# Patient Record
Sex: FEMALE | Race: WHITE | Employment: UNEMPLOYED | URBAN - METROPOLITAN AREA
[De-identification: names, ages, dates, MRNs, and addresses within clinical notes are randomized per-mention and may not be internally consistent; named-entity substitution may affect disease eponyms.]

---

## 2017-09-22 ENCOUNTER — HOSPITAL ENCOUNTER (OUTPATIENT)
Dept: WOUND CARE | Age: 67
Discharge: HOME | End: 2017-09-22
Payer: MEDICARE

## 2017-09-22 ENCOUNTER — HOSPITAL ENCOUNTER (OUTPATIENT)
Dept: GENERAL RADIOLOGY | Age: 67
Discharge: HOME | End: 2017-09-22
Payer: MEDICARE

## 2017-09-22 VITALS
BODY MASS INDEX: 42.59 KG/M2 | RESPIRATION RATE: 18 BRPM | TEMPERATURE: 97.6 F | SYSTOLIC BLOOD PRESSURE: 182 MMHG | DIASTOLIC BLOOD PRESSURE: 86 MMHG | HEIGHT: 66 IN | WEIGHT: 265 LBS | HEART RATE: 64 BPM

## 2017-09-22 DIAGNOSIS — E08.51 DIABETES MELLITUS DUE TO UNDERLYING CONDITION WITH DIABETIC PERIPHERAL ANGIOPATHY WITHOUT GANGRENE, WITHOUT LONG-TERM CURRENT USE OF INSULIN (HCC): Chronic | ICD-10-CM

## 2017-09-22 DIAGNOSIS — T81.32XA: Primary | ICD-10-CM

## 2017-09-22 DIAGNOSIS — M86.172 OSTEOMYELITIS OF ANKLE OR FOOT, LEFT, ACUTE (HCC): Chronic | ICD-10-CM

## 2017-09-22 DIAGNOSIS — T81.32XA: ICD-10-CM

## 2017-09-22 DIAGNOSIS — I73.9 PERIPHERAL ANGIOPATHY (HCC): Chronic | ICD-10-CM

## 2017-09-22 PROBLEM — T81.89XA NON-HEALING SURGICAL WOUND: Status: ACTIVE | Noted: 2017-09-22

## 2017-09-22 PROBLEM — E11.51: Chronic | Status: ACTIVE | Noted: 2017-09-22

## 2017-09-22 PROCEDURE — 87186 SC STD MICRODIL/AGAR DIL: CPT

## 2017-09-22 PROCEDURE — 87070 CULTURE OTHR SPECIMN AEROBIC: CPT

## 2017-09-22 PROCEDURE — 73620 X-RAY EXAM OF FOOT: CPT

## 2017-09-22 PROCEDURE — 99203 OFFICE O/P NEW LOW 30 MIN: CPT

## 2017-09-22 PROCEDURE — 11042 DBRDMT SUBQ TIS 1ST 20SQCM/<: CPT

## 2017-09-22 PROCEDURE — 87205 SMEAR GRAM STAIN: CPT

## 2017-09-22 PROCEDURE — 87077 CULTURE AEROBIC IDENTIFY: CPT

## 2017-09-22 RX ORDER — LEVOTHYROXINE SODIUM 0.12 MG/1
125 TABLET ORAL DAILY
COMMUNITY

## 2017-09-22 RX ORDER — VALSARTAN AND HYDROCHLOROTHIAZIDE 160; 25 MG/1; MG/1
1 TABLET ORAL DAILY
COMMUNITY
End: 2018-01-12

## 2017-09-22 RX ORDER — ALPRAZOLAM 0.5 MG/1
0.5 TABLET ORAL 3 TIMES DAILY
Status: ON HOLD | COMMUNITY
End: 2019-04-20 | Stop reason: HOSPADM

## 2017-09-22 RX ORDER — METOPROLOL SUCCINATE 100 MG/1
100 TABLET, EXTENDED RELEASE ORAL DAILY
COMMUNITY
End: 2022-06-09

## 2017-09-22 RX ORDER — INSULIN GLARGINE 300 U/ML
34 INJECTION, SOLUTION SUBCUTANEOUS NIGHTLY
Status: ON HOLD | COMMUNITY
End: 2019-04-19 | Stop reason: SDUPTHER

## 2017-09-22 RX ORDER — OXYCODONE HYDROCHLORIDE AND ACETAMINOPHEN 5; 325 MG/1; MG/1
1 TABLET ORAL EVERY 4 HOURS PRN
COMMUNITY
End: 2017-11-17

## 2017-09-22 RX ORDER — TRAZODONE HYDROCHLORIDE 150 MG/1
150 TABLET ORAL NIGHTLY
Status: ON HOLD | COMMUNITY
End: 2019-04-20 | Stop reason: HOSPADM

## 2017-09-24 LAB
GRAM STAIN RESULT: ABNORMAL
ORGANISM: ABNORMAL
ORGANISM: ABNORMAL
WOUND/ABSCESS: ABNORMAL

## 2017-09-26 RX ORDER — CIPROFLOXACIN 500 MG/1
500 TABLET, FILM COATED ORAL 2 TIMES DAILY
Qty: 20 TABLET | Refills: 0 | Status: SHIPPED | OUTPATIENT
Start: 2017-09-26 | End: 2017-10-06

## 2017-09-26 RX ORDER — GREEN TEA/HOODIA GORDONII 315-12.5MG
1 CAPSULE ORAL DAILY
Qty: 60 TABLET | Refills: 0 | Status: SHIPPED | OUTPATIENT
Start: 2017-09-26

## 2017-09-27 ENCOUNTER — TELEPHONE (OUTPATIENT)
Dept: WOUND CARE | Age: 67
End: 2017-09-27

## 2017-09-29 ENCOUNTER — HOSPITAL ENCOUNTER (OUTPATIENT)
Dept: WOUND CARE | Age: 67
Discharge: HOME | End: 2017-09-29
Payer: MEDICARE

## 2017-09-29 VITALS — HEART RATE: 65 BPM | SYSTOLIC BLOOD PRESSURE: 193 MMHG | DIASTOLIC BLOOD PRESSURE: 80 MMHG | TEMPERATURE: 97.4 F

## 2017-09-29 PROCEDURE — 11042 DBRDMT SUBQ TIS 1ST 20SQCM/<: CPT

## 2017-10-03 ENCOUNTER — HOSPITAL ENCOUNTER (OUTPATIENT)
Dept: MRI IMAGING | Age: 67
Discharge: HOME | End: 2017-10-03
Payer: MEDICARE

## 2017-10-03 ENCOUNTER — HOSPITAL ENCOUNTER (OUTPATIENT)
Dept: ULTRASOUND IMAGING | Age: 67
Discharge: HOME | End: 2017-10-03
Payer: MEDICARE

## 2017-10-03 DIAGNOSIS — T81.32XA: ICD-10-CM

## 2017-10-03 PROCEDURE — 93926 LOWER EXTREMITY STUDY: CPT

## 2017-10-03 PROCEDURE — 73718 MRI LOWER EXTREMITY W/O DYE: CPT

## 2017-10-06 ENCOUNTER — HOSPITAL ENCOUNTER (OUTPATIENT)
Dept: WOUND CARE | Age: 67
Discharge: HOME | End: 2017-10-06
Payer: MEDICARE

## 2017-10-06 VITALS
HEIGHT: 66 IN | HEART RATE: 67 BPM | BODY MASS INDEX: 44.2 KG/M2 | TEMPERATURE: 97.5 F | WEIGHT: 275 LBS | RESPIRATION RATE: 16 BRPM | DIASTOLIC BLOOD PRESSURE: 70 MMHG | SYSTOLIC BLOOD PRESSURE: 185 MMHG

## 2017-10-06 DIAGNOSIS — L97.425: Chronic | ICD-10-CM

## 2017-10-06 PROCEDURE — 11042 DBRDMT SUBQ TIS 1ST 20SQCM/<: CPT

## 2017-10-06 RX ORDER — OXYCODONE HYDROCHLORIDE AND ACETAMINOPHEN 5; 325 MG/1; MG/1
1 TABLET ORAL EVERY 6 HOURS PRN
Qty: 28 TABLET | Refills: 0 | Status: SHIPPED | OUTPATIENT
Start: 2017-10-06 | End: 2017-10-13

## 2017-10-06 ASSESSMENT — PAIN SCALES - GENERAL: PAINLEVEL_OUTOF10: 2

## 2017-10-06 ASSESSMENT — PAIN DESCRIPTION - LOCATION: LOCATION: FOOT

## 2017-10-06 ASSESSMENT — PAIN DESCRIPTION - ORIENTATION: ORIENTATION: LEFT

## 2017-10-06 ASSESSMENT — PAIN DESCRIPTION - DESCRIPTORS: DESCRIPTORS: SORE

## 2017-10-06 NOTE — PLAN OF CARE
Problem: Wound:  Goal: Will show signs of wound healing; wound closure and no evidence of infection  Will show signs of wound healing; wound closure and no evidence of infection  Outcome: Ongoing

## 2017-10-06 NOTE — PROGRESS NOTES
[address] [address]                                                   Progress Note and Procedure Note      Jl Johnston  MEDICAL RECORD NUMBER:  [id number]  AGE: 79 y.o. GENDER: female  : 1950  EPISODE DATE:  10/6/2017    Subjective:     Chief Complaint   Patient presents with    Wound Check     left foot         HISTORY of PRESENT ILLNESS HPI     Jl Johnston is a 79 y.o. female who presents today for wound/ulcer evaluation. History of Wound Context: Non-healing surgical site of the left foot secondary to neuroma excison. The wound is improving. She denies fever, chills, nausea, or vomiting. Admits to pain, reports the pain is so bad at night she has to take Percocet in order to sleep. MRI showed cutaneous defect with small underlying fluid collection near the distal 4th metatarsal without obvious cortical erosion or abnormal marrow signal, however fluid collection is not impressive to suggest an abscess. Arterial duplex showed no findings suggestive of arterial stenosis or occlusion, mild to moderately diminished waveforms noted distally, consistent with distal atherosclerotic disease. Wound/Ulcer Pain Timing/Severity: intermittent  Quality of pain: aching  Severity:  3 / 10   Modifying Factors: Pain worsens with walking  Associated Signs/Symptoms: edema, drainage and numbness    Ulcer Identification:  Ulcer Type: diabetic, non-healing surgical and neuropathic  Contributing Factors: edema, diabetes and decreased tissue oxygenation    Wound: N/A     Labs: Wound culture was positive for Ecoli and Klebsiella. She was placed on ten days of cipro and acidophilus      PAST MEDICAL HISTORY        Diagnosis Date    Bronchitis     Diabetes mellitus ([address]     Hypercholesterolemia     Hypertension     Thyroid disease        PAST SURGICAL HISTORY    Past Surgical History:   Procedure Laterality Date    APPENDECTOMY      HERNIA REPAIR      ORTHOPEDIC SURGERY Left 2017    broken left foot.  surgeries x3  TONSILLECTOMY N/A        FAMILY HISTORY    Family History   Problem Relation Age of Onset    Cancer Father        SOCIAL HISTORY    Social History   Substance Use Topics    Smoking status: Never Smoker    Smokeless tobacco: None    Alcohol use No       ALLERGIES    Allergies   Allergen Reactions    Sulfa Antibiotics Rash       MEDICATIONS    Current Outpatient Prescriptions on File Prior to Encounter   Medication Sig Dispense Refill    ciprofloxacin (CIPRO) 500 MG tablet Take 1 tablet by mouth 2 times daily for 10 days 20 tablet 0    Lactobacillus (PROBIOTIC ACIDOPHILUS) TABS Take 1 tablet by mouth daily 60 tablet 0    oxyCODONE-acetaminophen (PERCOCET) 5-325 MG per tablet Take 1 tablet by mouth every 4 hours as needed for Pain .  metoprolol succinate (TOPROL XL) 100 MG extended release tablet Take 100 mg by mouth daily      Multiple Vitamins-Minerals (CENTRUM SILVER PO) Take 1 tablet by mouth daily      levothyroxine (SYNTHROID) 125 MCG tablet Take 125 mcg by mouth Daily      valsartan-hydrochlorothiazide (DIOVAN-HCT) 160-25 MG per tablet Take 1 tablet by mouth daily      traZODone (DESYREL) 150 MG tablet Take 150 mg by mouth nightly      metFORMIN (GLUCOPHAGE) 1000 MG tablet Take 1,000 mg by mouth 2 times daily (with meals)      ALPRAZolam (XANAX) 0.5 MG tablet Take 0.5 mg by mouth three times daily      insulin glargine (TOUJEO SOLOSTAR) 300 UNIT/ML injection pen Inject 38 Units into the skin nightly        No current facility-administered medications on file prior to encounter. REVIEW OF SYSTEMS    Pertinent items are noted in HPI. Objective:      BP (!) 185/70  Pulse 67  Temp 97.5 °F (36.4 °C) (Oral)   Resp 16  Ht 5' 6\" (1.676 m)  Wt 275 lb (124.7 kg)  BMI 44.39 kg/m2    Wt Readings from Last 3 Encounters:   10/06/17 275 lb (124.7 kg)   09/22/17 265 lb (120.2 kg)       PHYSICAL EXAM    Constitutional:   Well nourished and well developed. Appears neat and clean.   Patient Debridement except measurements:    Wound 09/22/17 Surgical dehiscence Foot Left;Dorsal #1 (Active)   Wound Type Wound 10/6/2017  9:21 AM   Wound Other 10/6/2017  9:21 AM   Wound Cleansed Rinsed/Irrigated with saline 10/6/2017  9:21 AM   Wound Length (cm) 2.4 cm 10/6/2017  9:21 AM   Wound Width (cm) 0.4 cm 10/6/2017  9:21 AM   Wound Depth (cm)  1.7 10/6/2017  9:21 AM   Calculated Wound Size (cm^2) (l*w) 0.96 cm^2 10/6/2017  9:21 AM   Change in Wound Size % (l*w) 47.25 10/6/2017  9:21 AM   Distance Tunneling (cm) 1.6 cm 9/29/2017  9:11 AM   Tunneling Position ___ O'Clock 6 9/29/2017  9:11 AM   Undermining Starts ___ O'Clock 3 10/6/2017  9:21 AM   Undermining Ends___ O'Clock 5 10/6/2017  9:21 AM   Undermining Maxium Distance (cm) 1 10/6/2017  9:21 AM   Margins Defined edges 9/22/2017 11:19 AM   Exposed structure Tendon 10/6/2017  9:21 AM   Debby-wound Assessment Dry; Intact; Pink 10/6/2017  9:21 AM   Non-staged Wound Description Full thickness 10/6/2017  9:21 AM   [place]%Wound Bed 30 9/29/2017  9:11 AM   Red%Wound Bed 40 10/6/2017  9:21 AM   Yellow%Wound Bed 60 10/6/2017  9:21 AM   Drainage Amount Moderate 10/6/2017  9:21 AM   Drainage Description Yellow 10/6/2017  9:21 AM   Odor None 10/6/2017  9:21 AM   Op First Treatment Date 09/22/17 9/22/2017 11:19 AM   Number of days:13     Percent of Wound/Ulcer Debrided: 100%    Total Surface Area Debrided:  0.96 sq cm     Diabetic/Pressure/Non Pressure Ulcers:  Ulcer: Non-Pressure ulcer, fat layer exposed      Bleeding:  Minimal    Hemostasis Achieved:  by pressure    Procedural Pain:  3  / 10     Post Procedural Pain:  1 / 10     Response to treatment:  Well tolerated by patient. Plan:     Patient will be put back on wound vac for two weeks. If no improvement seen after two weeks, will discontinue wound vac. Plan for graft application. Another culture to be done prior to graft application.     Treatment Note please see attached Discharge Instructions    In my professional Doctor or go to the nearest emergency room. Keep next scheduled appointment. Joseline Aden give 24 hour notice if unable to keep appointment. [phone]      Patient Signature:_______________________   Michelle Kin  Nurse Signature_________________  Michelle Kin  Date: ___________ Time:  ____________            PLEASE NOTE: IF YOU ARE UNABLE TO OBTAIN WOUND SUPPLIES, CONTINUE TO USE THE SUPPLIES YOU HAVE AVAILABLE UNTIL YOU ARE ABLE TO REACH US. IT IS MOST IMPORTANT TO KEEP THE WOUND COVERED AT ALL TIMES.   Electronically signed by Milly Daly DPM on 10/6/2017 at 10:21 AM        Electronically signed by Milly Daly DPM on 10/6/2017 at 10:29 AM

## 2017-10-12 ENCOUNTER — TELEPHONE (OUTPATIENT)
Dept: WOUND CARE | Age: 67
End: 2017-10-12

## 2017-10-13 PROBLEM — L97.425: Chronic | Status: ACTIVE | Noted: 2017-10-13

## 2017-10-13 PROBLEM — M86.172 OSTEOMYELITIS OF ANKLE OR FOOT, LEFT, ACUTE (HCC): Chronic | Status: RESOLVED | Noted: 2017-09-22 | Resolved: 2017-10-13

## 2017-10-19 ENCOUNTER — TELEPHONE (OUTPATIENT)
Dept: WOUND CARE | Age: 67
End: 2017-10-19

## 2017-10-20 ENCOUNTER — HOSPITAL ENCOUNTER (OUTPATIENT)
Dept: WOUND CARE | Age: 67
Discharge: HOME | End: 2017-10-20
Payer: MEDICARE

## 2017-10-20 VITALS
BODY MASS INDEX: 44.2 KG/M2 | DIASTOLIC BLOOD PRESSURE: 86 MMHG | RESPIRATION RATE: 18 BRPM | SYSTOLIC BLOOD PRESSURE: 200 MMHG | WEIGHT: 275 LBS | HEIGHT: 66 IN | TEMPERATURE: 96.4 F | HEART RATE: 78 BPM

## 2017-10-20 PROCEDURE — 15275 SKIN SUB GRAFT FACE/NK/HF/G: CPT

## 2017-10-20 PROCEDURE — 97605 NEG PRS WND THER DME<=50SQCM: CPT

## 2017-10-20 ASSESSMENT — PAIN SCALES - GENERAL: PAINLEVEL_OUTOF10: 2

## 2017-10-20 ASSESSMENT — PAIN DESCRIPTION - PAIN TYPE: TYPE: ACUTE PAIN

## 2017-10-20 ASSESSMENT — PAIN DESCRIPTION - ORIENTATION: ORIENTATION: LEFT

## 2017-10-20 ASSESSMENT — PAIN DESCRIPTION - LOCATION: LOCATION: FOOT

## 2017-10-20 ASSESSMENT — PAIN DESCRIPTION - DESCRIPTORS: DESCRIPTORS: BURNING;THROBBING

## 2017-10-20 ASSESSMENT — PAIN DESCRIPTION - FREQUENCY: FREQUENCY: INTERMITTENT

## 2017-10-20 NOTE — PROGRESS NOTES
depression, anxiety, or agitation. Patient is calm, cooperative, and communicative. Appropriate interactions and affect. 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   Performed by: Patricia Paulino DPM    Wound/Ulcer #: 1    Post Debridement Measurements:  Wound/Ulcer Descriptions are Pre Debridement except measurements:    Wound 09/22/17 Surgical dehiscence Foot Left;Dorsal #1 (Active)   Wound Type Wound 10/20/2017 10:31 AM   Wound Other 10/20/2017 10:31 AM   Wound Cleansed Rinsed/Irrigated with saline 10/20/2017 10:31 AM   Wound Length (cm) 2 cm 10/20/2017 10:31 AM   Wound Width (cm) 0.4 cm 10/20/2017 10:31 AM   Wound Depth (cm)  1 10/20/2017 10:31 AM   Calculated Wound Size (cm^2) (l*w) 0.8 cm^2 10/20/2017 10:31 AM   Change in Wound Size % (l*w) 56.04 10/20/2017 10:31 AM   Distance Tunneling (cm) 2.8 cm 10/20/2017 10:31 AM   Tunneling Position ___ O'Clock 6 10/20/2017 10:31 AM   Undermining Starts ___ O'Clock 3 10/20/2017 10:31 AM   Undermining Ends___ O'Clock 5 10/20/2017 10:31 AM   Undermining Maxium Distance (cm) 1 10/20/2017 10:31 AM   Margins Defined edges 9/22/2017 11:19 AM   Exposed structure Tendon 10/20/2017 10:31 AM   Debby-wound Assessment Intact;Dry;Pink 10/20/2017 10:31 AM   Non-staged Wound Description Full thickness 10/20/2017 10:31 AM   [place]%Wound Bed 30 9/29/2017  9:11 AM   Red%Wound Bed 90 10/20/2017 10:31 AM   Yellow%Wound Bed 10 10/20/2017 10:31 AM   Drainage Amount Moderate 10/20/2017 10:31 AM   Drainage Description Serosanguinous 10/20/2017 10:31 AM   Odor None 10/6/2017  9:21 AM   Op First Treatment Date 09/22/17 9/22/2017 11:19 AM   Number of days: 28         Skin Substitue Procedure Note  Procedure:  Skin Substitute Application    Performed by: Hortensia Dickey DPM    Ulcer Type:non-healing surgical    Consent obtained: Yes    Time out taken: Yes    Product Utilized:         [] Apligraf   []44 sq/cm   []88 sq/cm    []132 sq/cm  []176  Sq/cm        [] Dermagraft   [] 38 sq/cm   [] 76 sq/cm             [] EpiFix      [] 1.54 sq/cm disc   #of pieces  [] 2 pieces (3.08 sq/cm)    [] 3 pieces (4.62 sq/cm)                                 [] 6 sq/cm    [] 8sq/cm (Mesh sheet 3.5 X 3.5)   [] 12 sq/cm (Mesh sheet 4 X 4.5)      [] PriMatrix   [] 9 sq/cm   []16 sq/cm    []25 sq/cm  []36 sq/cm         [] PuraPly AM   []4 sq/cm   []8 sq/cm    []25 sq/cm   []54sq/cm           [] Grafix Core  [] 1.5x2.0 sq/cm    [] 2.0x3.0cm               [x] TheraSkin      [x] 13 sq/cm    [] 39 sq/cm         Product Lot #:  [id number]  Product Expiration Date: 1/19/2022     0.9% Normal Saline: Lot # [id number]  0.9% Normal Saline: Expiration Date: 9/2018          Fenestrated: Yes    Mesher Utilized:  Yes      Skin Substitute was Applied to Ulcer Number(s):    Ulcer #: 1      Wound 09/22/17 Surgical dehiscence Foot Left;Dorsal #1 (Active)   Wound Type Wound 10/20/2017 10:31 AM   Wound Other 10/20/2017 10:31 AM   Wound Cleansed Rinsed/Irrigated with saline 10/20/2017 10:31 AM   Wound Length (cm) 2 cm 10/20/2017 10:31 AM   Wound Width (cm) 0.4 cm 10/20/2017 10:31 AM   Wound Depth (cm)  1 10/20/2017 10:31 AM   Calculated Wound Size (cm^2) (l*w) 0.8 cm^2 10/20/2017 10:31 AM   Change in Wound Size % (l*w) 56.04 10/20/2017 10:31 AM   Distance Tunneling (cm) 2.8 cm 10/20/2017 10:31 AM   Tunneling Position ___ O'Clock 6 10/20/2017 10:31 AM   Undermining Starts ___ O'Clock 3 10/20/2017 10:31 AM   Undermining Ends___ O'Clock 5 10/20/2017 10:31 AM   Undermining Maxium Distance (cm) 1 10/20/2017 10:31 AM   Margins Defined edges 9/22/2017 11:19 AM   Exposed structure Tendon 10/20/2017 10:31 AM   Debby-wound Assessment Intact;Dry;Pink 10/20/2017 10:31 AM   Non-staged Wound Description Full thickness 10/20/2017 10:31 AM   [place]%Wound Bed 30 9/29/2017  9:11 AM   Red%Wound Bed 90 10/20/2017 10:31 AM   Yellow%Wound Bed 10 10/20/2017 10:31 AM   Drainage Amount Moderate 10/20/2017 10:31 AM   Drainage Description Serosanguinous 10/20/2017 10:31 AM   Odor None 10/6/2017  9:21 AM   Op First Treatment Date 09/22/17 9/22/2017 11:19 AM   Number of days: 28       Diabetic/Pressure/Non Pressure Ulcers:  Ulcer: Non-Pressure ulcer, muscle necrosis\",      Total Surface Area of Ulcer(s) Covered 0.8 sq/cm    Was the Product Layered  No     Amount of Product Applied 8 sq/cm     Amount of Product Wasted 5 sq/cm     Reason for Waste Wound Size smaller then product size      Surgically Fixated: Yes    Secured With: Steri Strips     Procedural Pain: 3/10     Post Procedural Pain: 1 / 10    Response to Treatment:  Well tolerated by patient. Wound vac applied. Problem List Items Addressed This Visit     None      Visit Diagnoses    None. Plan:     Treatment Note please see attached Discharge Instructions    In my professional opinion this patient would benefit from HBO Therapy: No    Written patient dismissal instructions given to patient and signed by patient or POA. Discharge Instructions       Visit Discharge/Physician Orders:  [address]: [id number] [facility]      Wound Location:  Left Foot wound      Dressing orders: Theraskin GRAFT applied per Dr. Sandro Black today in wound center. Wound vac to remain in place until seen in wound center on Friday October 27, 2017. DO NOT REMOVE WOUND VAC OR DRESSING UNTIL SEEN IN WOUND CENTER.     TODAY IN WOUND CENTER APPLY WOUND VAC USING WHITE FOAM TUCKED OVER GRAFT IN WOUND BED, BLACK FOAM OVER WHITE FOAM, 75MMHg CONTINUOUS PRESSURE. Compression:  NONE    Other Instructions:        Keep all dressings clean & dry. Do not shower, take baths or get wound wet, unless otherwise instructed by your Wound Care doctor.      Follow up visit   1 Weeks        For Diabetic patients keep blood sugars below 150 for optimal wound healing.      If you experience any of the following, please call the Wound Care Service during business hours: [phone]      *Increase in pain                   *Temperature over 101                     *Increase in drainage from your wound or a foul odor  *Uncontrolled swelling                      *Need for compression bandage changes due to slippage, breakthrough drainage      If you need medical attention outside of business hours, please contact your Primary Care Doctor or go to the nearest emergency room. Keep next scheduled appointment. Adrienne Nguyen give 24 hour notice if unable to keep appointment. [phone]      Patient Signature:_______________________   Stoney Lower  Nurse Signature_________________  Stoney Lower  Date: ___________ Time:  ____________            PLEASE NOTE: IF YOU ARE UNABLE TO OBTAIN WOUND SUPPLIES, CONTINUE TO USE THE SUPPLIES YOU HAVE AVAILABLE UNTIL YOU ARE ABLE TO REACH US. IT IS MOST IMPORTANT TO KEEP THE WOUND COVERED AT ALL TIMES.   Electronically signed by Rosita Silverio DPM on 10/20/2017 at 11:29 AM               Electronically signed by Rosita Silverio DPM on 10/20/2017 at 11:31 AM

## 2017-10-27 ENCOUNTER — HOSPITAL ENCOUNTER (OUTPATIENT)
Dept: WOUND CARE | Age: 67
Discharge: HOME | End: 2017-10-27
Payer: MEDICARE

## 2017-10-27 VITALS
TEMPERATURE: 96.2 F | HEART RATE: 74 BPM | RESPIRATION RATE: 18 BRPM | DIASTOLIC BLOOD PRESSURE: 82 MMHG | SYSTOLIC BLOOD PRESSURE: 202 MMHG

## 2017-10-27 PROCEDURE — 99213 OFFICE O/P EST LOW 20 MIN: CPT

## 2017-10-27 ASSESSMENT — PAIN DESCRIPTION - DESCRIPTORS: DESCRIPTORS: DULL

## 2017-10-27 ASSESSMENT — PAIN DESCRIPTION - LOCATION: LOCATION: FOOT

## 2017-10-27 ASSESSMENT — PAIN SCALES - GENERAL: PAINLEVEL_OUTOF10: 4

## 2017-10-27 ASSESSMENT — PAIN DESCRIPTION - FREQUENCY: FREQUENCY: CONTINUOUS

## 2017-10-27 NOTE — PROGRESS NOTES
[address] [address]                                                   Progress Note and Procedure Note      Mendoza Gregory  MEDICAL RECORD NUMBER:  [id number]  AGE: 79 y.o. GENDER: female  : 1950  EPISODE DATE:  10/27/2017    Subjective:     Chief Complaint   Patient presents with    Wound Check     LLE wound recheck         HISTORY of PRESENT ILLNESS HPI     Mendoza Gregory is a 79 y.o. female who presents today for wound/ulcer evaluation. History of Wound Context: Non-healing surgical site of the left foot secondary to neuroma excison. She is status post one week theraskin application. She did have and issue with the wound vac with drape skin irritation. THe vac was removed and silver cell was applied. Quality of pain: aching  Severity:  3 / 10   Modifying Factors: Pain worsens with walking  Associated Signs/Symptoms: edema, drainage and numbness    Ulcer Identification:  Ulcer Type: diabetic, non-healing surgical and neuropathic  Contributing Factors: edema, diabetes and decreased tissue oxygenation    Wound: N/A     Labs: Wound culture was positive for Ecoli and Klebsiella. She was placed on ten days of cipro and acidophilus      PAST MEDICAL HISTORY        Diagnosis Date    Bronchitis     Diabetes mellitus ([address]     Hypercholesterolemia     Hypertension     Thyroid disease        PAST SURGICAL HISTORY    Past Surgical History:   Procedure Laterality Date    APPENDECTOMY      HERNIA REPAIR      ORTHOPEDIC SURGERY Left 2017    broken left foot.  surgeries x3    TONSILLECTOMY N/A        FAMILY HISTORY    Family History   Problem Relation Age of Onset    Cancer Father        SOCIAL HISTORY    Social History   Substance Use Topics    Smoking status: Never Smoker    Smokeless tobacco: Not on file    Alcohol use No       ALLERGIES    Allergies   Allergen Reactions    Sulfa Antibiotics Rash       MEDICATIONS    Current Outpatient Prescriptions on File Prior to Encounter   Medication Sig Dispense Refill    Lactobacillus (PROBIOTIC ACIDOPHILUS) TABS Take 1 tablet by mouth daily 60 tablet 0    oxyCODONE-acetaminophen (PERCOCET) 5-325 MG per tablet Take 1 tablet by mouth every 4 hours as needed for Pain .  metoprolol succinate (TOPROL XL) 100 MG extended release tablet Take 100 mg by mouth daily      Multiple Vitamins-Minerals (CENTRUM SILVER PO) Take 1 tablet by mouth daily      levothyroxine (SYNTHROID) 125 MCG tablet Take 125 mcg by mouth Daily      valsartan-hydrochlorothiazide (DIOVAN-HCT) 160-25 MG per tablet Take 1 tablet by mouth daily      traZODone (DESYREL) 150 MG tablet Take 150 mg by mouth nightly      metFORMIN (GLUCOPHAGE) 1000 MG tablet Take 1,000 mg by mouth 2 times daily (with meals)      ALPRAZolam (XANAX) 0.5 MG tablet Take 0.5 mg by mouth three times daily      insulin glargine (TOUJEO SOLOSTAR) 300 UNIT/ML injection pen Inject 38 Units into the skin nightly        No current facility-administered medications on file prior to encounter. REVIEW OF SYSTEMS    Pertinent items are noted in HPI. Objective:      BP (!) 202/82   Pulse 74   Temp 96.2 °F (35.7 °C) (Oral)   Resp 18     Wt Readings from Last 3 Encounters:   10/20/17 275 lb (124.7 kg)   10/06/17 275 lb (124.7 kg)   09/22/17 265 lb (120.2 kg)       PHYSICAL EXAM    Constitutional:   Well nourished and well developed. Appears neat and clean. Patient is alert, oriented x3, and in no apparent distress. Respiratory:  Respiratory effort is easy and symmetric bilaterally. Rate is normal at rest and on room air. Vascular:  Pedal Pulses is not palpable and audible with doppler. Capillary refill is <3 sec to digits bilateral.  Extremities positivefor 2+ pitting edema. Neurological:   Sensation is diminished to lower extremities. Dermatological:  Wound description noted in wound assessment. The wound is improving in depth and the theraskin adhered in the base of the wound.       Psychiatric:  Judgement and insight intact. Short and long term memory intact. No evidence of depression, anxiety, or agitation. Patient is calm, cooperative, and communicative. Appropriate interactions and affect. 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 Arterial duplex results consistent with distal atherosclerotic disease. Patient will be put back on wound vac for two weeks. If no improvement seen after two weeks, will discontinue wound vac. Plan for graft application. Another culture to be done prior to graft application. Treatment Note please see attached Discharge Instructions    In my professional opinion this patient would benefit from HBO Therapy: No    Written patient dismissal instructions given to patient and signed by patient or POA. Discharge Instructions       Visit Discharge/Physician Orders:  [address]: [id number] [facility]      Wound Location:  Left Foot wound      Dressing orders: Discontinue wound vac  1. Cleanse wound with normal saline  2. Gently pack wound with silvercel. 3. Cover with gauze, secure with nikolas or kerlix  4. Change dressing every day if draining through, change every other day if not draining through dressing. Compression:  NONE     Other Instructions:  may apply cortisone cream to rash area - do not get in wound, PATIENT MAY AMBULATE AS TOLERATED.     Keep all dressings clean & dry.  Do not shower, take baths or get wound wet, unless otherwise instructed by your Wound Care doctor.      Follow up visit   1 Week  NOVEMBER 3, 2017 AT 11:15      For Diabetic patients

## 2017-11-03 ENCOUNTER — HOSPITAL ENCOUNTER (OUTPATIENT)
Dept: WOUND CARE | Age: 67
Discharge: HOME | End: 2017-11-03
Payer: MEDICARE

## 2017-11-03 VITALS
TEMPERATURE: 97 F | HEART RATE: 74 BPM | RESPIRATION RATE: 18 BRPM | SYSTOLIC BLOOD PRESSURE: 153 MMHG | DIASTOLIC BLOOD PRESSURE: 80 MMHG

## 2017-11-03 DIAGNOSIS — E11.40 NEUROPATHY DUE TO TYPE 2 DIABETES MELLITUS (HCC): Primary | ICD-10-CM

## 2017-11-03 DIAGNOSIS — G90.521 COMPLEX REGIONAL PAIN SYNDROME TYPE 1 OF RIGHT LOWER EXTREMITY: ICD-10-CM

## 2017-11-03 DIAGNOSIS — T81.89XD NON-HEALING SURGICAL WOUND, SUBSEQUENT ENCOUNTER: ICD-10-CM

## 2017-11-03 DIAGNOSIS — I73.9 PERIPHERAL ANGIOPATHY (HCC): Chronic | ICD-10-CM

## 2017-11-03 DIAGNOSIS — E11.51 TYPE 2 DIABETES MELLITUS WITH DIABETIC PERIPHERAL ANGIOPATHY WITHOUT GANGRENE, WITHOUT LONG-TERM CURRENT USE OF INSULIN (HCC): Chronic | ICD-10-CM

## 2017-11-03 DIAGNOSIS — L97.425: Chronic | ICD-10-CM

## 2017-11-03 DIAGNOSIS — E08.43 DIABETIC AUTONOMIC NEUROPATHY ASSOCIATED WITH DIABETES MELLITUS DUE TO UNDERLYING CONDITION (HCC): Chronic | ICD-10-CM

## 2017-11-03 PROBLEM — E08.40 DIABETIC NEUROPATHY ASSOCIATED WITH DIABETES MELLITUS DUE TO UNDERLYING CONDITION (HCC): Chronic | Status: ACTIVE | Noted: 2017-11-03

## 2017-11-03 PROCEDURE — 11042 DBRDMT SUBQ TIS 1ST 20SQCM/<: CPT

## 2017-11-03 RX ORDER — LIDOCAINE 50 MG/G
1 PATCH TOPICAL DAILY
Qty: 30 PATCH | Refills: 0 | Status: SHIPPED | OUTPATIENT
Start: 2017-11-03 | End: 2017-12-15

## 2017-11-03 RX ORDER — NITROFURANTOIN 25; 75 MG/1; MG/1
100 CAPSULE ORAL 2 TIMES DAILY
COMMUNITY
Start: 2017-11-02 | End: 2017-11-09

## 2017-11-03 NOTE — PROGRESS NOTES
[address]                                                   Progress Note and Procedure Note      Abdirizak Brody  MEDICAL RECORD NUMBER:  [id number]  AGE: 79 y.o. GENDER: female  : 1950  EPISODE DATE:  11/3/2017    Subjective:     Chief Complaint   Patient presents with    Wound Check     left foot wound         HISTORY of PRESENT ILLNESS HPI     Abdirizak Brody is a 79 y.o. female who presents today for wound/ulcer evaluation. History of Wound Context: Non-healing surgical site of the left foot secondary to neuroma excison. She is status post two week theraskin application. Qualitlity of pain:Sharp only on the skin surface. She states it is an 9/10 but has no pain when debrided. Severity:  3 / 10   Modifying Factors: Pain worsens with walking  Associated Signs/Symptoms: edema, drainage and numbness    Ulcer Identification:  Ulcer Type: diabetic, non-healing surgical and neuropathic  Contributing Factors: edema, diabetes and decreased tissue oxygenation    Wound: N/A           Diagnosis Date    Bronchitis     Diabetes mellitus ([address]     Hypercholesterolemia     Hypertension     Thyroid disease        PAST SURGICAL HISTORY    Past Surgical History:   Procedure Laterality Date    APPENDECTOMY      HERNIA REPAIR      ORTHOPEDIC SURGERY Left 2017    broken left foot.  surgeries x3    TONSILLECTOMY N/A        FAMILY HISTORY    Family History   Problem Relation Age of Onset    Cancer Father        SOCIAL HISTORY    Social History   Substance Use Topics    Smoking status: Never Smoker    Smokeless tobacco: Not on file    Alcohol use No       ALLERGIES    Allergies   Allergen Reactions    Sulfa Antibiotics Rash       MEDICATIONS    Current Outpatient Prescriptions on File Prior to Encounter   Medication Sig Dispense Refill    Lactobacillus (PROBIOTIC ACIDOPHILUS) TABS Take 1 tablet by mouth daily 60 tablet 0    metoprolol succinate (TOPROL XL) 100 MG extended release tablet Take 100 mg by mouth daily      Multiple Vitamins-Minerals (CENTRUM SILVER PO) Take 1 tablet by mouth daily      levothyroxine (SYNTHROID) 125 MCG tablet Take 125 mcg by mouth Daily      valsartan-hydrochlorothiazide (DIOVAN-HCT) 160-25 MG per tablet Take 1 tablet by mouth daily      traZODone (DESYREL) 150 MG tablet Take 150 mg by mouth nightly      metFORMIN (GLUCOPHAGE) 1000 MG tablet Take 1,000 mg by mouth 2 times daily (with meals)      insulin glargine (TOUJEO SOLOSTAR) 300 UNIT/ML injection pen Inject 38 Units into the skin nightly       oxyCODONE-acetaminophen (PERCOCET) 5-325 MG per tablet Take 1 tablet by mouth every 4 hours as needed for Pain .  ALPRAZolam (XANAX) 0.5 MG tablet Take 0.5 mg by mouth three times daily       No current facility-administered medications on file prior to encounter. REVIEW OF SYSTEMS    Pertinent items are noted in HPI. Objective:      BP (!) 153/80   Pulse 74   Temp 97 °F (36.1 °C) (Oral)   Resp 18     Wt Readings from Last 3 Encounters:   10/20/17 275 lb (124.7 kg)   10/06/17 275 lb (124.7 kg)   09/22/17 265 lb (120.2 kg)       PHYSICAL EXAM    Constitutional:   Well nourished and well developed. Appears neat and clean. Patient is alert, oriented x3, and in no apparent distress. Respiratory:  Respiratory effort is easy and symmetric bilaterally. Rate is normal at rest and on room air. Vascular:  Pedal Pulses is not palpable and audible with doppler. Capillary refill is <3 sec to digits bilateral.  Extremities positivefor 2+ pitting edema. Neurological:   Sensation is diminished to lower extremities. Dermatological:  Wound description noted in wound assessment. The wound is improving in depth and the theraskin is well adhered and there is only one tunnel laterally to 1.7cm depth. The kinza skin is well healed without any redness or blistering. I did offer her a lidocaine patch RX which she will try until pain management.       Psychiatric:  Judgement debridement is required to promote healing and evaluate the wound base. Performed by: Rosemary Adler DPM    Consent obtained:  Yes    Time out taken:  Yes    Pain Control: Anesthetic  Anesthetic: None       Debridement:Excisional Debridement    Using curette the wound(s)/ulcer(s) was/were sharply debrided down through and including the removal of subcutaneous tissue. Devitalized Tissue Debrided:  slough    Pre Debridement Measurements:  Are located in the [facility]  Documentation Flow Sheet    Wound/Ulcer #: 1    Post Debridement Measurements:  Wound/Ulcer Descriptions are Pre Debridement except measurements:    Wound 09/22/17 Surgical dehiscence Foot Left;Dorsal #1 (Active)   Wound Type Wound 11/3/2017 11:32 AM   Wound Other 11/3/2017 11:32 AM   Wound Cleansed Rinsed/Irrigated with saline 11/3/2017 12:15 PM   Wound Length (cm) 1.7 cm 11/3/2017 11:32 AM   Wound Width (cm) 0.5 cm 11/3/2017 11:32 AM   Wound Depth (cm)  1.7 11/3/2017 11:32 AM   Calculated Wound Size (cm^2) (l*w) 0.85 cm^2 11/3/2017 11:32 AM   Change in Wound Size % (l*w) 53.3 11/3/2017 11:32 AM   Distance Tunneling (cm) 2.8 cm 10/20/2017 10:31 AM   Tunneling Position ___ O'Clock 6 10/20/2017 10:31 AM   Undermining Starts ___ O'Clock 3 10/20/2017 10:31 AM   Undermining Ends___ O'Clock 5 10/20/2017 10:31 AM   Undermining Maxium Distance (cm) 1 10/20/2017 10:31 AM   Margins Defined edges 9/22/2017 11:19 AM   Exposed structure Tendon 10/20/2017 10:31 AM   Debby-wound Assessment Dry;Pink; Intact 11/3/2017 11:32 AM   Non-staged Wound Description Full thickness 10/20/2017 10:31 AM   [place]%Wound Bed 20 11/3/2017 11:32 AM   Red%Wound Bed 90 10/20/2017 10:31 AM   Yellow%Wound Bed 80 11/3/2017 11:32 AM   Drainage Amount Moderate 11/3/2017 11:32 AM   Drainage Description Yellow 11/3/2017 11:32 AM   Odor None 11/3/2017 11:32 AM   Op First Treatment Date 09/22/17 9/22/2017 11:19 AM   Number of days: 42       Percent of Wound/Ulcer Debrided: 100%    Total Surface Area Debrided:  0.85 sq cm     Diabetic/Pressure/Non Pressure Ulcers:  Ulcer: Non-Pressure ulcer, fat layer exposed      Bleeding:  Minimal    Hemostasis Achieved:  by pressure    Procedural Pain:  0  / 10     Post Procedural Pain:  0 / 10     Response to treatment:  Well tolerated by patient. 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 Discharge Instructions       Visit Discharge/Physician Orders:  [address]: [id number] [facility]      Wound Location:  Left Foot wound      Dressing orders: Discontinue wound vac  1. Cleanse wound with normal saline  2. Gently pack wound with Ashlie or equivalent. 3. Cover with SilverCel NA, then gauze, secure with nikolas or kerlix  4. Change dressing every day if draining through, change every other day if not draining through dressing.     Compression:  NONE     Other Instructions: Dr. Cathryn Ruvalcaba to refer to pain management,  may apply cortisone cream to rash area - do not get in wound, PATIENT MAY AMBULATE AS TOLERATED.     Keep all dressings clean & dry.  Do not shower, take baths or get wound wet, unless otherwise instructed by your Wound Care doctor.      Follow up visit   2 Weeks      For Diabetic patients keep blood sugars below 150 for optimal wound healing.      If you experience any of the following, please call the Wound Care Service during business hours: [phone]      *Increase in pain                   *Temperature over 101                     *Increase in drainage from your wound or a foul odor  *Uncontrolled swelling                      *Need for compression bandage changes due to slippage, breakthrough drainage      If you need medical attention outside of business hours, please contact your Primary Care Doctor or go to the nearest emergency room. Keep next scheduled appointment. Myrtie Siemens give 24 hour notice if unable to keep appointment. [phone]      Patient Signature:_______________________   [facility]  Nurse Signature_________________  [facility]  Date: ___________ Time:  ____________            PLEASE NOTE: IF YOU ARE UNABLE TO OBTAIN WOUND SUPPLIES, CONTINUE TO USE THE SUPPLIES YOU HAVE AVAILABLE UNTIL YOU ARE ABLE TO REACH US. IT IS MOST IMPORTANT TO KEEP THE WOUND COVERED AT ALL TIMES.   Electronically signed by Ramez Urena DPM on 11/3/2017 at 12:04 PM            Electronically signed by Ramez Urena DPM on 11/3/2017 at 12:11 PM

## 2017-11-03 NOTE — CODE DOCUMENTATION
[id number] Phys[address] Billing Sheet. Lupe Blas  AGE: 79 y.o.    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

## 2017-11-17 ENCOUNTER — HOSPITAL ENCOUNTER (OUTPATIENT)
Dept: WOUND CARE | Age: 67
Discharge: HOME | End: 2017-11-17
Payer: MEDICARE

## 2017-11-17 VITALS — SYSTOLIC BLOOD PRESSURE: 152 MMHG | DIASTOLIC BLOOD PRESSURE: 78 MMHG

## 2017-11-17 PROCEDURE — 99213 OFFICE O/P EST LOW 20 MIN: CPT

## 2017-11-17 PROCEDURE — 87070 CULTURE OTHR SPECIMN AEROBIC: CPT

## 2017-11-17 PROCEDURE — 87205 SMEAR GRAM STAIN: CPT

## 2017-11-17 RX ORDER — GABAPENTIN 300 MG/1
300 CAPSULE ORAL DAILY
COMMUNITY
End: 2018-02-09

## 2017-11-17 RX ORDER — AMOXICILLIN 875 MG/1
875 TABLET, COATED ORAL 2 TIMES DAILY
COMMUNITY
End: 2017-12-01

## 2017-11-17 RX ORDER — GLIPIZIDE 10 MG/1
10 TABLET ORAL
Status: ON HOLD | COMMUNITY
End: 2019-04-19 | Stop reason: HOSPADM

## 2017-11-17 NOTE — PROGRESS NOTES
(PROBIOTIC ACIDOPHILUS) TABS Take 1 tablet by mouth daily 60 tablet 0    metoprolol succinate (TOPROL XL) 100 MG extended release tablet Take 100 mg by mouth daily      Multiple Vitamins-Minerals (CENTRUM SILVER PO) Take 1 tablet by mouth daily      levothyroxine (SYNTHROID) 125 MCG tablet Take 125 mcg by mouth Daily      valsartan-hydrochlorothiazide (DIOVAN-HCT) 160-25 MG per tablet Take 1 tablet by mouth daily      traZODone (DESYREL) 150 MG tablet Take 150 mg by mouth nightly      metFORMIN (GLUCOPHAGE) 1000 MG tablet Take 1,000 mg by mouth 2 times daily (with meals)      ALPRAZolam (XANAX) 0.5 MG tablet Take 0.5 mg by mouth three times daily      insulin glargine (TOUJEO SOLOSTAR) 300 UNIT/ML injection pen Inject 38 Units into the skin nightly        No current facility-administered medications on file prior to encounter. REVIEW OF SYSTEMS    Pertinent items are noted in HPI. Objective:      BP (!) 152/78     Wt Readings from Last 3 Encounters:   10/20/17 275 lb (124.7 kg)   10/06/17 275 lb (124.7 kg)   09/22/17 265 lb (120.2 kg)       PHYSICAL EXAM    Constitutional:   Well nourished and well developed. Appears neat and clean. Patient is alert, oriented x3, and in no apparent distress. Respiratory:  Respiratory effort is easy and symmetric bilaterally. Rate is normal at rest and on room air. Vascular:  Pedal Pulses is not palpable and audible with doppler. Capillary refill is <3 sec to digits bilateral.  Extremities positivefor 2+ pitting edema. Neurological:   Sensation is diminished to lower extremities. Dermatological:  Wound description noted in wound assessment. The wound is improving however she still has a small 2cm tunnel at 12:00 . A culture was taken. Psychiatric:  Judgement and insight intact. Short and long term memory intact. No evidence of depression, anxiety, or agitation. Patient is calm, cooperative, and communicative.   Appropriate interactions and affect. 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 Discharge Instructions       Visit Discharge/Physician Orders:  [address]: [id number] [facility]      Wound Location:  Left Foot wound      Dressing orders:   1. Cleanse wound with normal saline  2. Gently pack wound with Ashlie or equivalent. 3. Gently pack with HydroFeraBlueReady, then gauze, secure with nikolas or kerlix  4. Change dressing every day if draining through, change every other day if not draining through dressing.     Compression:  NONE     Other Instructions: may apply cortisone cream to rash area - do not get in wound, PATIENT MAY AMBULATE AS TOLERATED.     Keep all dressings clean & dry.  Do not shower, take baths or get wound wet, unless otherwise instructed by your Wound Care doctor.      Follow up visit   2 Weeks      For Diabetic patien ts keep blood sugars below 150 for optimal wound healing.      If you experience any of the following, please call the Wound Care Service during business hours: [phone]      *Increase in pain   *Temperature over 101   *Increase in drainage from your wound or a foul odor  *Uncontrolled swelling    *Need for compression bandage changes due to slippage, breakthrough drainage      If you need medical attention outside of

## 2017-11-17 NOTE — CODE DOCUMENTATION
[address] Phys[address] Billing Sheet. Lupe Blas  AGE: 79 y.o.    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

## 2017-11-19 LAB
GRAM STAIN RESULT: NORMAL
WOUND/ABSCESS: NORMAL

## 2017-12-01 ENCOUNTER — HOSPITAL ENCOUNTER (OUTPATIENT)
Dept: WOUND CARE | Age: 67
Discharge: HOME | End: 2017-12-01
Payer: MEDICARE

## 2017-12-01 VITALS
TEMPERATURE: 96.6 F | RESPIRATION RATE: 18 BRPM | SYSTOLIC BLOOD PRESSURE: 159 MMHG | HEART RATE: 73 BPM | DIASTOLIC BLOOD PRESSURE: 72 MMHG

## 2017-12-01 PROCEDURE — 11042 DBRDMT SUBQ TIS 1ST 20SQCM/<: CPT

## 2017-12-01 NOTE — PROGRESS NOTES
[address]                                                   Progress Note and Procedure Note      Caesar Warner  MEDICAL RECORD NUMBER:  [id number]  AGE: 79 y.o. GENDER: female  : 1950  EPISODE DATE:  2017    Subjective:     Chief Complaint   Patient presents with    Wound Check     left dorsal foot         HISTORY of PRESENT ILLNESS HPI     Caesar Warner is a 79 y.o. female who presents today for wound/ulcer evaluation. History of Wound Context: Non-healing surgical wound of the left foot. Wound/Ulcer Pain Timing/Severity: none  Quality of pain: N/A  Severity:  1 / 10   Modifying Factors: None  Associated Signs/Symptoms: edema    Ulcer Identification:  Ulcer Type: diabetic and non-healing/non-surgical  Contributing Factors: edema, venous stasis, diabetes, poor glucose control, decreased mobility and obesity    Wound: Internal surgical dehiscence        PAST MEDICAL HISTORY        Diagnosis Date    Bronchitis     Diabetes mellitus ([address]     Hypercholesterolemia     Hypertension     Thyroid disease        PAST SURGICAL HISTORY    Past Surgical History:   Procedure Laterality Date    APPENDECTOMY      HERNIA REPAIR      ORTHOPEDIC SURGERY Left 2017    broken left foot. surgeries x3    TONSILLECTOMY N/A        FAMILY HISTORY    Family History   Problem Relation Age of Onset    Cancer Father        SOCIAL HISTORY    Social History   Substance Use Topics    Smoking status: Never Smoker    Smokeless tobacco: Not on file    Alcohol use No       ALLERGIES    Allergies   Allergen Reactions    Sulfa Antibiotics Rash       MEDICATIONS    Current Outpatient Prescriptions on File Prior to Encounter   Medication Sig Dispense Refill    glipiZIDE (GLUCOTROL) 10 MG tablet Take 10 mg by mouth 2 times daily (before meals)      gabapentin (NEURONTIN) 300 MG capsule Take 300 mg by mouth daily      lidocaine (LIDODERM) 5 % Place 1 patch onto the skin daily 12 hours on, 12 hours off.  Switch area Wound Size (cm^2) (l*w) 0.22 cm^2 12/1/2017  9:10 AM   Change in Wound Size % (l*w) 87.91 12/1/2017  9:10 AM   Distance Tunneling (cm) 2 cm 11/17/2017  9:26 AM   Tunneling Position ___ O'Clock 12 11/17/2017  9:26 AM   Undermining Starts ___ O'Clock 3 10/20/2017 10:31 AM   Undermining Ends___ O'Clock 5 10/20/2017 10:31 AM   Undermining Maxium Distance (cm) 1 10/20/2017 10:31 AM   Drainage Amount None 12/1/2017  9:10 AM   Drainage Description Yellow;Serous 11/17/2017  9:17 AM   Odor None 11/3/2017 11:32 AM   Margins Defined edges 9/22/2017 11:19 AM   Exposed structure Tendon 10/20/2017 10:31 AM   Debby-wound Assessment Dry; Intact; Pink 11/17/2017  9:17 AM   Non-staged Wound Description Full thickness 10/20/2017 10:31 AM   [place]%Wound Bed 30 12/1/2017  9:10 AM   Red%Wound Bed 90 10/20/2017 10:31 AM   Yellow%Wound Bed 5 11/17/2017  9:17 AM   Other%Wound Bed 70% white 12/1/2017  9:10 AM   Op First Treatment Date 09/22/17 9/22/2017 11:19 AM   Number of days: 69       Percent of Wound/Ulcer Debrided: 100%    Total Surface Area Debrided:  0.22 sq cm     Diabetic/Pressure/Non Pressure Ulcers:  Ulcer: Non-Pressure ulcer, fat layer exposed      Bleeding:  Minimal    Hemostasis Achieved:  by pressure    Procedural Pain:  0  / 10     Post Procedural Pain:  0 / 10     Response to treatment:  Well tolerated by patient. Plan:     Treatment Note please see attached Discharge Instructions    In my professional opinion this patient would benefit from HBO Therapy: No    Written patient dismissal instructions given to patient and signed by patient or POA. Discharge Instructions       Visit Discharge/Physician Orders:  [address]: [id number] [facility]      Wound Location:  Left Foot wound      Dressing orders:   1. Cleanse wound with normal saline  2. Gently pack wound with Ashlie or equivalent. (Pack into deep area)   3. Gently pack with HydroFeraBlueReady, then gauze, secure with nikolas or kerlix  4.  Change dressing every day if draining through, change every other day if not draining through dressing.     Compression:  NONE     Other Instructions: may apply cortisone cream to rash area - do not get in wound, PATIENT MAY AMBULATE AS TOLERATED.     Keep all dressings clean & dry. Do not shower, take baths or get wound wet, unless otherwise instructed by your Wound Care doctor.      Follow up visit   2 Weeks  December 15, 2017 at   9:15      For Diabetic patien ts keep blood sugars below 150 for optimal wound healing.      If you experience any of the following, please call the Wound Care Service during business hours: [phone]    *Increase in pain   *Temperature over 101   *Increase in drainage from your wound or a foul odor   *Uncontrolled swelling    *Need for compression bandage changes due to slippage, breakthrough drainage      If you need medical attention outside of business hours, please contact your Primary Care Doctor or go to the nearest emergency room. Keep next scheduled appointment. Antonino Slater give 24 hour notice if unable to keep appointment. [phone]      Patient Signature:_______________________   Karen Levi  Nurse Signature_________________  Karen Levi  Date: ___________ Time:  ____________            PLEASE NOTE: IF YOU ARE UNABLE TO OBTAIN WOUND SUPPLIES, CONTINUE TO USE THE SUPPLIES YOU HAVE AVAILABLE UNTIL YOU ARE ABLE TO REACH US. IT IS MOST IMPORTANT TO KEEP THE WOUND COVERED AT ALL TIMES.   Electronically signed by Rosemary Adler DPM on 12/1/2017 at 10:05 AM          Electronically signed by Rosemary Adler DPM on 12/1/2017 at 10:06 AM

## 2017-12-01 NOTE — CODE DOCUMENTATION
[address] Physician Billing Sheet. Lupe Blas  AGE: 79 y.o.    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

## 2017-12-15 ENCOUNTER — HOSPITAL ENCOUNTER (OUTPATIENT)
Dept: WOUND CARE | Age: 67
Discharge: HOME | End: 2017-12-15
Payer: MEDICARE

## 2017-12-15 VITALS
SYSTOLIC BLOOD PRESSURE: 182 MMHG | DIASTOLIC BLOOD PRESSURE: 83 MMHG | HEART RATE: 67 BPM | TEMPERATURE: 96.2 F | RESPIRATION RATE: 18 BRPM

## 2017-12-15 PROCEDURE — 11042 DBRDMT SUBQ TIS 1ST 20SQCM/<: CPT

## 2017-12-15 NOTE — PROGRESS NOTES
Wound Description Full thickness 10/20/2017 10:31 AM   [place]%Wound Bed 100 12/15/2017  9:12 AM   Red%Wound Bed 90 10/20/2017 10:31 AM   Yellow%Wound Bed 5 11/17/2017  9:17 AM   Other%Wound Bed 70% white 12/1/2017  9:10 AM   Op First Treatment Date 09/22/17 9/22/2017 11:19 AM   Number of days: 83       Performed by: Bo Jansen DPM    Consent obtained:  Yes    Time out taken:  Yes    Pain Control: Anesthetic  Anesthetic: None       Debridement:Excisional Debridement    Using curette the wound(s)/ulcer(s) was/were sharply debrided down through and including the removal of subcutaneous tissue. Devitalized Tissue Debrided:  slough    Pre Debridement Measurements:  Are located in the [place]  Documentation Flow Sheet    Wound/Ulcer #: 1    Post Debridement Measurements:  Wound/Ulcer Descriptions are Pre Debridement except measurements:    Wound 09/22/17 Surgical dehiscence Foot Left;Dorsal #1 (Active)   Wound Type Wound 11/3/2017 11:32 AM   Wound Other 11/3/2017 11:32 AM   Wound Cleansed Rinsed/Irrigated with saline 11/3/2017 12:15 PM   Wound Length (cm) 1.7 cm 11/3/2017 11:32 AM   Wound Width (cm) 0.5 cm 11/3/2017 11:32 AM   Wound Depth (cm)  1.7 11/3/2017 11:32 AM   Calculated Wound Size (cm^2) (l*w) 0.85 cm^2 11/3/2017 11:32 AM   Change in Wound Size % (l*w) 53.3 11/3/2017 11:32 AM   Distance Tunneling (cm) 2.8 cm 10/20/2017 10:31 AM   Tunneling Position ___ O'Clock 6 10/20/2017 10:31 AM   Undermining Starts ___ O'Clock 3 10/20/2017 10:31 AM   Undermining Ends___ O'Clock 5 10/20/2017 10:31 AM   Undermining Maxium Distance (cm) 1 10/20/2017 10:31 AM   Margins Defined edges 9/22/2017 11:19 AM   Exposed structure Tendon 10/20/2017 10:31 AM   Debby-wound Assessment Dry;Pink; Intact 11/3/2017 11:32 AM   Non-staged Wound Description Full thickness 10/20/2017 10:31 AM   [place]%Wound Bed 20 11/3/2017 11:32 AM   Red%Wound Bed 90 10/20/2017 10:31 AM   Yellow%Wound Bed 80 11/3/2017 11:32 AM   Drainage Amount Moderate 11/3/2017 11:32 AM   Drainage Description Yellow 11/3/2017 11:32 AM   Odor None 11/3/2017 11:32 AM   Op First Treatment Date 09/22/17 9/22/2017 11:19 AM   Number of days: 42       Percent of Wound/Ulcer Debrided: 100%    Total Surface Area Debrided:  0.04 sq cm     Diabetic/Pressure/Non Pressure Ulcers:  Ulcer: Non-Pressure ulcer, fat layer exposed      Bleeding:  Minimal    Hemostasis Achieved:  by pressure    Procedural Pain:  0  / 10     Post Procedural Pain:  0 / 10     Response to treatment:  Well tolerated by patient. 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 Discharge Instructions       Visit Discharge/Physician Orders:  [address]: [id number] [facility]      Wound Location:  Left Foot wound      Dressing orders:   1. Cleanse wound with normal saline  2. Gently pack wound with Ashlie or equivalent. 3. Cover with gauze, secure with nikolas or kerlix. 4. Change dressing every day if draining through, change every other day if not draining through dressing.     Compression:  NONE     Other Instructions: may apply cortisone cream to rash area - do not get in wound, PATIENT MAY AMBULATE AS TOLERATED.       Keep all

## 2017-12-29 ENCOUNTER — HOSPITAL ENCOUNTER (OUTPATIENT)
Dept: WOUND CARE | Age: 67
Discharge: HOME | End: 2017-12-29
Payer: MEDICARE

## 2017-12-29 VITALS
TEMPERATURE: 96.4 F | DIASTOLIC BLOOD PRESSURE: 82 MMHG | SYSTOLIC BLOOD PRESSURE: 194 MMHG | HEART RATE: 67 BPM | RESPIRATION RATE: 18 BRPM

## 2017-12-29 PROCEDURE — 87077 CULTURE AEROBIC IDENTIFY: CPT

## 2017-12-29 PROCEDURE — 87186 SC STD MICRODIL/AGAR DIL: CPT

## 2017-12-29 PROCEDURE — 87070 CULTURE OTHR SPECIMN AEROBIC: CPT

## 2017-12-29 PROCEDURE — 99213 OFFICE O/P EST LOW 20 MIN: CPT

## 2017-12-29 PROCEDURE — 87205 SMEAR GRAM STAIN: CPT

## 2017-12-29 NOTE — CODE DOCUMENTATION
[id number] Physician Billing Sheet. Lupe Blas  AGE: 79 y.o.    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

## 2017-12-29 NOTE — PROGRESS NOTES
[address]   Progress Note and Procedure Note      Jordi Oropeza  MEDICAL RECORD NUMBER:  [id number]  AGE: 79 y.o. GENDER: female  : 1950  EPISODE DATE:  2017    Subjective:     No chief complaint on file. HISTORY of PRESENT ILLNESS HPI     Jordi Oropeza is a 79 y.o. female who presents today for wound/ulcer evaluation. History of Wound Context: Non-healing surgical wound of the left dorsal foot. The patient states that Mary Ann Haynes was able to push 2cm deep tunnel in the wound bed. She states she does not feel well. She denies any N/V/F/C. She is retaining fluid in both extremities +2 pitting edema. Wound/Ulcer Pain Timing/Severity: none  Quality of pain: N/A  Severity:  0 / 10   Modifying Factors: None  Associated Signs/Symptoms: edema, drainage and numbness    Ulcer Identification:  Ulcer Type: diabetic and non-healing surgical  Contributing Factors: edema, diabetes, poor glucose control, decreased mobility and obesity    Wound: N/A        PAST MEDICAL HISTORY        Diagnosis Date    Bronchitis     Diabetes mellitus ([address]     Hypercholesterolemia     Hypertension     Thyroid disease        PAST SURGICAL HISTORY    Past Surgical History:   Procedure Laterality Date    APPENDECTOMY      HERNIA REPAIR      ORTHOPEDIC SURGERY Left 2017    broken left foot.  surgeries x3    TONSILLECTOMY N/A        FAMILY HISTORY    Family History   Problem Relation Age of Onset    Cancer Father        SOCIAL HISTORY    Social History   Substance Use Topics    Smoking status: Never Smoker    Smokeless tobacco: Not on file    Alcohol use No       ALLERGIES    Allergies   Allergen Reactions    Sulfa Antibiotics Rash       MEDICATIONS    Current Outpatient Prescriptions on File Prior to Encounter   Medication Sig Dispense Refill    glipiZIDE (GLUCOTROL) 10 MG tablet Take 10 mg by mouth 2 times daily (before meals)      gabapentin (NEURONTIN) 300 MG capsule Take 300 mg by mouth daily      Lactobacillus (PROBIOTIC ACIDOPHILUS) TABS Take 1 tablet by mouth daily 60 tablet 0    metoprolol succinate (TOPROL XL) 100 MG extended release tablet Take 100 mg by mouth daily      Multiple Vitamins-Minerals (CENTRUM SILVER PO) Take 1 tablet by mouth daily      levothyroxine (SYNTHROID) 125 MCG tablet Take 125 mcg by mouth Daily      valsartan-hydrochlorothiazide (DIOVAN-HCT) 160-25 MG per tablet Take 1 tablet by mouth daily      traZODone (DESYREL) 150 MG tablet Take 150 mg by mouth nightly      metFORMIN (GLUCOPHAGE) 1000 MG tablet Take 1,000 mg by mouth 2 times daily (with meals)      ALPRAZolam (XANAX) 0.5 MG tablet Take 0.5 mg by mouth three times daily      insulin glargine (TOUJEO SOLOSTAR) 300 UNIT/ML injection pen Inject 38 Units into the skin nightly        No current facility-administered medications on file prior to encounter. REVIEW OF SYSTEMS    Pertinent items are noted in HPI. Objective:      BP (!) 194/82   Pulse 67   Temp 96.4 °F (35.8 °C) (Oral)   Resp 18     Wt Readings from Last 3 Encounters:   10/20/17 275 lb (124.7 kg)   10/06/17 275 lb (124.7 kg)   09/22/17 265 lb (120.2 kg)       PHYSICAL EXAM    Constitutional:   Well nourished and well developed. Appears neat and clean. Patient is alert, oriented x3, and in no apparent distress. Respiratory:  Respiratory effort is easy and symmetric bilaterally. Rate is normal at rest and on room air. Vascular:  Pedal Pulses is not palpable and audible signal noted with doppler. Capillary refill is <5 sec to digits bilateral.  Extremities negative for pitting edema. Neurological:  Gross and Light touch intact. Protective sensation absent    Dermatological:  Wound description noted in wound assessment. The wound is stable with a granular base. No signs of acute infection. No erythema or warmth noted. I cannot appreciate a 2cm tunnel. It was probed in all directions without a  Tunnel identified per [facility].  A culture was

## 2017-12-29 NOTE — CODE DOCUMENTATION
[address] Physician Billing Sheet. Lupe Blas  AGE: 79 y.o.    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

## 2017-12-31 LAB
GRAM STAIN RESULT: ABNORMAL
ORGANISM: ABNORMAL
WOUND/ABSCESS: ABNORMAL
WOUND/ABSCESS: ABNORMAL

## 2018-01-02 ENCOUNTER — TELEPHONE (OUTPATIENT)
Dept: WOUND CARE | Age: 68
End: 2018-01-02

## 2018-01-02 RX ORDER — CEPHALEXIN 500 MG/1
500 CAPSULE ORAL 3 TIMES DAILY
Qty: 30 CAPSULE | Refills: 0 | Status: SHIPPED | OUTPATIENT
Start: 2018-01-02 | End: 2018-01-12

## 2018-01-05 ENCOUNTER — HOSPITAL ENCOUNTER (OUTPATIENT)
Dept: WOUND CARE | Age: 68
Discharge: HOME | End: 2018-01-05
Payer: MEDICARE

## 2018-01-05 VITALS
RESPIRATION RATE: 18 BRPM | TEMPERATURE: 96.7 F | HEART RATE: 74 BPM | DIASTOLIC BLOOD PRESSURE: 80 MMHG | SYSTOLIC BLOOD PRESSURE: 150 MMHG

## 2018-01-05 PROCEDURE — 99213 OFFICE O/P EST LOW 20 MIN: CPT

## 2018-01-05 PROCEDURE — 87070 CULTURE OTHR SPECIMN AEROBIC: CPT

## 2018-01-05 PROCEDURE — 87186 SC STD MICRODIL/AGAR DIL: CPT

## 2018-01-05 PROCEDURE — 87205 SMEAR GRAM STAIN: CPT

## 2018-01-05 PROCEDURE — 87077 CULTURE AEROBIC IDENTIFY: CPT

## 2018-01-05 RX ORDER — AZITHROMYCIN 250 MG/1
250 TABLET, FILM COATED ORAL DAILY
COMMUNITY
End: 2018-01-12

## 2018-01-05 ASSESSMENT — PAIN DESCRIPTION - DESCRIPTORS: DESCRIPTORS: ACHING

## 2018-01-05 ASSESSMENT — PAIN SCALES - GENERAL: PAINLEVEL_OUTOF10: 3

## 2018-01-05 ASSESSMENT — PAIN DESCRIPTION - PAIN TYPE: TYPE: ACUTE PAIN

## 2018-01-05 ASSESSMENT — PAIN DESCRIPTION - LOCATION: LOCATION: FOOT

## 2018-01-05 ASSESSMENT — PAIN DESCRIPTION - ORIENTATION: ORIENTATION: RIGHT

## 2018-01-05 NOTE — PROGRESS NOTES
base. No signs of acute infection. No erythema or warmth noted. I can appreciate a 2cm tunnel. It was probed  Plantarly. The wound has no further purulence. No erythema or edema noted. Psychiatric:  Judgement and insight intact. Short and long term memory intact. No evidence of depression, anxiety, or agitation. Patient is calm, cooperative, and communicative. Appropriate interactions and affect. 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 Plan:   Another culture was taken and a change to Aquacel Ag was made for the dressing. Possible repeat MRI if wound is not improving or further purulence is noted. Treatment Note please see attached Discharge Instructions    In my professional opinion this patient would benefit from HBO Therapy: No    Written patient dismissal instructions given to patient and signed by patient or POA. Discharge Instructions       Visit Discharge/Physician Orders:  [address]: [id number] [facility]      Wound Location:  Left Foot wound      Dressing orders: 1. Cleanse wound(s) with normal saline. 2. Apply dry AquaCel Ag rope or eqivalent to wound bed. Pack gently into wound bed. 3. Cover with 4x4's and wrap with gauze (nikolas or kerlix)  4.  Change  Every other day or Monday, Wednesday, and Friday     Compression:  NONE     Other Instructions: may apply cortisone cream to rash area - do not get in wound, PATIENT MAY AMBULATE AS TOLERATED.     Keep all dressings clean & dry. Do not shower, take baths or get wound wet, unless otherwise instructed by your Wound Care doctor.      Follow up visit   1 week      For Diabetic patien ts keep blood sugars below 150 for optimal wound healing.      If you experience any of the following, please call the Wound Care Service during business hours: [phone]    *Increase in pain   *Temperature over 101   *Increase in drainage from your wound or a foul odor   *Uncontrolled swelling    *Need for compression bandage changes due to slippage, breakthrough drainage      If you need medical attention outside of business hours, please contact your Primary Care Doctor or go to the nearest emergency room. Keep next scheduled appointment. Kaylynn Jovel give 24 hour notice if unable to keep appointment. [phone]      Patient Signature:_______________________   Claudia Galor  Nurse Signature_________________  Claudia Oar  Date: ___________ Time:  ____________            PLEASE NOTE: IF YOU ARE UNABLE TO OBTAIN WOUND SUPPLIES, CONTINUE TO USE THE SUPPLIES YOU HAVE AVAILABLE UNTIL YOU ARE ABLE TO REACH US. IT IS MOST IMPORTANT TO KEEP THE WOUND COVERED AT ALL TIMES.   Electronically signed by Terrell Alvarado DPM on 1/5/2018 at 10:32 AM          Electronically signed by Terrell Alvarado DPM on 1/5/2018 at 10:37 AM

## 2018-01-07 LAB
GRAM STAIN RESULT: ABNORMAL
ORGANISM: ABNORMAL
WOUND/ABSCESS: ABNORMAL
WOUND/ABSCESS: ABNORMAL

## 2018-01-12 ENCOUNTER — HOSPITAL ENCOUNTER (OUTPATIENT)
Dept: WOUND CARE | Age: 68
Discharge: HOME | End: 2018-01-12
Payer: MEDICARE

## 2018-01-12 VITALS
HEART RATE: 64 BPM | SYSTOLIC BLOOD PRESSURE: 170 MMHG | DIASTOLIC BLOOD PRESSURE: 74 MMHG | TEMPERATURE: 96.4 F | RESPIRATION RATE: 18 BRPM

## 2018-01-12 PROCEDURE — 99213 OFFICE O/P EST LOW 20 MIN: CPT

## 2018-01-12 RX ORDER — VALSARTAN AND HYDROCHLOROTHIAZIDE 320; 25 MG/1; MG/1
1 TABLET, FILM COATED ORAL DAILY
COMMUNITY
End: 2022-06-09

## 2018-01-12 ASSESSMENT — PAIN DESCRIPTION - ORIENTATION: ORIENTATION: LEFT

## 2018-01-12 ASSESSMENT — PAIN DESCRIPTION - PAIN TYPE: TYPE: ACUTE PAIN

## 2018-01-12 ASSESSMENT — PAIN DESCRIPTION - DESCRIPTORS: DESCRIPTORS: BURNING

## 2018-01-12 ASSESSMENT — PAIN SCALES - GENERAL: PAINLEVEL_OUTOF10: 4

## 2018-01-12 ASSESSMENT — PAIN DESCRIPTION - LOCATION: LOCATION: FOOT

## 2018-01-12 NOTE — CODE DOCUMENTATION
[address] Physician Billing Sheet. Lupe Bals  AGE: 79 y.o.    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

## 2018-01-12 NOTE — PROGRESS NOTES
succinate (TOPROL XL) 100 MG extended release tablet Take 100 mg by mouth daily      Multiple Vitamins-Minerals (CENTRUM SILVER PO) Take 1 tablet by mouth daily      levothyroxine (SYNTHROID) 125 MCG tablet Take 125 mcg by mouth Daily      traZODone (DESYREL) 150 MG tablet Take 150 mg by mouth nightly      metFORMIN (GLUCOPHAGE) 1000 MG tablet Take 1,000 mg by mouth 2 times daily (with meals)      ALPRAZolam (XANAX) 0.5 MG tablet Take 0.5 mg by mouth three times daily      insulin glargine (TOUJEO SOLOSTAR) 300 UNIT/ML injection pen Inject 38 Units into the skin nightly        No current facility-administered medications on file prior to encounter. REVIEW OF SYSTEMS    Pertinent items are noted in HPI. Objective:      BP (!) 170/74   Pulse 64   Temp 96.4 °F (35.8 °C) (Oral)   Resp 18     Wt Readings from Last 3 Encounters:   10/20/17 275 lb (124.7 kg)   10/06/17 275 lb (124.7 kg)   09/22/17 265 lb (120.2 kg)       PHYSICAL EXAM    Constitutional:   Well nourished and well developed. Appears neat and clean. Patient is alert, oriented x3, and in no apparent distress. Respiratory:  Respiratory effort is easy and symmetric bilaterally. Rate is normal at rest and on room air. Vascular:  Pedal Pulses is not palpable and audible signal noted with doppler. Capillary refill is <5 sec to digits bilateral.  Extremities negative for pitting edema. Neurological:  Gross and Light touch intact. Protective sensation absent    Dermatological:  Wound description noted in wound assessment. The wound is stable with a granular base. No signs of acute infection. No erythema or warmth noted. I can appreciate a 1 cm tunnel. This is decreased since last visit. Psychiatric:  Judgement and insight intact. Short and long term memory intact. No evidence of depression, anxiety, or agitation. Patient is calm, cooperative, and communicative. Appropriate interactions and affect.       Assessment:      Patient Active Problem List   Diagnosis Code    Diabetes mellitus with diabetic peripheral angiopathy without gangrene, without long-term current use of insulin (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 E08.40        Procedure Note  Indications:  Based on my examination of this patient's wound(s)/ulcer(s) today, debridement is not required to promote healing and evaluate the wound base. Plan:   Treatment Note please see attached Discharge Instructions    In my professional opinion this patient would benefit from HBO Therapy: No    Written patient dismissal instructions given to patient and signed by patient or POA. Discharge Instructions            Visit Discharge/Physician Orders:  [address]: [id number] [facility]      Wound Location:  Left Foot wound      Dressing orders    1. Cleanse wound(s) with normal saline. 2. Apply dry BERNARDINO  Or equivalent to wound bed. 3. Moisten BERNARDINO with a few drops of normal saline. 4. Cover with 4x4's and wrap with gauze (nikolas or kerlix)  5. Change  Every other day or Monday, Wednesday, and Friday    Compression:  NONE     Other Instructions: PATIENT MAY AMBULATE AS TOLERATED.     Keep all dressings clean & dry.  Do not shower, take baths or get wound wet, unless otherwise instructed by your Wound Care doctor.      Follow up visit   2 week      For Diabetic patien ts keep blood sugars below 150 for optimal wound healing.      If you experience any of the following, please call the Wound Care Service during business hours: [phone]    *Increase in pain   *Temperature over 101   *Increase in drainage from your wound or a foul odor   *Uncontrolled swelling    *Need for compression bandage changes due to slippage, breakthrough drainage      If you need medical attention outside of business hours, please contact your Primary Care Doctor or go to the nearest emergency room. Keep next scheduled appointment. Marco Ferguson give 24 hour notice if unable to keep appointment. [phone]      Patient Signature:_______________________   Anderson Persaud  Nurse Signature_________________  Anderson Persaud  Date: ___________ Time:  ____________            PLEASE NOTE: IF YOU ARE UNABLE TO OBTAIN WOUND SUPPLIES, CONTINUE TO USE THE SUPPLIES YOU HAVE AVAILABLE UNTIL YOU ARE ABLE TO REACH US. IT IS MOST IMPORTANT TO KEEP THE WOUND COVERED AT ALL TIMES.   Electronically signed by Parisa White DPM on 1/12/2018 at 9:23 AM            Electronically signed by Parisa White DPM on 1/12/2018 at 9:33 AM

## 2018-01-26 ENCOUNTER — HOSPITAL ENCOUNTER (OUTPATIENT)
Dept: WOUND CARE | Age: 68
Discharge: HOME | End: 2018-01-26
Payer: MEDICARE

## 2018-01-26 VITALS
HEART RATE: 70 BPM | SYSTOLIC BLOOD PRESSURE: 190 MMHG | TEMPERATURE: 97.1 F | DIASTOLIC BLOOD PRESSURE: 89 MMHG | RESPIRATION RATE: 18 BRPM

## 2018-01-26 DIAGNOSIS — L02.612 ABSCESS OF FOOT INCLUDING TOES, LEFT: Chronic | ICD-10-CM

## 2018-01-26 DIAGNOSIS — B43.2: Chronic | ICD-10-CM

## 2018-01-26 DIAGNOSIS — E11.51 TYPE 2 DIABETES MELLITUS WITH DIABETIC PERIPHERAL ANGIOPATHY WITHOUT GANGRENE, WITHOUT LONG-TERM CURRENT USE OF INSULIN (HCC): Primary | Chronic | ICD-10-CM

## 2018-01-26 DIAGNOSIS — I73.9 PERIPHERAL ANGIOPATHY (HCC): Chronic | ICD-10-CM

## 2018-01-26 PROCEDURE — 99213 OFFICE O/P EST LOW 20 MIN: CPT

## 2018-01-26 PROCEDURE — 11042 DBRDMT SUBQ TIS 1ST 20SQCM/<: CPT

## 2018-01-26 ASSESSMENT — PAIN SCALES - GENERAL: PAINLEVEL_OUTOF10: 4

## 2018-01-26 ASSESSMENT — PAIN DESCRIPTION - PAIN TYPE: TYPE: ACUTE PAIN

## 2018-01-26 ASSESSMENT — PAIN DESCRIPTION - LOCATION: LOCATION: FOOT

## 2018-01-26 ASSESSMENT — PAIN DESCRIPTION - DESCRIPTORS: DESCRIPTORS: BURNING

## 2018-01-26 NOTE — CODE DOCUMENTATION
[address] Physician Billing Sheet. Lupe Blas  AGE: 79 y.o.    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

## 2018-01-26 NOTE — PROGRESS NOTES
insight intact. Short and long term memory intact. No evidence of depression, anxiety, or agitation. Patient is calm, cooperative, and communicative. Appropriate interactions and affect. 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 Performed by: Gideon Cornelius DPM    Consent obtained:  Yes    Time out taken:  Yes    Pain Control: Anesthetic  Anesthetic: None       Debridement:Excisional Debridement    Using tissue nippers the wound(s)/ulcer(s) was/were sharply debrided down through and including the removal of epidermis, dermis and subcutaneous tissue.         Devitalized Tissue Debrided:  slough and exudate    Pre Debridement Measurements:  Are located in the [place]  Documentation Flow Sheet    Wound/Ulcer #: 1    Post Debridement Measurements:  Wound/Ulcer Descriptions are Pre Debridement except measurements:    Wound 09/22/17 Surgical dehiscence Foot Left;Dorsal #1 (Active)   Wound Image   1/26/2018 11:34 AM   Wound Type Wound 1/26/2018 11:34 AM   Wound Other 1/26/2018 11:34 AM   Wound Cleansed Rinsed/Irrigated with saline 1/26/2018 11:34 AM   Wound Length (cm) 0.1 cm 1/26/2018 11:49 AM   Wound Width (cm) 0.1 cm 1/26/2018 11:49 AM   Wound Depth (cm)  0.2 1/26/2018 11:49 AM   Calculated Wound Size (cm^2) (l*w) 0.01 cm^2 1/26/2018

## 2018-02-01 ENCOUNTER — HOSPITAL ENCOUNTER (OUTPATIENT)
Dept: MRI IMAGING | Age: 68
Discharge: HOME | End: 2018-02-03
Payer: MEDICARE

## 2018-02-01 DIAGNOSIS — E11.51 TYPE 2 DIABETES MELLITUS WITH DIABETIC PERIPHERAL ANGIOPATHY WITHOUT GANGRENE, WITHOUT LONG-TERM CURRENT USE OF INSULIN (HCC): Chronic | ICD-10-CM

## 2018-02-01 DIAGNOSIS — L02.612 ABSCESS OF FOOT INCLUDING TOES, LEFT: Chronic | ICD-10-CM

## 2018-02-01 DIAGNOSIS — I73.9 PERIPHERAL ANGIOPATHY (HCC): Chronic | ICD-10-CM

## 2018-02-01 DIAGNOSIS — B43.2: Chronic | ICD-10-CM

## 2018-02-01 PROCEDURE — 73718 MRI LOWER EXTREMITY W/O DYE: CPT

## 2018-02-09 ENCOUNTER — HOSPITAL ENCOUNTER (OUTPATIENT)
Dept: WOUND CARE | Age: 68
Discharge: HOME | End: 2018-02-09
Payer: MEDICARE

## 2018-02-09 ENCOUNTER — TELEPHONE (OUTPATIENT)
Dept: INTERVENTIONAL RADIOLOGY/VASCULAR | Age: 68
End: 2018-02-09

## 2018-02-09 VITALS
RESPIRATION RATE: 18 BRPM | DIASTOLIC BLOOD PRESSURE: 82 MMHG | TEMPERATURE: 97.2 F | HEART RATE: 67 BPM | SYSTOLIC BLOOD PRESSURE: 186 MMHG

## 2018-02-09 PROCEDURE — 99213 OFFICE O/P EST LOW 20 MIN: CPT

## 2018-02-09 RX ORDER — GABAPENTIN 300 MG/1
300 CAPSULE ORAL 2 TIMES DAILY
Status: ON HOLD | COMMUNITY
End: 2019-04-20 | Stop reason: HOSPADM

## 2018-02-09 ASSESSMENT — PAIN DESCRIPTION - ORIENTATION: ORIENTATION: LEFT

## 2018-02-09 ASSESSMENT — PAIN DESCRIPTION - LOCATION: LOCATION: FOOT

## 2018-02-09 ASSESSMENT — PAIN SCALES - GENERAL: PAINLEVEL_OUTOF10: 5

## 2018-02-09 ASSESSMENT — PAIN DESCRIPTION - PAIN TYPE: TYPE: CHRONIC PAIN

## 2018-02-09 ASSESSMENT — PAIN DESCRIPTION - DESCRIPTORS: DESCRIPTORS: BURNING

## 2018-02-09 NOTE — PROGRESS NOTES
[address]   Progress Note and Procedure Note      Mani Deleon  MEDICAL RECORD NUMBER:  [id number]  AGE: 79 y.o. GENDER: female  : 1950  EPISODE DATE:  2018    Subjective:     Chief Complaint   Patient presents with    Wound Check     Left foot wound recheck         HISTORY of PRESENT ILLNESS HPI     Mani Deleon is a 79 y.o. female who presents today for wound/ulcer evaluation. History of Wound Context: Non-healing surgical wound of the left dorsal foot. Patient has has pain. MRI was positive for fluid collection measuring 1.4x1.6x0.6cm  . Wound/Ulcer Pain Timing/Severity: none  Quality of pain: N/A  Severity:  0 / 10   Modifying Factors: None  Associated Signs/Symptoms: edema, drainage and numbness    Ulcer Identification:  Ulcer Type: diabetic and non-healing surgical  Contributing Factors: edema, diabetes, poor glucose control, decreased mobility and obesity    Wound: N/A        PAST MEDICAL HISTORY        Diagnosis Date    Bronchitis     Diabetes mellitus ([address]     Hypercholesterolemia     Hypertension     Thyroid disease        PAST SURGICAL HISTORY    Past Surgical History:   Procedure Laterality Date    APPENDECTOMY      HERNIA REPAIR      ORTHOPEDIC SURGERY Left 2017    broken left foot.  surgeries x3    TONSILLECTOMY N/A        FAMILY HISTORY    Family History   Problem Relation Age of Onset    Cancer Father        SOCIAL HISTORY    Social History   Substance Use Topics    Smoking status: Never Smoker    Smokeless tobacco: Not on file    Alcohol use No       ALLERGIES    Allergies   Allergen Reactions    Sulfa Antibiotics Rash       MEDICATIONS    Current Outpatient Prescriptions on File Prior to Encounter   Medication Sig Dispense Refill    valsartan-hydrochlorothiazide (DIOVAN-HCT) 320-25 MG per tablet Take 1 tablet by mouth daily      glipiZIDE (GLUCOTROL) 10 MG tablet Take 10 mg by mouth 2 times daily (before meals)      Lactobacillus (PROBIOTIC ACIDOPHILUS) TABS Take 1 tablet by mouth daily 60 tablet 0    metoprolol succinate (TOPROL XL) 100 MG extended release tablet Take 100 mg by mouth daily      Multiple Vitamins-Minerals (CENTRUM SILVER PO) Take 1 tablet by mouth daily      levothyroxine (SYNTHROID) 125 MCG tablet Take 125 mcg by mouth Daily      traZODone (DESYREL) 150 MG tablet Take 150 mg by mouth nightly      metFORMIN (GLUCOPHAGE) 1000 MG tablet Take 1,000 mg by mouth 2 times daily (with meals)      ALPRAZolam (XANAX) 0.5 MG tablet Take 0.5 mg by mouth three times daily      insulin glargine (TOUJEO SOLOSTAR) 300 UNIT/ML injection pen Inject 38 Units into the skin nightly        No current facility-administered medications on file prior to encounter. REVIEW OF SYSTEMS    Pertinent items are noted in HPI. Objective:      BP (!) 186/82   Pulse 67   Temp 97.2 °F (36.2 °C) (Oral)   Resp 18     Wt Readings from Last 3 Encounters:   10/20/17 275 lb (124.7 kg)   10/06/17 275 lb (124.7 kg)   09/22/17 265 lb (120.2 kg)       PHYSICAL EXAM    Constitutional:   Well nourished and well developed. Appears neat and clean. Patient is alert, oriented x3, and in no apparent distress. Respiratory:  Respiratory effort is easy and symmetric bilaterally. Rate is normal at rest and on room air. Vascular:  Pedal Pulses is not palpable and audible signal noted with doppler. Capillary refill is <5 sec to digits bilateral.  Extremities negative for pitting edema. Neurological:  Gross and Light touch intact. Protective sensation absent    Dermatological:  Wound description noted in wound assessment. The wound is 95% healed redness is improved. No edema is noted. Psychiatric:  Judgement and insight intact. Short and long term memory intact. No evidence of depression, anxiety, or agitation. Patient is calm, cooperative, and communicative. Appropriate interactions and affect.       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 Plan:   Reviewed MRI results with patient and told her that a fine needle aspiration would be the first method of treatment to see if it is indeed an infection. Dr. Tee Shen will plan to do this on Tuesday 2/13 @ 9:45. After the results are tested for infection then we can decide if surgery is necessary. Treatment Note please see attached Discharge Instructions    In my professional opinion this patient would benefit from HBO Therapy: No    Written patient dismissal instructions given to patient and signed by patient or POA. Discharge Instructions       Discharge Instructions       Home Care: [id number] [place] [address]      Wound Location:  Left Foot wound      Dressing orders     1. Cleanse wound(s) with normal saline. 2. Apply a dry dressing every day.     Compression:  NONE     Other Instructions: schedule appointment with Dr. Niels Goodson.     Keep all dressings clean & dry.  Do not shower, take baths or get wound wet, unless otherwise instructed by your Wound Care doctor.      Follow up visit   3 weeks      For Diabetic patien ts keep blood sugars below 150 for optimal wound healing.      If you experience any of the following, please call the Wound Care Service during business hours: [phone]    *Increase in pain   *Temperature

## 2018-02-13 ENCOUNTER — PROCEDURE VISIT (OUTPATIENT)
Dept: INTERVENTIONAL RADIOLOGY/VASCULAR | Age: 68
End: 2018-02-13
Payer: MEDICARE

## 2018-02-13 VITALS
HEART RATE: 68 BPM | SYSTOLIC BLOOD PRESSURE: 130 MMHG | OXYGEN SATURATION: 98 % | RESPIRATION RATE: 14 BRPM | DIASTOLIC BLOOD PRESSURE: 72 MMHG

## 2018-02-13 DIAGNOSIS — L02.612 ABSCESS OF FOOT INCLUDING TOES, LEFT: Primary | Chronic | ICD-10-CM

## 2018-02-13 DIAGNOSIS — T81.89XA NON-HEALING SURGICAL WOUND, INITIAL ENCOUNTER: ICD-10-CM

## 2018-02-13 PROCEDURE — G8417 CALC BMI ABV UP PARAM F/U: HCPCS | Performed by: RADIOLOGY

## 2018-02-13 PROCEDURE — G8484 FLU IMMUNIZE NO ADMIN: HCPCS | Performed by: RADIOLOGY

## 2018-02-13 PROCEDURE — 1036F TOBACCO NON-USER: CPT | Performed by: RADIOLOGY

## 2018-02-13 PROCEDURE — 4040F PNEUMOC VAC/ADMIN/RCVD: CPT | Performed by: RADIOLOGY

## 2018-02-13 PROCEDURE — 1090F PRES/ABSN URINE INCON ASSESS: CPT | Performed by: RADIOLOGY

## 2018-02-13 PROCEDURE — 99204 OFFICE O/P NEW MOD 45 MIN: CPT | Performed by: RADIOLOGY

## 2018-02-13 PROCEDURE — G8427 DOCREV CUR MEDS BY ELIG CLIN: HCPCS | Performed by: RADIOLOGY

## 2018-02-13 PROCEDURE — 1123F ACP DISCUSS/DSCN MKR DOCD: CPT | Performed by: RADIOLOGY

## 2018-02-13 PROCEDURE — 3017F COLORECTAL CA SCREEN DOC REV: CPT | Performed by: RADIOLOGY

## 2018-02-13 PROCEDURE — 3014F SCREEN MAMMO DOC REV: CPT | Performed by: RADIOLOGY

## 2018-02-13 PROCEDURE — G8400 PT W/DXA NO RESULTS DOC: HCPCS | Performed by: RADIOLOGY

## 2018-02-13 ASSESSMENT — ENCOUNTER SYMPTOMS
DOUBLE VISION: 0
NAUSEA: 0
COUGH: 0
ABDOMINAL PAIN: 0
BACK PAIN: 0
EYES NEGATIVE: 1
VOMITING: 0
SHORTNESS OF BREATH: 0
DIARRHEA: 0
GASTROINTESTINAL NEGATIVE: 1
WHEEZING: 0
SPUTUM PRODUCTION: 0
RESPIRATORY NEGATIVE: 1
BLURRED VISION: 0
CONSTIPATION: 0
HEARTBURN: 0
PHOTOPHOBIA: 0
HEMOPTYSIS: 0

## 2018-02-13 NOTE — PROGRESS NOTES
IMPRESSION:   Status post ultrasound-guided attempted aspiration of a left interosseous fluid collection post surgical repair of a traumatic fracture. No fluid was able to be aspirated from any portion of the small fluid collection. The collection appears complex with hypoechoic, and echogenic septations. CLINICAL HISTORY: Isadora Nesbitt is a Female of 79 years age, referred for ultrasound-guided aspiration of a fluid collection in the forefoot. PROCEDURE: Survey of the left foot showed a small, complex hypoechoic, and mixed echogenic area dorsal to the second digit. After obtaining informed consent, the patient was positioned supine on the sonography table. A transverse approach was used. The skin over the fluid collection was cleansed with ChloraPrep. Cutaneous and deeper subcutaneous anesthesia was achieved using 1% Lidocaine. A 22-gauge needle was inserted and its accurate position confirmed with ultrasound. Attempted aspiration yielded no fluid or tissue of any kind. It was decided to move up to a larger 21-gauge needle. The 21-gauge needle was also inserted under direct ultrasound imaging, and aspiration was attempted from several areas of the collection, though no fluid or tissue was aspirated. At that point it was decided to abandon any further attempts to minimize the risk of contamination or infection. The patient tolerated the procedure well. Following the procedure, the wound was cleansed and compressed. Band-aid was applied and the patient was given post procedure instructions. The patient left the department in good condition.

## 2018-03-02 ENCOUNTER — HOSPITAL ENCOUNTER (OUTPATIENT)
Dept: WOUND CARE | Age: 68
Discharge: HOME | End: 2018-03-02
Payer: MEDICARE

## 2018-03-02 VITALS
SYSTOLIC BLOOD PRESSURE: 177 MMHG | DIASTOLIC BLOOD PRESSURE: 83 MMHG | RESPIRATION RATE: 18 BRPM | TEMPERATURE: 97 F | HEART RATE: 76 BPM

## 2018-03-02 DIAGNOSIS — E08.42 DIABETIC POLYNEUROPATHY ASSOCIATED WITH DIABETES MELLITUS DUE TO UNDERLYING CONDITION (HCC): Chronic | ICD-10-CM

## 2018-03-02 DIAGNOSIS — T81.89XD NON-HEALING SURGICAL WOUND, SUBSEQUENT ENCOUNTER: ICD-10-CM

## 2018-03-02 DIAGNOSIS — I73.9 PERIPHERAL ANGIOPATHY (HCC): Primary | Chronic | ICD-10-CM

## 2018-03-02 PROCEDURE — 99212 OFFICE O/P EST SF 10 MIN: CPT

## 2018-03-02 ASSESSMENT — PAIN DESCRIPTION - ORIENTATION: ORIENTATION: LEFT

## 2018-03-02 ASSESSMENT — PAIN DESCRIPTION - LOCATION: LOCATION: FOOT

## 2018-03-02 ASSESSMENT — PAIN DESCRIPTION - PAIN TYPE: TYPE: CHRONIC PAIN

## 2018-03-02 ASSESSMENT — PAIN DESCRIPTION - DESCRIPTORS: DESCRIPTORS: ACHING

## 2018-03-02 ASSESSMENT — PAIN SCALES - GENERAL: PAINLEVEL_OUTOF10: 3

## 2018-03-02 NOTE — PROGRESS NOTES
tablet Take 10 mg by mouth 2 times daily (before meals)      Lactobacillus (PROBIOTIC ACIDOPHILUS) TABS Take 1 tablet by mouth daily 60 tablet 0    metoprolol succinate (TOPROL XL) 100 MG extended release tablet Take 100 mg by mouth daily      Multiple Vitamins-Minerals (CENTRUM SILVER PO) Take 1 tablet by mouth daily      levothyroxine (SYNTHROID) 125 MCG tablet Take 125 mcg by mouth Daily      traZODone (DESYREL) 150 MG tablet Take 150 mg by mouth nightly      metFORMIN (GLUCOPHAGE) 1000 MG tablet Take 1,000 mg by mouth 2 times daily (with meals)      ALPRAZolam (XANAX) 0.5 MG tablet Take 0.5 mg by mouth three times daily      insulin glargine (TOUJEO SOLOSTAR) 300 UNIT/ML injection pen Inject 38 Units into the skin nightly        No current facility-administered medications on file prior to encounter. REVIEW OF SYSTEMS    Pertinent items are noted in HPI. Objective:      BP (!) 177/83   Pulse 76   Temp 97 °F (36.1 °C) (Oral)   Resp 18     Wt Readings from Last 3 Encounters:   10/20/17 275 lb (124.7 kg)   10/06/17 275 lb (124.7 kg)   09/22/17 265 lb (120.2 kg)       PHYSICAL EXAM    Constitutional:   Well nourished and well developed. Appears neat and clean. Patient is alert, oriented x3, and in no apparent distress. Respiratory:  Respiratory effort is easy and symmetric bilaterally. Rate is normal at rest and on room air. Vascular:  Pedal Pulses is not palpable and audible signal noted with doppler. Capillary refill is <5 sec to digits bilateral.  Extremities negative for pitting edema. Neurological:  Gross and Light touch intact. Protective sensation absent    Dermatological:  Wound description noted in wound assessment. The wound is  healed redness is improved. No edema is noted. Still continues to have burning sensation. Psychiatric:  Judgement and insight intact. Short and long term memory intact. No evidence of depression, anxiety, or agitation.   Patient is calm, attention outside of business hours, please contact your Primary Care Doctor or go to the nearest emergency room. Keep next scheduled appointment. Bernabe Hansen give 24 hour notice if unable to keep appointment. [phone]      Patient Signature:_______________________   Chely Lavern  Nurse Signature_________________  Chely Puckett  Date: ___________ Time:  ____________            PLEASE NOTE: IF YOU ARE UNABLE TO OBTAIN WOUND SUPPLIES, CONTINUE TO USE THE SUPPLIES YOU HAVE AVAILABLE UNTIL YOU ARE ABLE TO REACH US. IT IS MOST IMPORTANT TO KEEP THE WOUND COVERED AT ALL TIMES.   Electronically signed by Justin Martinez DPM on 3/2/2018 at 9:37 AM          Electronically signed by Justin Martinez DPM on 3/2/2018 at 9:49 AM

## 2018-03-13 ENCOUNTER — TELEPHONE (OUTPATIENT)
Dept: WOUND CARE | Age: 68
End: 2018-03-13

## 2018-03-30 ENCOUNTER — HOSPITAL ENCOUNTER (OUTPATIENT)
Dept: WOUND CARE | Age: 68
Discharge: HOME | End: 2018-03-30
Payer: MEDICARE

## 2018-03-30 VITALS
DIASTOLIC BLOOD PRESSURE: 82 MMHG | TEMPERATURE: 97 F | RESPIRATION RATE: 18 BRPM | HEART RATE: 75 BPM | SYSTOLIC BLOOD PRESSURE: 170 MMHG

## 2018-03-30 PROCEDURE — 99212 OFFICE O/P EST SF 10 MIN: CPT

## 2018-03-30 NOTE — PROGRESS NOTES
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 Plan:   She is to get diabetic shoes which she states she has an RX for. Keep blood sugars under control and continue PT in home which she states has not yet begun. They did not contact he. My staff will look into this matter. Follow up with PCP. Treatment Note please see attached Discharge Instructions    In my professional opinion this patient would benefit from HBO Therapy: No    Written patient dismissal instructions given to patient and signed by patient or POA. Discharge Instructions         Wound Clinic Physician Orders and Discharge [address] [facility]  [address] [facility], [address] [place] [address]  Telephone: [phone]    NAME:  Yudi Ruiz  YOB: 1950  MEDICAL RECORD NUMBER:  [id number]  DATE:  3/30/2018    Congratulations!! You have completed your treatment. 1. Return to your Primary Care Physician for all your health issues. 2. Resume your ordinary activities as tolerated. 3. Take your medications as prescribed by your primary care physician. 4. Check your skin daily for cracks, bruises, sores, or dryness. Use a moisturizer as needed. 5. Clean and dry your skin, using mild soap and warm water (not hot). 6. Avoid alcohol and caffeine and do not smoke. 7. Maintain a nutritious diet. 8. Avoid pressure on your wound site.  Keep your legs elevated above the level

## 2018-06-15 ENCOUNTER — HOSPITAL ENCOUNTER (OUTPATIENT)
Dept: PHYSICAL THERAPY | Age: 68
Setting detail: THERAPIES SERIES
Discharge: HOME | End: 2018-06-15
Payer: MEDICARE

## 2018-06-15 PROCEDURE — G8979 MOBILITY GOAL STATUS: HCPCS

## 2018-06-15 PROCEDURE — 97162 PT EVAL MOD COMPLEX 30 MIN: CPT

## 2018-06-15 PROCEDURE — G8978 MOBILITY CURRENT STATUS: HCPCS

## 2018-06-15 ASSESSMENT — PAIN SCALES - GENERAL: PAINLEVEL_OUTOF10: 4

## 2018-06-15 ASSESSMENT — PAIN DESCRIPTION - DESCRIPTORS: DESCRIPTORS: TIGHTNESS

## 2018-06-19 ENCOUNTER — HOSPITAL ENCOUNTER (OUTPATIENT)
Dept: WOUND CARE | Age: 68
Discharge: HOME | End: 2018-06-19

## 2018-06-20 ENCOUNTER — HOSPITAL ENCOUNTER (OUTPATIENT)
Dept: PHYSICAL THERAPY | Age: 68
Setting detail: THERAPIES SERIES
Discharge: HOME | End: 2018-06-20
Payer: MEDICARE

## 2018-06-20 PROCEDURE — 97116 GAIT TRAINING THERAPY: CPT

## 2018-06-20 PROCEDURE — 97112 NEUROMUSCULAR REEDUCATION: CPT

## 2018-06-20 PROCEDURE — 97110 THERAPEUTIC EXERCISES: CPT

## 2018-06-20 ASSESSMENT — PAIN DESCRIPTION - DESCRIPTORS: DESCRIPTORS: TIGHTNESS

## 2018-06-20 ASSESSMENT — PAIN DESCRIPTION - PAIN TYPE: TYPE: CHRONIC PAIN

## 2018-06-20 ASSESSMENT — PAIN SCALES - GENERAL: PAINLEVEL_OUTOF10: 3

## 2018-06-20 ASSESSMENT — PAIN DESCRIPTION - LOCATION: LOCATION: FOOT;BACK

## 2018-06-20 ASSESSMENT — PAIN DESCRIPTION - ORIENTATION: ORIENTATION: LEFT

## 2018-06-26 ENCOUNTER — HOSPITAL ENCOUNTER (OUTPATIENT)
Dept: PHYSICAL THERAPY | Age: 68
Setting detail: THERAPIES SERIES
Discharge: HOME | End: 2018-06-26
Payer: MEDICARE

## 2018-06-26 PROCEDURE — 97110 THERAPEUTIC EXERCISES: CPT

## 2018-06-26 PROCEDURE — 97112 NEUROMUSCULAR REEDUCATION: CPT

## 2018-06-26 PROCEDURE — 97116 GAIT TRAINING THERAPY: CPT

## 2018-06-26 ASSESSMENT — PAIN DESCRIPTION - DESCRIPTORS: DESCRIPTORS: SORE

## 2018-06-26 ASSESSMENT — PAIN DESCRIPTION - ORIENTATION: ORIENTATION: LEFT

## 2018-06-26 ASSESSMENT — PAIN SCALES - GENERAL: PAINLEVEL_OUTOF10: 3

## 2018-06-26 ASSESSMENT — PAIN DESCRIPTION - PAIN TYPE: TYPE: CHRONIC PAIN

## 2018-06-28 ENCOUNTER — HOSPITAL ENCOUNTER (OUTPATIENT)
Dept: PHYSICAL THERAPY | Age: 68
Setting detail: THERAPIES SERIES
Discharge: HOME | End: 2018-06-28
Payer: MEDICARE

## 2018-06-28 PROCEDURE — 97112 NEUROMUSCULAR REEDUCATION: CPT

## 2018-06-28 PROCEDURE — 97110 THERAPEUTIC EXERCISES: CPT

## 2018-06-28 ASSESSMENT — PAIN DESCRIPTION - LOCATION: LOCATION: LEG

## 2018-06-28 ASSESSMENT — PAIN DESCRIPTION - PAIN TYPE: TYPE: CHRONIC PAIN

## 2018-06-28 ASSESSMENT — PAIN DESCRIPTION - DESCRIPTORS: DESCRIPTORS: SORE

## 2018-06-28 ASSESSMENT — PAIN SCALES - GENERAL: PAINLEVEL_OUTOF10: 3

## 2018-07-03 ENCOUNTER — HOSPITAL ENCOUNTER (OUTPATIENT)
Dept: PHYSICAL THERAPY | Age: 68
Setting detail: THERAPIES SERIES
Discharge: HOME | End: 2018-07-03
Payer: MEDICARE

## 2018-07-03 PROCEDURE — 97112 NEUROMUSCULAR REEDUCATION: CPT

## 2018-07-03 PROCEDURE — 97110 THERAPEUTIC EXERCISES: CPT

## 2018-07-06 ENCOUNTER — HOSPITAL ENCOUNTER (OUTPATIENT)
Dept: PHYSICAL THERAPY | Age: 68
Setting detail: THERAPIES SERIES
Discharge: HOME | End: 2018-07-06
Payer: MEDICARE

## 2018-07-06 PROCEDURE — 97112 NEUROMUSCULAR REEDUCATION: CPT

## 2018-07-06 PROCEDURE — 97110 THERAPEUTIC EXERCISES: CPT

## 2018-07-06 ASSESSMENT — PAIN DESCRIPTION - ORIENTATION: ORIENTATION: LEFT

## 2018-07-06 ASSESSMENT — PAIN SCALES - GENERAL: PAINLEVEL_OUTOF10: 3

## 2018-07-06 ASSESSMENT — PAIN DESCRIPTION - PAIN TYPE: TYPE: CHRONIC PAIN

## 2018-07-06 ASSESSMENT — PAIN DESCRIPTION - DESCRIPTORS: DESCRIPTORS: ACHING

## 2018-07-06 ASSESSMENT — PAIN DESCRIPTION - LOCATION: LOCATION: LEG

## 2018-07-11 ENCOUNTER — HOSPITAL ENCOUNTER (OUTPATIENT)
Dept: PHYSICAL THERAPY | Age: 68
Setting detail: THERAPIES SERIES
Discharge: HOME | End: 2018-07-11
Payer: MEDICARE

## 2018-07-11 PROCEDURE — 97110 THERAPEUTIC EXERCISES: CPT

## 2018-07-11 PROCEDURE — 97116 GAIT TRAINING THERAPY: CPT

## 2018-07-11 PROCEDURE — 97112 NEUROMUSCULAR REEDUCATION: CPT

## 2018-07-11 ASSESSMENT — PAIN SCALES - GENERAL: PAINLEVEL_OUTOF10: 3

## 2018-07-11 NOTE — PROGRESS NOTES
[address] [address]  Outpatient Physical Therapy    Treatment Note        Date: 2018  Patient: Es Jasmine  : 1950  ACCT #: [de-identified]  Referring Practitioner: Dr. Maria C Cordoba  Diagnosis: Generalized weakness, Muscle weakness    Visit Information:  PT Visit Information  PT Insurance Information: Medicare  Total # of Visits Approved:  (Subject to therapy cap)  Total # of Visits to Date: 7  Plan of Care/Certification Expiration Date: 18  No Show: 0  Canceled Appointment: 0  Progress Note Counter: 7/10    Subjective: Pt reports feeling really worn out today after increased activity for 3 days. Feels she is getting overall stronger and is picking her feet up more when she walks. Able to get about 8 reps of exercises at home before she becomes fatigued - was only doing 5.      HEP Compliance:  [x] Good [] Fair [] Poor [] Reports not doing due to:    Vital Signs  Patient Currently in Pain: Yes   Pain Screening  Patient Currently in Pain: Yes  Pain Assessment  Pain Assessment: 0-10  Pain Level: 3  Pain Location:  (All over)    OBJECTIVE:   Exercises  Exercise 4: Foam: balancing, wt shifts with marty UE support  Exercise 5: Gait drills: lateral  Exercise 6: 2 way SLR x10 - decreased quad lag  Exercise 8: bridges x10   Exercise 10: B clams x10    Ambulation 1  Surface: carpet  Device: Single point cane  Assistance: Contact guard assistance, Stand by assistance  Quality of Gait: Step to gait pattern, decreased speed, decreased marty step length and heel strike  Distance: 20' x2    Transfers  Comment: 5xSTS from chair with UEs = 45.26sec    Assessment:   Activity Tolerance  Activity Tolerance: Patient Tolerated treatment well    Body structures, Functions, Activity limitations: Decreased functional mobility , Decreased strength, Decreased balance, Decreased coordination, Decreased endurance  Assessment: Initiated gait training with s/c - pt very hesitant at first.  Ambulates with a step to gait pattern

## 2018-07-13 ENCOUNTER — HOSPITAL ENCOUNTER (OUTPATIENT)
Dept: PHYSICAL THERAPY | Age: 68
Setting detail: THERAPIES SERIES
Discharge: HOME | End: 2018-07-13
Payer: MEDICARE

## 2018-07-13 PROCEDURE — 97116 GAIT TRAINING THERAPY: CPT

## 2018-07-13 PROCEDURE — 97110 THERAPEUTIC EXERCISES: CPT

## 2018-07-13 PROCEDURE — 97140 MANUAL THERAPY 1/> REGIONS: CPT

## 2018-07-13 PROCEDURE — 97112 NEUROMUSCULAR REEDUCATION: CPT

## 2018-07-13 ASSESSMENT — PAIN SCALES - GENERAL: PAINLEVEL_OUTOF10: 5

## 2018-07-18 ENCOUNTER — HOSPITAL ENCOUNTER (OUTPATIENT)
Dept: PHYSICAL THERAPY | Age: 68
Setting detail: THERAPIES SERIES
Discharge: HOME | End: 2018-07-18
Payer: MEDICARE

## 2018-07-18 PROCEDURE — 97110 THERAPEUTIC EXERCISES: CPT

## 2018-07-18 PROCEDURE — 97116 GAIT TRAINING THERAPY: CPT

## 2018-07-18 ASSESSMENT — PAIN DESCRIPTION - LOCATION: LOCATION: FOOT

## 2018-07-18 ASSESSMENT — PAIN DESCRIPTION - DESCRIPTORS: DESCRIPTORS: ACHING

## 2018-07-18 ASSESSMENT — PAIN SCALES - GENERAL: PAINLEVEL_OUTOF10: 3

## 2018-07-18 ASSESSMENT — PAIN DESCRIPTION - ORIENTATION: ORIENTATION: LEFT

## 2018-07-18 ASSESSMENT — PAIN DESCRIPTION - PAIN TYPE: TYPE: CHRONIC PAIN

## 2018-07-20 ENCOUNTER — HOSPITAL ENCOUNTER (OUTPATIENT)
Dept: PHYSICAL THERAPY | Age: 68
Setting detail: THERAPIES SERIES
Discharge: HOME | End: 2018-07-20
Payer: MEDICARE

## 2018-07-20 PROCEDURE — G8978 MOBILITY CURRENT STATUS: HCPCS

## 2018-07-20 PROCEDURE — 97116 GAIT TRAINING THERAPY: CPT

## 2018-07-20 PROCEDURE — 97112 NEUROMUSCULAR REEDUCATION: CPT

## 2018-07-20 PROCEDURE — G8979 MOBILITY GOAL STATUS: HCPCS

## 2018-07-20 ASSESSMENT — PAIN DESCRIPTION - PAIN TYPE: TYPE: CHRONIC PAIN

## 2018-07-20 ASSESSMENT — PAIN SCALES - GENERAL: PAINLEVEL_OUTOF10: 3

## 2018-07-20 ASSESSMENT — PAIN DESCRIPTION - ORIENTATION: ORIENTATION: LEFT

## 2018-07-20 ASSESSMENT — PAIN DESCRIPTION - LOCATION: LOCATION: FOOT

## 2018-07-20 ASSESSMENT — PAIN DESCRIPTION - DESCRIPTORS: DESCRIPTORS: ACHING

## 2018-07-20 NOTE — PROGRESS NOTES
Danielle [address]ndt [address]     Ph: [phone]  Fax: [phone]    [] Certification  [] Recertification []  Plan of Care  [x] Progress Note [] Discharge      To:  Referring Practitioner: Dr. Kayla Quispe      From: Manjit Mcneill PT, DPT   Patient: Sharri Mccullough     : 1950  Diagnosis: Generalized weakness, Muscle weakness     Date: 2018  Treatment Diagnosis: Generalized weakness    Plan of Care/Certification Expiration Date: 18  Progress Report Period from:  6/15/2018  to 2018    Total # of Visits to Date: 10   No Show: 0    Canceled Appointment: 0     OBJECTIVE:       Long Term Goals - Time Frame for Long term goals : 4-5 weeks  Goals Current/ Discharge status Met   Long term goal 1: Improve marty LE strength to >/= 4+/5 to improve stability with standing and walking. Strength RLE  Comment: Hip flex 4/5, knee ext and flex 4+/5, expect hip adb and ext weakness  Strength LLE  Comment: hip flex 4+/5, knee ext/flex 5/5, expect hip abd and ext weakness [x] yes  [x] no   Long term goal 2: Pt will ambulate with LRD >/= 250' with minimal to no deviations S/I. Ambulation 1  Surface: carpet  Device: Rollator  Assistance: Stand by assistance, Supervision  Quality of Gait: decreased jean claude, slow, steady  Distance: 250ft   Ambulation 2  Surface - 2: carpet  Device 2: Single point cane  Assistance 2: Contact guard assistance  Distance: 20'  Comments: slow jean claude and speed  [] yes  [x] no   Long term goal 3: [place] >/= 40/56 to reduce risk for falls at home. UPDATECandie Minnie >/= 46/56 40/56 [x] yes  [] no   Long term goal 4: TUG </= 12sec with LRD. 28.9 with rollator [] yes  [x] no   Long term goal 5: 5xSTS with use of UEs from chair </= 12sec to demo improved functional strength.  STS x5 without UE A = 24.45 secs [] yes  [x] no        Body structures, Functions, Activity limitations: Decreased functional mobility , Decreased strength, Decreased balance, Decreased coordination, Decreased endurance  Assessment: Pt demonstrates steadily improving strength and balance. Pt has progressed to ambulating short distances in therapy with use of s/c vs [employer]. Pt would benefit from further skilled PT to improve her strength, balance and safety with all functional mobility. Prognosis: Good  Discharge Recommendations: Continue to assess pending progress    G-Codes  PT G-Codes  Functional Assessment Tool Used: [place] and clinical judgement  Score: 40/56  Functional Limitation: Mobility: Walking and moving around  Mobility: Walking and Moving Around Current Status (): At least 20 percent but less than 40 percent impaired, limited or restricted  Mobility: Walking and Moving Around Goal Status (): At least 1 percent but less than 20 percent impaired, limited or restricted    PLAN: [x] Evaluate and Treat  Frequency/Duration:  Plan  Times per week: 2  Plan weeks: 4-5  Current Treatment Recommendations: Strengthening, Balance Training, Functional Mobility Training, Transfer Training, Gait Training, Stair training, Neuromuscular Re-education, Manual Therapy - Soft Tissue Mobilization, Home Exercise Program, Safety Education & Training, Patient/Caregiver Education & Training, Equipment Evaluation, Education, & procurement, Modalities     Patient Status:[x] Continue/ Initiate plan of Care    [] Discharge PT. Recommend pt continue with HEP. [] Additional visits requested, Please re-certify for additional visits:          Signature: Electronically signed by Amy Bajwa PTA on 7/20/18 at 5:13 PM  Electronically signed by Bonnie Lord PT on 7/23/2018 at 5:51 PM    If you have any questions or concerns, please don't hesitate to call. Thank you for your referral.    I have reviewed this plan of care and certify a need for medically necessary rehabilitation services.     Physician Signature:__________________________________________________________  Date:  Please sign and return

## 2018-07-20 NOTE — PROGRESS NOTES
[address] [address]  Outpatient Physical Therapy    Treatment Note        Date: 2018  Patient: Basim Valdes  : 1950  ACCT #: [de-identified]  Referring Practitioner: Dr. Angela Thomas  Diagnosis: Generalized weakness, Muscle weakness    Visit Information:  PT Visit Information  PT Insurance Information: Medicare  Total # of Visits Approved:  (Subject to therapy cap)  Total # of Visits to Date: 10  Plan of Care/Certification Expiration Date: 18  No Show: 0  Canceled Appointment: 0  Progress Note Counter: 10/10    Subjective: Pt reports she wants to continue     HEP Compliance:  [x] Good [] Fair [] Poor [] Reports not doing due to:    Vital Signs  Patient Currently in Pain: Yes   Pain Screening  Patient Currently in Pain: Yes  Pain Assessment  Pain Assessment: 0-10  Pain Level: 3  Pain Type: Chronic pain  Pain Location: Foot  Pain Orientation: Left  Pain Descriptors: Aching    OBJECTIVE:   Exercises  Exercise 7: STS x5 without UE A = 24.45 secs  Exercise 15: [place]/56  Exercise 19: TUG=28.9 with rollator       Ambulation 1  Surface: carpet  Device: Rollator  Assistance: Stand by assistance, Supervision  Quality of Gait: decreased jean claude, slow, steady  Distance: 250ft          Strength: [] NT  [x] MMT completed:  Strength RLE  Comment: Hip flex 4/5, knee ext and flex 4+/5, expect hip adb and ext weakness  Strength LLE  Comment: hip flex 4+/5, knee ext/flex 5/5, expect hip abd and ext weakness    ROM: [x] NT  [] ROM measurements:    *Indicates exercise, modality, or manual techniques to be initiated when appropriate    Assessment:   Activity Tolerance  Activity Tolerance: Patient Tolerated treatment well    Body structures, Functions, Activity limitations: Decreased functional mobility , Decreased strength, Decreased balance, Decreased coordination, Decreased endurance  Assessment: Pt would like to con't with therapy. [place] 40/56 increase by 5 pts and TUG 28.9 with rollator.  Pt able to amb with rollator 250' with decreased jean claude, with SPC slow with extra concentration on pattern. Treatment Diagnosis: Generalized weakness  Prognosis: Good       Goals:       Long term goals  Time Frame for Long term goals : 4-5 weeks  Long term goal 1: Improve marty LE strength to >/= 4+/5 to improve stability with standing and walking. Long term goal 2: Pt will ambulate with LRD >/= 250' with minimal to no deviations S/I. Long term goal 3: [place] >/= 40/56 to reduce risk for falls at home. Long term goal 4: TUG </= 12sec with LRD. Long term goal 5: 5xSTS with use of UEs from chair </= 12sec to demo improved functional strength. Progress toward goals: [place] improved by 12 pts to improve balance    POST-PAIN       Pain Rating (0-10 pain scale):  3/10   Location and pain description same as pre-treatment unless indicated. Action: [x] NA   [] Perform HEP  [] Meds as prescribed  [] Modalities as prescribed   [] Call Physician     Frequency/Duration:  Plan  Times per week: 2  Plan weeks: 4-5  Current Treatment Recommendations: Strengthening, Balance Training, Functional Mobility Training, Transfer Training, Gait Training, Stair training, Neuromuscular Re-education, Manual Therapy - Soft Tissue Mobilization, Home Exercise Program, Safety Education & Training, Patient/Caregiver Education & Training, Equipment Evaluation, Education, & procurement, Modalities     Pt to continue current HEP. See objective section for any therapeutic exercise changes, additions or modifications this date.          PT Individual Minutes  Time In: 1000  Time Out: 1100  Minutes: 60     Neuro 45  Gait  15    Signature:  Electronically signed by Ivelisse Montanez PTA on 7/20/18 at 5:08 PM

## 2018-07-25 ENCOUNTER — HOSPITAL ENCOUNTER (OUTPATIENT)
Dept: PHYSICAL THERAPY | Age: 68
Setting detail: THERAPIES SERIES
Discharge: HOME | End: 2018-07-25
Payer: MEDICARE

## 2018-07-25 PROCEDURE — 97116 GAIT TRAINING THERAPY: CPT

## 2018-07-25 PROCEDURE — 97110 THERAPEUTIC EXERCISES: CPT

## 2018-07-25 PROCEDURE — 97112 NEUROMUSCULAR REEDUCATION: CPT

## 2018-07-25 ASSESSMENT — PAIN DESCRIPTION - PAIN TYPE: TYPE: CHRONIC PAIN

## 2018-07-25 ASSESSMENT — PAIN DESCRIPTION - DESCRIPTORS: DESCRIPTORS: ACHING

## 2018-07-25 ASSESSMENT — PAIN SCALES - GENERAL: PAINLEVEL_OUTOF10: 3

## 2018-07-25 ASSESSMENT — PAIN DESCRIPTION - LOCATION: LOCATION: FOOT

## 2018-07-25 ASSESSMENT — PAIN DESCRIPTION - ORIENTATION: ORIENTATION: LEFT

## 2018-07-27 ENCOUNTER — HOSPITAL ENCOUNTER (OUTPATIENT)
Dept: PHYSICAL THERAPY | Age: 68
Setting detail: THERAPIES SERIES
Discharge: HOME | End: 2018-07-27
Payer: MEDICARE

## 2018-07-27 PROCEDURE — 97116 GAIT TRAINING THERAPY: CPT

## 2018-07-27 PROCEDURE — 97112 NEUROMUSCULAR REEDUCATION: CPT

## 2018-07-27 PROCEDURE — 97110 THERAPEUTIC EXERCISES: CPT

## 2018-07-27 ASSESSMENT — PAIN DESCRIPTION - LOCATION: LOCATION: GENERALIZED

## 2018-07-27 ASSESSMENT — PAIN SCALES - GENERAL: PAINLEVEL_OUTOF10: 3

## 2018-07-27 ASSESSMENT — PAIN DESCRIPTION - DESCRIPTORS: DESCRIPTORS: ACHING

## 2018-07-30 ENCOUNTER — HOSPITAL ENCOUNTER (OUTPATIENT)
Dept: PHYSICAL THERAPY | Age: 68
Setting detail: THERAPIES SERIES
Discharge: HOME | End: 2018-07-30
Payer: MEDICARE

## 2018-07-30 PROCEDURE — 97112 NEUROMUSCULAR REEDUCATION: CPT

## 2018-07-30 PROCEDURE — 97110 THERAPEUTIC EXERCISES: CPT

## 2018-07-30 PROCEDURE — 97116 GAIT TRAINING THERAPY: CPT

## 2018-07-30 ASSESSMENT — PAIN DESCRIPTION - LOCATION: LOCATION: GENERALIZED

## 2018-07-30 ASSESSMENT — PAIN SCALES - GENERAL: PAINLEVEL_OUTOF10: 3

## 2018-07-30 ASSESSMENT — PAIN DESCRIPTION - DESCRIPTORS: DESCRIPTORS: ACHING

## 2018-07-30 ASSESSMENT — PAIN DESCRIPTION - ORIENTATION: ORIENTATION: LEFT

## 2018-07-30 ASSESSMENT — PAIN DESCRIPTION - PAIN TYPE: TYPE: CHRONIC PAIN

## 2018-07-30 NOTE — PROGRESS NOTES
36'      Assessment:   Activity Tolerance  Activity Tolerance: Patient Tolerated treatment well    Body structures, Functions, Activity limitations: Decreased functional mobility , Decreased strength, Decreased balance, Decreased coordination, Decreased endurance  Assessment: Patient completed session with restbreaks between activities. Increased repititions for strengthening as well as increased distance with more confidence using SC. Patient demonstrates good judgement and knows when she needs a break. Treatment Diagnosis: Generalized weakness  Prognosis: Good       Goals:       Long term goals  Time Frame for Long term goals : 4-5 weeks  Long term goal 1: Improve marty LE strength to >/= 4+/5 to improve stability with standing and walking. Long term goal 2: Pt will ambulate with LRD >/= 250' with minimal to no deviations S/I. Long term goal 3: [place] >/= 46/56 to reduce risk for falls at home. Long term goal 4: TUG </= 12sec with LRD. Long term goal 5: 5xSTS with use of UEs from chair </= 12sec to demo improved functional strength. Progress toward goals: LTG 2 as noted with improved ambulation with sc    POST-PAIN       Pain Rating (0-10 pain scale):  0 /10   Location and pain description same as pre-treatment unless indicated. Action: [x] NA   [] Perform HEP  [] Meds as prescribed  [] Modalities as prescribed   [] Call Physician     Frequency/Duration:   Plan  Times per week: 2  Plan weeks: 4-5  Current Treatment Recommendations: Strengthening, Balance Training, Functional Mobility Training, Transfer Training, Gait Training, Stair training, Neuromuscular Re-education, Manual Therapy - Soft Tissue Mobilization, Home Exercise Program, Safety Education & Training, Patient/Caregiver Education & Training, Equipment Evaluation, Education, & procurement, Modalities     Pt to continue current HEP. See objective section for any therapeutic exercise changes, additions or modifications this date.          PT Individual Minutes  Time In: 0900  Time Out: 0069  Minutes: 59  Timed Code Treatment Minutes: 59 Minutes  Procedure Minutes:0  TherEx 30 min  Gait 15 min  Neuro 14 min    Signature:  Electronically signed by Aleyxs Layton PTA on 7/30/18 at 12:49 PM

## 2018-08-01 ENCOUNTER — HOSPITAL ENCOUNTER (OUTPATIENT)
Dept: PHYSICAL THERAPY | Age: 68
Setting detail: THERAPIES SERIES
Discharge: HOME | End: 2018-08-01
Payer: MEDICARE

## 2018-08-01 PROCEDURE — 97110 THERAPEUTIC EXERCISES: CPT

## 2018-08-01 PROCEDURE — 97116 GAIT TRAINING THERAPY: CPT

## 2018-08-01 PROCEDURE — 97112 NEUROMUSCULAR REEDUCATION: CPT

## 2018-08-01 ASSESSMENT — PAIN DESCRIPTION - LOCATION: LOCATION: KNEE

## 2018-08-01 ASSESSMENT — PAIN SCALES - GENERAL: PAINLEVEL_OUTOF10: 4

## 2018-08-01 ASSESSMENT — PAIN DESCRIPTION - PAIN TYPE: TYPE: CHRONIC PAIN

## 2018-08-01 ASSESSMENT — PAIN DESCRIPTION - ORIENTATION: ORIENTATION: RIGHT

## 2018-08-08 ENCOUNTER — HOSPITAL ENCOUNTER (OUTPATIENT)
Dept: PHYSICAL THERAPY | Age: 68
Setting detail: THERAPIES SERIES
Discharge: HOME | End: 2018-08-08
Payer: MEDICARE

## 2018-08-08 PROCEDURE — 97110 THERAPEUTIC EXERCISES: CPT

## 2018-08-08 PROCEDURE — 97112 NEUROMUSCULAR REEDUCATION: CPT

## 2018-08-08 PROCEDURE — 97116 GAIT TRAINING THERAPY: CPT

## 2018-08-08 ASSESSMENT — PAIN SCALES - GENERAL: PAINLEVEL_OUTOF10: 3

## 2018-08-08 NOTE — PROGRESS NOTES
[address] [address]  Outpatient Physical Therapy    Treatment Note        Date: 2018  Patient: Zeny Crawford  : 1950  ACCT #: [de-identified]  Referring Practitioner: Dr. Melida Dickerson  Diagnosis: Generalized weakness, Muscle weakness    Visit Information:  PT Visit Information  PT Insurance Information: Medicare  Total # of Visits Approved:  (Subject to therapy cap)  Total # of Visits to Date: 13  Plan of Care/Certification Expiration Date: 18  No Show: 0  Canceled Appointment: 0  Progress Note Counter: 5/10    Subjective: Pt reports feeling very tired today - has been visiting friend in hospice and then daughter had her baby last night. HEP Compliance:  [x] Good [] Fair [] Poor [] Reports not doing due to:    Vital Signs  Patient Currently in Pain: Yes   Pain Screening  Patient Currently in Pain: Yes  Pain Assessment  Pain Assessment: 0-10  Pain Level: 3  Pain Location:  (all over)    OBJECTIVE:   Exercises  Exercise 1: Static standing: heels/toes on bean bags  Exercise 2: Standing with 1 foot on 4\" box with bean bag toss  Exercise 3: Single stepping with s/c x10 marty  Exercise 13: 2\" eccentric step downs x10 marty  Exercise 14: Standing hip circles x10 ea marty  Exercise 15: Hip hikes on 2\" box x10 marty  Exercise 20: HEP: walk along counter with s/c    Ambulation 1  Surface: carpet  Device: Single point cane  Assistance: Stand by assistance, Contact guard assistance  Quality of Gait: Decreased speed, focus on step trough gait pattern  Distance: 60', 40', 70'    Transfers  Comment: 5xSTS from chair with UEs = 28.83sec    Assessment:   Activity Tolerance  Activity Tolerance: Patient Tolerated treatment well    Body structures, Functions, Activity limitations: Decreased functional mobility , Decreased strength, Decreased balance, Decreased coordination, Decreased endurance  Assessment: Pt with improved tolerance for ambulation this date with s/c.   Focused on improved step length marty with ambulation with a step through gait pattern. Pt with Rt Trendelenburg when ambulating with cane likely due to hip abduction weakness. Challenged with standing with 1 foot on 4\" box due to need for modified SLS. Treatment Diagnosis: Generalized weakness  Prognosis: Good       Goals:  Long term goals  Time Frame for Long term goals : 4-5 weeks  Long term goal 1: Improve marty LE strength to >/= 4+/5 to improve stability with standing and walking. Long term goal 2: Pt will ambulate with LRD >/= 250' with minimal to no deviations S/I. Long term goal 3: [place] >/= 46/56 to reduce risk for falls at home. Long term goal 4: TUG </= 12sec with LRD. Long term goal 5: 5xSTS with use of UEs from chair </= 12sec to demo improved functional strength. Progress toward goals: gait, balance    POST-PAIN       Pain Rating (0-10 pain scale): 3  /10   Location and pain description same as pre-treatment unless indicated. Action: [] NA   [] Perform HEP  [x] Meds as prescribed  [x] Modalities as prescribed   [] Call Physician     Frequency/Duration:  Plan  Times per week: 2  Plan weeks: 4-5  Current Treatment Recommendations: Strengthening, Balance Training, Functional Mobility Training, Transfer Training, Gait Training, Stair training, Neuromuscular Re-education, Manual Therapy - Soft Tissue Mobilization, Home Exercise Program, Safety Education & Training, Patient/Caregiver Education & Training, Equipment Evaluation, Education, & procurement, Modalities     Pt to continue current HEP. See objective section for any therapeutic exercise changes, additions or modifications this date.     PT Individual Minutes  Time In: 7541  Time Out: 1000  Minutes: 55  Timed Code Treatment Minutes: 54 Minutes  Procedure Minutes:0  Gait: 20'  There ex: 10'  Neuro mathew: 24'    Signature:  Electronically signed by Citlaly Alvarado, PT on 8/8/18 at 8:54 AM

## 2018-08-09 ENCOUNTER — APPOINTMENT (OUTPATIENT)
Dept: PHYSICAL THERAPY | Age: 68
End: 2018-08-09
Payer: MEDICARE

## 2018-08-10 ENCOUNTER — HOSPITAL ENCOUNTER (OUTPATIENT)
Dept: PHYSICAL THERAPY | Age: 68
Setting detail: THERAPIES SERIES
Discharge: HOME | End: 2018-08-10
Payer: MEDICARE

## 2018-08-10 ENCOUNTER — APPOINTMENT (OUTPATIENT)
Dept: PHYSICAL THERAPY | Age: 68
End: 2018-08-10
Payer: MEDICARE

## 2018-08-10 PROCEDURE — 97112 NEUROMUSCULAR REEDUCATION: CPT

## 2018-08-10 PROCEDURE — 97110 THERAPEUTIC EXERCISES: CPT

## 2018-08-10 PROCEDURE — 97116 GAIT TRAINING THERAPY: CPT

## 2018-08-10 ASSESSMENT — PAIN DESCRIPTION - ORIENTATION: ORIENTATION: RIGHT

## 2018-08-10 ASSESSMENT — PAIN SCALES - GENERAL: PAINLEVEL_OUTOF10: 3

## 2018-08-10 ASSESSMENT — PAIN DESCRIPTION - PAIN TYPE: TYPE: CHRONIC PAIN

## 2018-08-10 ASSESSMENT — PAIN DESCRIPTION - DESCRIPTORS: DESCRIPTORS: ACHING

## 2018-08-10 ASSESSMENT — PAIN DESCRIPTION - LOCATION: LOCATION: FOOT

## 2018-08-10 NOTE — PROGRESS NOTES
[address] [address]  Outpatient Physical Therapy    Treatment Note        Date: 8/10/2018  Patient: Sharri Mccullough  : 1950  ACCT #: [de-identified]  Referring Practitioner: Dr. Kayla Quispe  Diagnosis: Generalized weakness, Muscle weakness    Visit Information:  PT Visit Information  PT Insurance Information: Medicare  Total # of Visits Approved:  (Subject to therapy cap)  Total # of Visits to Date: 12  Plan of Care/Certification Expiration Date: 18  No Show: 0  Canceled Appointment: 0  Progress Note Counter: 6/10    Subjective: Pt reports being tired d/t to a lot going on.       HEP Compliance:  [x] Good [] Fair [] Poor [] Reports not doing due to:    Vital Signs  Patient Currently in Pain: Yes   Pain Screening  Patient Currently in Pain: Yes  Pain Assessment  Pain Assessment: 0-10  Pain Level: 3  Pain Type: Chronic pain  Pain Location: Foot  Pain Orientation: Right  Pain Descriptors: Aching    OBJECTIVE:   Exercises  Exercise 2: Standing with 1 foot on 4\" box with bean bag toss  Exercise 3: Single stepping in 6\" box concentrating on heel strike  Exercise 5: Gait drills with emphasis on 0-1 ue support for improved strength and stability  Exercise 11: Seated HS stretch on step 3/30'' B  Exercise 13: 2\" eccentric step downs x10 marty  Exercise 14: Standing hip circles x10 ea marty  Exercise 15: Hip hikes on 2\" box x10 marty       Ambulation 1  Surface: carpet  Device: Single point cane  Assistance: Stand by assistance  Quality of Gait: Decreased speed, focus on step through gait pattern  Distance: 125'   Comments: with standing RB every 50-70'     *Indicates exercise, modality, or manual techniques to be initiated when appropriate    Assessment:   Activity Tolerance  Activity Tolerance: Patient Tolerated treatment well    Body structures, Functions, Activity limitations: Decreased functional mobility , Decreased strength, Decreased balance, Decreased coordination, Decreased endurance  Assessment: Increased

## 2018-08-13 ENCOUNTER — HOSPITAL ENCOUNTER (OUTPATIENT)
Dept: PHYSICAL THERAPY | Age: 68
Setting detail: THERAPIES SERIES
Discharge: HOME | End: 2018-08-13
Payer: MEDICARE

## 2018-08-13 PROCEDURE — 97112 NEUROMUSCULAR REEDUCATION: CPT

## 2018-08-13 PROCEDURE — 97110 THERAPEUTIC EXERCISES: CPT

## 2018-08-13 ASSESSMENT — PAIN DESCRIPTION - DESCRIPTORS: DESCRIPTORS: BURNING

## 2018-08-13 ASSESSMENT — PAIN DESCRIPTION - ORIENTATION: ORIENTATION: RIGHT

## 2018-08-13 ASSESSMENT — PAIN DESCRIPTION - LOCATION: LOCATION: GENERALIZED;FOOT

## 2018-08-13 ASSESSMENT — PAIN DESCRIPTION - PAIN TYPE: TYPE: CHRONIC PAIN

## 2018-08-13 ASSESSMENT — PAIN SCALES - GENERAL: PAINLEVEL_OUTOF10: 3

## 2018-08-15 ENCOUNTER — HOSPITAL ENCOUNTER (OUTPATIENT)
Dept: PHYSICAL THERAPY | Age: 68
Setting detail: THERAPIES SERIES
Discharge: HOME | End: 2018-08-15
Payer: MEDICARE

## 2018-08-15 PROCEDURE — 97116 GAIT TRAINING THERAPY: CPT

## 2018-08-15 PROCEDURE — 97110 THERAPEUTIC EXERCISES: CPT

## 2018-08-15 PROCEDURE — 97112 NEUROMUSCULAR REEDUCATION: CPT

## 2018-08-15 ASSESSMENT — PAIN DESCRIPTION - PAIN TYPE: TYPE: CHRONIC PAIN

## 2018-08-15 ASSESSMENT — PAIN DESCRIPTION - LOCATION: LOCATION: FOOT

## 2018-08-15 ASSESSMENT — PAIN SCALES - GENERAL: PAINLEVEL_OUTOF10: 3

## 2018-08-15 NOTE — PROGRESS NOTES
[address] [address]  Outpatient Physical Therapy    Treatment Note        Date: 8/15/2018  Patient: Valeriy Toscano  : 1950  ACCT #: [de-identified]  Referring Practitioner: Dr. Elizabeth Judd  Diagnosis: Generalized weakness, Muscle weakness    Visit Information:  PT Visit Information  PT Insurance Information: Medicare  Total # of Visits Approved:  (Subject to therapy cap)  Total # of Visits to Date: 25  Plan of Care/Certification Expiration Date: 18  No Show: 0  Canceled Appointment: 0  Progress Note Counter: 8/10    Subjective: Pt reports that the outside of her left toe was swollen and red after last treatment. Pain was a 6/10 the following day, but patient reports that it is feeling better. HEP Compliance:  [x] Good [] Fair [] Poor [] Reports not doing due to:    Vital Signs  Patient Currently in Pain: Yes   Pain Screening  Patient Currently in Pain: Yes  Pain Assessment  Pain Assessment: 0-10  Pain Level: 3  Pain Type: Chronic pain  Pain Location: Foot    OBJECTIVE:   Exercises  Exercise 4: Foam: Tuning board, wt shifts L/A/P/circles with 1 ue support  Exercise 9: NuStep L2x 5 minutes   Exercise 11: alt toe taps 4\" with 1HHA  Exercise 15: Hip hikes on 2\" box x15 marty  Exercise 18: AKERS=43/56  Exercise 19: TUG=22 with rollator  Exercise 20: STS from chair            Ambulation 1  Surface: carpet  Device: Single point cane  Assistance: Stand by assistance  Quality of Gait: Decreased speed, reciprocal step gait pattern  Distance: 125' and 75'         Assessment:   Activity Tolerance  Activity Tolerance: Patient Tolerated treatment well    Body structures, Functions, Activity limitations: Decreased functional mobility , Decreased strength, Decreased balance, Decreased coordination, Decreased endurance  Assessment: AKERS scored improved 43/56 and TUG score 22 with rollator. Contiued tuning board activites to progress with balance.   Treatment Diagnosis: Generalized weakness          Goals:

## 2018-08-17 ENCOUNTER — APPOINTMENT (OUTPATIENT)
Dept: PHYSICAL THERAPY | Age: 68
End: 2018-08-17
Payer: MEDICARE

## 2018-08-20 ENCOUNTER — HOSPITAL ENCOUNTER (OUTPATIENT)
Dept: PHYSICAL THERAPY | Age: 68
Setting detail: THERAPIES SERIES
Discharge: HOME | End: 2018-08-20
Payer: MEDICARE

## 2018-08-20 PROCEDURE — 97112 NEUROMUSCULAR REEDUCATION: CPT

## 2018-08-20 PROCEDURE — 97110 THERAPEUTIC EXERCISES: CPT

## 2018-08-20 PROCEDURE — 97116 GAIT TRAINING THERAPY: CPT

## 2018-08-20 ASSESSMENT — PAIN DESCRIPTION - DESCRIPTORS: DESCRIPTORS: SORE

## 2018-08-20 ASSESSMENT — PAIN DESCRIPTION - LOCATION: LOCATION: GENERALIZED

## 2018-08-20 ASSESSMENT — PAIN SCALES - GENERAL: PAINLEVEL_OUTOF10: 3

## 2018-08-20 ASSESSMENT — PAIN DESCRIPTION - PAIN TYPE: TYPE: CHRONIC PAIN

## 2018-08-20 NOTE — PROGRESS NOTES
Patient Tolerated treatment well    Body structures, Functions, Activity limitations: Decreased functional mobility , Decreased strength, Decreased balance, Decreased coordination, Decreased endurance  Assessment: Initiated step ups for increased strengthening. VCs to avoid knee extension/locking in [address] Trialed gait drills using straight cane to improve balance and confidence with AD. Occ seated RB during ex's d/t decreased endurance and LE fatigue. Pt is progressing well towards goals, would like to continue with PT to further address stnregth and balance deficits. Discussed with pt visits subject to  Medicare cap, pt aware. Treatment Diagnosis: Generalized weakness  Prognosis: Good       Goals:       Long term goals  Time Frame for Long term goals : 4-5 weeks  Long term goal 1: Improve marty LE strength to >/= 4+/5 to improve stability with standing and walking. Long term goal 2: Pt will ambulate with LRD >/= 250' with minimal to no deviations S/I. Long term goal 3: [place] >/= 46/56 to reduce risk for falls at home. Long term goal 4: TUG </= 12sec with LRD. Long term goal 5: 5xSTS with use of UEs from chair </= 12sec to demo improved functional strength. Progress toward goals: Balance, strength    POST-PAIN       Pain Rating (0-10 pain scale):   4/10   Location and pain description same as pre-treatment unless indicated.    Action: [] NA   [] Perform HEP  [x] Meds as prescribed  [] Modalities as prescribed   [] Call Physician     Frequency/Duration:  Plan  Times per week: 2  Plan weeks: 4-5  Specific instructions for Next Treatment: POC NV  Current Treatment Recommendations: Strengthening, Balance Training, Functional Mobility Training, Transfer Training, Gait Training, Stair training, Neuromuscular Re-education, Manual Therapy - Soft Tissue Mobilization, Home Exercise Program, Safety Education & Training, Patient/Caregiver Education & Training, Equipment Evaluation, Education, & procurement, Modalities

## 2018-08-22 ENCOUNTER — HOSPITAL ENCOUNTER (OUTPATIENT)
Dept: PHYSICAL THERAPY | Age: 68
Setting detail: THERAPIES SERIES
Discharge: HOME | End: 2018-08-22
Payer: MEDICARE

## 2018-08-22 PROCEDURE — G8979 MOBILITY GOAL STATUS: HCPCS

## 2018-08-22 PROCEDURE — 97116 GAIT TRAINING THERAPY: CPT

## 2018-08-22 PROCEDURE — 97112 NEUROMUSCULAR REEDUCATION: CPT

## 2018-08-22 PROCEDURE — G8978 MOBILITY CURRENT STATUS: HCPCS

## 2018-08-22 ASSESSMENT — PAIN SCALES - GENERAL: PAINLEVEL_OUTOF10: 3

## 2018-08-22 ASSESSMENT — PAIN DESCRIPTION - ORIENTATION: ORIENTATION: RIGHT

## 2018-08-22 ASSESSMENT — PAIN DESCRIPTION - LOCATION: LOCATION: FOOT

## 2018-08-22 NOTE — PROGRESS NOTES
[address]     Ph: [phone]  Fax: [phone]    [] Certification  [x] Recertification []  Plan of Care  [] Progress Note [] Discharge      To:  Referring Practitioner: Dr. Britany Monk      From:  Dawn Ortiz, PT  Patient: Nora Daly     : 1950  Diagnosis: Generalized weakness, Muscle weakness     Date: 2018  Treatment Diagnosis: Generalized weakness    Plan of Care/Certification Expiration Date: 10/31/18  Progress Report Period from:  2018  to 2018    Total # of Visits to Date: 20   No Show: 0    Canceled Appointment: 0     OBJECTIVE:   Long Term Goals - Time Frame for Long term goals : 4-5 weeks  Goals Current/ Discharge status Met   Long term goal 1: Improve marty LE strength to >/= 4+/5 to improve stability with standing and walking. Strength RLE  Comment: Hip flex 4+/5, knee ext and flex 5/5, hip abd 4-/5, hip ext 3/5  Strength LLE  Comment: Hip flex 4+/5, knee ext and flex 5/5, hip abd 4-/5, hip ext 3/5 [] yes  [x] no   Long term goal 2: Pt will ambulate with LRD >/= 250' with minimal to no deviations S/I. Ambulation 1  Surface: carpet  Device: Single point cane  Assistance: Stand by assistance, Contact guard assistance  Quality of Gait: Improved heel strike and foot clearance, decreased speed, increased fatigue throughout, 1 episode of LOB with Lt turn requiring CGA  Distance: 200' [] yes  [x] no   Long term goal 3: [place] >/= 49/56 to reduce risk for falls at home. UPDATE: 49/56 43/56 [] yes  [x] no   Long term goal 4: TUG </= 12sec with LRD. 22 sec with [employer]  Timed Up and Go: 30.25 (with s/c) [] yes  [x] no   Long term goal 5: 5xSTS with use of UEs from chair </= 12sec to demo improved functional strength.  5xSTS from chair with UEs = 24.42sec [] yes  [x] no      Body structures, Functions, Activity limitations: Decreased functional mobility , Decreased strength, Decreased balance, Decreased coordination, Decreased endurance  Assessment: Pt with improving marty LE strength as seen with MMT and improved time with 5xSTS. Pt also with improving time with TUG with rollator and now able to complete with s/c. Pt with steadily improving ability to ambulate with s/c but continues to easily fatigue and demonstrate imbalance. Pt would benefit from continued skilled PT to improve her strength and muscular endurance as well as her balance and gait to reduce risk for further falls. Prognosis: Good  Discharge Recommendations: Continue to assess pending progress    G-Codes  PT G-Codes  Functional Assessment Tool Used: [place] and clinical judgement  Score: 43/56  Functional Limitation: Mobility: Walking and moving around  Mobility: Walking and Moving Around Current Status (): At least 20 percent but less than 40 percent impaired, limited or restricted  Mobility: Walking and Moving Around Goal Status (): At least 1 percent but less than 20 percent impaired, limited or restricted    PLAN: [x] Evaluate and Treat  Frequency/Duration:  Plan  Times per week: 2  Plan weeks: 4-5  Current Treatment Recommendations: Strengthening, Balance Training, Functional Mobility Training, Transfer Training, Gait Training, Stair training, Neuromuscular Re-education, Manual Therapy - Soft Tissue Mobilization, Home Exercise Program, Safety Education & Training, Patient/Caregiver Education & Training, Equipment Evaluation, Education, & procurement, Modalities     Patient Status:[x] Continue/ Initiate plan of Care    [] Discharge PT. Recommend pt continue with HEP. [] Additional visits requested, Please re-certify for additional visits:          Signature: Electronically signed by Jennifer Friday, PT on 8/22/18 at 9:01 AM      If you have any questions or concerns, please don't hesitate to call.   Thank you for your referral.    I have reviewed this plan of care and certify a need for medically necessary

## 2018-08-22 NOTE — PROGRESS NOTES
[address] [address]  Outpatient Physical Therapy    Treatment Note        Date: 2018  Patient: Catarina Ponce  : 1950  ACCT #: [de-identified]  Referring Practitioner: Dr. Kedar Maurer  Diagnosis: Generalized weakness, Muscle weakness    Visit Information:  PT Visit Information  PT Insurance Information: Medicare  Total # of Visits Approved:  (Subject to therapy cap)  Total # of Visits to Date: 21  Plan of Care/Certification Expiration Date: 18  No Show: 0  Canceled Appointment: 0  Progress Note Counter: 10/10    Subjective: Pt reports she believes she needs to continue PT to continue to improve her strength, balance and walking.      HEP Compliance:  [x] Good [] Fair [] Poor [] Reports not doing due to:    Vital Signs  Patient Currently in Pain: Yes   Pain Screening  Patient Currently in Pain: Yes  Pain Assessment  Pain Assessment: 0-10  Pain Level: 3  Pain Location: Foot  Pain Orientation: Right    OBJECTIVE:   Exercises  Exercise 5: Gait drills: sidestepping with YTB, F without UE support  Exercise 16: Figure 8 around bolsters with s/c    Ambulation 1  Surface: carpet  Device: Single point cane  Assistance: Stand by assistance, Contact guard assistance  Quality of Gait: Improved heel strike and foot clearance, decreased speed, increased fatigue throughout, 1 episode of LOB with Lt turn requiring CGA  Distance: 200'    Transfers  Comment: 5xSTS from chair with UEs = 24.42sec      Strength: [] NT  [x] MMT completed:  Strength RLE  Comment: Hip flex 4+/5, knee ext and flex 5/5, hip abd 4-/5, hip ext 3/5  Strength LLE  Comment: Hip flex 4+/5, knee ext and flex 5/5, hip abd 4-/5, hip ext 3/5    Assessment:   Activity Tolerance  Activity Tolerance: Patient Tolerated treatment well    Body structures, Functions, Activity limitations: Decreased functional mobility , Decreased strength, Decreased balance, Decreased coordination, Decreased endurance  Assessment: Pt with improving marty LE strength as seen with MMT and improved time with 5xSTS. Pt also with improving time with TUG with rollator and now able to complete with s/c. Pt with steadily improving ability to ambulate with s/c but continues to easily fatigue and demonstrate imbalance. Pt would benefit from continued skilled PT to improve her strength and muscular endurance as well as her balance and gait to reduce risk for further falls. Treatment Diagnosis: Generalized weakness  Prognosis: Good       Goals:  Long term goals  Time Frame for Long term goals : 4-5 weeks  Long term goal 1: Improve marty LE strength to >/= 4+/5 to improve stability with standing and walking. Long term goal 2: Pt will ambulate with LRD >/= 250' with minimal to no deviations S/I. Long term goal 3: [place] >/= 46/56 to reduce risk for falls at home. Long term goal 4: TUG </= 12sec with LRD. Long term goal 5: 5xSTS with use of UEs from chair </= 12sec to demo improved functional strength. Progress toward goals: see UPOC    POST-PAIN       Pain Rating (0-10 pain scale):3   /10   Location and pain description same as pre-treatment unless indicated. Action: [] NA   [] Perform HEP  [x] Meds as prescribed  [x] Modalities as prescribed   [] Call Physician     Frequency/Duration:  Plan  Times per week: 2  Plan weeks: 4-5  Current Treatment Recommendations: Strengthening, Balance Training, Functional Mobility Training, Transfer Training, Gait Training, Stair training, Neuromuscular Re-education, Manual Therapy - Soft Tissue Mobilization, Home Exercise Program, Safety Education & Training, Patient/Caregiver Education & Training, Equipment Evaluation, Education, & procurement, Modalities     Pt to continue current HEP. See objective section for any therapeutic exercise changes, additions or modifications this date.     PT Individual Minutes  Time In: 3720  Time Out: 1000  Minutes: 57  Timed Code Treatment Minutes: 56 Minutes  Procedure Minutes:0  Gait:20'  Neuro mathew: 31'  There

## 2018-08-24 ENCOUNTER — APPOINTMENT (OUTPATIENT)
Dept: PHYSICAL THERAPY | Age: 68
End: 2018-08-24
Payer: MEDICARE

## 2018-08-27 ENCOUNTER — HOSPITAL ENCOUNTER (OUTPATIENT)
Dept: PHYSICAL THERAPY | Age: 68
Setting detail: THERAPIES SERIES
Discharge: HOME | End: 2018-08-27
Payer: MEDICARE

## 2018-08-27 PROCEDURE — 97112 NEUROMUSCULAR REEDUCATION: CPT

## 2018-08-27 PROCEDURE — 97110 THERAPEUTIC EXERCISES: CPT

## 2018-08-27 PROCEDURE — 97116 GAIT TRAINING THERAPY: CPT

## 2018-08-27 ASSESSMENT — PAIN DESCRIPTION - DESCRIPTORS: DESCRIPTORS: SORE

## 2018-08-27 ASSESSMENT — PAIN DESCRIPTION - PAIN TYPE: TYPE: CHRONIC PAIN

## 2018-08-27 ASSESSMENT — PAIN SCALES - GENERAL: PAINLEVEL_OUTOF10: 3

## 2018-08-27 ASSESSMENT — PAIN DESCRIPTION - LOCATION: LOCATION: GENERALIZED

## 2018-08-27 NOTE — PROGRESS NOTES
Tolerated treatment well    Body structures, Functions, Activity limitations: Decreased functional mobility , Decreased strength, Decreased balance, Decreased coordination, Decreased endurance  Assessment: Pt able to increase repetitions of step ups forward and lateral today with decreased standing/seated RBs. Initiated step ups on tuning board to improve stability with pt requiring unilateral support. Continued focus on balance activities to improve balance and stability. Pt would benefit from continuing balance and gait training to improve independence using straight cane and decrease risks of falls. Treatment Diagnosis: Generalized weakness  Prognosis: Good       Goals:       Long term goals  Time Frame for Long term goals : 4-5 weeks  Long term goal 1: Improve marty LE strength to >/= 4+/5 to improve stability with standing and walking. Long term goal 2: Pt will ambulate with LRD >/= 250' with minimal to no deviations S/I. Long term goal 3: [place] >/= 49/56 to reduce risk for falls at home. Long term goal 4: TUG </= 12sec with LRD. Long term goal 5: 5xSTS with use of UEs from chair </= 12sec to demo improved functional strength. Progress toward goals: Balance, strength    POST-PAIN       Pain Rating (0-10 pain scale):   3/10   Location and pain description same as pre-treatment unless indicated. Action: [] NA   [] Perform HEP  [x] Meds as prescribed  [] Modalities as prescribed   [] Call Physician     Frequency/Duration:  Plan  Times per week: 2  Plan weeks: 4-5  Current Treatment Recommendations: Strengthening, Balance Training, Functional Mobility Training, Transfer Training, Gait Training, Stair training, Neuromuscular Re-education, Manual Therapy - Soft Tissue Mobilization, Home Exercise Program, Safety Education & Training, Patient/Caregiver Education & Training, Equipment Evaluation, Education, & procurement, Modalities     Pt to continue current HEP.   See objective section for any therapeutic exercise changes, additions or modifications this date.          PT Individual Minutes  Time In: 0900  Time Out: 8457  Minutes: 58  Timed Code Treatment Minutes: 56 Minutes  Procedure Minutes: 0  There ex: 20  Neuro: 26  Gait: 10    Signature:  Electronically signed by Jerome Castro PTA on 8/27/18 at 9:03 AM

## 2018-08-30 ENCOUNTER — HOSPITAL ENCOUNTER (OUTPATIENT)
Dept: PHYSICAL THERAPY | Age: 68
Setting detail: THERAPIES SERIES
Discharge: HOME | End: 2018-08-30
Payer: MEDICARE

## 2018-08-30 PROCEDURE — 97110 THERAPEUTIC EXERCISES: CPT

## 2018-08-30 PROCEDURE — 97112 NEUROMUSCULAR REEDUCATION: CPT

## 2018-08-30 ASSESSMENT — PAIN DESCRIPTION - LOCATION: LOCATION: FOOT

## 2018-08-30 ASSESSMENT — PAIN DESCRIPTION - ORIENTATION: ORIENTATION: LEFT

## 2018-08-30 ASSESSMENT — PAIN SCALES - GENERAL: PAINLEVEL_OUTOF10: 3

## 2018-09-04 ENCOUNTER — HOSPITAL ENCOUNTER (OUTPATIENT)
Dept: PHYSICAL THERAPY | Age: 68
Setting detail: THERAPIES SERIES
Discharge: HOME | End: 2018-09-04
Payer: MEDICARE

## 2018-09-04 PROCEDURE — 97112 NEUROMUSCULAR REEDUCATION: CPT

## 2018-09-04 PROCEDURE — 97110 THERAPEUTIC EXERCISES: CPT

## 2018-09-04 ASSESSMENT — PAIN DESCRIPTION - DESCRIPTORS: DESCRIPTORS: SORE;ACHING

## 2018-09-04 ASSESSMENT — PAIN DESCRIPTION - LOCATION: LOCATION: FOOT

## 2018-09-04 ASSESSMENT — PAIN SCALES - GENERAL: PAINLEVEL_OUTOF10: 3

## 2018-09-04 ASSESSMENT — PAIN DESCRIPTION - ORIENTATION: ORIENTATION: LEFT

## 2018-09-04 ASSESSMENT — PAIN DESCRIPTION - PAIN TYPE: TYPE: CHRONIC PAIN

## 2018-09-04 NOTE — PROGRESS NOTES
[address] [address]  Outpatient Physical Therapy    Treatment Note        Date: 2018  Patient: Zeny Crawford  : 1950  ACCT #: [de-identified]  Referring Practitioner: Dr. Melida Dickerson  Diagnosis: Generalized weakness, Muscle weakness    Visit Information:  PT Visit Information  PT Insurance Information: Medicare  Total # of Visits Approved:  (Subject to therapy cap)  Total # of Visits to Date: 21  Plan of Care/Certification Expiration Date: 10/31/18  No Show: 0  Canceled Appointment: 0  Progress Note Counter: 3/10    Subjective: Pt reports soreness after last treatment/ 4way hip exercise.  Reports using S/C more than the rollator, tried on outdoor surface \"I did okay\"      HEP Compliance:  [x] Good [] Fair [] Poor [] Reports not doing due to:    Vital Signs  Patient Currently in Pain: Yes   Pain Screening  Patient Currently in Pain: Yes  Pain Assessment  Pain Level: 3  Pain Type: Chronic pain  Pain Location: Foot  Pain Orientation: Left  Pain Descriptors: Sore;Aching    OBJECTIVE:   Exercises  Exercise 5: Gait: amb with S/C on compliant surface, figure 8, carrying cup, ball on cone, stepping over/onto objects F/L  Exercise 7: Step ups 4'' step x10 F/L for increased strengthening  Exercise 15: Hip hikes on 2\" box x15 kuldeep (occ tactile cues to improve technique)  Exercise 17: HS stretch on step 3/30'' Kuldeep to increase flexibility; gastroc str with strap to improve heel strike  Exercise 18: STS from mat without UEs x10; increased time to complete           Strength: [x] NT    ROM: [x] NT  [] ROM measurements:       *Indicates exercise, modality, or manual techniques to be initiated when appropriate    Assessment:   Activity Tolerance  Activity Tolerance: Patient Tolerated treatment well    Body structures, Functions, Activity limitations: Decreased functional mobility , Decreased strength, Decreased balance, Decreased coordination, Decreased endurance  Assessment: Tx focused on higher level gait tasks with

## 2018-09-06 ENCOUNTER — HOSPITAL ENCOUNTER (OUTPATIENT)
Dept: PHYSICAL THERAPY | Age: 68
Setting detail: THERAPIES SERIES
Discharge: HOME | End: 2018-09-06
Payer: MEDICARE

## 2018-09-06 PROCEDURE — 97112 NEUROMUSCULAR REEDUCATION: CPT

## 2018-09-06 PROCEDURE — 97116 GAIT TRAINING THERAPY: CPT

## 2018-09-06 PROCEDURE — 97110 THERAPEUTIC EXERCISES: CPT

## 2018-09-06 ASSESSMENT — PAIN DESCRIPTION - PAIN TYPE: TYPE: CHRONIC PAIN

## 2018-09-06 ASSESSMENT — PAIN DESCRIPTION - DESCRIPTORS: DESCRIPTORS: TENDER;ACHING

## 2018-09-06 ASSESSMENT — PAIN SCALES - GENERAL: PAINLEVEL_OUTOF10: 3

## 2018-09-06 ASSESSMENT — PAIN DESCRIPTION - LOCATION: LOCATION: GENERALIZED;FOOT

## 2018-09-06 NOTE — PROGRESS NOTES
[address] [address]  Outpatient Physical Therapy    Treatment Note        Date: 2018  Patient: Es Jasmine  : 1950  ACCT #: [de-identified]  Referring Practitioner: Dr. Maria C Cordoba  Diagnosis: Generalized weakness, Muscle weakness    Visit Information:  PT Visit Information  PT Insurance Information: Medicare  Total # of Visits Approved:  (Subject to therapy cap)  Total # of Visits to Date: 24  Plan of Care/Certification Expiration Date: 10/31/18  No Show: 0  Canceled Appointment: 0  Progress Note Counter: 4/10    Subjective: Pt reports improved flexibility with taking socks off and reaching. HEP Compliance:  [x] Good [] Fair [] Poor [] Reports not doing due to:    Vital Signs  Patient Currently in Pain: Yes   Pain Screening  Patient Currently in Pain: Yes  Pain Assessment  Pain Level: 3  Pain Type: Chronic pain  Pain Location: Generalized; Foot  Pain Descriptors: Tender;Aching    OBJECTIVE:   Exercises  Exercise 3: Single stepping with S/C over quad cane x10 Kuldeep to improve balance with AD.    Exercise 5: Gait: amb with S/C through gait ladder to increase stride length, onto/over objects, DGI tasks  Exercise 8: 4 way hip YTB x10 kuldeep (VCs for technique)  Exercise 15: Hip hikes on 2\" box x15 kuldeep (occ tactile cues to improve technique)  Exercise 17: HS stretch on step 330'' Kuldeep to increase flexibility; gastroc str with strap to improve heel strike         Strength: [x] NT  [] MMT completed:    ROM: [x] NT  [] ROM measurements:       *Indicates exercise, modality, or manual techniques to be initiated when appropriate    Assessment:   Activity Tolerance  Activity Tolerance: Patient Tolerated treatment well    Body structures, Functions, Activity limitations: Decreased functional mobility , Decreased strength, Decreased balance, Decreased coordination, Decreased endurance  Assessment: Improved TUG score using S/C, progressing towards goal. Initiated DGI Tasks with S/C to improve gait and dynamic 13    Signature:  Electronically signed by Steven Evans PTA on 9/6/18 at 10:12 AM

## 2018-09-10 ENCOUNTER — HOSPITAL ENCOUNTER (OUTPATIENT)
Dept: PHYSICAL THERAPY | Age: 68
Setting detail: THERAPIES SERIES
Discharge: HOME | End: 2018-09-10
Payer: MEDICARE

## 2018-09-10 PROCEDURE — 97112 NEUROMUSCULAR REEDUCATION: CPT

## 2018-09-10 PROCEDURE — 97110 THERAPEUTIC EXERCISES: CPT

## 2018-09-10 ASSESSMENT — PAIN DESCRIPTION - DESCRIPTORS: DESCRIPTORS: SORE

## 2018-09-10 ASSESSMENT — PAIN DESCRIPTION - PAIN TYPE: TYPE: CHRONIC PAIN

## 2018-09-10 ASSESSMENT — PAIN DESCRIPTION - LOCATION: LOCATION: GENERALIZED

## 2018-09-10 NOTE — PROGRESS NOTES
[address] [address]  Outpatient Physical Therapy    Treatment Note        Date: 9/10/2018  Patient: Es Jasmine  : 1950  ACCT #: [de-identified]  Referring Practitioner: Dr. Maria C Cordoba  Diagnosis: Generalized weakness, Muscle weakness    Visit Information:  PT Visit Information  PT Insurance Information: Medicare  Total # of Visits Approved:  (Subject to therapy cap)  Total # of Visits to Date: 25  Plan of Care/Certification Expiration Date: 10/31/18  No Show: 0  Canceled Appointment: 0  Progress Note Counter: 5/10    Subjective: Reports busy weekend. HEP Compliance:  [x] Good [] Fair [] Poor [] Reports not doing due to:    Vital Signs  Patient Currently in Pain: Yes   Pain Screening  Patient Currently in Pain: Yes  Pain Assessment  Pain Type: Chronic pain  Pain Location: Generalized  Pain Descriptors: Sore    OBJECTIVE:   Exercises  Exercise 3: Single stepping with S/C over quad cane x10 Kuldeep to improve balance with AD. Exercise 5: Gait: amb with S/C through gait ladder to increase stride length  Exercise 7: Step ups 4'' step x10 F/L for increased strengthening  Exercise 8: 4 way hip YTB x10 kuldeep (VCs for technique)  Exercise 13: Tuning board: one foot on/off, balancing, EC 10'' x3  Exercise 14: Squats x10 to improve strength and reaching  Exercise 17: HS stretch on step 3/30'' Kuldeep to increase flexibility  Exercise 20: HEP: single stepping using S/C         Strength: [x] NT  [] MMT completed:          *Indicates exercise, modality, or manual techniques to be initiated when appropriate    Assessment: Body structures, Functions, Activity limitations: Decreased functional mobility , Decreased strength, Decreased balance, Decreased coordination, Decreased endurance  Assessment: Pt with increase in [place] score by 1 point, progressing towards goal. Difficulties with WBing through single LE unsupported d/t instability and discomfort. Trialed KT to Rt knee to decrease pain and discomfort.  Pt would benefit from continuing with PT to further address balance and reaching  Treatment Diagnosis: Generalized weakness  Prognosis: Good  Patient Education: KT info given to pt    Goals:       Long term goals  Time Frame for Long term goals : 4-5 weeks  Long term goal 1: Improve marty LE strength to >/= 4+/5 to improve stability with standing and walking. Long term goal 2: Pt will ambulate with LRD >/= 250' with minimal to no deviations S/I. Long term goal 3: [place] >/= 49/56 to reduce risk for falls at home. Long term goal 4: TUG </= 12sec with LRD. Long term goal 5: 5xSTS with use of UEs from chair </= 12sec to demo improved functional strength. Progress toward goals: [place], strength    POST-PAIN       Pain Rating (0-10 pain scale):  2 /10   Location and pain description same as pre-treatment unless indicated. Action: [] NA   [] Perform HEP  [x] Meds as prescribed  [] Modalities as prescribed   [] Call Physician     Frequency/Duration:  Plan  Times per week: 2  Plan weeks: 4-5  Current Treatment Recommendations: Strengthening, Balance Training, Functional Mobility Training, Transfer Training, Gait Training, Stair training, Neuromuscular Re-education, Manual Therapy - Soft Tissue Mobilization, Home Exercise Program, Safety Education & Training, Patient/Caregiver Education & Training, Equipment Evaluation, Education, & procurement, Modalities     Pt to continue current HEP. See objective section for any therapeutic exercise changes, additions or modifications this date.          PT Individual Minutes  Time In: 1001  Time Out: 8178  Minutes: 58  Timed Code Treatment Minutes: 56 Minutes  Procedure Minutes:0  There ex: 23  Neuro: 33    Signature:  Electronically signed by Pavan Sanabria PTA on 9/10/18 at 7:50 AM

## 2018-09-12 ENCOUNTER — HOSPITAL ENCOUNTER (OUTPATIENT)
Dept: PHYSICAL THERAPY | Age: 68
Setting detail: THERAPIES SERIES
Discharge: HOME | End: 2018-09-12
Payer: MEDICARE

## 2018-09-12 PROCEDURE — 97112 NEUROMUSCULAR REEDUCATION: CPT

## 2018-09-12 PROCEDURE — 97116 GAIT TRAINING THERAPY: CPT

## 2018-09-12 PROCEDURE — 97110 THERAPEUTIC EXERCISES: CPT

## 2018-09-12 ASSESSMENT — PAIN SCALES - GENERAL: PAINLEVEL_OUTOF10: 4

## 2018-09-12 ASSESSMENT — PAIN DESCRIPTION - LOCATION: LOCATION: FOOT

## 2018-09-12 ASSESSMENT — PAIN DESCRIPTION - ORIENTATION: ORIENTATION: LEFT

## 2018-09-12 NOTE — PROGRESS NOTES
[address] [address]  Outpatient Physical Therapy    Treatment Note        Date: 2018  Patient: Bhavya Ernandez  : 1950  ACCT #: [de-identified]  Referring Practitioner: Dr. Harpreet Flores  Diagnosis: Generalized weakness, Muscle weakness    Visit Information:  PT Visit Information  PT Insurance Information: Medicare  Total # of Visits Approved:  (Subject to therapy cap)  Total # of Visits to Date: 32  Plan of Care/Certification Expiration Date: 10/31/18  No Show: 0  Canceled Appointment: 0  Progress Note Counter: 6/10    Subjective: Pt reports having increased soreness in foot and in upper back after last visit.      HEP Compliance:  [x] Good [] Fair [] Poor [] Reports not doing due to:    Vital Signs  Patient Currently in Pain: Yes   Pain Screening  Patient Currently in Pain: Yes  Pain Assessment  Pain Assessment: 0-10  Pain Level: 4  Pain Location: Foot  Pain Orientation: Left    OBJECTIVE:   Exercises  Exercise 8: 4 way hip YTB x10 kuldeep (VCs for technique)  Exercise 10: Rows/lats and horiz shoulder abd with YTB while standing x10 ea  Exercise 13: Tuning board: balancing, wt shifts, marching  Exercise 17: HS stretch on step 3/30'' Kuldeep, kuldeep gastroc str with strap to increase flexibility  Exercise 18: Seated trunk rot x10  Exercise 19: KT to Rt knee  Exercise 20: HEP: toe flexion stretch    Ambulation 1  Surface: carpet  Device: Single point cane  Assistance: Stand by assistance, Supervision  Quality of Gait: Improved heel strike and foot clearance, sstady, good balance with sharp turns  Distance: 200'    Transfers  Comment: 5xSTS from chair with UEs = 18.62sec     Manual:   Manual therapy  Joint mobilization: Gentle mobs of Lt metatarsals  PROM: Lt toe flexion str    Assessment:   Activity Tolerance  Activity Tolerance: Patient Tolerated treatment well    Body structures, Functions, Activity limitations: Decreased functional mobility , Decreased strength, Decreased balance, Decreased coordination, Decreased endurance  Assessment: Pt with improved time with 5xSTS indicating improving functional strength. Able to ambulate 200' twice with improving tolerance suggesting increasing strength. Pt continues to demosntrate decreased mobility in Lt metatarsals and toes - educated pt and  on toe flexion str to increase ROM. Pt with increased pain in Lt foot possibly due to increased foot intrinsics use. Treatment Diagnosis: Generalized weakness  Prognosis: Good  Patient Education: KT info given to pt    Goals:  Long term goals  Time Frame for Long term goals : 4-5 weeks  Long term goal 1: Improve marty LE strength to >/= 4+/5 to improve stability with standing and walking. Long term goal 2: Pt will ambulate with LRD >/= 250' with minimal to no deviations S/I. Long term goal 3: [place] >/= 49/56 to reduce risk for falls at home. Long term goal 4: TUG </= 12sec with LRD. Long term goal 5: 5xSTS with use of UEs from chair </= 12sec to demo improved functional strength. Progress toward goals: 5xSTS -improving    POST-PAIN       Pain Rating (0-10 pain scale): 3  /10   Location and pain description same as pre-treatment unless indicated. Action: [] NA   [] Perform HEP  [x] Meds as prescribed  [x] Modalities as prescribed   [] Call Physician     Frequency/Duration:  Plan  Times per week: 2  Plan weeks: 4-5  Current Treatment Recommendations: Strengthening, Balance Training, Functional Mobility Training, Transfer Training, Gait Training, Stair training, Neuromuscular Re-education, Manual Therapy - Soft Tissue Mobilization, Home Exercise Program, Safety Education & Training, Patient/Caregiver Education & Training, Equipment Evaluation, Education, & procurement, Modalities     Pt to continue current HEP. See objective section for any therapeutic exercise changes, additions or modifications this date.     PT Individual Minutes  Time In: 8841  Time Out: 1000  Minutes: 57  Timed Code Treatment Minutes: 56 Minutes  Procedure

## 2018-09-17 ENCOUNTER — HOSPITAL ENCOUNTER (OUTPATIENT)
Dept: PHYSICAL THERAPY | Age: 68
Setting detail: THERAPIES SERIES
Discharge: HOME | End: 2018-09-17
Payer: MEDICARE

## 2018-09-17 PROCEDURE — 97112 NEUROMUSCULAR REEDUCATION: CPT

## 2018-09-17 PROCEDURE — 97110 THERAPEUTIC EXERCISES: CPT

## 2018-09-17 ASSESSMENT — PAIN DESCRIPTION - LOCATION: LOCATION: GENERALIZED

## 2018-09-17 ASSESSMENT — PAIN DESCRIPTION - PAIN TYPE: TYPE: CHRONIC PAIN

## 2018-09-17 ASSESSMENT — PAIN DESCRIPTION - DESCRIPTORS: DESCRIPTORS: TIGHTNESS

## 2018-09-19 ENCOUNTER — HOSPITAL ENCOUNTER (OUTPATIENT)
Dept: PHYSICAL THERAPY | Age: 68
Setting detail: THERAPIES SERIES
Discharge: HOME | End: 2018-09-19
Payer: MEDICARE

## 2018-09-19 PROCEDURE — 97112 NEUROMUSCULAR REEDUCATION: CPT

## 2018-09-19 PROCEDURE — 97116 GAIT TRAINING THERAPY: CPT

## 2018-09-19 ASSESSMENT — PAIN DESCRIPTION - LOCATION: LOCATION: GENERALIZED

## 2018-09-19 ASSESSMENT — PAIN SCALES - GENERAL: PAINLEVEL_OUTOF10: 3

## 2018-09-19 NOTE — PROGRESS NOTES
grieve and due to mental and physical fatigue. Pt requests to cancel appointment on Monday as she will be going to [place] this weekend for the  and feels it may be too much to come on Monday. Pt continues to have difficulty maintaining balance with 1 foot supported on step. Pt with difficulty ambulating with s/c today likely due to mental and physical fatigue. Treatment Diagnosis: Generalized weakness  Prognosis: Good       Goals:  Long term goals  Time Frame for Long term goals : 4-5 weeks  Long term goal 1: Improve marty LE strength to >/= 4+/5 to improve stability with standing and walking. Long term goal 2: Pt will ambulate with LRD >/= 250' with minimal to no deviations S/I. Long term goal 3: [place] >/= 49/56 to reduce risk for falls at home. Long term goal 4: TUG </= 12sec with LRD. Long term goal 5: 5xSTS with use of UEs from chair </= 12sec to demo improved functional strength. Progress toward goals: strength, balance, gait    POST-PAIN       Pain Rating (0-10 pain scale):  3 /10   Location and pain description same as pre-treatment unless indicated. Action: [] NA   [] Perform HEP  [x] Meds as prescribed  [x] Modalities as prescribed   [] Call Physician     Frequency/Duration:  Plan  Times per week: 2  Plan weeks: 4-5  Current Treatment Recommendations: Strengthening, Balance Training, Functional Mobility Training, Transfer Training, Gait Training, Stair training, Neuromuscular Re-education, Manual Therapy - Soft Tissue Mobilization, Home Exercise Program, Safety Education & Training, Patient/Caregiver Education & Training, Equipment Evaluation, Education, & procurement, Modalities     Pt to continue current HEP. See objective section for any therapeutic exercise changes, additions or modifications this date.     PT Individual Minutes  Time In: 902  Time Out: 957  Minutes: 55  Timed Code Treatment Minutes: 54 Minutes  Procedure Minutes:0  Gait: 10'  Neuro mathew:44'    Signature:

## 2018-09-24 ENCOUNTER — APPOINTMENT (OUTPATIENT)
Dept: PHYSICAL THERAPY | Age: 68
End: 2018-09-24
Payer: MEDICARE

## 2018-09-26 ENCOUNTER — HOSPITAL ENCOUNTER (OUTPATIENT)
Dept: PHYSICAL THERAPY | Age: 68
Setting detail: THERAPIES SERIES
Discharge: HOME | End: 2018-09-26
Payer: MEDICARE

## 2018-09-26 PROCEDURE — 97112 NEUROMUSCULAR REEDUCATION: CPT

## 2018-09-26 PROCEDURE — 97116 GAIT TRAINING THERAPY: CPT

## 2018-09-26 PROCEDURE — 97110 THERAPEUTIC EXERCISES: CPT

## 2018-09-26 ASSESSMENT — PAIN DESCRIPTION - ORIENTATION: ORIENTATION: LEFT

## 2018-09-26 ASSESSMENT — PAIN SCALES - GENERAL: PAINLEVEL_OUTOF10: 3

## 2018-09-26 ASSESSMENT — PAIN DESCRIPTION - LOCATION: LOCATION: FOOT

## 2018-09-26 ASSESSMENT — PAIN DESCRIPTION - PAIN TYPE: TYPE: CHRONIC PAIN

## 2018-10-04 ENCOUNTER — HOSPITAL ENCOUNTER (OUTPATIENT)
Dept: PHYSICAL THERAPY | Age: 68
Setting detail: THERAPIES SERIES
Discharge: HOME | End: 2018-10-04
Payer: MEDICARE

## 2018-10-04 PROCEDURE — G8978 MOBILITY CURRENT STATUS: HCPCS

## 2018-10-04 PROCEDURE — G8979 MOBILITY GOAL STATUS: HCPCS

## 2018-10-04 PROCEDURE — 97112 NEUROMUSCULAR REEDUCATION: CPT

## 2018-10-04 PROCEDURE — 97116 GAIT TRAINING THERAPY: CPT

## 2018-10-04 ASSESSMENT — PAIN DESCRIPTION - ORIENTATION: ORIENTATION: LEFT

## 2018-10-04 ASSESSMENT — PAIN SCALES - GENERAL: PAINLEVEL_OUTOF10: 2

## 2018-10-04 ASSESSMENT — PAIN DESCRIPTION - LOCATION: LOCATION: FOOT

## 2018-10-04 NOTE — PROGRESS NOTES
functional mobility , Decreased strength, Decreased balance, Decreased coordination, Decreased endurance  Assessment: Pt limited throughout treatment due to overall fatigue since close friend's passing 2 weeks ago. No significant change in [place] score but demonstrates improving stability with static balance activities. Demos improving Lt LE strength but no significant change in Rt LE. Initiated stair training as pt has been unable to go upstairs to her second floor for a while. Pt would benefit from further skilled PT to continue to progress her strength, balance and ambulation with improved quality and ease. Treatment Diagnosis: Generalized weakness  Prognosis: Good       Goals:  Long term goals  Time Frame for Long term goals : 4-5 weeks  Long term goal 1: Improve marty LE strength to >/= 4+/5 to improve stability with standing and walking. Long term goal 2: Pt will ambulate with LRD >/= 250' with minimal to no deviations S/I. Long term goal 3: [place] >/= 49/56 to reduce risk for falls at home. Long term goal 4: TUG </= 12sec with LRD. Long term goal 5: 5xSTS with use of UEs from chair </= 12sec to demo improved functional strength. Progress toward goals: see UPOC    POST-PAIN       Pain Rating (0-10 pain scale):  4 /10   Location and pain description same as pre-treatment unless indicated. Action: [] NA   [] Perform HEP  [x] Meds as prescribed  [x] Modalities as prescribed   [] Call Physician     Frequency/Duration:  Plan  Times per week: 2  Plan weeks: 4-5  Current Treatment Recommendations: Strengthening, Balance Training, Functional Mobility Training, Transfer Training, Gait Training, Stair training, Neuromuscular Re-education, Manual Therapy - Soft Tissue Mobilization, Home Exercise Program, Safety Education & Training, Patient/Caregiver Education & Training, Equipment Evaluation, Education, & procurement, Modalities     Pt to continue current HEP.   See objective section for any therapeutic exercise

## 2018-10-04 NOTE — PROGRESS NOTES
floor of her home. [] yes  [] no      Body structures, Functions, Activity limitations: Decreased functional mobility , Decreased strength, Decreased balance, Decreased coordination, Decreased endurance  Assessment: Pt limited throughout treatment due to overall fatigue since close friend's passing 2 weeks ago. No significant change in [place] score but demonstrates improving stability with static balance activities. Demos improving Lt LE strength but no significant change in Rt LE. Initiated stair training as pt has been unable to go upstairs to her second floor for a while. Pt would benefit from further skilled PT to continue to progress her strength, balance and ambulation with improved quality and ease. Prognosis: Good    G-Codes  PT G-Codes  Functional Assessment Tool Used: [place] and clinical judgement  Functional Limitation: Mobility: Walking and moving around  Mobility: Walking and Moving Around Current Status (): At least 20 percent but less than 40 percent impaired, limited or restricted  Mobility: Walking and Moving Around Goal Status (): At least 1 percent but less than 20 percent impaired, limited or restricted    PLAN: [x] Evaluate and Treat  Frequency/Duration:  Plan  Times per week: 1  Plan weeks: 4-5  Current Treatment Recommendations: Strengthening, Balance Training, Functional Mobility Training, Transfer Training, Gait Training, Stair training, Neuromuscular Re-education, Manual Therapy - Soft Tissue Mobilization, Home Exercise Program, Safety Education & Training, Patient/Caregiver Education & Training, Equipment Evaluation, Education, & procurement, Modalities     Patient Status:[x] Continue/ Initiate plan of Care    [] Discharge PT. Recommend pt continue with HEP.      [] Additional visits requested, Please re-certify for additional visits:          Signature: Electronically signed by Binh Quezada PT on 10/4/18 at 8:53 AM      If you have any questions or concerns, please don't

## 2018-10-08 ENCOUNTER — HOSPITAL ENCOUNTER (OUTPATIENT)
Dept: PHYSICAL THERAPY | Age: 68
Setting detail: THERAPIES SERIES
Discharge: HOME | End: 2018-10-08
Payer: MEDICARE

## 2018-10-08 PROCEDURE — 97110 THERAPEUTIC EXERCISES: CPT

## 2018-10-08 PROCEDURE — 97116 GAIT TRAINING THERAPY: CPT

## 2018-10-08 PROCEDURE — 97112 NEUROMUSCULAR REEDUCATION: CPT

## 2018-10-08 ASSESSMENT — PAIN DESCRIPTION - LOCATION: LOCATION: FOOT

## 2018-10-08 ASSESSMENT — PAIN DESCRIPTION - DESCRIPTORS: DESCRIPTORS: TIGHTNESS;SORE

## 2018-10-08 ASSESSMENT — PAIN DESCRIPTION - PAIN TYPE: TYPE: CHRONIC PAIN

## 2018-10-08 ASSESSMENT — PAIN SCALES - GENERAL: PAINLEVEL_OUTOF10: 2

## 2018-10-08 ASSESSMENT — PAIN DESCRIPTION - ORIENTATION: ORIENTATION: LEFT

## 2018-10-08 NOTE — PROGRESS NOTES
PT Individual Minutes  Time In: 0590  Time Out: 9140  Minutes: 56  Timed Code Treatment Minutes: 55 Minutes  Procedure Minutes:0  Neuro: 30  Gait: 10  There ex: 15    Signature:  Electronically signed by Zachary Galdamez PTA on 10/8/18 at 7:47 AM

## 2018-10-11 ENCOUNTER — APPOINTMENT (OUTPATIENT)
Dept: PHYSICAL THERAPY | Age: 68
End: 2018-10-11
Payer: MEDICARE

## 2018-10-17 ENCOUNTER — HOSPITAL ENCOUNTER (OUTPATIENT)
Dept: PHYSICAL THERAPY | Age: 68
Setting detail: THERAPIES SERIES
Discharge: HOME | End: 2018-10-17
Payer: MEDICARE

## 2018-10-17 PROCEDURE — 97116 GAIT TRAINING THERAPY: CPT

## 2018-10-17 PROCEDURE — 97110 THERAPEUTIC EXERCISES: CPT

## 2018-10-17 PROCEDURE — 97112 NEUROMUSCULAR REEDUCATION: CPT

## 2018-10-17 ASSESSMENT — PAIN DESCRIPTION - PAIN TYPE: TYPE: CHRONIC PAIN

## 2018-10-17 ASSESSMENT — PAIN DESCRIPTION - ORIENTATION: ORIENTATION: LEFT

## 2018-10-17 ASSESSMENT — PAIN DESCRIPTION - LOCATION: LOCATION: FOOT

## 2018-10-17 ASSESSMENT — PAIN SCALES - GENERAL: PAINLEVEL_OUTOF10: 2

## 2018-10-17 ASSESSMENT — PAIN DESCRIPTION - DESCRIPTORS: DESCRIPTORS: SORE

## 2018-10-24 ENCOUNTER — HOSPITAL ENCOUNTER (OUTPATIENT)
Dept: PHYSICAL THERAPY | Age: 68
Setting detail: THERAPIES SERIES
Discharge: HOME | End: 2018-10-24
Payer: MEDICARE

## 2018-10-24 PROCEDURE — 97110 THERAPEUTIC EXERCISES: CPT

## 2018-10-24 PROCEDURE — 97116 GAIT TRAINING THERAPY: CPT

## 2018-10-24 PROCEDURE — 97112 NEUROMUSCULAR REEDUCATION: CPT

## 2018-10-24 ASSESSMENT — PAIN DESCRIPTION - PAIN TYPE: TYPE: CHRONIC PAIN

## 2018-10-24 ASSESSMENT — PAIN SCALES - GENERAL: PAINLEVEL_OUTOF10: 2

## 2018-10-24 ASSESSMENT — PAIN DESCRIPTION - DESCRIPTORS: DESCRIPTORS: TIGHTNESS

## 2018-10-24 ASSESSMENT — PAIN DESCRIPTION - ORIENTATION: ORIENTATION: LEFT

## 2018-10-24 ASSESSMENT — PAIN DESCRIPTION - LOCATION: LOCATION: FOOT;LEG

## 2018-10-31 ENCOUNTER — HOSPITAL ENCOUNTER (OUTPATIENT)
Dept: PHYSICAL THERAPY | Age: 68
Setting detail: THERAPIES SERIES
Discharge: HOME | End: 2018-10-31
Payer: MEDICARE

## 2018-11-07 ENCOUNTER — HOSPITAL ENCOUNTER (OUTPATIENT)
Dept: PHYSICAL THERAPY | Age: 68
Setting detail: THERAPIES SERIES
Discharge: HOME | End: 2018-11-07
Payer: MEDICARE

## 2018-11-07 PROCEDURE — G8979 MOBILITY GOAL STATUS: HCPCS

## 2018-11-07 PROCEDURE — G8978 MOBILITY CURRENT STATUS: HCPCS

## 2018-11-07 PROCEDURE — 97116 GAIT TRAINING THERAPY: CPT

## 2018-11-07 PROCEDURE — 97112 NEUROMUSCULAR REEDUCATION: CPT

## 2018-11-07 ASSESSMENT — PAIN DESCRIPTION - PAIN TYPE: TYPE: CHRONIC PAIN

## 2018-11-07 ASSESSMENT — PAIN SCALES - GENERAL: PAINLEVEL_OUTOF10: 3

## 2018-11-07 ASSESSMENT — PAIN DESCRIPTION - LOCATION: LOCATION: LEG;FOOT

## 2018-11-07 ASSESSMENT — PAIN DESCRIPTION - DESCRIPTORS: DESCRIPTORS: ACHING;SORE

## 2018-11-07 NOTE — PROGRESS NOTES
[address] [address]  Outpatient Physical Therapy    Treatment Note        Date: 2018  Patient: Malathi Schmidt  : 1950  ACCT #: [de-identified]  Referring Practitioner: Dr. Iker Natarajan  Diagnosis: Generalized weakness, Muscle weakness    Visit Information:  PT Visit Information  PT Insurance Information: Medicare  Total # of Visits Approved:  (Subject to therapy cap)  Total # of Visits to Date: 29  Plan of Care/Certification Expiration Date: 18  No Show: 0  Canceled Appointment: 1  Progress Note Counter:     Subjective: Pt reports having xray of Lt foot and Rt knee. \"The  just said my foot is just inflammed, my knee has a little bit of arthritis\" Went to [facility] who adjusted Lt insert yesterday. Reports not walking with cane lately d/t pain, primarily using rollator     HEP Compliance:  [x] Good [] Fair [] Poor [] Reports not doing due to:    Vital Signs  Patient Currently in Pain: Yes   Pain Screening  Patient Currently in Pain: Yes  Pain Assessment  Pain Level: 3  Pain Type: Chronic pain  Pain Location: Leg;Foot  Pain Descriptors: Aching; Sore    OBJECTIVE:   Exercises  Exercise 19: [place] tasks: obj measures    Ambulation 1  Surface: carpet  Device: Single point cane  Assistance: Supervision, Stand by assistance  Quality of Gait: Decreased speed, increased lateral trunk lean, easily fatigued, difficulty clearing both feet  Distance: 100'    Transfers  Comment: 5xSTS from chair with UEs = 17.26 (second trial)      Strength: [] NT  [x] MMT completed:  Strength RLE  Comment: Hip flex 4+/5, knee flex and ext 5/5, hip abd 4/5, hip ext 3+/5  Strength LLE  Comment: Hip flex 4+/5, knee ext and flex 5/5, hip abd 4/5, hip ext 3+/5      Assessment:   Activity Tolerance  Activity Tolerance: Patient Tolerated treatment well, Patient limited by endurance, Patient limited by fatigue    Body structures, Functions, Activity limitations: Decreased functional mobility , Decreased strength, Decreased balance, Decreased coordination, Decreased endurance  Assessment: Pt with decrease in [place] score by 3 points as well as increased time with TUG and 5x STS. Pt limited by pain today which is affecting gait quality. Pt reports not using S/C d/t pain and fear of falling. Pt could benefit from continuing with PT (3-4 more visits) to improve gait and decrease risks of falls. Treatment Diagnosis: Generalized weakness  Prognosis: Good       Goals:  Long term goals  Time Frame for Long term goals : 4-5 weeks  Long term goal 1: Improve marty LE strength to >/= 4+/5 to improve stability with standing and walking. Long term goal 2: Pt will ambulate with LRD >/= 250' with minimal to no deviations S/I. Long term goal 3: [place] >/= 49/56 to reduce risk for falls at home. Long term goal 4: TUG </= 12sec with LRD. Long term goal 5: 5xSTS with use of UEs from chair </= 12sec and </= 20sec without UEs to demo improved functional strength. Progress toward goals: Gait, balance    POST-PAIN       Pain Rating (0-10 pain scale):   3/10   Location and pain description same as pre-treatment unless indicated. Action: [] NA   [] Perform HEP  [x] Meds as prescribed  [] Modalities as prescribed   [] Call Physician     Frequency/Duration:  Plan  Times per week: 1  Plan weeks: 4-5  Current Treatment Recommendations: Strengthening, Balance Training, Functional Mobility Training, Transfer Training, Gait Training, Stair training, Neuromuscular Re-education, Manual Therapy - Soft Tissue Mobilization, Home Exercise Program, Safety Education & Training, Patient/Caregiver Education & Training, Equipment Evaluation, Education, & procurement, Modalities     Pt to continue current HEP. See objective section for any therapeutic exercise changes, additions or modifications this date.       PT Individual Minutes  Time In: 3870  Time Out: 1007  Minutes: 56  Timed Code Treatment Minutes: 53 Minutes  Procedure Minutes: 0  Neuro: 40  Gait: 13    Signature:

## 2018-11-13 ENCOUNTER — HOSPITAL ENCOUNTER (OUTPATIENT)
Dept: PHYSICAL THERAPY | Age: 68
Setting detail: THERAPIES SERIES
Discharge: HOME | End: 2018-11-13
Payer: MEDICARE

## 2018-11-13 PROCEDURE — 97110 THERAPEUTIC EXERCISES: CPT

## 2018-11-13 PROCEDURE — 97116 GAIT TRAINING THERAPY: CPT

## 2018-11-13 PROCEDURE — 97112 NEUROMUSCULAR REEDUCATION: CPT

## 2018-11-13 ASSESSMENT — PAIN SCALES - GENERAL: PAINLEVEL_OUTOF10: 2

## 2018-11-13 ASSESSMENT — PAIN DESCRIPTION - ORIENTATION: ORIENTATION: LEFT

## 2018-11-13 ASSESSMENT — PAIN DESCRIPTION - LOCATION: LOCATION: FOOT;LEG

## 2018-11-13 ASSESSMENT — PAIN DESCRIPTION - PAIN TYPE: TYPE: CHRONIC PAIN

## 2018-11-21 ENCOUNTER — HOSPITAL ENCOUNTER (OUTPATIENT)
Dept: PHYSICAL THERAPY | Age: 68
Setting detail: THERAPIES SERIES
Discharge: HOME | End: 2018-11-21
Payer: MEDICARE

## 2018-11-26 NOTE — PROGRESS NOTES
[address]       Physical Therapy  Cancellation/No-show Note  Patient Name:  Chriss Eldridge  :  1950   Date:  2018  Referring Practitioner: Dr. Zach Monson  Diagnosis: Generalized weakness, Muscle weakness    Visit Information:  PT Visit Information  PT Insurance Information: Medicare  Total # of Visits Approved:  (Subject to therapy cap)  Total # of Visits to Date: 28  Plan of Care/Certification Expiration Date: 18  No Show: 0  Canceled Appointment: 3  Progress Note Counter:  (cx for 18)    For today's appointment patient:  [x]  Cancelled  []  Rescheduled appointment  []  No-show   []  Called pt to remind of next appointment     Reason given by patient:  [x]  Patient ill  []  Conflicting appointment  []  No transportation    []  Conflict with work  []  No reason given  []  Other:       Comments:       Signature: Electronically signed by Reba Crockett PTA on 18 at 4:42 PM

## 2018-11-27 ENCOUNTER — HOSPITAL ENCOUNTER (OUTPATIENT)
Dept: PHYSICAL THERAPY | Age: 68
Setting detail: THERAPIES SERIES
Discharge: HOME | End: 2018-11-27
Payer: MEDICARE

## 2018-12-05 ENCOUNTER — HOSPITAL ENCOUNTER (OUTPATIENT)
Dept: PHYSICAL THERAPY | Age: 68
Setting detail: THERAPIES SERIES
Discharge: HOME | End: 2018-12-05
Payer: MEDICARE

## 2018-12-13 ENCOUNTER — HOSPITAL ENCOUNTER (OUTPATIENT)
Dept: PHYSICAL THERAPY | Age: 68
Setting detail: THERAPIES SERIES
Discharge: HOME | End: 2018-12-13
Payer: MEDICARE

## 2018-12-13 PROCEDURE — G8979 MOBILITY GOAL STATUS: HCPCS

## 2018-12-13 PROCEDURE — G8978 MOBILITY CURRENT STATUS: HCPCS

## 2018-12-13 PROCEDURE — 97112 NEUROMUSCULAR REEDUCATION: CPT

## 2018-12-13 NOTE — PROGRESS NOTES
[address] [address]  Outpatient Physical Therapy    Treatment Note        Date: 2018  Patient: Carlos Gomez  : 1950  ACCT #: [de-identified]  Referring Practitioner: Dr. Cecilio Christopher  Diagnosis: Generalized weakness, Muscle weakness    Visit Information:  PT Visit Information  PT Insurance Information: Medicare  Total # of Visits Approved:  (Subject to therapy cap)  Total # of Visits to Date: 39  Plan of Care/Certification Expiration Date: 18  No Show: 0  Progress Note Due Date: 18  Canceled Appointment: 4  Progress Note Counter: 2/    Subjective: Pt reports using S/C primarily for 6 days vs using rollator. Has had Bronchial pneumonia for over 3 weeks, using inhaler and taking antibiotics/steroids. HEP Compliance:  [x] Good [] Fair [] Poor [] Reports not doing due to:    Vital Signs  Patient Currently in Pain: No   Pain Screening  Patient Currently in Pain: No    OBJECTIVE:   Exercises  Exercise 18: Seated trunk rot x10     Ambulation 1  Device: Single point cane  Assistance: Supervision, Stand by assistance  Quality of Gait: DEcreased foot clearance, decreased jean claude       Transfers  Comment: 5x STS from chair with UEs =15.31sec, without UEs 21.63               Strength: [] NT  [x] MMT completed:  Strength RLE  Comment: Hip flex 4+/5, knee flex and ext 5/5, hip abd 4/5  Strength LLE  Comment: Hip flex 4+/5, knee ext and flex 5/5, hip abd 4+/5               *Indicates exercise, modality, or manual techniques to be initiated when appropriate    Assessment:   Activity Tolerance  Activity Tolerance: Patient Tolerated treatment well, Patient limited by endurance    Body structures, Functions, Activity limitations: Decreased functional mobility , Decreased strength, Decreased balance, Decreased coordination, Decreased endurance  Assessment: Pt with decreased endurance, d/t illness over last few weeks. Pt with significant decrease in TUG time using S/C as well as decreased time with STS.

## 2018-12-13 NOTE — PROGRESS NOTES
[address]     Ph: [phone]  Fax: [phone]    [] Certification  [] Recertification []  Plan of Care  [x] Progress Note [] Discharge      To:  Referring Practitioner: Dr. Mya Yañez      From:  Mallie Gottron, PT    Patient: Yadi Scott     : 1950  Diagnosis: Generalized weakness, Muscle weakness     Date: 2018  Treatment Diagnosis: Generalized weakness    Plan of Care/Certification Expiration Date: 19  Progress Report Period from:  2018 to 2018    Total # of Visits to Date: 36   No Show: 0    Canceled Appointment: 4     OBJECTIVE:   Long Term Goals - Time Frame for Long term goals : 4-5 weeks  Goals Current/ Discharge status Met   Long term goal 1: Improve marty LE strength to >/= 4+/5 to improve stability with standing and walking. Strength RLE  Comment: Hip flex 4+/5, knee flex and ext 5/5, hip abd 4/5  Strength LLE  Comment: Hip flex 4+/5, knee ext and flex 5/5, hip abd 4+/5 [x] yes  [x] no   Long term goal 2: Pt will ambulate with LRD >/= 250' with minimal to no deviations S/I. Ambulates with S/C with SBA/SUP decreased foot clearance and jean claude  [] yes  [x] no   Long term goal 3: [place] >/= 49/56 to reduce risk for falls at home. [place]/56 [] yes  [x] no   Long term goal 4: TUG </= 12sec with LRD. TU.55 sec with S/C [] yes  [x] no   Long term goal 5: 5xSTS with use of UEs from chair </= 12sec and </= 20sec without UEs to demo improved functional strength. 5xSTS with use of UEs from chair:15.31sec  5xSTS without UEs from chair: without UEs 21.63sec [] yes  [x] no        Body structures, Functions, Activity limitations: Decreased functional mobility , Decreased strength, Decreased balance, Decreased coordination, Decreased endurance  Assessment: Pt with decreased endurance, d/t illness over last few weeks.  Pt with significant decrease in TUG time using S/C as well

## 2018-12-19 ENCOUNTER — HOSPITAL ENCOUNTER (OUTPATIENT)
Dept: PHYSICAL THERAPY | Age: 68
Setting detail: THERAPIES SERIES
Discharge: HOME | End: 2018-12-19
Payer: MEDICARE

## 2018-12-19 PROCEDURE — G8980 MOBILITY D/C STATUS: HCPCS

## 2018-12-19 PROCEDURE — 97116 GAIT TRAINING THERAPY: CPT

## 2018-12-19 PROCEDURE — G8979 MOBILITY GOAL STATUS: HCPCS

## 2018-12-19 ASSESSMENT — PAIN SCALES - GENERAL: PAINLEVEL_OUTOF10: 2

## 2018-12-19 ASSESSMENT — PAIN DESCRIPTION - ORIENTATION: ORIENTATION: LEFT

## 2018-12-19 ASSESSMENT — PAIN DESCRIPTION - LOCATION: LOCATION: TOE (COMMENT WHICH ONE)

## 2018-12-19 NOTE — PROGRESS NOTES
ambulate further if breathing was not a limiting factor. Pt feels comfortable with HEP and D/C at this time. Treatment Diagnosis: Generalized weakness  Prognosis: Good       Goals:  Long term goals  Time Frame for Long term goals : 4-5 weeks  Long term goal 1: Improve marty LE strength to >/= 4+/5 to improve stability with standing and walking. Long term goal 2: Pt will ambulate with LRD >/= 250' with minimal to no deviations S/I. Long term goal 3: [place] >/= 49/56 to reduce risk for falls at home. Long term goal 4: TUG </= 12sec with LRD. Long term goal 5: 5xSTS with use of UEs from chair </= 12sec and </= 20sec without UEs to demo improved functional strength. Progress toward goals: see D/C note    POST-PAIN       Pain Rating (0-10 pain scale):  2 /10   Location and pain description same as pre-treatment unless indicated. Action: [x] NA   [] Perform HEP  [] Meds as prescribed  [] Modalities as prescribed   [] Call Physician     Frequency/Duration:  Plan  Plan Comment: D/C to HEP     Pt to continue current HEP. See objective section for any therapeutic exercise changes, additions or modifications this date.     PT Individual Minutes  Time In: 1104  Time Out: 3943  Minutes: 40  Timed Code Treatment Minutes: 39 Minutes  Procedure Minutes:0  Gait: 44'    Signature:  Electronically signed by Abel Hopper PT on 12/19/18 at 12:04 PM

## 2018-12-26 ENCOUNTER — APPOINTMENT (OUTPATIENT)
Dept: PHYSICAL THERAPY | Age: 68
End: 2018-12-26
Payer: MEDICARE

## 2019-01-12 ENCOUNTER — APPOINTMENT (OUTPATIENT)
Dept: GENERAL RADIOLOGY | Age: 69
End: 2019-01-12
Payer: MEDICARE

## 2019-01-12 ENCOUNTER — HOSPITAL ENCOUNTER (EMERGENCY)
Age: 69
Discharge: HOME | End: 2019-01-12
Attending: EMERGENCY MEDICINE
Payer: MEDICARE

## 2019-01-12 VITALS
SYSTOLIC BLOOD PRESSURE: 177 MMHG | HEART RATE: 73 BPM | BODY MASS INDEX: 43.39 KG/M2 | TEMPERATURE: 98.7 F | WEIGHT: 270 LBS | DIASTOLIC BLOOD PRESSURE: 69 MMHG | RESPIRATION RATE: 20 BRPM | HEIGHT: 66 IN | OXYGEN SATURATION: 94 %

## 2019-01-12 DIAGNOSIS — S82.831A CLOSED FRACTURE OF PROXIMAL END OF RIGHT FIBULA, UNSPECIFIED FRACTURE MORPHOLOGY, INITIAL ENCOUNTER: Primary | ICD-10-CM

## 2019-01-12 DIAGNOSIS — S82.54XA NONDISPLACED FRACTURE OF MEDIAL MALLEOLUS OF RIGHT TIBIA, INITIAL ENCOUNTER FOR CLOSED FRACTURE: ICD-10-CM

## 2019-01-12 PROCEDURE — 73610 X-RAY EXAM OF ANKLE: CPT

## 2019-01-12 PROCEDURE — 73590 X-RAY EXAM OF LOWER LEG: CPT

## 2019-01-12 PROCEDURE — 6370000000 HC RX 637 (ALT 250 FOR IP): Performed by: PHYSICIAN ASSISTANT

## 2019-01-12 PROCEDURE — 73502 X-RAY EXAM HIP UNI 2-3 VIEWS: CPT

## 2019-01-12 PROCEDURE — 99283 EMERGENCY DEPT VISIT LOW MDM: CPT

## 2019-01-12 RX ORDER — IBUPROFEN 400 MG/1
400 TABLET ORAL EVERY 6 HOURS PRN
Qty: 20 TABLET | Refills: 0 | Status: ON HOLD | OUTPATIENT
Start: 2019-01-12 | End: 2019-04-20 | Stop reason: HOSPADM

## 2019-01-12 RX ORDER — LOSARTAN POTASSIUM AND HYDROCHLOROTHIAZIDE 25; 100 MG/1; MG/1
1 TABLET ORAL DAILY
Status: ON HOLD | COMMUNITY
End: 2019-04-03

## 2019-01-12 RX ORDER — HYDROCODONE BITARTRATE AND ACETAMINOPHEN 5; 325 MG/1; MG/1
1 TABLET ORAL ONCE
Status: COMPLETED | OUTPATIENT
Start: 2019-01-12 | End: 2019-01-12

## 2019-01-12 RX ORDER — HYDROCODONE BITARTRATE AND ACETAMINOPHEN 5; 325 MG/1; MG/1
1 TABLET ORAL EVERY 6 HOURS PRN
Qty: 12 TABLET | Refills: 0 | Status: SHIPPED | OUTPATIENT
Start: 2019-01-12 | End: 2019-01-15

## 2019-01-12 RX ADMIN — HYDROCODONE BITARTRATE AND ACETAMINOPHEN 1 TABLET: 5; 325 TABLET ORAL at 18:32

## 2019-01-12 RX ADMIN — HYDROCODONE BITARTRATE AND ACETAMINOPHEN 1 TABLET: 5; 325 TABLET ORAL at 20:04

## 2019-01-12 ASSESSMENT — ENCOUNTER SYMPTOMS
COUGH: 0
BACK PAIN: 0
NAUSEA: 0
ABDOMINAL PAIN: 0
EYE PAIN: 0
VOMITING: 0
VOICE CHANGE: 0
ABDOMINAL DISTENTION: 0
EYE DISCHARGE: 0
SHORTNESS OF BREATH: 0
ANAL BLEEDING: 0
PHOTOPHOBIA: 0
APNEA: 0

## 2019-01-12 ASSESSMENT — PAIN SCALES - GENERAL
PAINLEVEL_OUTOF10: 5
PAINLEVEL_OUTOF10: 5
PAINLEVEL_OUTOF10: 10

## 2019-01-12 ASSESSMENT — PAIN DESCRIPTION - LOCATION
LOCATION: LEG
LOCATION: LEG

## 2019-01-12 ASSESSMENT — PAIN DESCRIPTION - PAIN TYPE
TYPE: ACUTE PAIN
TYPE: ACUTE PAIN

## 2019-02-13 NOTE — PROGRESS NOTES
[address] [address]  Outpatient Physical Therapy    Treatment Note        Date: 2018  Patient: Luis Oneil  : 1950  ACCT #: [de-identified]  Referring Practitioner: Dr. Romero Irizarry  Diagnosis: Generalized weakness, Muscle weakness    Visit Information:  PT Visit Information  PT Insurance Information: Medicare  Total # of Visits Approved:  (Subject to therapy cap)  Total # of Visits to Date: 15  Plan of Care/Certification Expiration Date: 18  No Show: 0  Canceled Appointment: 0  Progress Note Counter: 4/10    Subjective: Pt reports increase  all over soreness. HEP Compliance:  [x] Good [] Fair [] Poor [] Reports not doing due to:    Vital Signs  Patient Currently in Pain: Yes   Pain Screening  Patient Currently in Pain: Yes  Pain Assessment  Pain Assessment: 0-10  Pain Level: 4  Pain Type: Chronic pain  Pain Location: Knee  Pain Orientation: Right    OBJECTIVE:   Exercises  Exercise 1: Static stand: FT/tandem with head turns  Exercise 3: Single stepping into 4\" box with 1UE support  Exercise 5: Gait drills with emphasis on 0-1 ue support for improved strength and stability  Exercise 6: 2 way SLR x12   Exercise 10: B clams x15, sidelying hip abd/ext x15  Exercise 11: Seated HS stretch on step 3/30'' B  Exercise 18: Step ups 4\" x5 b/l                 Ambulation 1  Surface: carpet  Device: Single point cane  Assistance: Stand by assistance, Contact guard assistance  Quality of Gait: decreased jean claude, decreased step length, step to pattern  Distance: 50', 30'         Assessment:   Activity Tolerance  Activity Tolerance: Patient Tolerated treatment well    Body structures, Functions, Activity limitations: Decreased functional mobility , Decreased strength, Decreased balance, Decreased coordination, Decreased endurance  Assessment: Pt instructed in strengthening and balance activities to improve functional mobility.   Pt performed step ups onto 4\" step with c/o medial/lateral knee pain when Post-Care Instructions: Patient instructed to not lie down for 4 hours and limit physical activity for 24 hours. Patient instructed not to travel by airplane for 48 hours.

## 2019-04-03 ENCOUNTER — HOSPITAL ENCOUNTER (OUTPATIENT)
Age: 69
Setting detail: OBSERVATION
Discharge: HOME | End: 2019-04-04
Attending: INTERNAL MEDICINE | Admitting: INTERNAL MEDICINE
Payer: MEDICARE

## 2019-04-03 DIAGNOSIS — R27.0 ATAXIA: ICD-10-CM

## 2019-04-03 DIAGNOSIS — R26.2 UNABLE TO AMBULATE: ICD-10-CM

## 2019-04-03 DIAGNOSIS — R29.6 FREQUENT FALLS: ICD-10-CM

## 2019-04-03 DIAGNOSIS — R29.898 WEAKNESS OF BOTH LOWER EXTREMITIES: Primary | ICD-10-CM

## 2019-04-03 LAB
ALBUMIN SERPL-MCNC: 3.4 G/DL (ref 3.5–4.6)
ALP BLD-CCNC: 114 U/L (ref 40–130)
ALT SERPL-CCNC: 10 U/L (ref 0–33)
ANION GAP SERPL CALCULATED.3IONS-SCNC: 13 MEQ/L (ref 9–15)
AST SERPL-CCNC: 11 U/L (ref 0–35)
BACTERIA: NEGATIVE /HPF
BASOPHILS ABSOLUTE: 0.1 K/UL (ref 0–0.2)
BASOPHILS RELATIVE PERCENT: 0.8 %
BILIRUB SERPL-MCNC: <0.2 MG/DL (ref 0.2–0.7)
BILIRUBIN URINE: NEGATIVE
BLOOD, URINE: NEGATIVE
BUN BLDV-MCNC: 26 MG/DL (ref 8–23)
CALCIUM SERPL-MCNC: 9 MG/DL (ref 8.5–9.9)
CHLORIDE BLD-SCNC: 99 MEQ/L (ref 95–107)
CLARITY: CLEAR
CO2: 28 MEQ/L (ref 20–31)
COLOR: YELLOW
CREAT SERPL-MCNC: 1.83 MG/DL (ref 0.5–0.9)
EOSINOPHILS ABSOLUTE: 0.2 K/UL (ref 0–0.7)
EOSINOPHILS RELATIVE PERCENT: 1.8 %
EPITHELIAL CELLS, UA: ABNORMAL /HPF (ref 0–5)
GFR AFRICAN AMERICAN: 33.1
GFR NON-AFRICAN AMERICAN: 27.4
GLOBULIN: 2.5 G/DL (ref 2.3–3.5)
GLUCOSE BLD-MCNC: 236 MG/DL (ref 60–115)
GLUCOSE BLD-MCNC: 251 MG/DL (ref 70–99)
GLUCOSE URINE: NEGATIVE MG/DL
HCT VFR BLD CALC: 33.4 % (ref 37–47)
HEMOGLOBIN: 10.8 G/DL (ref 12–16)
HYALINE CASTS: ABNORMAL /HPF (ref 0–5)
KETONES, URINE: NEGATIVE MG/DL
LEUKOCYTE ESTERASE, URINE: ABNORMAL
LYMPHOCYTES ABSOLUTE: 1.5 K/UL (ref 1–4.8)
LYMPHOCYTES RELATIVE PERCENT: 17.5 %
MCH RBC QN AUTO: 28.5 PG (ref 27–31.3)
MCHC RBC AUTO-ENTMCNC: 32.4 % (ref 33–37)
MCV RBC AUTO: 88 FL (ref 82–100)
MONOCYTES ABSOLUTE: 0.6 K/UL (ref 0.2–0.8)
MONOCYTES RELATIVE PERCENT: 7.2 %
NEUTROPHILS ABSOLUTE: 6.4 K/UL (ref 1.4–6.5)
NEUTROPHILS RELATIVE PERCENT: 72.7 %
NITRITE, URINE: NEGATIVE
PDW BLD-RTO: 14.7 % (ref 11.5–14.5)
PERFORMED ON: ABNORMAL
PH UA: 5 (ref 5–9)
PLATELET # BLD: 226 K/UL (ref 130–400)
POTASSIUM SERPL-SCNC: 4.3 MEQ/L (ref 3.4–4.9)
PROTEIN UA: 100 MG/DL
RBC # BLD: 3.79 M/UL (ref 4.2–5.4)
RBC UA: ABNORMAL /HPF (ref 0–5)
SODIUM BLD-SCNC: 140 MEQ/L (ref 135–144)
SPECIFIC GRAVITY UA: 1.02 (ref 1–1.03)
TOTAL PROTEIN: 5.9 G/DL (ref 6.3–8)
URINE REFLEX TO CULTURE: YES
UROBILINOGEN, URINE: 0.2 E.U./DL
WBC # BLD: 8.8 K/UL (ref 4.8–10.8)
WBC UA: ABNORMAL /HPF (ref 0–5)

## 2019-04-03 PROCEDURE — G0378 HOSPITAL OBSERVATION PER HR: HCPCS

## 2019-04-03 PROCEDURE — 99285 EMERGENCY DEPT VISIT HI MDM: CPT

## 2019-04-03 PROCEDURE — 97535 SELF CARE MNGMENT TRAINING: CPT

## 2019-04-03 PROCEDURE — 6370000000 HC RX 637 (ALT 250 FOR IP): Performed by: INTERNAL MEDICINE

## 2019-04-03 PROCEDURE — 2580000003 HC RX 258: Performed by: INTERNAL MEDICINE

## 2019-04-03 PROCEDURE — 97166 OT EVAL MOD COMPLEX 45 MIN: CPT

## 2019-04-03 PROCEDURE — 80053 COMPREHEN METABOLIC PANEL: CPT

## 2019-04-03 PROCEDURE — 36415 COLL VENOUS BLD VENIPUNCTURE: CPT

## 2019-04-03 PROCEDURE — 85025 COMPLETE CBC W/AUTO DIFF WBC: CPT

## 2019-04-03 PROCEDURE — 97162 PT EVAL MOD COMPLEX 30 MIN: CPT

## 2019-04-03 PROCEDURE — 6370000000 HC RX 637 (ALT 250 FOR IP): Performed by: PHYSICIAN ASSISTANT

## 2019-04-03 PROCEDURE — 81001 URINALYSIS AUTO W/SCOPE: CPT

## 2019-04-03 PROCEDURE — 87086 URINE CULTURE/COLONY COUNT: CPT

## 2019-04-03 RX ORDER — DEXTROSE MONOHYDRATE 25 G/50ML
12.5 INJECTION, SOLUTION INTRAVENOUS PRN
Status: DISCONTINUED | OUTPATIENT
Start: 2019-04-03 | End: 2019-04-04 | Stop reason: HOSPADM

## 2019-04-03 RX ORDER — METOPROLOL SUCCINATE 100 MG/1
100 TABLET, EXTENDED RELEASE ORAL DAILY
Status: DISCONTINUED | OUTPATIENT
Start: 2019-04-04 | End: 2019-04-04 | Stop reason: HOSPADM

## 2019-04-03 RX ORDER — VALSARTAN AND HYDROCHLOROTHIAZIDE 160; 12.5 MG/1; MG/1
2 TABLET, FILM COATED ORAL DAILY
Status: DISCONTINUED | OUTPATIENT
Start: 2019-04-04 | End: 2019-04-03

## 2019-04-03 RX ORDER — SODIUM CHLORIDE 9 MG/ML
INJECTION, SOLUTION INTRAVENOUS ONCE
Status: COMPLETED | OUTPATIENT
Start: 2019-04-03 | End: 2019-04-03

## 2019-04-03 RX ORDER — L. ACIDOPHILUS/L.BULGARICUS 1MM CELL
1 TABLET ORAL DAILY
Status: DISCONTINUED | OUTPATIENT
Start: 2019-04-04 | End: 2019-04-04 | Stop reason: HOSPADM

## 2019-04-03 RX ORDER — DEXTROSE MONOHYDRATE 50 MG/ML
100 INJECTION, SOLUTION INTRAVENOUS PRN
Status: DISCONTINUED | OUTPATIENT
Start: 2019-04-03 | End: 2019-04-04 | Stop reason: HOSPADM

## 2019-04-03 RX ORDER — INSULIN GLARGINE 100 [IU]/ML
27 INJECTION, SOLUTION SUBCUTANEOUS NIGHTLY
Status: DISCONTINUED | OUTPATIENT
Start: 2019-04-03 | End: 2019-04-04 | Stop reason: HOSPADM

## 2019-04-03 RX ORDER — GLIPIZIDE 5 MG/1
10 TABLET ORAL
Status: DISCONTINUED | OUTPATIENT
Start: 2019-04-04 | End: 2019-04-04 | Stop reason: HOSPADM

## 2019-04-03 RX ORDER — SODIUM CHLORIDE 0.9 % (FLUSH) 0.9 %
10 SYRINGE (ML) INJECTION EVERY 12 HOURS SCHEDULED
Status: DISCONTINUED | OUTPATIENT
Start: 2019-04-03 | End: 2019-04-04 | Stop reason: HOSPADM

## 2019-04-03 RX ORDER — ONDANSETRON 2 MG/ML
4 INJECTION INTRAMUSCULAR; INTRAVENOUS EVERY 6 HOURS PRN
Status: DISCONTINUED | OUTPATIENT
Start: 2019-04-03 | End: 2019-04-04 | Stop reason: HOSPADM

## 2019-04-03 RX ORDER — SODIUM CHLORIDE 0.9 % (FLUSH) 0.9 %
10 SYRINGE (ML) INJECTION PRN
Status: DISCONTINUED | OUTPATIENT
Start: 2019-04-03 | End: 2019-04-04 | Stop reason: HOSPADM

## 2019-04-03 RX ORDER — TRAZODONE HYDROCHLORIDE 50 MG/1
150 TABLET ORAL NIGHTLY
Status: DISCONTINUED | OUTPATIENT
Start: 2019-04-03 | End: 2019-04-04 | Stop reason: HOSPADM

## 2019-04-03 RX ORDER — ACETAMINOPHEN 325 MG/1
650 TABLET ORAL EVERY 4 HOURS PRN
Status: DISCONTINUED | OUTPATIENT
Start: 2019-04-03 | End: 2019-04-04 | Stop reason: HOSPADM

## 2019-04-03 RX ORDER — LEVOTHYROXINE SODIUM 0.12 MG/1
125 TABLET ORAL DAILY
Status: DISCONTINUED | OUTPATIENT
Start: 2019-04-04 | End: 2019-04-04 | Stop reason: HOSPADM

## 2019-04-03 RX ORDER — NICOTINE POLACRILEX 4 MG
15 LOZENGE BUCCAL PRN
Status: DISCONTINUED | OUTPATIENT
Start: 2019-04-03 | End: 2019-04-04 | Stop reason: HOSPADM

## 2019-04-03 RX ORDER — GABAPENTIN 300 MG/1
300 CAPSULE ORAL 2 TIMES DAILY
Status: DISCONTINUED | OUTPATIENT
Start: 2019-04-04 | End: 2019-04-04 | Stop reason: HOSPADM

## 2019-04-03 RX ORDER — ALPRAZOLAM 0.5 MG/1
0.5 TABLET ORAL 3 TIMES DAILY
Status: DISCONTINUED | OUTPATIENT
Start: 2019-04-03 | End: 2019-04-04 | Stop reason: HOSPADM

## 2019-04-03 RX ADMIN — SODIUM CHLORIDE: 9 INJECTION, SOLUTION INTRAVENOUS at 22:54

## 2019-04-03 RX ADMIN — INSULIN GLARGINE 27 UNITS: 100 INJECTION, SOLUTION SUBCUTANEOUS at 23:17

## 2019-04-03 RX ADMIN — INSULIN LISPRO 2 UNITS: 100 INJECTION, SOLUTION INTRAVENOUS; SUBCUTANEOUS at 02:00

## 2019-04-03 RX ADMIN — TRAZODONE HYDROCHLORIDE 150 MG: 50 TABLET ORAL at 22:06

## 2019-04-03 ASSESSMENT — PAIN SCALES - GENERAL
PAINLEVEL_OUTOF10: 0
PAINLEVEL_OUTOF10: 0
PAINLEVEL_OUTOF10: 5
PAINLEVEL_OUTOF10: 6
PAINLEVEL_OUTOF10: 6
PAINLEVEL_OUTOF10: 0
PAINLEVEL_OUTOF10: 0

## 2019-04-03 ASSESSMENT — ENCOUNTER SYMPTOMS
TROUBLE SWALLOWING: 0
SORE THROAT: 0
EYE PAIN: 0
VOMITING: 0
SHORTNESS OF BREATH: 0
EYE ITCHING: 0
DIARRHEA: 0
VOICE CHANGE: 0
WHEEZING: 0
ABDOMINAL PAIN: 0
COLOR CHANGE: 0
NAUSEA: 0
BACK PAIN: 0
COUGH: 0
EYE REDNESS: 0
RHINORRHEA: 0

## 2019-04-03 ASSESSMENT — PAIN DESCRIPTION - PAIN TYPE
TYPE: CHRONIC PAIN

## 2019-04-03 ASSESSMENT — PAIN DESCRIPTION - LOCATION
LOCATION: LEG

## 2019-04-03 ASSESSMENT — PAIN DESCRIPTION - ORIENTATION
ORIENTATION: RIGHT;LEFT
ORIENTATION: LEFT
ORIENTATION: LEFT

## 2019-04-03 ASSESSMENT — PAIN DESCRIPTION - DESCRIPTORS: DESCRIPTORS: TIGHTNESS

## 2019-04-03 ASSESSMENT — PAIN DESCRIPTION - FREQUENCY: FREQUENCY: CONTINUOUS

## 2019-04-03 NOTE — PROGRESS NOTES
Physical Therapy Med Surg Daily Treatment Note  Facility/Department: [address] [place] [address] ED  Room:        NAME: Alexa Vazquez  : 1950 (76 y.o.)  MRN: [id number]  CODE STATUS: No Order    Date of Service: 4/3/2019    Patient Diagnosis(es): No admission diagnoses are documented for this encounter. Chief Complaint   Patient presents with    Extremity Weakness     pt c/o BLE weakness for the past 2 years, sent to the ER by her ortho doctor. 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 without evidence of necrosis (CODE) 10/13/2017    Diabetes mellitus with diabetic peripheral angiopathy without gangrene, without long-term current use of insulin ([facility] [address] 2017    Peripheral angiopathy ([facility] [address] 2017    Non-healing surgical wound 2017        Past Medical History:   Diagnosis Date    Bronchitis     Diabetes mellitus ([facility] [address]     Hypercholesterolemia     Hypertension     Thyroid disease      Past Surgical History:   Procedure Laterality Date    APPENDECTOMY      HERNIA REPAIR      ORTHOPEDIC SURGERY Left 2017    broken left foot. surgeries x3    TONSILLECTOMY N/A          Restrictions  Restrictions/Precautions: Fall Risk    Subjective   General  Chart Reviewed: Yes  Family / Caregiver Present: Yes(spouse)  Subjective  Subjective: Pt reports this all started with surgery to L foot about 2 years ago, was NWB and got weak since then. Reports now L foot is healed. Then fractured R tib/fib and ankle sprain in January reports this is healed now. R knee arthritis causing knee pain/weakness, has hinged brace with laterals.        Pain Screening  Patient Currently in Pain: Yes  Pre Treatment Pain Screening  Pain at present: 6  Intervention List: Patient able to continue with treatment  Comments / Details: worsens with mobility but agreeable to PT eval  Pain Reassessment:   Pain Assessment  Pain Level: 6  Pain Type: Chronic pain  Pain Location: Leg  Pain Orientation: Left(worse than R)       Orientation  Orientation  Overall Orientation Status: Within Functional Limits(often looks to spouse for answers as well)    Objective   Bed mobility  Supine to Sit: Moderate assistance;2 Person assistance  Sit to Supine: (DNT, pt up on BS)    Transfers  Sit to Stand: Moderate Assistance;2 Person Assistance  Stand to sit: Moderate Assistance;2 Person Assistance  Bed to Chair: Moderate assistance;2 Person Assistance  Comment: pt able to STS with min A +2 from bed and hold about 30 seconds with knees braced against back of bed  when attempting pivot transfer to [facility], pt unsafe, not following cues, unable to advance LLE due to severe instability of RLE - increased time and effort to complete, calling spouse over to assist despite a +3 staff with pt    Ambulation  Ambulation?: No(unable to safely advance LEs)       Balance  Sitting - Static: Fair  Sitting - Dynamic: Fair;-  Standing - Static: Poor  Standing - Dynamic: Poor;-      AROM RLE (degrees)  RLE AROM: WFL  AROM LLE (degrees)  LLE AROM : WFL                 Assessment   Body structures, Functions, Activity limitations: Decreased functional mobility ; Decreased safe awareness;Decreased balance;Decreased endurance; Increased Pain;Decreased strength  Assessment: Pt demonstrates the above deficits and decline in functional mobility status placing them at increased risk for falls. Pt would benefit from physical therapy to address above deficits and allow for safe return home at highest level of function, decrease risk for falls, and improve QOL.     Prognosis: Good  REQUIRES PT FOLLOW UP: Yes  Activity Tolerance  Activity Tolerance: Patient Tolerated treatment well     Discharge Recommendations:  Continue to assess pending progress, Patient would benefit from continued therapy after discharge    Goals  Short term goals  Short term goal 1: stand >60 seconds with CGA  Short term goal 2: indep with HEP  Long term goals  Long term goal 1: indep with bed mobility  Long term goal 2: indep with transfers  Long term goal 3: pt to ambulate >50 ft LRAD supervision  Long term goal 4: pt to be assessed for stairs when able  Patient Goals   Patient goals : agreeable to PT [address]  Times per week: 3-6  Current Treatment Recommendations: Strengthening, Balance Training, Transfer Training, Endurance Training, Gait Training, Neuromuscular Re-education, Home Exercise Program, ROM, Functional Mobility Training, Stair training, Pain Management, Safety Education & Training, Equipment Evaluation, Education, & procurement  Safety Devices  Type of devices: (handoff of care to RN)     [facility] (6 CLICK) [address] Mobility Raw Score : 11     Therapy Time   Individual   Time In 1636   Time Out 1659   Minutes 23      Bed mob/transfers: 10 min       Ady Bernal PT, 04/03/19 at 5:11 PM

## 2019-04-03 NOTE — ED TRIAGE NOTES
Pt c/o chronic BLE weakness for the past 2 years, states she was sent to the ER by Dr Louie Stahl for admission, Pt is A&OX3, calm, afebrile,  Breathes are equal and unlabored, denies recent trauma.

## 2019-04-03 NOTE — ED PROVIDER NOTES
[address] [facility] ED  eMERGENCY dEPARTMENT eNCOUnter      Pt Name: Prosper Vega  MRN: [id number]  [place] 1950  Date of evaluation: 4/3/2019  Provider: Byron Serrano       Chief Complaint   Patient presents with    Extremity Weakness     pt c/o BLE weakness for the past 2 years, sent to the ER by her ortho doctor. HISTORY OF PRESENT ILLNESS   (Location/Symptom, Timing/Onset,Context/Setting, Quality, Duration, Modifying Factors, Severity)  Note limiting factors. Prosper Vega is a 76 y.o. female who presents to the emergency department with complaints of progressively worse generalized weakness bilateral lower extremities. She reports that this been an ongoing issue over the last 2 years. She reports she has had increased falls and his unable to ambulate at home. She reports that she has to keep a bedside commode at her bedside in order for her to get up to go to the bathroom. She states she uses a walker to stand and she previously was able to walk with a walker but is no longer able to. She recently had a fall and saw her orthopedic specialist today and had her left lower extremity x-rayed and states that she had no fractures. She states that the orthopedic specialist advised her to come to the ER to be admitted to a inpatient or outpatient rehab facility to get her leg stronger. She does report that she gets frequent like spasms from sitting in chair for long periods of time and being unable to ambulate. Other than bilateral lower extremity weakness progressively getting worse, patient denies any other complaints. Patient was previously seen in the ER in January for a fractured right ankle and fibula which she has been currently receiving at home physical therapy with [facility] since then. She reports that her left lower extremity is significantly more weak than her right. She does wear bilateral hinged knee braces for support.         Nursing Notes were reviewed. REVIEW OF SYSTEMS    (2-9 systems for level 4, 10 or more for level 5)     Review of Systems   Constitutional: Negative for activity change, appetite change, diaphoresis, fatigue and fever. HENT: Negative for congestion, ear discharge, ear pain, rhinorrhea, sore throat, trouble swallowing and voice change. Eyes: Negative for pain, redness and itching. Respiratory: Negative for cough, shortness of breath and wheezing. Cardiovascular: Negative for chest pain and palpitations. Gastrointestinal: Negative for abdominal pain, diarrhea, nausea and vomiting. Genitourinary: Negative for dysuria, flank pain and hematuria. Musculoskeletal: Negative for back pain. BLE spasms and \"achiness\"   Skin: Negative for color change and rash. Neurological: Positive for weakness. Negative for dizziness, seizures, light-headedness, numbness and headaches. All other systems reviewed and are negative. Except as noted above the remainder of the review of systems was reviewed and negative. PAST MEDICAL HISTORY     Past Medical History:   Diagnosis Date    Bronchitis     Diabetes mellitus ([address]     Hypercholesterolemia     Hypertension     Thyroid disease      Past Surgical History:   Procedure Laterality Date    APPENDECTOMY      HERNIA REPAIR      ORTHOPEDIC SURGERY Left 2017    broken left foot. surgeries x3    TONSILLECTOMY N/A      Social History     Socioeconomic History    Marital status:      Spouse name: geo amador    Number of children: 2    Years of education: 15    Highest education level: None   Occupational History    Occupation: retired   Social Needs    Financial resource strain: None    Food insecurity:     Worry: None     Inability: None    Transportation needs:     Medical: None     Non-medical: None   Tobacco Use    Smoking status: Never Smoker    Smokeless tobacco: Never Used   Substance and Sexual Activity    Alcohol use: No    Drug use:  No deformity. Tenderness. Left ankle: Normal.        Feet:    Slight decrease in strength of BLE L>R, but still has strong foot pushes. Slight decrease in ROM of B knees and R ankle. Strong hand grasps   Neurological: She is alert and oriented to person, place, and time. She is not disoriented. No cranial nerve deficit or sensory deficit. GCS eye subscore is 4. GCS verbal subscore is 5. GCS motor subscore is 6. Skin: Skin is warm and dry. Capillary refill takes less than 2 seconds. She is not diaphoretic. Psychiatric: She has a normal mood and affect. Her behavior is normal. Judgment and thought content normal.   Nursing note and vitals reviewed. DIAGNOSTIC RESULTS     EKG: All EKG's are interpreted by the Emergency Department Physician who either signs or Co-signsthis chart in the absence of a cardiologist.      RADIOLOGY:   Johnella Calkin such as CT, Ultrasound and MRI are read by the radiologist. Plain radiographic images are visualized and preliminarily interpreted by the emergency physician with the below findings:      Interpretation per the Radiologist below, if available at the time ofthis note:    No orders to display         ED BEDSIDE ULTRASOUND:   Performed by ED Physician - none    LABS:  Labs Reviewed   URINE RT REFLEX TO CULTURE - Abnormal; Notable for the following components:       Result Value    Protein,  (*)     Leukocyte Esterase, Urine SMALL (*)     All other components within normal limits   MICROSCOPIC URINALYSIS - Abnormal; Notable for the following components:    WBC, UA 20-50 (*)     RBC, UA 3-5 (*)     All other components within normal limits   URINE CULTURE   COMPREHENSIVE METABOLIC PANEL   CBC WITH AUTO DIFFERENTIAL       All other labs were within normal range or not returned as of this dictation.     EMERGENCY DEPARTMENT COURSE and DIFFERENTIAL DIAGNOSIS/MDM:   Vitals:    Vitals:    04/03/19 1523 04/03/19 1707   BP: (!) 150/63 (!) 171/64   Pulse: 71 76   Resp: 18 16   Temp: 97.8 °F (36.6 °C)    TempSrc: Oral    SpO2: 95% 95%   Weight: 270 lb (122.5 kg)    Height: 5' 7\" (1.702 m)             MDM     Patient presented emergency department after being sent by the orthopedic specialist for progressive generalized bilateral lower extremity weakness and inability and not with frequent falls that is ongoing for 2 year period. She is alert and oriented, hematuria and stable, no acute distress. She is nontoxic appearing. She was advised come the ER should she get inpatient placement to rehab facility for this decline. Occupational therapy and physical therapy eval's were done in the ER. Patient will be admitted to an observation unit at this time and further transfer to inpatient rehab. Initial lab work was ordered. Pending at this time. Case was discussed with Dr. Kusum Mead, hospitalist, who will accept patient to his care at this time. Patient was instructed on admission plan of care at this time and progression to inpatient rehab unit. She verbalizes understanding and denies any further questions at this time. CRITICAL CARE TIME       CONSULTS:  None    PROCEDURES:  Unless otherwise noted below, none     Procedures    FINAL IMPRESSION      1. Weakness of both lower extremities    2. Unable to ambulate    3. Ataxia    4. Frequent falls          DISPOSITION/PLAN   DISPOSITION        PATIENT REFERRED TO:  No follow-up provider specified.     DISCHARGE MEDICATIONS:  New Prescriptions    No medications on file          (Please notethat portions of this note were completed with a voice recognition program.  Efforts were made to edit the dictations but occasionally words are mis-transcribed.)    MARCO Morris CNP (electronically signed)  Attending Emergency Physician         MARCO Morris CNP  04/03/19 3896  Attending Supervising Physician's Attestation Statement  I performed a history and physical examination on the patient and discussed the

## 2019-04-03 NOTE — PROGRESS NOTES
[facility] OCCUPATIONAL THERAPY EVALUATION - ACUTE     Date: 4/3/2019  Patient Name: Yamilex Daily        MRN: [id number]  Account: [de-identified]   : 1950  (76 y.o.)  Room:     Chart Review:  Diagnosis:  The primary encounter diagnosis was Weakness of both lower extremities. Diagnoses of Unable to ambulate, Ataxia, and Frequent falls were also pertinent to this visit. Past Medical History:   Diagnosis Date    Bronchitis     Diabetes mellitus ([address]     Hypercholesterolemia     Hypertension     Thyroid disease      Past Surgical History:   Procedure Laterality Date    APPENDECTOMY      HERNIA REPAIR      ORTHOPEDIC SURGERY Left 2017    broken left foot.  surgeries x3    TONSILLECTOMY N/A        Restrictions  Restrictions/Precautions: Fall Risk     Safety Devices: Safety Devices  Safety Devices in place: Yes  Type of devices: (Left on BSC with nursing)    Subjective  Pre Treatment Pain Screening  Pain at present: 6  Scale Used: Numeric Score  Intervention List: Patient able to continue with treatment    Pain Reassessment:   Pain Assessment  Patient Currently in Pain: Yes  Pain Assessment: 0-10  Pain Level: 6  Pain Type: Chronic pain  Pain Location: Leg  Pain Orientation: Left       Orientation  Orientation  Overall Orientation Status: Within Functional Limits    Prior Level of Function:  Social/Functional History  Lives With: Spouse  Type of Home: House  Home Layout: Two level, Able to Live on Main level with bedroom/bathroom  Home Access: Stairs to enter with rails  Entrance Stairs - Number of Steps: 3  Entrance Stairs - Rails: Both  Bathroom Shower/Tub: Walk-in shower(Patient states she has not been using shower since fall in 2019)  Bathroom Toilet: Standard  Bathroom Equipment: 3-in-1 commode(Reports no room for a shower chair)  Home Equipment: 4 wheeled walker  ADL Assistance: Needs assistance  Bath: (Patient states  assists to wash feet)  Dressing: (Patient reports  Normotonic  Tone LUE  LUE Tone: Normotonic  Coordination  Movements Are Fluid And Coordinated: Yes    Hand Dominance:  Hand Dominance  Hand Dominance: Right    ADL Status:  ADL  Feeding: Setup  Grooming: Setup  UE Bathing: Setup(Simulated)  LE Bathing: Maximum assistance(Simulated)  UE Dressing: Setup  LE Dressing: Maximum assistance  Toileting: Maximum assistance  Toilet Transfers  Toilet - Technique: Stand pivot  Equipment Used: Standard bedside commode  Toilet Transfer: Moderate assistance, 2 Person assistance  Toilet Transfers Comments: Patient requires max verbal cues for proper and safe techqniue for transfer onto [facility]. Patient fearful that legs will give out on her during evaluation  Tub Transfers  Tub Transfers: Not tested    Functional Mobility:  Functional Mobility  Functional Mobility Comments: NT secondary to safety concerns. Patient unsafe to ambulate at this time. Transfers  Sit to stand: Minimal assistance  Stand to sit: Minimal assistance    Bed Mobility  Bed mobility  Supine to Sit: Moderate assistance, 2 Person assistance  Sit to Supine: (DNT, patient up on BSC)    Seated and Standing Balance:  Balance  Sitting Balance: Supervision  Standing Balance: Contact guard assistance    Functional Endurance:  Activity Tolerance  Activity Tolerance: Patient limited by fatigue, Patient limited by pain    D/C Recommendations:    Equipment Recommendations:      OT Follow Up:  OT D/C RECOMMENDATIONS  REQUIRES OT FOLLOW UP: Yes       Assessment/Discharge Disposition:     Performance deficits / Impairments: Decreased functional mobility , Decreased high-level IADLs, Decreased ADL status, Decreased endurance, Decreased strength, Decreased balance, Decreased safe awareness, Decreased coordination  Prognosis: Fair  Discharge Recommendations: Continue to assess pending progress  History: Multiple comorbidities  Exam: 8 performance deficits  Assistance / Modification:  Mod A    Six Click Score   How much help for putting on and taking off regular lower body clothing?: A Lot  How much help for Bathing?: A Lot  How much help for Toileting?: A Lot  How much help for putting on and taking off regular upper body clothing?: A Little  How much help for taking care of personal grooming?: A Little  How much help for eating meals?: None  AM-PAC Inpatient Daily Activity Raw Score: 16  AM-PAC Inpatient ADL T-Scale Score : 35.96  ADL Inpatient CMS 0-100% Score: 53.32    Plan:  Plan  Times per week: 1-4x/week  Current Treatment Recommendations: Strengthening, Safety Education & Training, Balance Training, Patient/Caregiver Education & Training, Self-Care / ADL, Cognitive/Perceptual Training, Functional Mobility Training, Neuromuscular Re-education, Equipment Evaluation, Education, & procurement, Home Management Training, Endurance Training    Goals:   Patient will:    - Improve functional endurance to tolerate/complete 30-45 mins of ADL's  - Be Modified Independent in UB ADLs   - Be Modified Independent in LB ADLs  - Be Modified Independent in ADL transfers without LOB  - Be Modified Independent in toileting tasks  - Improve bilateral UE strength and endurance to 4/5 in order to participate in self-care activities as projected.   - Sequence self-care tasks with no cues for safety awareness  - Other :  Improve standing balance/tolerance to complete ADL activities as projected    Patient Goal:    To return home   Discussed and agreed upon: Yes Comments:     Therapy Time:   OT Individual Minutes  Time In: 1630  Time Out: 1700  Minutes: 30    Electronically signed by:    KALYANI Corona  4/3/2019, 5:17 PM

## 2019-04-04 ENCOUNTER — APPOINTMENT (OUTPATIENT)
Dept: CT IMAGING | Age: 69
End: 2019-04-04
Payer: MEDICARE

## 2019-04-04 ENCOUNTER — HOSPITAL ENCOUNTER (INPATIENT)
Age: 69
LOS: 16 days | Discharge: HOME HEALTH | DRG: 092 | End: 2019-04-20
Attending: PHYSICAL MEDICINE & REHABILITATION | Admitting: PHYSICAL MEDICINE & REHABILITATION
Payer: MEDICARE

## 2019-04-04 VITALS
HEIGHT: 67 IN | WEIGHT: 273.15 LBS | DIASTOLIC BLOOD PRESSURE: 44 MMHG | RESPIRATION RATE: 20 BRPM | BODY MASS INDEX: 42.87 KG/M2 | TEMPERATURE: 97.7 F | OXYGEN SATURATION: 89 % | HEART RATE: 59 BPM | SYSTOLIC BLOOD PRESSURE: 129 MMHG

## 2019-04-04 DIAGNOSIS — M15.9 PRIMARY OSTEOARTHRITIS INVOLVING MULTIPLE JOINTS: Primary | ICD-10-CM

## 2019-04-04 DIAGNOSIS — F41.9 ANXIETY: ICD-10-CM

## 2019-04-04 PROBLEM — R26.9 ABNORMALITY OF GAIT AND MOBILITY: Status: ACTIVE | Noted: 2019-04-04

## 2019-04-04 PROBLEM — I10 ESSENTIAL HYPERTENSION: Status: ACTIVE | Noted: 2019-04-04

## 2019-04-04 PROBLEM — J40 BRONCHITIS: Status: ACTIVE | Noted: 2019-04-04

## 2019-04-04 PROBLEM — S82.401A FRACTURE OF RIGHT TIBIA AND FIBULA: Status: ACTIVE | Noted: 2019-04-04

## 2019-04-04 PROBLEM — E03.9 HYPOTHYROID: Status: ACTIVE | Noted: 2019-04-04

## 2019-04-04 PROBLEM — F13.20 BENZODIAZEPINE DEPENDENCE (HCC): Status: ACTIVE | Noted: 2019-04-04

## 2019-04-04 PROBLEM — R27.0 ATAXIA: Status: ACTIVE | Noted: 2019-04-04

## 2019-04-04 PROBLEM — N17.9 ACUTE RENAL FAILURE (ARF) (HCC): Status: ACTIVE | Noted: 2019-04-04

## 2019-04-04 PROBLEM — S82.201A FRACTURE OF RIGHT TIBIA AND FIBULA: Status: ACTIVE | Noted: 2019-04-04

## 2019-04-04 PROBLEM — N17.9 ACUTE ON CHRONIC RENAL FAILURE (HCC): Status: ACTIVE | Noted: 2019-04-04

## 2019-04-04 PROBLEM — M19.90 OA (OSTEOARTHRITIS): Status: ACTIVE | Noted: 2019-04-04

## 2019-04-04 PROBLEM — E78.5 HLD (HYPERLIPIDEMIA): Status: ACTIVE | Noted: 2019-04-04

## 2019-04-04 PROBLEM — N18.9 ACUTE ON CHRONIC RENAL FAILURE (HCC): Status: ACTIVE | Noted: 2019-04-04

## 2019-04-04 PROBLEM — L97.529 CHRONIC ULCER OF LEFT FOOT (HCC): Status: ACTIVE | Noted: 2019-04-04

## 2019-04-04 PROBLEM — E66.01 MORBID OBESITY DUE TO EXCESS CALORIES (HCC): Status: ACTIVE | Noted: 2019-04-04

## 2019-04-04 LAB
ANION GAP SERPL CALCULATED.3IONS-SCNC: 11 MEQ/L (ref 9–15)
BUN BLDV-MCNC: 24 MG/DL (ref 8–23)
CALCIUM SERPL-MCNC: 8.7 MG/DL (ref 8.5–9.9)
CHLORIDE BLD-SCNC: 101 MEQ/L (ref 95–107)
CO2: 27 MEQ/L (ref 20–31)
CREAT SERPL-MCNC: 1.78 MG/DL (ref 0.5–0.9)
GFR AFRICAN AMERICAN: 34.2
GFR NON-AFRICAN AMERICAN: 28.3
GLUCOSE BLD-MCNC: 120 MG/DL (ref 60–115)
GLUCOSE BLD-MCNC: 122 MG/DL (ref 60–115)
GLUCOSE BLD-MCNC: 122 MG/DL (ref 60–115)
GLUCOSE BLD-MCNC: 144 MG/DL (ref 70–99)
GLUCOSE BLD-MCNC: 97 MG/DL (ref 60–115)
HCT VFR BLD CALC: 32.2 % (ref 37–47)
HEMOGLOBIN: 10.6 G/DL (ref 12–16)
MCH RBC QN AUTO: 28.8 PG (ref 27–31.3)
MCHC RBC AUTO-ENTMCNC: 32.9 % (ref 33–37)
MCV RBC AUTO: 87.6 FL (ref 82–100)
PDW BLD-RTO: 14.5 % (ref 11.5–14.5)
PERFORMED ON: ABNORMAL
PERFORMED ON: NORMAL
PLATELET # BLD: 212 K/UL (ref 130–400)
POTASSIUM REFLEX MAGNESIUM: 3.9 MEQ/L (ref 3.4–4.9)
RBC # BLD: 3.67 M/UL (ref 4.2–5.4)
SODIUM BLD-SCNC: 139 MEQ/L (ref 135–144)
WBC # BLD: 8.4 K/UL (ref 4.8–10.8)

## 2019-04-04 PROCEDURE — 6370000000 HC RX 637 (ALT 250 FOR IP): Performed by: INTERNAL MEDICINE

## 2019-04-04 PROCEDURE — 85027 COMPLETE CBC AUTOMATED: CPT

## 2019-04-04 PROCEDURE — 80048 BASIC METABOLIC PNL TOTAL CA: CPT

## 2019-04-04 PROCEDURE — 96372 THER/PROPH/DIAG INJ SC/IM: CPT

## 2019-04-04 PROCEDURE — 36415 COLL VENOUS BLD VENIPUNCTURE: CPT

## 2019-04-04 PROCEDURE — 70450 CT HEAD/BRAIN W/O DYE: CPT

## 2019-04-04 PROCEDURE — 72131 CT LUMBAR SPINE W/O DYE: CPT

## 2019-04-04 PROCEDURE — G0378 HOSPITAL OBSERVATION PER HR: HCPCS

## 2019-04-04 PROCEDURE — 2500000003 HC RX 250 WO HCPCS: Performed by: PHYSICAL MEDICINE & REHABILITATION

## 2019-04-04 PROCEDURE — 6370000000 HC RX 637 (ALT 250 FOR IP): Performed by: PHYSICAL MEDICINE & REHABILITATION

## 2019-04-04 PROCEDURE — 96365 THER/PROPH/DIAG IV INF INIT: CPT

## 2019-04-04 PROCEDURE — 1180000000 HC REHAB R&B

## 2019-04-04 PROCEDURE — 6370000000 HC RX 637 (ALT 250 FOR IP): Performed by: PHYSICIAN ASSISTANT

## 2019-04-04 PROCEDURE — 6360000002 HC RX W HCPCS: Performed by: PHYSICIAN ASSISTANT

## 2019-04-04 PROCEDURE — 97535 SELF CARE MNGMENT TRAINING: CPT

## 2019-04-04 PROCEDURE — 2580000003 HC RX 258: Performed by: PHYSICIAN ASSISTANT

## 2019-04-04 RX ORDER — NITROFURANTOIN 25; 75 MG/1; MG/1
100 CAPSULE ORAL EVERY 12 HOURS SCHEDULED
Status: CANCELLED | OUTPATIENT
Start: 2019-04-04 | End: 2019-04-07

## 2019-04-04 RX ORDER — METOPROLOL SUCCINATE 100 MG/1
100 TABLET, EXTENDED RELEASE ORAL DAILY
Status: DISCONTINUED | OUTPATIENT
Start: 2019-04-05 | End: 2019-04-20 | Stop reason: HOSPADM

## 2019-04-04 RX ORDER — HYDROCODONE BITARTRATE AND ACETAMINOPHEN 5; 325 MG/1; MG/1
1 TABLET ORAL EVERY 4 HOURS PRN
Status: DISCONTINUED | OUTPATIENT
Start: 2019-04-04 | End: 2019-04-20 | Stop reason: HOSPADM

## 2019-04-04 RX ORDER — AMMONIUM LACTATE 12 G/100G
LOTION TOPICAL PRN
Status: DISCONTINUED | OUTPATIENT
Start: 2019-04-04 | End: 2019-04-20 | Stop reason: HOSPADM

## 2019-04-04 RX ORDER — TRAZODONE HYDROCHLORIDE 50 MG/1
150 TABLET ORAL NIGHTLY
Status: CANCELLED | OUTPATIENT
Start: 2019-04-04

## 2019-04-04 RX ORDER — GLIPIZIDE 5 MG/1
10 TABLET ORAL
Status: CANCELLED | OUTPATIENT
Start: 2019-04-04

## 2019-04-04 RX ORDER — LEVOTHYROXINE SODIUM 0.12 MG/1
125 TABLET ORAL DAILY
Status: CANCELLED | OUTPATIENT
Start: 2019-04-05

## 2019-04-04 RX ORDER — TRAZODONE HYDROCHLORIDE 150 MG/1
150 TABLET ORAL NIGHTLY
Status: DISCONTINUED | OUTPATIENT
Start: 2019-04-04 | End: 2019-04-10

## 2019-04-04 RX ORDER — L. ACIDOPHILUS/L.BULGARICUS 1MM CELL
1 TABLET ORAL DAILY
Status: DISCONTINUED | OUTPATIENT
Start: 2019-04-05 | End: 2019-04-20 | Stop reason: HOSPADM

## 2019-04-04 RX ORDER — CIPROFLOXACIN 500 MG/1
250 TABLET, FILM COATED ORAL 2 TIMES DAILY
Refills: 0 | Status: ON HOLD | DISCHARGE
Start: 2019-04-04 | End: 2019-04-20 | Stop reason: HOSPADM

## 2019-04-04 RX ORDER — LEVOTHYROXINE SODIUM 0.12 MG/1
125 TABLET ORAL DAILY
Status: DISCONTINUED | OUTPATIENT
Start: 2019-04-05 | End: 2019-04-20 | Stop reason: HOSPADM

## 2019-04-04 RX ORDER — ACETAMINOPHEN 325 MG/1
650 TABLET ORAL EVERY 4 HOURS PRN
Status: DISCONTINUED | OUTPATIENT
Start: 2019-04-04 | End: 2019-04-20 | Stop reason: HOSPADM

## 2019-04-04 RX ORDER — LIDOCAINE 4 G/G
3 PATCH TOPICAL DAILY
Status: DISCONTINUED | OUTPATIENT
Start: 2019-04-04 | End: 2019-04-20 | Stop reason: HOSPADM

## 2019-04-04 RX ORDER — L. ACIDOPHILUS/L.BULGARICUS 1MM CELL
1 TABLET ORAL DAILY
Status: CANCELLED | OUTPATIENT
Start: 2019-04-05

## 2019-04-04 RX ORDER — ANALGESIC BALM 1.74; 4.06 G/29G; G/29G
OINTMENT TOPICAL 3 TIMES DAILY
Status: DISCONTINUED | OUTPATIENT
Start: 2019-04-04 | End: 2019-04-20 | Stop reason: HOSPADM

## 2019-04-04 RX ORDER — INSULIN GLARGINE 100 [IU]/ML
27 INJECTION, SOLUTION SUBCUTANEOUS NIGHTLY
Status: CANCELLED | OUTPATIENT
Start: 2019-04-04

## 2019-04-04 RX ORDER — NITROFURANTOIN 25; 75 MG/1; MG/1
100 CAPSULE ORAL
Status: DISCONTINUED | OUTPATIENT
Start: 2019-04-04 | End: 2019-04-11

## 2019-04-04 RX ORDER — METOPROLOL SUCCINATE 100 MG/1
100 TABLET, EXTENDED RELEASE ORAL DAILY
Status: CANCELLED | OUTPATIENT
Start: 2019-04-05

## 2019-04-04 RX ORDER — GLIPIZIDE 5 MG/1
10 TABLET ORAL
Status: DISCONTINUED | OUTPATIENT
Start: 2019-04-04 | End: 2019-04-09

## 2019-04-04 RX ORDER — INSULIN GLARGINE 100 [IU]/ML
27 INJECTION, SOLUTION SUBCUTANEOUS NIGHTLY
Status: DISCONTINUED | OUTPATIENT
Start: 2019-04-04 | End: 2019-04-09

## 2019-04-04 RX ADMIN — ANALGESIC BALM: 1.74; 4.06 OINTMENT TOPICAL at 21:54

## 2019-04-04 RX ADMIN — LACTOBACILLUS TAB 1 TABLET: TAB at 09:01

## 2019-04-04 RX ADMIN — GABAPENTIN 300 MG: 300 CAPSULE ORAL at 09:02

## 2019-04-04 RX ADMIN — CEFTRIAXONE SODIUM 1 G: 1 INJECTION, POWDER, FOR SOLUTION INTRAMUSCULAR; INTRAVENOUS at 00:40

## 2019-04-04 RX ADMIN — GLIPIZIDE 10 MG: 5 TABLET ORAL at 15:32

## 2019-04-04 RX ADMIN — ACETAMINOPHEN 650 MG: 325 TABLET ORAL at 19:53

## 2019-04-04 RX ADMIN — INSULIN GLARGINE 27 UNITS: 100 INJECTION, SOLUTION SUBCUTANEOUS at 21:54

## 2019-04-04 RX ADMIN — MICONAZOLE NITRATE: 20 POWDER TOPICAL at 21:54

## 2019-04-04 RX ADMIN — TRAZODONE HYDROCHLORIDE 150 MG: 150 TABLET ORAL at 21:37

## 2019-04-04 RX ADMIN — ENOXAPARIN SODIUM 40 MG: 40 INJECTION SUBCUTANEOUS at 09:02

## 2019-04-04 RX ADMIN — GLIPIZIDE 10 MG: 5 TABLET ORAL at 06:47

## 2019-04-04 RX ADMIN — ACETAMINOPHEN 650 MG: 325 TABLET ORAL at 15:32

## 2019-04-04 RX ADMIN — ALPRAZOLAM 0.5 MG: 0.5 TABLET ORAL at 09:01

## 2019-04-04 RX ADMIN — LEVOTHYROXINE SODIUM 125 MCG: 125 TABLET ORAL at 06:47

## 2019-04-04 RX ADMIN — ACETAMINOPHEN 650 MG: 325 TABLET ORAL at 11:16

## 2019-04-04 RX ADMIN — METOPROLOL SUCCINATE 100 MG: 100 TABLET, EXTENDED RELEASE ORAL at 09:02

## 2019-04-04 RX ADMIN — NITROFURANTOIN MONOHYDRATE/MACROCRYSTALLINE 100 MG: 25; 75 CAPSULE ORAL at 16:56

## 2019-04-04 ASSESSMENT — PAIN SCALES - GENERAL
PAINLEVEL_OUTOF10: 5
PAINLEVEL_OUTOF10: 0
PAINLEVEL_OUTOF10: 0
PAINLEVEL_OUTOF10: 4
PAINLEVEL_OUTOF10: 0
PAINLEVEL_OUTOF10: 4
PAINLEVEL_OUTOF10: 0
PAINLEVEL_OUTOF10: 7
PAINLEVEL_OUTOF10: 0
PAINLEVEL_OUTOF10: 5
PAINLEVEL_OUTOF10: 0
PAINLEVEL_OUTOF10: 0

## 2019-04-04 ASSESSMENT — PAIN DESCRIPTION - PAIN TYPE
TYPE: CHRONIC PAIN
TYPE: CHRONIC PAIN

## 2019-04-04 ASSESSMENT — PAIN DESCRIPTION - LOCATION
LOCATION: LEG
LOCATION: LEG

## 2019-04-04 ASSESSMENT — PAIN DESCRIPTION - ORIENTATION
ORIENTATION: RIGHT;LEFT
ORIENTATION: LEFT

## 2019-04-04 ASSESSMENT — PAIN DESCRIPTION - FREQUENCY: FREQUENCY: INTERMITTENT

## 2019-04-04 ASSESSMENT — PAIN DESCRIPTION - DESCRIPTORS: DESCRIPTORS: SPASM

## 2019-04-04 NOTE — PROGRESS NOTES
[facility] Occupational Therapy  Date: 2019  Patient Name: Ximena Dean        MRN: [id number]  Account: [de-identified]   : 1950  (76 y.o.)  Room: [place] [address]    Chart reviewed, pt. has not had a negative change in medical status at this time. Pt. is currently on program with OT. New eval not indicated at this time.     Electronically signed by KALYANI Joe on 2019 at 8:14 AM

## 2019-04-04 NOTE — PROGRESS NOTES
0730: Assumed care of patient. [facility] Patient awake and alert. Denies any needs at the times. 0800: Patient vital signs assessed as charted. Patient assessment as charted. 0900: Patient is planned for tranfer to inpatient rehab. 1230: Report given by telephone to Elvia on rehab.

## 2019-04-04 NOTE — CARE COORDINATION
ABIMAELW spoke to Pt, she was eval in the ER for [facility]. Plan Rehab today. .Electronically signed by AURA Carranza on 4/4/2019 at 8:49 AM

## 2019-04-04 NOTE — PROGRESS NOTES
Physical Therapy Med Surg Daily Treatment Note  Facility/Department: [facility] ICU  Room: [place] [address]       NAME: Candance Sievert  : 1950 (76 y.o.)  MRN: [id number]  CODE STATUS: Full Code    Date of Service: 2019    Patient Diagnosis(es): Unable to ambulate [R26.2]   Chief Complaint   Patient presents with    Extremity Weakness     pt c/o BLE weakness for the past 2 years, sent to the ER by her ortho doctor. 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 without evidence of necrosis (CODE) 10/13/2017    Diabetes mellitus with diabetic peripheral angiopathy without gangrene, without long-term current use of insulin ([address] 2017    Peripheral angiopathy ([facility] 2017    Non-healing surgical wound 2017        Past Medical History:   Diagnosis Date    Bronchitis     Diabetes mellitus ([address]     Hypercholesterolemia     Hypertension     Thyroid disease      Past Surgical History:   Procedure Laterality Date    APPENDECTOMY      HERNIA REPAIR      ORTHOPEDIC SURGERY Left 2017    broken left foot. surgeries x3    TONSILLECTOMY N/A           Restrictions:  Restrictions/Precautions: Fall Risk    SUBJECTIVE:  General  Chart Reviewed: Yes  Family / Caregiver Present: Yes(spouse)  Subjective  Subjective: pt focused on past medical history and is self limiting.      Pre Pain Assessment:  Pre Treatment Pain Screening  Pain at present: 5  Scale Used: Numeric Score  Intervention List: Patient able to continue with treatment  Pain Screening  Patient Currently in Pain: Yes       Post Pain Assessment:   Pain Assessment  Pain Assessment: 0-10  Pain Level: 5  Pain Type: Chronic pain  Pain Location: Leg  Pain Orientation: Left       OBJECTIVE:         Bed mobility  Supine to Sit: Moderate assistance;2 Person assistance(assist with BLE)  Sit to Supine: Moderate assistance;2 Person assistance(assist bringing BLE up onto bed. )    Transfers  Sit to Stand: Moderate Assistance;Minimal Assistance  Stand to sit: Moderate Assistance;Minimal Assistance  Comment: STS x3 pt improving with each attempt, progressed to Min A of 1 to complete. Pt insistant on pulling from [employer] with 1 UE. Ambulation  Ambulation?: No(unsafe to attempt at this time. )    Neuromuscular Education  Neuromuscular Comments: static standing, 45 seconds. and then 1 minute. Exercises  Knee Long Arc Quad: x 10  Comments: pt stating independence with HEP. ASSESSMENT:  Body structures, Functions, Activity limitations: Decreased functional mobility ; Decreased safe awareness;Decreased balance;Decreased endurance; Increased Pain;Decreased strength    Assessment: pt is very self limiting, stating she cannot do certain tasks and then completes without issue.      Activity Tolerance  Activity Tolerance: Patient Tolerated treatment well       Discharge Recommendations:  Continue to assess pending progress, Patient would benefit from continued therapy after discharge    Goals:  Short term goals  Short term goal 1: stand >60 seconds with CGA  Short term goal 2: indep with HEP  Long term goals  Long term goal 1: indep with bed mobility  Long term goal 2: indep with transfers  Long term goal 3: pt to ambulate >50 ft LRAD supervision  Long term goal 4: pt to be assessed for stairs when able  Patient Goals   Patient goals : agreeable to PT POC    PLAN:   Plan  Times per week: 3-6  Current Treatment Recommendations: Strengthening, Balance Training, Transfer Training, Endurance Training, Gait Training, Neuromuscular Re-education, Home Exercise Program, ROM, Functional Mobility Training, Stair training, Pain Management, Safety Education & Training, Equipment Evaluation, Education, & procurement  Safety Devices  Type of devices:  All fall risk precautions in place, Bed alarm in place, Call light within reach, Left in bed     [facility] (6 CLICK) Levon Ruff 28 Inpatient Mobility Raw Score : 12      Therapy Time   Individual   Time In 0936   Time Out 1000   Minutes 24      Bm/Trsf: 23  NM: [address], [facility], 04/04/19 at 10:10 AM

## 2019-04-04 NOTE — DISCHARGE INSTR - COC
Continuity of Care Form    Patient Name: Jose Rafael Church   :  1950  MRN:  [id number]    Admit date:  4/3/2019  Discharge date:  ***    Code Status Order: Full Code   Advance Directives:     Admitting Physician:  Robby Spence MD  PCP: Lonnie Pierre MD    Discharging Nurse: [facility] Unit/Room#: IC17/IC17-01  Discharging Unit Phone Number: ***    Emergency Contact:   Extended Emergency Contact Information  Primary Emergency Contact: Enrique Blas  Address: [address] Phone: [phone]  Work Phone: [phone]  Mobile Phone: [phone]  Relation: Spouse    Past Surgical History:  Past Surgical History:   Procedure Laterality Date    APPENDECTOMY      HERNIA REPAIR      ORTHOPEDIC SURGERY Left 2017    broken left foot. surgeries x3    TONSILLECTOMY N/A        Immunization History: There is no immunization history on file for this patient.     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 without evidence of necrosis (CODE) L97.425    Diabetic neuropathy associated with diabetes mellitus due to underlying condition ([facility] E08.40    Abscess of foot including toes, left L02.612    Unable to ambulate R26.2    Ataxia R27.0       Isolation/Infection:   Isolation          No Isolation            Nurse Assessment:  Last Vital Signs: BP (!) 157/71   Pulse 62   Temp 98.1 °F (36.7 °C) (Oral)   Resp 10   Ht 5' 7\" (1.702 m)   Wt 273 lb 2.4 oz (123.9 kg)   LMP  (LMP Unknown)   SpO2 96%   BMI 42.78 kg/m²     Last documented pain score (0-10 scale): Pain Level: 0  Last Weight:   Wt Readings from Last 1 Encounters: 19 273 lb 2.4 oz (123.9 kg)     Mental Status:  {IP PT MENTAL STATUS:44076}    IV Access:  { NIESHA IV ACCESS:563411997}    Nursing Mobility/ADLs:  Walking   {CHP DME THMY:977938692}  Transfer  {CHP DME NHIC:344716396}  Bathing  {CHP DME AFV}  Dressing  {CHP DME ZXJQ:173023819}  Toileting  {CHP DME QVRO:810132618}  Feeding  {CHP DME DEEA:477358014}  Med Admin  {CHP DME OKSU:731781381}  Med Delivery   { NIESHA MED Delivery:825195774}    Wound Care Documentation and Therapy:        Elimination:  Continence:   · Bowel: {YES / YJ:46077}  · Bladder: {YES / UR:09727}  Urinary Catheter: {Urinary Catheter:040306392}   Colostomy/Ileostomy/Ileal Conduit: {YES / YY:24328}       Date of Last BM: ***    Intake/Output Summary (Last 24 hours) at 2019 0846  Last data filed at 2019 0540  Gross per 24 hour   Intake 612 ml   Output 600 ml   Net 12 ml     I/O last 3 completed shifts: In: 80 [P.O.:150;  I.V.:462]  Out: 600 [Urine:600]    Safety Concerns:     [address] [employer] Safety Concerns:825554100}    Impairments/Disabilities:      [employer] Impairments/Disabilities:842645928}    Nutrition Therapy:  Current Nutrition Therapy:   [employer] Diet List:892469237}    Routes of Feeding: {CHP DME Other Feedings:795486847}  Liquids: {Slp liquid thickness:99131}  Daily Fluid Restriction: {CHP DME Yes amt example:647592355}  Last Modified Barium Swallow with Video (Video Swallowing Test): {Done Not Done OGLW:347327574}    Treatments at the Time of Hospital Discharge:   Respiratory Treatments: ***  Oxygen Therapy:  {Therapy; copd oxygen:93254}  Ventilator:    { CC Vent IWIK:135142439}    Rehab Therapies: {THERAPEUTIC INTERVENTION:5943647844}  Weight Bearing Status/Restrictions: [facility] Weight Bearin}  Other Medical Equipment (for information only, NOT a DME order):  {EQUIPMENT:550322459}  Other Treatments: ***    Patient's personal belongings (please select all that are sent with patient):  {[facility] DME Belongings:904311211}    RN SIGNATURE:  {Esignature:147019186}    CASE MANAGEMENT/SOCIAL WORK SECTION    Inpatient Status Date: ***    Readmission Risk Assessment Score:  Readmission Risk              Risk of Unplanned Readmission:        9           Discharging to Facility/ Agency   · Name: [facility] Hence  · Address:  · Phone:  · Fax:    Dialysis Facility (if applicable)   · Name:  · Address:  · Dialysis Schedule:  · Phone:  · Fax:    / signature: {Esignature:[id number]}. Electronically signed by AURA Buenrostro on 4/4/2019 at 8:46 AM    PHYSICIAN SECTION    Prognosis: Good    Condition at Discharge: Stable    Rehab Potential (if transferring to Rehab): Good    Physician Certification: I certify the above information and transfer of Eileen Gaspar  is necessary for the continuing treatment of the diagnosis listed and that she requires Acute Rehab for less 30 days.      Update Admission H&P: No change in H&P    PHYSICIAN SIGNATURE:  Electronically signed by Xiomara Crawford MD on 4/4/19 at 9:37 AM

## 2019-04-04 NOTE — H&P
Department of Internal Medicine  General Internal Medicine  Attending History and Physical      CHIEF COMPLAINT:  Weakness and fall    Reason for Admission:  weakness    History Obtained From:  patient    HISTORY OF PRESENT ILLNESS:      The patient is a 76 y.o. female with significant past medical history of HTN, DM type 2 in obese, Hyperlipidemia who presents with weakness and fall. She reported that she is having a hard time ambulating at home. She denied fever and chills. She noted that her gait has become worse over the past 10 days and that her last fall was 10 days ago. She went to her orthopedic physician today, who wanted her to be admitted for possible in patient PT. She noted pain on the left leg when she stands on it. No open wound on the leg. Past Medical History:        Diagnosis Date    Bronchitis     Diabetes mellitus ([address]     Hypercholesterolemia     Hypertension     Thyroid disease      Past Surgical History:        Procedure Laterality Date    APPENDECTOMY      HERNIA REPAIR      ORTHOPEDIC SURGERY Left 2017    broken left foot.  surgeries x3    TONSILLECTOMY N/A      Immunizations:                Pneumococcal Polysaccharide:      Medications Prior to Admission:    Medications Prior to Admission: ibuprofen (IBU) 400 MG tablet, Take 1 tablet by mouth every 6 hours as needed for Pain (Patient taking differently: Take 400 mg by mouth every 4-6 hours as needed for Pain )  glipiZIDE (GLUCOTROL) 10 MG tablet, Take 10 mg by mouth 2 times daily (before meals)  Lactobacillus (PROBIOTIC ACIDOPHILUS) TABS, Take 1 tablet by mouth daily  metoprolol succinate (TOPROL XL) 100 MG extended release tablet, Take 100 mg by mouth daily  levothyroxine (SYNTHROID) 125 MCG tablet, Take 125 mcg by mouth Daily  traZODone (DESYREL) 150 MG tablet, Take 150 mg by mouth nightly  metFORMIN (GLUCOPHAGE) 1000 MG tablet, Take 500 mg by mouth 2 times daily (with meals)   ALPRAZolam (XANAX) 0.5 MG tablet, Take 0.5 mg by mouth three times daily  [DISCONTINUED] losartan-hydrochlorothiazide (HYZAAR) 100-25 MG per tablet, Take 1 tablet by mouth daily  gabapentin (NEURONTIN) 300 MG capsule, Take 300 mg by mouth 2 times daily. valsartan-hydrochlorothiazide (DIOVAN-HCT) 320-25 MG per tablet, Take 1 tablet by mouth daily  Multiple Vitamins-Minerals (CENTRUM SILVER PO), Take 1 tablet by mouth daily  insulin glargine (TOUJEO SOLOSTAR) 300 UNIT/ML injection pen, Inject 34 Units into the skin nightly     Allergies:  Sulfa antibiotics    Social History:   TOBACCO:   reports that she has never smoked.  She has never used smokeless tobacco.    Family History:       Problem Relation Age of Onset    Cancer Father      REVIEW OF SYSTEMS:  CONSTITUTIONAL:  positive for  Pain on left lower extremities and frequent falls  EYES:  positive for  double vision, irritation and redness  HEENT:  negative for  hearing loss, tinnitus, earaches and epistaxis  RESPIRATORY:  negative for  dry cough, cough with sputum, wheezing and hemoptysis  CARDIOVASCULAR:  negative for  chest pain  GASTROINTESTINAL:  negative for nausea, vomiting and dysphagia  HEMATOLOGIC/LYMPHATIC:  negative for easy bruising, bleeding and lymphadenopathy  ENDOCRINE:  negative for heat intolerance and cold intolerance  MUSCULOSKELETAL:  negative for  myalgias and arthralgias, positive for pain  NEUROLOGICAL:  negative for headaches and dizziness, Positive for lower extremity weakness  BEHAVIOR/PSYCH:  negative for poor appetite and increased appetite  PHYSICAL EXAM:    Vitals:  BP (!) 178/57   Pulse 68   Temp 98.3 °F (36.8 °C) (Oral)   Resp 13   Ht 5' 7\" (1.702 m)   Wt 270 lb (122.5 kg)   LMP  (LMP Unknown)   SpO2 96%   BMI 42.29 kg/m²     CONSTITUTIONAL:  awake, alert, cooperative, no apparent distress, and appears stated age  EYES:  Lids and lashes normal, pupils equal, round and reactive to light, extra ocular muscles intact, sclera clear, conjunctiva normal  ENT: Normocephalic, without obvious abnormality, atraumatic, sinuses nontender on palpation, external ears without lesions, oral pharynx with moist mucus membranes, tonsils without erythema or exudates, gums normal and good dentition. NECK:  supple, symmetrical, trachea midline  HEMATOLOGIC/LYMPHATICS:  no cervical lymphadenopathy  LUNGS:  No increased work of breathing, good air exchange, clear to auscultation bilaterally, no crackles or wheezing  CARDIOVASCULAR:  Normal apical impulse, regular rate and rhythm, normal S1 and S2, no S3 or S4, and no murmur noted  ABDOMEN:  normal bowel sounds  MUSCULOSKELETAL:  LEFT ANKLE:  Mild ecchymosis present  NEUROLOGIC:  Awake, alert, oriented to name, place and time. Cranial nerves II-XII are grossly intact. Motor is 5 out of 5 bilaterally. Cerebellar finger to nose, heel to shin intact. Sensory is intact. Babinski down going, Romberg negative, and gait is normal.    DATA:  CBC:   Lab Results   Component Value Date    WBC 8.8 04/03/2019    RBC 3.79 04/03/2019    HGB 10.8 04/03/2019    HCT 33.4 04/03/2019    MCV 88.0 04/03/2019    MCH 28.5 04/03/2019    MCHC 32.4 04/03/2019    RDW 14.7 04/03/2019     04/03/2019     CMP:    Lab Results   Component Value Date     04/03/2019    K 4.3 04/03/2019    CL 99 04/03/2019    CO2 28 04/03/2019    BUN 26 04/03/2019    CREATININE 1.83 04/03/2019    GFRAA 33.1 04/03/2019    LABGLOM 27.4 04/03/2019    GLUCOSE 251 04/03/2019    PROT 5.9 04/03/2019    LABALBU 3.4 04/03/2019    CALCIUM 9.0 04/03/2019    BILITOT <0.2 04/03/2019    ALKPHOS 114 04/03/2019    AST 11 04/03/2019    ALT 10 04/03/2019     ASSESSMENT AND PLAN:      1. Unable to ambulate (4/3/2019)  PT/OT consulted  Will continue to monitor  Continue pain control  Possible placement to rehab    2. Acute vs Chronic Kidney disease;  Creatinine at 1.8. Unsure of baseline  Will give 1 time IV fluid at 75cc/hr  Repeat BMP in am.  Continue to monitor    3.  Diabetes mellitus type 2 in obese:  Patient on metformin, will hold this for now  Continue glipizide and lantus    3. Hypertension Essential:  Continue home meds  Will hold hctz and losartan for now  Will continue to monitor BP    5. Hypothyroidism:   On synthroid  Continue to monitor    DVT prophylaxis:  lovenox

## 2019-04-05 LAB
GLUCOSE BLD-MCNC: 114 MG/DL (ref 60–115)
GLUCOSE BLD-MCNC: 152 MG/DL (ref 60–115)
GLUCOSE BLD-MCNC: 154 MG/DL (ref 60–115)
GLUCOSE BLD-MCNC: 184 MG/DL (ref 60–115)
HCT VFR BLD CALC: 34.7 % (ref 37–47)
HEMOGLOBIN: 11.3 G/DL (ref 12–16)
MCH RBC QN AUTO: 28.5 PG (ref 27–31.3)
MCHC RBC AUTO-ENTMCNC: 32.7 % (ref 33–37)
MCV RBC AUTO: 87.1 FL (ref 82–100)
PDW BLD-RTO: 14.5 % (ref 11.5–14.5)
PERFORMED ON: ABNORMAL
PERFORMED ON: NORMAL
PLATELET # BLD: 236 K/UL (ref 130–400)
RBC # BLD: 3.98 M/UL (ref 4.2–5.4)
URINE CULTURE, ROUTINE: NORMAL
WBC # BLD: 7 K/UL (ref 4.8–10.8)

## 2019-04-05 PROCEDURE — 97162 PT EVAL MOD COMPLEX 30 MIN: CPT

## 2019-04-05 PROCEDURE — 6360000002 HC RX W HCPCS: Performed by: PHYSICAL MEDICINE & REHABILITATION

## 2019-04-05 PROCEDURE — 6370000000 HC RX 637 (ALT 250 FOR IP): Performed by: PHYSICAL MEDICINE & REHABILITATION

## 2019-04-05 PROCEDURE — 99223 1ST HOSP IP/OBS HIGH 75: CPT | Performed by: PHYSICAL MEDICINE & REHABILITATION

## 2019-04-05 PROCEDURE — 94150 VITAL CAPACITY TEST: CPT

## 2019-04-05 PROCEDURE — 85027 COMPLETE CBC AUTOMATED: CPT

## 2019-04-05 PROCEDURE — 1180000000 HC REHAB R&B

## 2019-04-05 PROCEDURE — 97530 THERAPEUTIC ACTIVITIES: CPT

## 2019-04-05 PROCEDURE — 97116 GAIT TRAINING THERAPY: CPT

## 2019-04-05 PROCEDURE — 36415 COLL VENOUS BLD VENIPUNCTURE: CPT

## 2019-04-05 PROCEDURE — 97535 SELF CARE MNGMENT TRAINING: CPT

## 2019-04-05 PROCEDURE — 97167 OT EVAL HIGH COMPLEX 60 MIN: CPT

## 2019-04-05 PROCEDURE — 97140 MANUAL THERAPY 1/> REGIONS: CPT

## 2019-04-05 PROCEDURE — 6370000000 HC RX 637 (ALT 250 FOR IP): Performed by: INTERNAL MEDICINE

## 2019-04-05 RX ORDER — CYANOCOBALAMIN 1000 UG/ML
1000 INJECTION INTRAMUSCULAR; SUBCUTANEOUS WEEKLY
Status: DISCONTINUED | OUTPATIENT
Start: 2019-04-05 | End: 2019-04-20 | Stop reason: HOSPADM

## 2019-04-05 RX ORDER — UBIDECARENONE 100 MG
100 CAPSULE ORAL
Status: DISCONTINUED | OUTPATIENT
Start: 2019-04-05 | End: 2019-04-20 | Stop reason: HOSPADM

## 2019-04-05 RX ADMIN — ACETAMINOPHEN 650 MG: 325 TABLET ORAL at 23:30

## 2019-04-05 RX ADMIN — INSULIN LISPRO 2 UNITS: 100 INJECTION, SOLUTION INTRAVENOUS; SUBCUTANEOUS at 12:27

## 2019-04-05 RX ADMIN — ACETAMINOPHEN 650 MG: 325 TABLET ORAL at 03:55

## 2019-04-05 RX ADMIN — GLIPIZIDE 10 MG: 5 TABLET ORAL at 06:33

## 2019-04-05 RX ADMIN — ACETAMINOPHEN 650 MG: 325 TABLET ORAL at 12:30

## 2019-04-05 RX ADMIN — HYDROCODONE BITARTRATE AND ACETAMINOPHEN 1 TABLET: 5; 325 TABLET ORAL at 09:37

## 2019-04-05 RX ADMIN — ANALGESIC BALM: 1.74; 4.06 OINTMENT TOPICAL at 09:39

## 2019-04-05 RX ADMIN — NITROFURANTOIN MONOHYDRATE/MACROCRYSTALLINE 100 MG: 25; 75 CAPSULE ORAL at 16:34

## 2019-04-05 RX ADMIN — LEVOTHYROXINE SODIUM 125 MCG: 125 TABLET ORAL at 06:33

## 2019-04-05 RX ADMIN — HYDROCODONE BITARTRATE AND ACETAMINOPHEN 1 TABLET: 5; 325 TABLET ORAL at 21:29

## 2019-04-05 RX ADMIN — METOPROLOL SUCCINATE 100 MG: 100 TABLET, EXTENDED RELEASE ORAL at 09:35

## 2019-04-05 RX ADMIN — MICONAZOLE NITRATE: 20 POWDER TOPICAL at 21:29

## 2019-04-05 RX ADMIN — INSULIN LISPRO 2 UNITS: 100 INJECTION, SOLUTION INTRAVENOUS; SUBCUTANEOUS at 16:35

## 2019-04-05 RX ADMIN — CYANOCOBALAMIN 1000 MCG: 1000 INJECTION, SOLUTION INTRAMUSCULAR; SUBCUTANEOUS at 12:26

## 2019-04-05 RX ADMIN — Medication 100 MG: at 12:27

## 2019-04-05 RX ADMIN — HYDROCODONE BITARTRATE AND ACETAMINOPHEN 1 TABLET: 5; 325 TABLET ORAL at 16:39

## 2019-04-05 RX ADMIN — ANALGESIC BALM: 1.74; 4.06 OINTMENT TOPICAL at 21:29

## 2019-04-05 RX ADMIN — TRAZODONE HYDROCHLORIDE 150 MG: 150 TABLET ORAL at 21:29

## 2019-04-05 RX ADMIN — INSULIN GLARGINE 27 UNITS: 100 INJECTION, SOLUTION SUBCUTANEOUS at 21:30

## 2019-04-05 RX ADMIN — NITROFURANTOIN MONOHYDRATE/MACROCRYSTALLINE 100 MG: 25; 75 CAPSULE ORAL at 06:33

## 2019-04-05 RX ADMIN — GLIPIZIDE 10 MG: 5 TABLET ORAL at 16:34

## 2019-04-05 RX ADMIN — MICONAZOLE NITRATE: 20 POWDER TOPICAL at 09:39

## 2019-04-05 RX ADMIN — LACTOBACILLUS TAB 1 TABLET: TAB at 09:35

## 2019-04-05 ASSESSMENT — ENCOUNTER SYMPTOMS
BOWEL INCONTINENCE: 0
BLOOD IN STOOL: 0
FACIAL SWELLING: 0
ABDOMINAL DISTENTION: 0
COLOR CHANGE: 0
COUGH: 0
CHOKING: 0
VOMITING: 0
CONSTIPATION: 0
NAUSEA: 0
EYE PAIN: 0
WHEEZING: 0
PHOTOPHOBIA: 0
SORE THROAT: 0
ABDOMINAL PAIN: 0
BACK PAIN: 1
CHEST TIGHTNESS: 0
SHORTNESS OF BREATH: 0
EYE REDNESS: 0
SWOLLEN GLANDS: 0
TROUBLE SWALLOWING: 0
ANAL BLEEDING: 0

## 2019-04-05 ASSESSMENT — PAIN DESCRIPTION - FREQUENCY
FREQUENCY: INTERMITTENT

## 2019-04-05 ASSESSMENT — PAIN SCALES - GENERAL
PAINLEVEL_OUTOF10: 0
PAINLEVEL_OUTOF10: 3
PAINLEVEL_OUTOF10: 5
PAINLEVEL_OUTOF10: 7
PAINLEVEL_OUTOF10: 4
PAINLEVEL_OUTOF10: 3
PAINLEVEL_OUTOF10: 10
PAINLEVEL_OUTOF10: 10
PAINLEVEL_OUTOF10: 8
PAINLEVEL_OUTOF10: 6

## 2019-04-05 ASSESSMENT — PAIN DESCRIPTION - PAIN TYPE
TYPE: ACUTE PAIN

## 2019-04-05 ASSESSMENT — PAIN DESCRIPTION - PROGRESSION
CLINICAL_PROGRESSION: NOT CHANGED
CLINICAL_PROGRESSION: NOT CHANGED

## 2019-04-05 ASSESSMENT — PAIN DESCRIPTION - ORIENTATION
ORIENTATION: LEFT

## 2019-04-05 ASSESSMENT — PAIN DESCRIPTION - ONSET
ONSET: ON-GOING
ONSET: ON-GOING

## 2019-04-05 ASSESSMENT — PAIN DESCRIPTION - DESCRIPTORS
DESCRIPTORS: ACHING;SPASM
DESCRIPTORS: ACHING
DESCRIPTORS: ACHING

## 2019-04-05 ASSESSMENT — PAIN DESCRIPTION - LOCATION
LOCATION: ARM
LOCATION: NECK;SHOULDER
LOCATION: ARM
LOCATION: NECK

## 2019-04-05 NOTE — PROGRESS NOTES
Occupational Therapy  Facility/Department: [facility]  Daily Treatment Note  NAME: Radha Love  : 1950  MRN: [id number]    Date of Service: 2019    Discharge Recommendations:  Continue to assess pending progress    Assessment   Performance deficits / Impairments: Decreased functional mobility ; Decreased ADL status; Decreased strength;Decreased safe awareness;Decreased endurance;Decreased balance  Assessment: Pt is 76 y.o female with above mentioned deficits impacting ability to complete ADL's IND'ly and safely. Pt would benefit from skilled OT to increase level of functioning. Decision Making: High Complexity  History: Mulit comorb   Exam: 6 perf imp   Assistance / Modification: Mod/Max A   REQUIRES OT FOLLOW UP: Yes  Activity Tolerance  Activity Tolerance: Patient Tolerated treatment well  Safety Devices  Safety Devices in place: Yes  Type of devices: All fall risk precautions in place         Patient Diagnosis(es): There were no encounter diagnoses. has a past medical history of Anxiety, Arthritis, Bronchitis, Diabetes mellitus ([facility] Diabetic neuropathy associated with diabetes mellitus due to underlying condition [facility]), Essential hypertension, Hypercholesterolemia, Hypertension, Morbid obesity due to excess calories ([facility] Thyroid disease, and Wound infection. has a past surgical history that includes orthopedic surgery (Left, 2017); hernia repair; Appendectomy; and Tonsillectomy (N/A). Restrictions  Restrictions/Precautions  Restrictions/Precautions: Fall Risk     Subjective: \"This is the most I've moved in years. \"   General  Chart Reviewed: Yes  Patient assessed for rehabilitation services?: Yes  Response to previous treatment: Patient with no complaints from previous session  Family / Caregiver Present: No  Referring Practitioner: Dr. Lolita Zambrano   Diagnosis: Impaired mobility 2° to flare up of O. A.    Pre Treatment Pain Screening  Pain at present: 3  Scale Used: Numeric Score  Intervention List: Patient able to continue with treatment;Patient declined any intervention  Pain Assessment  Pain Assessment: 0-10  Pain Level: 3  Pain Type: Acute pain  Pain Location: Neck; Shoulder  Pain Orientation: Left  Pain Descriptors: Aching  Pain Frequency: Intermittent  Pain Onset: On-going  Clinical Progression: Not changed  Non-Pharmaceutical Pain Intervention(s): Declines  Response to Pain Intervention: None  Vital Signs  Patient Currently in Pain: Yes     Objective  Upper Extremity Function  UE Strengthing: Pt wore 1# wrist weights to don/doff rubber rings onto their corresponding vertical graded misael (x61 rings). Pt had Min difficulty with activity and required rest breaks prn. To increase BUE strength and endurance for improved transfers. Problem solving incorporated into activity. Pt folded towels and wash cloths. No added weight to arms. Pt had no difficulty with activity. To increase BUE endurance for improved ADL completion. Plan   Plan  Times per week: 5-7x/ wk   Plan weeks: 2-3  Current Treatment Recommendations: Strengthening, Balance Training, Functional Mobility Training, Endurance Training, Wheelchair Mobility Training, Safety Education & Training, Pain Management, Patient/Caregiver Education & Training, Equipment Evaluation, Education, & procurement, Self-Care / ADL, Home Management Training    Goals  Patient Goals   Patient goals : \"To go home. \"     Therapy Time   Individual Concurrent Group Co-treatment   Time In 1300         Time Out 1330         Minutes 30           Electronically signed by BOB Loving on 4/5/2019 at 1:32 PM  BOB Loving

## 2019-04-05 NOTE — PLAN OF CARE
Plan of Care:  Coping  Educated and recommended to patient Rehab Support Group to help with coping. Patient reports she might be interested in attending the rehab support group while an inpatient on rehab.  Electronically signed by Patience Dominguez on 4/5/2019 at 10:53 AM

## 2019-04-05 NOTE — PLAN OF CARE
Problem: Pain:  Goal: Pain level will decrease  Description  Pain level will decrease  Outcome: Ongoing  Goal: Control of acute pain  Description  Control of acute pain  Outcome: Ongoing     Problem: Falls - Risk of:  Goal: Will remain free from falls  Description  Will remain free from falls  Outcome: Ongoing

## 2019-04-05 NOTE — H&P
HISTORY & PHYSICAL       DATE OF ADMISSION:  4/4/2019    DATE OF SERVICE:  4/5/19    Subjective:    Chika Wallace, 76 y.o. female presents today with:     CHIEF COMPLAINT:   76year old female who presented to the ER with complaints of weakness and falls. She noted that her gait has become worse over the last 10 days. She was admitted for observation and place in the ICU because of her nailbeds on the general medical floor. States that she had previously been evaluated and prescribed knee braces by a nonoperative orthopedic specialist.  She saw her orthopedic physician who recommended intense therapy. Other recent workup included CT Head 04/03/19 revealed no acute findings. Mild cerebral atrophy. CT Lumbar 04/03/19 revealed no evidence of fracture. Minimal anterolisthesis. Degenerative changes. Lumbar canal stenosis L3-4. People were worry that she was declining in function because of a myelopathy however does not appear to be the case. 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 I am also concerned about her benzodiazepine dependence after reviewing her home medication list and seeing that she takes half a milligram 3 times a day. Indeed seems very anxious. The patient has been found to have severe abnormality of gait and mobility with impaired self care due to Impaired Mobility secondary to Flare up of OA and is admitted to the acute inpatient rehab program.     Transcribed from pre-admission information sheet completed by Rajinder Terry RN/mdl as directed by Dr. Marquis Bullock. Neurologic Problem   The patient's primary symptoms include clumsiness, focal sensory loss, focal weakness and weakness. The patient's pertinent negatives include no loss of balance, memory loss, near-syncope, slurred speech or syncope. This is a chronic problem. The current episode started more than 1 year ago.  The neurological problem developed insidiously. The problem has been gradually worsening since onset. There was left-sided, lower extremity, right-sided and upper extremity focality noted. Associated symptoms include back pain, bladder incontinence, dizziness, fatigue, light-headedness and neck pain. Pertinent negatives include no abdominal pain, auditory change, aura, bowel incontinence, chest pain, confusion, diaphoresis, fever, headaches, nausea, palpitations, shortness of breath, vertigo or vomiting. Past treatments include walking. The treatment provided mild relief. There is no history of a bleeding disorder, a clotting disorder, a CVA, dementia, head trauma, liver disease, mood changes or seizures. Fatigue   This is a recurrent problem. The current episode started more than 1 year ago. The problem occurs constantly. The problem has been gradually worsening. Associated symptoms include arthralgias, fatigue, joint swelling, myalgias, neck pain, numbness and weakness. Pertinent negatives include no abdominal pain, anorexia, chest pain, chills, congestion, coughing, diaphoresis, fever, headaches, nausea, rash, sore throat, swollen glands, urinary symptoms, vertigo or vomiting. The symptoms are aggravated by walking, stress, exertion, eating and bending. She has tried rest, lying down, heat, acetaminophen, oral narcotics, position changes and relaxation for the symptoms. The treatment provided mild relief. The patient has stabilized medically andis able to participate at acute level rehab but is too medically complex for SNF due to need for therapy at the acute level with at least 15 hours a week of PT OT and cognitive and recreational therapy at an acute level with daily medical monitoring. Imaging:    Imaging and other studies reviewed and discussed with patient and staff    Ct Head Wo   4/4/2019  CT Brain Contrast medium:  Not utilized. History:  Inability to ambulate.  Comparison:  None Findings: Extra-axial spaces:  Normal. 04/04/2019     04/04/2019    CO2 27 04/04/2019    BUN 24 04/04/2019    CREATININE 1.78 04/04/2019    CALCIUM 8.7 04/04/2019    LABALBU 3.4 04/03/2019    BILITOT <0.2 04/03/2019    ALKPHOS 114 04/03/2019    AST 11 04/03/2019    ALT 10 04/03/2019     Lab Results   Component Value Date    WBC 7.0 04/05/2019    RBC 3.98 04/05/2019    HGB 11.3 04/05/2019    HCT 34.7 04/05/2019    MCV 87.1 04/05/2019    MCH 28.5 04/05/2019    MCHC 32.7 04/05/2019    RDW 14.5 04/05/2019     04/05/2019     No results found for: VITD25  Lab Results   Component Value Date    COLORU Yellow 04/03/2019    NITRU Negative 04/03/2019    GLUCOSEU Negative 04/03/2019    KETUA Negative 04/03/2019    UROBILINOGEN 0.2 04/03/2019    BILIRUBINUR Negative 04/03/2019     No results found for: PROTIME  No results found for: INR      I discussed results with patient. The patient remains highly medically complex and continues to have severe problems with activities of daily living and mobility. The patient was assessed to be able to tolerate intensive rehabilitation and therefore was admitted to Rehabilitation to address these needs.         Prior Function; everyday activities:     Social History     Socioeconomic History    Marital status:      Spouse name: geo amador    Number of children: 2    Years of education: 15    Highest education level: Not on file   Occupational History    Occupation: retired   Social Needs    Financial resource strain: Not on file    Food insecurity:     Worry: Not on file     Inability: Not on file   [employer] needs:     Medical: Not on file     Non-medical: Not on file   Tobacco Use    Smoking status: Never Smoker    Smokeless tobacco: Never Used   Substance and Sexual Activity    Alcohol use: No    Drug use: No    Sexual activity: Not Currently   Lifestyle    Physical activity:     Days per week: Not on file     Minutes per session: Not on file    Stress: Not on file   Relationships  Social connections:     Talks on phone: Not on file     Gets together: Not on file     Attends Caodaism service: Not on file     Active member of club or organization: Not on file     Attends meetings of clubs or organizations: Not on file     Relationship status: Not on file    Intimate partner violence:     Fear of current or ex partner: Not on file     Emotionally abused: Not on file     Physically abused: Not on file     Forced sexual activity: Not on file   Other Topics Concern    Not on file   Social History Narrative         Lives With: Spouse    Type of Home: House Two level, Able to Live on Main level with bedroom/bathroom    Home Access: Stairs to enter with rails - Number of Steps: 3- Rails: Both    Bathroom Shower/Tub: Walk-in shower(Patient states she has not been using shower since fall in January 2019)    Bathroom Toilet: Standard    Bathroom Equipment: 3-in-1 commode(Reports no room for a shower chair)    Home Equipment: 4 wheeled walker    ADL Assistance: Needs assistance    Bath: (Patient states  assists to wash feet)    Dressing: (Patient reports  provides assistance for socks and shoes and to help pull pants over hips)    Homemaking Assistance: Needs assistance( performs)    Ambulation Assistance: Needs assistance(Rollator)    Transfer Assistance: Needs assistance(Spouse assists with transfers due to increasing weakness)    Active : No    Additional Comments: has been minimally ambulatory for about 2 years since her R foot injury     Social supports listed above.       Prior Device(s) used:  As above    History of falls:  Rarely falls    In depth analysis of complex functional data; the patient has been:    Current Rehabilitation Assessments:    Physical therapy: FIMS:  Bed Mobility:      Transfers:Sit to Stand: Maximum Assistance(+2)  Stand to sit: Minimal Assistance  Bed to Chair: Minimal assistance(with WW), Ambulation 1  Surface: level tile, carpet  Device: [address]: Contact guard assistance  Quality of Gait: Shortened steps with heavy bracing on [employer]. Pt extremely fearful of falling and states her knees are going to give out. Encouragement provided throughout. Distance: 1 step fwd with WC follow. Side stepping L and R edge of bed 5ft ea.  ,      FIMS: Bed, Chair, Wheel Chair: 1 - Requires >75% assitance to transfer  Walk: 1 - Total Assistance Walks < 50 feet OR requires two or more people OR patient performs < 25% of locomotion effort  Distance Walked: 1step  Stairs: 0 - Activity Does not Occur ( 0 only for the admission assessment),PT Equipment Recommendations  Other: TBD, Assessment: Increased anxiety and fear in standing limited pt. Encouragement and redirection to task required throughout. Occupational therapy: FIMS:  Eatin - Patient feeds self  Groomin - Requires setup/cues to do all tasks  Bathing: 3 - Able to bathe 5-7 areas  Dressing-Upper: 5 - Requires setup/supervision/cues and/or requires assist with presthesis/brace only  Dressing-Lower: 1 - Total assist with lower body dressing  Toiletin - Total assist  Toilet Transfer: 1 - Requires > 75% assist getting on & off toilet  Tub Transfer: 0 - Activity does not occur(new admit)  Shower Transfer: 0 - Activity does not occur,  , Assessment: Pt is 76 y.o female with above mentioned deficits impacting ability to complete ADL's IND'ly and safely. Pt would benefit from skilled OT to increase level of functioning. Speech therapy: FIMS: Comprehension: 6 - Complex ideas 90% or device (hearing aid/glasses)  Expression: 7 - Patient expresses complex ideas/needs  Social Interaction: 5 - Patient is appropriate with supervision/cues  Problem Solvin - Patient able to solve simple/routine tasks  Memory: 5 - Patient requires prompting with stress/unfamiliar situations     Prior to admission patient was independent with all ADLs and mobilityand did not require any outside services. 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  surgeries x3    TONSILLECTOMY N/A        Current Facility-Administered Medications   Medication Dose Route Frequency Provider Last Rate Last Dose    ALPRAZolam (XANAX) tablet 0.25 mg  0.25 mg Oral TID Mee Scullin, DO        cyanocobalamin injection 1,000 mcg  1,000 mcg Intramuscular Weekly Mee Scullin, DO   1,000 mcg at 04/05/19 1226    coenzyme Q10 capsule 100 mg  100 mg Oral BID AC Mee Scullin, DO   100 mg at 04/05/19 1227    glipiZIDE (GLUCOTROL) tablet 10 mg  10 mg Oral BID AC Ozzy Dacosta MD   10 mg at 04/05/19 1634    insulin glargine (LANTUS) injection vial 27 Units  27 Units Subcutaneous Nightly Ozzy Dacosta MD   27 Units at 04/05/19 2130    insulin lispro (HUMALOG) injection vial 0-12 Units  0-12 Units Subcutaneous TID [facility] Ozzy Dacosta MD   2 Units at 04/05/19 1635    insulin lispro (HUMALOG) injection vial 0-6 Units  0-6 Units Subcutaneous Nightly Ozzy Dacosta MD        lactobacillus acidophilus [facility]) 1 tablet  1 tablet Oral Daily Ozzy Dacosta MD   1 tablet at 04/05/19 0935    levothyroxine (SYNTHROID) tablet 125 mcg  125 mcg Oral Daily Ozzy Dacosta MD   125 mcg at 04/06/19 2300    metoprolol succinate (TOPROL XL) extended release tablet 100 mg  100 mg Oral Daily Ozzy Dacosta MD   100 mg at 04/05/19 0935    traZODone (DESYREL) tablet 150 mg  150 mg Oral Nightly Cleo Pierce MD   150 mg at 04/05/19 2129    acetaminophen (TYLENOL) tablet 650 mg  650 mg Oral Q4H PRN Mee Scullin, DO   650 mg at 04/05/19 2330    lidocaine 4 % external patch 3 patch  3 patch Transdermal Daily Mee Scullin, DO   3 patch at 04/05/19 4187    HYDROcodone-acetaminophen (NORCO) 5-325 MG per tablet 1 tablet  1 tablet Oral Q4H PRN Kennis Cotton, DO   1 tablet at 04/06/19 0239    analgesic ointment ointment   Topical TID Mee Scullin, DO        miconazole (MICOTIN) 2 % powder   Topical BID Mee Scullin, DO        ammonium lactate (LAC-HYDRIN) 12 % lotion   Topical PRN Mee Scullin, DO        nitrofurantoin (macrocrystal-monohydrate) (MACROBID) capsule 100 mg  100 mg Oral BID AC Mee Scullin, DO   100 mg at 04/06/19 0620       Allergies   Allergen Reactions    Sulfa Antibiotics Rash       Social History     Socioeconomic History    Marital status:      Spouse name: geo amador    Number of children: 2    Years of education: 12    Highest education level: Not on file   Occupational History    Occupation: retired   Social Needs    Financial resource strain: Not on file    Food insecurity:     Worry: Not on file     Inability: Not on file   [employer] needs:     Medical: Not on file     Non-medical: Not on file   Tobacco Use    Smoking status: Never Smoker    Smokeless tobacco: Never Used   Substance and Sexual Activity    Alcohol use: No    Drug use: No    Sexual activity: Not Currently   Lifestyle    Physical activity:     Days per week: Not on file     Minutes per session: Not on file    Stress: Not on file   Relationships    Social connections:     Talks on phone: Not on file     Gets together: Not on file     Attends Buddhist service: Not on file     Active member of club or organization: Not on file     Attends meetings of clubs or organizations: Not on bleeding, anorexia, blood in stool, bowel incontinence, constipation, nausea and vomiting. Genitourinary: Positive for bladder incontinence. Negative for difficulty urinating, dysuria, flank pain, frequency and urgency. Musculoskeletal: Positive for arthralgias, back pain, gait problem, joint swelling, myalgias and neck pain. Negative for neck stiffness. Skin: Negative for color change, pallor, rash and wound. Allergic/Immunologic: Positive for immunocompromised state. Neurological: Positive for dizziness, focal weakness, weakness, light-headedness and numbness. Negative for vertigo, tremors, syncope, facial asymmetry, speech difficulty, headaches and loss of balance. Hematological: Negative for adenopathy. Does not bruise/bleed easily. Psychiatric/Behavioral: Positive for dysphoric mood and sleep disturbance. Negative for agitation, behavioral problems, confusion, decreased concentration, hallucinations, memory loss, self-injury and suicidal ideas. The patient is not nervous/anxious and is not hyperactive. All other systems reviewed and are negative. Objective  BP (!) 189/69   Pulse 72   Temp 97 °F (36.1 °C) (Oral)   Resp 16   Ht 5' 6.5\" (1.689 m)   Wt 275 lb (124.7 kg)   LMP  (LMP Unknown)   SpO2 95%   BMI 43.72 kg/m² *    Physical Exam   Constitutional: She is oriented to person, place, and time. Vital signs are normal. She appears well-developed and well-nourished. Non-toxic appearance. She does not have a sickly appearance. She does not appear ill. No distress. HENT:   Head: Normocephalic and atraumatic. Right Ear: Hearing normal.   Left Ear: Hearing normal.   Nose: Nose normal.   Mouth/Throat: Oropharynx is clear and moist and mucous membranes are normal. No oral lesions. Normal dentition. No oropharyngeal exudate. No lesions   Eyes: Pupils are equal, round, and reactive to light.  Conjunctivae and EOM are normal. Right eye exhibits no chemosis, no discharge and no exudate. Left eye exhibits no chemosis, no discharge and no exudate. No scleral icterus. Neck: Normal range of motion. Neck supple. No JVD present. No neck rigidity. No tracheal deviation and no edema present. No thyromegaly present. Cardiovascular: Intact distal pulses. Exam reveals no decreased pulses. Pulmonary/Chest: Effort normal. No accessory muscle usage. No apnea, no tachypnea and no bradypnea. No respiratory distress. She has decreased breath sounds. She has no wheezes. She has no rales. She exhibits no tenderness. Abdominal: Soft. Bowel sounds are normal. She exhibits no distension and no mass. There is no tenderness. There is no rebound and no guarding. Musculoskeletal: She exhibits tenderness. She exhibits no edema. Right shoulder: Normal.        Left shoulder: Normal.        Right elbow: Normal.       Left elbow: Normal.        Right wrist: Normal.        Left wrist: Normal.        Right hip: Normal.        Left hip: Normal.        Right knee: She exhibits decreased range of motion and swelling. Tenderness found. Medial joint line tenderness noted. Left knee: Tenderness found. Medial joint line tenderness noted. Right ankle: Normal. Achilles tendon normal.        Left ankle: Normal. Achilles tendon normal.        Cervical back: She exhibits decreased range of motion, tenderness and bony tenderness. Thoracic back: She exhibits decreased range of motion, tenderness and bony tenderness. Lumbar back: She exhibits decreased range of motion, tenderness, bony tenderness and pain. She exhibits no swelling, no edema, no deformity, no laceration and normal pulse.         Right upper arm: Normal.        Left upper arm: Normal.        Right forearm: Normal.        Left forearm: Normal.        Right hand: Normal.        Left hand: Normal.        Right upper leg: Normal.        Left upper leg: Normal.        Right lower leg: Normal.        Left lower leg: Normal. Legs:       Right foot: Normal.        Left foot: Normal.   Tender areas are indicated by numbered spot         Lymphadenopathy:     She has no cervical adenopathy. She has no axillary adenopathy. Right: No inguinal adenopathy present. Left: No inguinal adenopathy present. Neurological: She is alert and oriented to person, place, and time. She displays abnormal reflex. She displays no atrophy and no tremor. A sensory deficit is present. No cranial nerve deficit. She exhibits normal muscle tone. She has an abnormal Finger-Nose-Finger Test, an abnormal Romberg Test and an abnormal Tandem Gait Test. Gait abnormal. Coordination normal. She displays no Babinski's sign on the right side. She displays no Babinski's sign on the left side. Reflex Scores:       Tricep reflexes are 0 on the right side and 0 on the left side. Bicep reflexes are 1+ on the right side and 1+ on the left side. Brachioradialis reflexes are 0 on the right side and 0 on the left side. Patellar reflexes are 0 on the right side and 0 on the left side. Achilles reflexes are 0 on the right side and 0 on the left side. Skin: Skin is warm and dry. Abrasion and bruising noted. No ecchymosis, no laceration, no petechiae and no rash noted. Rash is not macular, not pustular and not urticarial. She is not diaphoretic. No cyanosis or erythema. No pallor. Nails show no clubbing. Chronic ulcer left foot   Psychiatric: She has a normal mood and affect. Her speech is normal and behavior is normal. Judgment and thought content normal. Her mood appears not anxious. Her affect is not angry, not blunt, not labile and not inappropriate. She is not agitated, not aggressive, not hyperactive, not slowed, not withdrawn, not actively hallucinating and not combative. Thought content is not paranoid and not delusional. Cognition and memory are normal. Cognition and memory are not impaired.  She does not express impulsivity or inappropriate judgment. She does not exhibit a depressed mood. She expresses no homicidal and no suicidal ideation. She expresses no suicidal plans and no homicidal plans. She exhibits normal recent memory and normal remote memory. She is attentive. Vitals reviewed. Back Exam     Muscle Strength   Right Quadriceps:  4/5   Left Quadriceps:  4/5   Right Hamstrings:  4/5   Left Hamstrings:  4/5           Neurologic Exam     Mental Status   Oriented to person, place, and time. Attention: normal. Concentration: normal.   Speech: speech is normal   Level of consciousness: alert  Knowledge: good. Able to name object. Able to read. Able to repeat. Able to write. Normal comprehension. Cranial Nerves     CN III, IV, VI   Pupils are equal, round, and reactive to light.   Extraocular motions are normal.     Motor Exam   Muscle bulk: decreased  Overall muscle tone: decreased  Right leg tone: decreased  Left leg tone: decreased    Strength   Right neck flexion: 4/5  Left neck flexion: 4/5  Right neck extension: 4/5  Left neck extension: 4/5  Right deltoid: 4/5  Left deltoid: 4/5  Right biceps: 4/5  Left biceps: 4/5  Right triceps: 4/5  Left triceps: 4/5  Right wrist flexion: 4/5  Left wrist flexion: 4/5  Right wrist extension: 4/5  Left wrist extension: 4/5  Right interossei: 4/5  Left interossei: 4/5  Right abdominals: 4/5  Left abdominals: 4/5  Right iliopsoas: 4/5  Left iliopsoas: 4/5  Right quadriceps: 4/5  Left quadriceps: 4/5  Right hamstrin/5  Left hamstrin/5  Right glutei: 4/5  Left glutei: 4/5  Right anterior tibial: 4/5  Left anterior tibial: 4/5  Right posterior tibial: 4/5  Left posterior tibial: 4/5  Right peroneal: 4/5  Left peroneal: 4/5  Right gastroc: 4/5  Left gastroc: 4/5    Sensory Exam   Right arm light touch: decreased from wrist  Left arm light touch: decreased from wrist  Right leg light touch: decreased from knee  Left leg light touch: decreased from knee    Gait, Coordination, and Reflexes     Gait  Gait: wide-based    Coordination   Romberg: positive  Finger to nose coordination: abnormal  Tandem walking coordination: abnormal    Reflexes   Right brachioradialis: 0  Left brachioradialis: 0  Right biceps: 1+  Left biceps: 1+  Right triceps: 0  Left triceps: 0  Right patellar: 0  Left patellar: 0  Right achilles: 0  Left achilles: 0      After extensive review of the records and above physical exam, I have formulated the followingdiagnoses and plan:      DIAGNOSES:    1. The patient was admitted to the acute rehabilitation unit with the primary rehab diagnoses being severe abnormality of gait and mobility andimpaired self care and ADL's due to severe flareup of osteoarthritis    Compared to Pre-Admission Assessment, patients medical and functional status remain challengingly complex and patient continues to requireintensive therapeutic intervention from multiple therapies, therefore, initiate acute intensive comprehensive inpatient rehabilitation program including PT/OT to improve balance, ambulation, ADLs, and to improve the P/AROM. Functional and medical status reassessed regarding patients ability to participate in therapies and patient found to be able to participate in acute intensivecomprehensive inpatient rehabilitation program.  Therapeutic modifications regarding activities in therapies, place, amount of time per day and intensity of therapy made daily. Enroll in acute course of therapy program to include 1 1/2 hours per day of PT 5 to 7days per week and 1 1/2 hours per day of OT 5 to 7 days per week, and Rec T 1/2 hour per day 3-5 days per week.   The patient is stable medically and physically on previous exam.       This patient present with significant new onset decreased mobility andinability to perform activities of daily living skills independently and is at significant risk for prolonged disability  For this reason they have been admitted to [facility]. Thepatient's current functional and medical status are highly complex but the patient is able to participate in intensive rehabilitation. A comprehensive inpatient rehabilitation program is appropriate. The patient Kevin Marii initial evaluation by the rehabilitation team and be discussed at regular treatment team meetings to assess progress, mobility, self care, mood and discharge issues. Physical therapy will be consulted for mobilityand endurance issues and should be performed 1 to 2 times per day, 7 days per week for the length of stay. Occupational therapy will be consulted for activities of daily living and should be performed 1 to 2 times per day,7 days per week for the length of stay. Their capacity to participate at an acute level, decision to be treated in the gym, room or on the unit, their activity goals for the day and the number of minutes of activeparticipation will be reassessed and re-prescribed daily. Because this patient is medically complex, I will check a CBC, BMP, UA and orthostatic blood pressures. They will be reassessed daily regarding their ability toparticipate in an acute level rehabilitation program.  Recreational Therapy will be consulted for community re-entry and adjustment to disability. Communication, cognitive and emotional issues will also be addressed duringthis rehabilitation stay by rehabilitation psychologist or speech therapist as appropriate. I reviewed the patient's old and current charts and discussed other rehabilitation options with the rehabilitation teamincluding the rehab RN and the admission team as well as the patient. I feel that the patient's functional recovery would be best served at an acute inpatient rehabilitation program because the patient needs intense therapythree hours per day, direct RN supervision and daily monitoring by a physician for medical status.  This cannot be sufficiently provided by home health care, a skilled nursing facility or in an outpatient setting. I furtherfeel that the patient has the potential to improve functional abilities in an acute intensive rehabilitation program.    Old records were reviewed and summarized. 2.  Other diagnoses which complicate rehabilitation stay include:     Principal Problem:    Abnormality of gait and mobility due to Impaired Mobility secondary to Flare up of OA. [facility] admit 4/04/19. Active Problems:  1. Peripheral angiopathy,   Hx of Chronic ulcer of left foot-0 pressure to her heels bilaterally, at E T-max, add podiatry consult  2. Diabetic neuropathy associated with diabetes mellitus due to underlying condition -low carb diet, add Glucotrol, Lantus, titrate Humalog  3. Ataxia-focus on balance in physical therapy  4. Hypothyroid-monitor symptoms, titrate Synthroid  5. Morbid obesity due to excess calories -add diabetic and obesity related dietary counseling, restrict carbohydrates and diet  6. Essential hypertension, veer peripheral artery disease,  HLD (hyperlipidemia)-vital signs every shift, dose and titrate cardiac medications accordingly to include Toprol, recheck CBC BMP and consult hospitalist for backup medical  7. Severe active chronic Anxiety and depression with learned helplessness behaviors-emotional support daily, add rec therapy  rehabilitation psychology and trazodone consider SSRI-wean off of Xanax, consult rehabilitation psychology and health psychiatry regarding alternative measures for depression and anxiety, add vitamin B12 shot  8. Bronchitis-pulse ox checks every shift, dose and titrate oxygen and aerosol treatments  9.   OA (osteoarthritis) with severe osteoarthritic and severe neuropathic pain-titrate Tylenol versus Norco, always use the lowest effective dose of medication, add Desyrel at night for neuropathic pain, add topical agents, check [facility] prescription monitoring database to review for overuse or abuse  10.

## 2019-04-05 NOTE — PROGRESS NOTES
[facility]) -     Acute  Rehabilitation  RECREATIONAL THERAPY  Initial Evaluation    Date:  4/5/2019        Patient Name: Ximena Dean       MRN: [id number]        YOB: 1950 (77 y.o.)       Gender: female  Diagnosis: Impaired mobility 2° to flare up of O. A. Referring Practitioner: Dr. Marylee Showman     RESTRICTIONS/PRECAUTIONS:  Restrictions/Precautions: Fall Risk      Patient seen at: 200 20 min    Recreation Therapist received referral and chart reviewed    SUBJECTIVE:   Patient reports she has been homebound and has had mobility issues for two years. Prior to admission, patient engaged in leisure/recreation on a regular basis: rarely   Patient reports he/she is comfortable in: comfortable with both scenarios    Patient reports pain is a chronic issue      OBJECTIVE:  Pt is up in wheelchair. Oxygen No    Ability to provide information regarding leisure and recreation interests:  Issues with mood     Leisure Interest Survey:  [employer]   Reading, Word searches/Crosswords, Likes music, Likes animals and Television                 Sports/Physical Activity     Home exercise routine    Community  Not getting out    Social Activities   Family/friend visits    Details regarding  Leisure Interests: likes to read light hearted novels. Likes music from the 1950's and modern country. Does coloring. Talks on the phone with friends. Past leisure interests: gardening, yardwork  Future leisure interests:     Comments: NO FRIENDS IN [place]. MOVED HERE FROM [place] TWO YEARS AGO THIS MAY. HER CURRENT NEIGHBORS SHE SAID ARE NICE.  SUPPORTIVE. DTR BRANDT IN [facility] WHO OWNS THE HOME IN [place]. DTR LEENA LIVES IN [facility].      Emotional   Observed/noted:   Cooperative, Pleasant and Patient spoke about their feelings  Affect:   Appropriate BUT TEARFUL WHEN DESCRIBING HOW HER LIFE CHANGED AFTER HAVING MOBILITY ISSUES  Comments:     [address] Session included:  (Check ALL that apply)   Increased Socialization, Provided active listening, Offered emotional support and Benefits of the patient's leisure and recreation pursuits being utilized as stress relievers    Recommendations to utilize Recreation Therapy services, its supported services and groups include:            [address], [employer] opportunities, Pet Therapy, Leisure Education, Individual Music Therapy session, Music Therapy Group, Independent leisure/recreational opportunities and activities for in room use and Coping skills    Comment(s):     ASSESSMENT  Patient tolerated todays session:  good       Prior Activity Level: Inactive   Leisure Awareness:   fair      Lack of interest in the following:    Independent leisure activities for in room use. Barriers to Leisure Participation:  Mobility, Pain, General Weakness and Endurance  Comment(s):    Plan  Prior to discharge from rehab program:   COMPLETE  Patient will express interest in participating in the Pet Therapy program during their hospitalization. COMPLETE Patient will indicate current and prior leisure/recreational interests and state those they will follow through with post discharge. COMPLETE Patient encouraged to use their own personal independent leisure activity for in room use. He/she brought SMARTPHONE  COMPLETE Patient will be provided with a cognitive/orientation/puzzle handout 5x/week. COMPLETE Patient will be educated on various alternative pain management techniques which  can include both techniques done independently and/or in group programs. COMPLETE Patient will express interest in participating in Music Therapy during his hospitalization. COMPLETE  Patient will identify the recreation therapy supported groups he has interest in attending.   Patient provided with the following accessible and independently used recreation/leisure resources when in hospital room:   5 Days week large print orientation/puzzle activity handout    EDUCATION   Was new education provided: Yes  Learner:   Patient  Education provided:    Andrew Galaviz  Method of Education: Discussion  Evaluation of Patients Response to Education:  Verbalized Understanding and No Family Present                         FIMS  Comprehension: 5 - Patient understands basic needs (hungry/hot/pain)  Expression: 6 - Device used to express complex ideas/needs  Social Interaction: 6 - Patient requires medication for mood and/or effect  Problem Solvin - Independent with device (e.g. notes, schedules)  Memory: 7 - Patient independent with meds/people/schedule    Electronically signed by: Jonas Washington  Date: 2019   Electronically signed by Jonas Washington on 2019 at 11:15 AM

## 2019-04-05 NOTE — PROGRESS NOTES
Physical Therapy Rehab Treatment Note  Facility/Department: [facility]  Room: R262/R262-01       NAME: Lee Ann Fernandez  : 1950 (76 y.o.)  MRN: [id number]  CODE STATUS: DNR-CCA    Date of Service: 2019  Chart Reviewed: Yes  Patient assessed for rehabilitation services?: Yes  Family / Caregiver Present: No  Diagnosis: Impaired Mobility secondary to Flair up of OA  General Comment  Comments: pt emotional about the past. trying to get her to focus on present and that she's healed    Restrictions:  Restrictions/Precautions: Fall Risk       SUBJECTIVE: Subjective: \"i just have not been able to do anything for 2 years\"  Pain Screening  Patient Currently in Pain: Yes  Pre Treatment Pain Screening  Pain at present: 5  Scale Used: Numeric Score    Post Treatment Pain Screenin  Pain Assessment  Pain Assessment: 0-10  Pain Level: 5  Pain Type: Acute pain  Pain Location: Neck  Pain Descriptors: Aching  Pain Frequency: Intermittent    OBJECTIVE:   Overall Orientation Status: Within Functional Limits  Follows Commands: Within Functional Limits  Transfers  Sit to Stand: Minimal Assistance;2 Person Assistance  Manual therapy  Soft Tissue Mobalization: to left upper trap area and down arm. upper cervical area. Other: gastroc stretches to marty lower extremities. pt very guarded but did allow me to get to neutral position.      ASSESSMENT/COMMENTS:lots of encouragement needed for pt to focus on moving fwd and not to perseverate on    PLAN OF CARE/Safety:    Therapy Time:   Individual   Time In 1130   Time Out 1200   Minutes 30     Minutes:30      Transfer/Bed mobility training:10      Gait trainin      Neuro re education:0     Therapeutic ex:0      Manual: 20      [facility] KERMIT LEE PTA, 19 at 12:17 PM

## 2019-04-05 NOTE — PROGRESS NOTES
Facility/Department: [facility] Initial Assessment: Physical Therapy  Room: R262/R262-01    NAME: Lee Ann Fernandez  : 1950  MRN: [id number]    Date of Service: 2019    Rehab Diagnosis(es): Impaired Mobility secondary to Flair up of OA  Patient Active Problem List    Diagnosis Date Noted    Ataxia 2019    Acute renal failure (ARF) ([address] 2019    Hypothyroid 2019    Morbid obesity due to excess calories ([address] 2019    Essential hypertension 2019    Benzodiazepine dependence ([address] 2019    Anxiety 2019    Bronchitis 2019    OA (osteoarthritis) 2019    Acute on chronic renal failure ([address] 2019    Hx of Chronic ulcer of left foot ([address] 2019    HLD (hyperlipidemia) 2019    Fracture of right tibia and fibula 2019    Abnormality of gait and mobility due to Impaired Mobility secondary to Flare up of OA.   [facility] admit 19. 2019    Unable to ambulate 2019    Abscess of foot including toes, left 2018    Diabetic neuropathy associated with diabetes mellitus due to underlying condition ([address] 2017    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 without evidence of necrosis (CODE) 10/13/2017    Diabetes mellitus with diabetic peripheral angiopathy without gangrene, without long-term current use of insulin ([address] 2017    Peripheral angiopathy ([address] 2017    Non-healing surgical wound 2017       Past Medical History:   Diagnosis Date    Anxiety 2019    Arthritis     Bronchitis     Diabetes mellitus ([address]     Diabetic neuropathy associated with diabetes mellitus due to underlying condition ([address] 11/3/2017    Essential hypertension 2019    Hypercholesterolemia     Hypertension     Morbid obesity due to excess calories ([address] 2019    Thyroid disease     Wound infection 2018    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 without evidence of necrosis      Past Surgical History:   Procedure Laterality Date    APPENDECTOMY      HERNIA REPAIR      ORTHOPEDIC SURGERY Left 2017    broken left foot.  surgeries x3    TONSILLECTOMY N/A        Chart Reviewed: Yes  Patient assessed for rehabilitation services?: Yes  Family / Caregiver Present: No  Diagnosis: Impaired Mobility secondary to Flair up of OA  General Comment  Comments: pt emotional about the past. trying to get her to focus on present and that she's healed    Restrictions:  Restrictions/Precautions: Fall Risk     SUBJECTIVE: Subjective: \"i just have not been able to do anything for 2 years\"    Pre Treatment Pain Screening  Pain at present: 3  Scale Used: Numeric Score  Intervention List: Patient able to continue with treatment;Patient declined any intervention  Comments / Details: Neck and L UE    Post Treatment Pain Screening:  Pain Assessment  Pain Assessment: (3 L UE and neck - RN notified)    Prior Level of Function:  Social/Functional History  Lives With: Spouse  Type of Home: House  Home Layout: Two level, Able to Live on Main level with bedroom/bathroom  Home Access: Stairs to enter with rails  Entrance Stairs - Number of Steps: 3  Entrance Stairs - Rails: Both  Bathroom Shower/Tub: Walk-in shower(Patient states she has not been using shower since fall in January 2019)  Bathroom Toilet: Standard  Bathroom Equipment: 3-in-1 commode(Reports no room for a shower chair)  Home Equipment: 4 wheeled walker  ADL Assistance: Needs assistance  Bath: (Patient states  assists to wash feet)  Dressing: (Patient reports  provides assistance for socks and shoes and to help pull pants over hips)  Homemaking Assistance: Needs assistance( performs)  Ambulation Assistance: Needs assistance(Rollator)  Transfer Assistance: Needs assistance(Spouse assists with transfers due to increasing weakness)  Active : No  Additional Comments: has been minimally ambulatory for about 2 years since her R foot injury. Prior to initial injury, pt was ambulatory without device. OBJECTIVE:   Vision/Hearing:  Vision Exceptions: Wears glasses at all times  Hearing: Within functional limits    Cognition:  Overall Orientation Status: Within Functional Limits  Follows Commands: Within Functional Limits  Observation/Palpation  Observation: Obese; pt with hinge brace B knees for support. Readjusted for pt size and comfort. No acute distress noted. Pt seemingly anxious and expresses severe fear of falling. ROM:  RLE PROM: WFL  LLE PROM: WFL    Strength:  Strength Other  Other: B LEs assessed in seated and supine: Completes bridge in supine, however unable to SLR either B LE; noted weakness throughout hips grossly 4-/5; Quads grossly 3+/5; ankles WFL. Severe trunk weakness observed throughout functional mobility. Neuro:        Balance  Sitting - Static: Good  Sitting - Dynamic: Good  Standing - Static: Poor  Standing - Dynamic: Poor  Comments: Pt reluctant to challenge balance in standing, bracing heavily on her walker d/t fear of falling. Able to shift OBOS in seated, however control limited by trunk weakness. Motor Control  Gross Motor?: WFL    Bed mobility  Rolling to Left: Contact guard assistance;Minimal assistance  Rolling to Right: Contact guard assistance;Minimal assistance  Supine to Sit: Moderate assistance;Maximum assistance(lifting trunk)  Sit to Supine: Minimal assistance(lift LEs)  Comment: Pt educated in logroll technique requiring encouragement and step by step sequencing for effective completion    Transfers  Sit to Stand: Maximum Assistance  Stand to sit: Minimal Assistance  Bed to Chair: Minimal assistance  Comment: Pt requiring multiple attempts to complete d/t fear.  Frequent reminders for safe sequencing with push up and hand placement with pt abandoning plan at times requiring additional assist.     Ambulation  Ambulation?: Yes  Ambulation 1  Surface: level tile  Device: Rolling Walker  Assistance: Contact guard assistance  Quality of Gait: Shortened steps with heavy bracing on [employer]. Pt extremely fearful of falling. Encouragement provided throughout. Distance: 5ft  Stairs/Curb  Stairs?: No(safety concerns)    Activity Tolerance  Activity Tolerance: Patient Tolerated treatment well  Activity Tolerance: self-limiting from fear  PT Equipment Recommendations  Other: TBD        Car transfers: Not tested  Walk 10 ft: Not tested  Walk 50 ft with 2 turns: Not tested  Walk 150 ft in corridor: Not tested  Walking 10 ft on unlevel surface: Not tested  Picking up objects: Not tested  Wheelchair Mobility: No     ASSESSMENT:  Body structures, Functions, Activity limitations: Decreased functional mobility ; Decreased safe awareness;Decreased balance;Decreased endurance; Increased Pain;Decreased strength;Decreased coordination  Decision Making: Medium Complexity  History: High  Exam: Med  Clinical Presentation: Med    Prognosis: Good  Patient Education: PT POC; DC recs; role of PT in the hosp  Barriers to Learning: none at this time    CLINICAL IMPRESSION: Pt presenting with severe weakness and deconditioning from chronic illnesses which have debilitated her mobility. Multiple falls recently evidence of impaired balance.  Continued PT indicated for strength and mobility training in order to progress function to highest level of potential to DC home indep    PLAN OF CARE:  Frequency: 1-2 treatment sessions per day, 5-7 days per week     Current Treatment Recommendations: Strengthening, Balance Training, Transfer Training, Endurance Training, Gait Training, Neuromuscular Re-education, Home Exercise Program, ROM, Functional Mobility Training, Stair training, Pain Management, Safety Education & Training, Equipment Evaluation, Education, & procurement, Patient/Caregiver Education & Training, Manual Therapy - Soft Tissue Mobilization, Manual Therapy - Joint Manipulation    Patient's Goal:  Get walking    GOALS:  Short term goals  Short term goal 1: Pt to complete HEP with indep  Long term goals  Long term goal 1: Pt to complete all bed mobility with indep  Long term goal 2: Pt to complete all functional transfers with indep  Long term goal 3: Pt to ambulate 50-150ft with LRD and indep, >150ft with LRD and supervision  Long term goal 4: Pt to manage 4 steps with Analisa    ELOS:   Plan weeks: 2-3    Therapy Time:    Individual   Time In 1100   Time Out 1130   Minutes 30     10 Minutes(transfers)       Wilbert Ang, PT, 04/05/19 at 12:29 PM

## 2019-04-05 NOTE — PROGRESS NOTES
Physical Therapy  Facility/Department: [facility] IC17/IC17-01  Physical Therapy Discharge      NAME: Chika Wallace    : 1950 (77 y.o.)  MRN: [id number]    Account: [de-identified]  Gender: female      Patient has been discharged from acute care hospital. DC patient from current PT program.      Electronically signed by Sumeet Castrejon PT on 19 at 4:01 PM

## 2019-04-05 NOTE — PROGRESS NOTES
Occupational Therapy   Occupational Therapy Initial Assessment  Date: 2019   Patient Name: Shelli Torres  MRN: [id number]     : 1950    Date of Service: 2019    Discharge Recommendations:  Continue to assess pending progress       Transfer care to lead OTR. Assessment   Performance deficits / Impairments: Decreased functional mobility ; Decreased ADL status; Decreased strength;Decreased safe awareness;Decreased endurance;Decreased balance  Assessment: Pt is 76 y.o female with above mentioned deficits impacting ability to complete ADL's IND'ly and safely. Pt would benefit from skilled OT to increase level of functioning. Decision Making: High Complexity  History: Mulit comorb   Exam: 6 perf imp   Assistance / Modification: Mod/Max A   REQUIRES OT FOLLOW UP: Yes  Activity Tolerance  Activity Tolerance: Patient limited by fatigue;Patient limited by pain  Safety Devices  Safety Devices in place: Yes  Type of devices: All fall risk precautions in place           Patient Diagnosis(es): There were no encounter diagnoses. has a past medical history of Anxiety, Arthritis, Bronchitis, Diabetes mellitus ([facility] [address] Diabetic neuropathy associated with diabetes mellitus due to underlying condition [facility]), Essential hypertension, Hypercholesterolemia, Hypertension, Morbid obesity due to excess calories ([facility] [address] Thyroid disease, and Wound infection. has a past surgical history that includes orthopedic surgery (Left, 2017); hernia repair; Appendectomy; and Tonsillectomy (N/A). Restrictions  Restrictions/Precautions  Restrictions/Precautions: Fall Risk    Subjective   General  Chart Reviewed: Yes  Patient assessed for rehabilitation services?: Yes  Referring Practitioner: Dr. Leonardo Willoughby   Diagnosis: Impaired mobility 2° to flare up of O. A.    Pain Assessment  Pain Assessment: 0-10  Pain Level: 4  Patient's Stated Pain Goal: 5  Pain Type: Acute pain  Pain Location: Neck  Pain Orientation: Left  Pain Descriptors: Aching  Pain Frequency: Intermittent  Pain Onset: On-going  Clinical Progression: Not changed  Non-Pharmaceutical Pain Intervention(s): Rest, Repositioned  Multiple Pain Sites: No     Social/Functional History  Social/Functional History  Lives With: Spouse  Type of Home: House  Home Layout: Two level, Able to Live on Main level with bedroom/bathroom  Home Access: Stairs to enter with rails  Entrance Stairs - Number of Steps: 3  Entrance Stairs - Rails: Both  Bathroom Shower/Tub: Walk-in shower(Patient states she has not been using shower since fall in January 2019)  Bathroom Toilet: Standard  Bathroom Equipment: 3-in-1 commode(Reports no room for a shower chair)  Home Equipment: 4 wheeled walker  ADL Assistance: Needs assistance  Bath: (Patient states  assists to wash feet)  Dressing: (Patient reports  provides assistance for socks and shoes and to help pull pants over hips)  Homemaking Assistance: Needs assistance( performs)  Ambulation Assistance: Needs assistance(Rollator)  Transfer Assistance: Needs assistance(Spouse assists with transfers due to increasing weakness)  Active : No  Type of occupation: [employer], [employer]   Leisure & Hobbies: watch TV  Additional Comments: has been minimally ambulatory for about 2 years since her R foot injury. Prior to initial injury, pt was ambulatory without device. Objective   Vision: Impaired  Vision Exceptions: Wears glasses at all times  Hearing: Within functional limits    Orientation  Overall Orientation Status: Within Functional Limits  Observation/Palpation  Observation: Pt in bed, no signs of distress. Pt had B knee braces on counter. Balance  Sitting Balance: Supervision  Standing Balance: Moderate assistance  Standing Balance  Sit to stand:  Moderate assistance  Stand to sit: Moderate assistance  Functional Mobility  Functional - Mobility Device: Wheelchair  Activity: To/from bathroom  Assist Level: Dependent/Total  Toilet Transfers  Toilet - Technique: Stand pivot  Equipment Used: Grab bars  Toilet Transfer: Maximum assistance;2 Person assistance(+2)  Shower Transfers  Shower Transfers: Not tested  Shower Transfers Comments: NT for safety, Pt stated legs feel like getting out. ADL  Feeding: Independent  Grooming: Setup  UE Bathing: Setup  LE Bathing: Moderate assistance(Pt assist to wash B feet and buttocks )  UE Dressing: Setup  LE Dressing: Dependent/Total  Toileting: Dependent/Total  Tone RUE  RUE Tone: Normotonic  Tone LUE  LUE Tone: Normotonic  Coordination  Movements Are Fluid And Coordinated: Yes     Bed mobility  Supine to Sit: Moderate assistance;2 Person assistance(+2)  Sit to Supine: Unable to assess(pt seated in chair )  Transfers  Sit to stand: Moderate assistance  Stand to sit: Moderate assistance     Cognition  Arousal/Alertness: Appropriate responses to stimuli  Following Commands: Follows all commands without difficulty  Attention Span: Appears intact  Memory: Decreased short term memory(pt recalled 1/3 words after 1 min 0/3 with VC's)  Safety Judgement: Decreased awareness of need for assistance  Problem Solving: Assistance required to generate solutions;Assistance required to implement solutions  Insights: Fully aware of deficits  Initiation: Requires cues for some  Sequencing: Does not require cues  Cognition Comment: Comp 6  Exp 7  Social 5  Prob 5  Mem 5  Pt with increased anxiety about falling.    Perception  Overall Perceptual Status: WFL     Sensation  Overall Sensation Status: WFL(pt denied n/t )        LUE AROM (degrees)  LUE AROM : WFL  Left Hand AROM (degrees)  Left Hand AROM: WFL  RUE AROM (degrees)  RUE AROM : WFL  Right Hand AROM (degrees)  Right Hand AROM: WFL  LUE Strength  L Hand Grasp: 3+/5  L Hand Release: 3+/5  LUE Strength Comment: 3/5 shoulder, 3+/5 elbow to hand   RUE Strength  R Hand Grasp: 3+/5  R Hand Release: 3+/5  RUE Strength Comment: 3+/5      Hand Dominance  Hand Dominance: Right []  Patient and/or caregiver will demonstrate understanding of hip precautions with  verbal/tactile cues. [x]  Patient and/or caregiver will demonstrate understanding of energy conservation techniques without verbal/tactile cues. [x]  Patient and/or caregiver will demonstrate understanding of recommended HEP for strengthening.         [x]  Patient's cognition will improve to safely perform ADLs:  Comprehension: 6  Expression: 7  Social Interaction: 6  Problem Solvin  Memory: 5           Therapy Time   Individual Concurrent Group Co-treatment   Time In 0930         Time Out 1035         Minutes 65         Electronically signed by NIELS Valdivia/L on 2019 at 12:48 PM  Mei Hernandez OTR/WILIAN

## 2019-04-05 NOTE — PROGRESS NOTES
Physical Therapy Rehab Treatment Note  Facility/Department: [facility]  Room: R262/R262-01       NAME: Sylvester Bush  : 1950 (76 y.o.)  MRN: [id number]  CODE STATUS: DNR-CCA    Date of Service: 2019  Chart Reviewed: Yes  Patient assessed for rehabilitation services?: Yes  Family / Caregiver Present: No  Diagnosis: Impaired Mobility secondary to Flair up of OA    Restrictions:  Restrictions/Precautions: Fall Risk    SUBJECTIVE: Subjective: Pt states she has been up all day and is tired. States she uses a rollator at home however also states she has not walked in over a year. Pain Screening  Patient Currently in Pain: No     Post Treatment Pain Screenin/10     OBJECTIVE:   Overall Orientation Status: Within Functional Limits  Follows Commands: Within Functional Limits          Bed mobility  Rolling to Left: Contact guard assistance  Rolling to Right: Contact guard assistance  Sit to Supine: Minimal assistance(assist for 1 LE)    Transfers  Sit to Stand: Maximum Assistance(+2)  Stand to sit: Minimal Assistance  Bed to Chair: Minimal assistance(with WW)  Comment: Improved carryover of sequencing.  +2    Ambulation  Ambulation?: Yes  Ambulation 1  Surface: level tile;carpet  Device: Rolling Walker  Assistance: Contact guard assistance  Quality of Gait: Shortened steps with heavy bracing on [employer]. Pt extremely fearful of falling and states her knees are going to give out. Encouragement provided throughout. Distance: 1 step fwd with WC follow. Side stepping L and R edge of bed 5ft ea. 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 ASSESSMENT/COMMENTS:  Body structures, Functions, Activity limitations: Decreased functional mobility ; Decreased endurance;Decreased balance;Decreased strength  Assessment: Increased anxiety and fear in standing limited pt. Encouragement and redirection to task required throughout. PLAN OF CARE/Safety:   Safety Devices  Type of devices:  All fall risk precautions in place    Therapy Time:   Individual   Time In 1530   Time Out 1600   Minutes 30     Minutes:      Transfer/Bed mobility training:15      Gait training:15      Neuro re education:0     Therapeutic ex:0      Leanora Jeans, PTA, 04/05/19 at 4:23 PM

## 2019-04-06 LAB
ANION GAP SERPL CALCULATED.3IONS-SCNC: 11 MEQ/L (ref 9–15)
BUN BLDV-MCNC: 17 MG/DL (ref 8–23)
CALCIUM SERPL-MCNC: 9.1 MG/DL (ref 8.5–9.9)
CHLORIDE BLD-SCNC: 94 MEQ/L (ref 95–107)
CO2: 29 MEQ/L (ref 20–31)
CREAT SERPL-MCNC: 1.66 MG/DL (ref 0.5–0.9)
GFR AFRICAN AMERICAN: 37.1
GFR NON-AFRICAN AMERICAN: 30.6
GLUCOSE BLD-MCNC: 126 MG/DL (ref 60–115)
GLUCOSE BLD-MCNC: 141 MG/DL (ref 60–115)
GLUCOSE BLD-MCNC: 172 MG/DL (ref 70–99)
GLUCOSE BLD-MCNC: 174 MG/DL (ref 60–115)
GLUCOSE BLD-MCNC: 185 MG/DL (ref 60–115)
PERFORMED ON: ABNORMAL
POTASSIUM SERPL-SCNC: 4.7 MEQ/L (ref 3.4–4.9)
SODIUM BLD-SCNC: 134 MEQ/L (ref 135–144)

## 2019-04-06 PROCEDURE — 97112 NEUROMUSCULAR REEDUCATION: CPT

## 2019-04-06 PROCEDURE — 97535 SELF CARE MNGMENT TRAINING: CPT

## 2019-04-06 PROCEDURE — 80048 BASIC METABOLIC PNL TOTAL CA: CPT

## 2019-04-06 PROCEDURE — 97110 THERAPEUTIC EXERCISES: CPT

## 2019-04-06 PROCEDURE — 1180000000 HC REHAB R&B

## 2019-04-06 PROCEDURE — 6370000000 HC RX 637 (ALT 250 FOR IP): Performed by: INTERNAL MEDICINE

## 2019-04-06 PROCEDURE — 36415 COLL VENOUS BLD VENIPUNCTURE: CPT

## 2019-04-06 PROCEDURE — 99232 SBSQ HOSP IP/OBS MODERATE 35: CPT | Performed by: PHYSICAL MEDICINE & REHABILITATION

## 2019-04-06 PROCEDURE — 6370000000 HC RX 637 (ALT 250 FOR IP): Performed by: PHYSICAL MEDICINE & REHABILITATION

## 2019-04-06 PROCEDURE — 97150 GROUP THERAPEUTIC PROCEDURES: CPT

## 2019-04-06 PROCEDURE — 97116 GAIT TRAINING THERAPY: CPT

## 2019-04-06 RX ORDER — ALPRAZOLAM 0.25 MG/1
0.25 TABLET ORAL 3 TIMES DAILY
Status: DISCONTINUED | OUTPATIENT
Start: 2019-04-06 | End: 2019-04-09

## 2019-04-06 RX ORDER — HYDRALAZINE HYDROCHLORIDE 25 MG/1
25 TABLET, FILM COATED ORAL EVERY 6 HOURS PRN
Status: DISCONTINUED | OUTPATIENT
Start: 2019-04-06 | End: 2019-04-20 | Stop reason: HOSPADM

## 2019-04-06 RX ADMIN — Medication 100 MG: at 08:36

## 2019-04-06 RX ADMIN — GLIPIZIDE 10 MG: 5 TABLET ORAL at 16:59

## 2019-04-06 RX ADMIN — LACTOBACILLUS TAB 1 TABLET: TAB at 08:36

## 2019-04-06 RX ADMIN — ANALGESIC BALM: 1.74; 4.06 OINTMENT TOPICAL at 13:14

## 2019-04-06 RX ADMIN — INSULIN GLARGINE 27 UNITS: 100 INJECTION, SOLUTION SUBCUTANEOUS at 22:00

## 2019-04-06 RX ADMIN — ANALGESIC BALM: 1.74; 4.06 OINTMENT TOPICAL at 08:38

## 2019-04-06 RX ADMIN — ANALGESIC BALM: 1.74; 4.06 OINTMENT TOPICAL at 21:53

## 2019-04-06 RX ADMIN — ALPRAZOLAM 0.25 MG: 0.25 TABLET ORAL at 08:49

## 2019-04-06 RX ADMIN — ACETAMINOPHEN 650 MG: 325 TABLET ORAL at 15:35

## 2019-04-06 RX ADMIN — NITROFURANTOIN MONOHYDRATE/MACROCRYSTALLINE 100 MG: 25; 75 CAPSULE ORAL at 16:59

## 2019-04-06 RX ADMIN — GLIPIZIDE 10 MG: 5 TABLET ORAL at 08:36

## 2019-04-06 RX ADMIN — ALPRAZOLAM 0.25 MG: 0.25 TABLET ORAL at 21:52

## 2019-04-06 RX ADMIN — NITROFURANTOIN MONOHYDRATE/MACROCRYSTALLINE 100 MG: 25; 75 CAPSULE ORAL at 06:20

## 2019-04-06 RX ADMIN — METOPROLOL SUCCINATE 100 MG: 100 TABLET, EXTENDED RELEASE ORAL at 08:36

## 2019-04-06 RX ADMIN — ACETAMINOPHEN 650 MG: 325 TABLET ORAL at 22:10

## 2019-04-06 RX ADMIN — HYDROCODONE BITARTRATE AND ACETAMINOPHEN 1 TABLET: 5; 325 TABLET ORAL at 02:39

## 2019-04-06 RX ADMIN — TRAZODONE HYDROCHLORIDE 150 MG: 150 TABLET ORAL at 21:52

## 2019-04-06 RX ADMIN — MICONAZOLE NITRATE: 20 POWDER TOPICAL at 22:11

## 2019-04-06 RX ADMIN — ALPRAZOLAM 0.25 MG: 0.25 TABLET ORAL at 13:08

## 2019-04-06 RX ADMIN — MICONAZOLE NITRATE: 20 POWDER TOPICAL at 08:40

## 2019-04-06 RX ADMIN — ACETAMINOPHEN 650 MG: 325 TABLET ORAL at 10:31

## 2019-04-06 RX ADMIN — INSULIN LISPRO 2 UNITS: 100 INJECTION, SOLUTION INTRAVENOUS; SUBCUTANEOUS at 13:08

## 2019-04-06 RX ADMIN — LEVOTHYROXINE SODIUM 125 MCG: 125 TABLET ORAL at 06:20

## 2019-04-06 ASSESSMENT — PAIN SCALES - GENERAL
PAINLEVEL_OUTOF10: 8
PAINLEVEL_OUTOF10: 6
PAINLEVEL_OUTOF10: 8
PAINLEVEL_OUTOF10: 8
PAINLEVEL_OUTOF10: 7
PAINLEVEL_OUTOF10: 8
PAINLEVEL_OUTOF10: 10
PAINLEVEL_OUTOF10: 7

## 2019-04-06 ASSESSMENT — PAIN DESCRIPTION - FREQUENCY
FREQUENCY: INTERMITTENT
FREQUENCY: CONTINUOUS

## 2019-04-06 ASSESSMENT — PAIN DESCRIPTION - ORIENTATION: ORIENTATION: LEFT

## 2019-04-06 ASSESSMENT — PAIN DESCRIPTION - DESCRIPTORS
DESCRIPTORS: SORE
DESCRIPTORS: ACHING;OTHER (COMMENT)
DESCRIPTORS: SORE

## 2019-04-06 ASSESSMENT — PAIN DESCRIPTION - PROGRESSION: CLINICAL_PROGRESSION: NOT CHANGED

## 2019-04-06 ASSESSMENT — PAIN DESCRIPTION - PAIN TYPE
TYPE: ACUTE PAIN

## 2019-04-06 ASSESSMENT — PAIN DESCRIPTION - LOCATION
LOCATION: GENERALIZED
LOCATION: NECK;SHOULDER;LEG
LOCATION: ARM;LEG;SHOULDER
LOCATION: SHOULDER;NECK;LEG

## 2019-04-06 ASSESSMENT — PAIN DESCRIPTION - ONSET: ONSET: ON-GOING

## 2019-04-06 NOTE — PROGRESS NOTES
Occupational Therapy  Facility/Department: [facility]  Daily Treatment Note  NAME: Oksana Brown  : 1950  MRN: [id number]    Date of Service: 2019    Discharge Recommendations:  Continue to assess pending progress       Assessment      Activity Tolerance  Activity Tolerance: Patient Tolerated treatment well  Safety Devices  Safety Devices in place: Yes  Type of devices: All fall risk precautions in place         Patient Diagnosis(es): There were no encounter diagnoses. has a past medical history of Anxiety, Arthritis, Bronchitis, Diabetes mellitus ([facility] [address] Diabetic neuropathy associated with diabetes mellitus due to underlying condition [facility]), Essential hypertension, Hypercholesterolemia, Hypertension, Morbid obesity due to excess calories ([facility] [address] Thyroid disease, and Wound infection. has a past surgical history that includes orthopedic surgery (Left, 2017); hernia repair; Appendectomy; and Tonsillectomy (N/A). Restrictions  Restrictions/Precautions  Restrictions/Precautions: Fall Risk  Subjective   General  Chart Reviewed: Yes  Patient assessed for rehabilitation services?: Yes  Response to previous treatment: Patient with no complaints from previous session  Family / Caregiver Present: No  Referring Practitioner: Dr. Allan Olivia   Diagnosis: Impaired mobility 2° to flare up of O. A. Pre Treatment Pain Screening  Pain at present: 6  Intervention List: Patient able to continue with treatment  Pain Assessment  Pain Level: 8  Pain Location: Shoulder;Neck;Leg  Pain Orientation: Left  Pain Descriptors: Aching; Other (Comment)(knotted)   Orientation     Objective      Pt req'ed MOD v/c'sc for safety techniques and MIN encouragement to attempt new techniques. ADL  Equipment Provided: Reacher;Dressing stick  Grooming: Setup  UE Bathing: Supervision  LE Bathing:  Moderate assistance;Verbal cueing(B feet for thoroughness; kinza/buttocks)  UE Dressing: Setup  LE Dressing: Dependent/Total(thread B LE's with reacher; pt not given the opportunity to pull up or don B socks/shoes 2° time constraint)  Toileting: Moderate assistance(kinza hygiene; v/c's and MOD A for sit -> stand)        Toilet Transfers  Toilet - Technique: Stand step  Equipment Used: Grab bars  Toilet Transfer: 2 Person assistance;Dependent/Total  Toilet Transfers Comments: DEP X 2 people, MOD A for sit -> stand  Shower Transfers  Shower - Transfer From: Wheelchair  Shower - Transfer Type: To and From  Shower - Transfer To: Transfer tub bench  Shower - Technique: (stand step)  Shower Transfers: 2 Person assistance;Dependent  Shower Transfers Comments: DEP X 2 people for safety  Wheelchair Bed Transfers  Wheelchair/Bed - Technique: Stand step  Equipment Used: Bed;Wheelchair  Level of Asssistance: 2 Person assistance;Dependent/Total  Wheelchair Transfers Comments: DEP X 2 people for safety; MIN A for sit -> stand         Plan   Plan  Times per week: 5-7x/ wk   Plan weeks: 2-3  Current Treatment Recommendations: Strengthening, Balance Training, Functional Mobility Training, Endurance Training, Wheelchair Mobility Training, Safety Education & Training, Pain Management, Patient/Caregiver Education & Training, Equipment Evaluation, Education, & procurement, Self-Care / ADL, Home Management Training  Plan Comment: Con't OT POC  G-Code     OutComes Score                                                  AM-PAC Score             Goals  Patient Goals   Patient goals : \"To go home. \"        Therapy Time   Individual Concurrent Group Co-treatment   Time In 0900         Time Out 1000         Minutes 60               Electronically signed by BOB Holly on 4/6/19 at 11:23 AM    BOB Holly

## 2019-04-06 NOTE — PLAN OF CARE
Plan of Care:  Coping  Patient declined attendance to the rehab support group secondary to being tired, in bed and preferring to spend time with .    Electronically signed by Wero Romero on 4/6/2019 at 5:00 PM

## 2019-04-06 NOTE — PROGRESS NOTES
Physical Therapy Rehab Treatment Note  Facility/Department: [facility]  Room: [place] [address]       NAME: Chichi Garsia  : 1950 (76 y.o.)  MRN: [id number]  CODE STATUS: DNR-CCA    Date of Service: 2019  Chart Reviewed: Yes  Family / Caregiver Present: No    Restrictions:  Restrictions/Precautions: Fall Risk    SUBJECTIVE: Subjective: Patient has no new reports  Pain Screening  Patient Currently in Pain: Yes  Pre Treatment Pain Screening  Pain at present: 7  Scale Used: Numeric Score  Intervention List: Patient able to continue with treatment  Comments / Details: \"it's just sore muscles\"     Post Treatment Pain Screening:unchanged  Pain Assessment  Pain Assessment: 0-10  Pain Level: 7  Pain Type: Acute pain  Pain Location: Arm;Leg;Shoulder  Pain Descriptors: Sore(\"sore muscles\")  Pain Frequency: Intermittent    OBJECTIVE:           Neuromuscular Education  Neuromuscular Comments: static stand 20sec, 45sec x2    Bed mobility  Rolling to Left: Contact guard assistance  Rolling to Right: Contact guard assistance  Supine to Sit: Contact guard assistance(up from right)  Sit to Supine: Contact guard assistance(uses right ue )  Scooting: Stand by assistance  Comment: with increased time and effort     Transfers  Sit to Stand: Contact guard assistance  Stand to sit: Contact guard assistance  Bed to Chair: Minimal assistance;Contact guard assistance  Comment: sit to stand x5 for quality and technique          Activity Tolerance  Activity Tolerance: Patient Tolerated treatment well    Exercises  Hamstring Sets: x20 ytb  Hip Flexion: x10x2 seated dls  Hip Abduction: ytb x10x2 ; add with ball and abd bracing x10x2  Knee Long Arc Quad: x20 with 3 sec hold  Ankle Pumps: inv/ev/df/pf isolated movement to improve ankle circles without compensation   Core Strengthening: seated march dls N92  Other exercises  Other exercises?: Yes  Other exercises 1: lat push down in chair x10x2      ASSESSMENT/COMMENTS:  Body structures, Functions, Activity limitations: Decreased functional mobility ; Decreased endurance;Decreased balance;Decreased strength  Assessment: Patient progressed from Max assist sit to stand this am to cga this pm.     PLAN OF CARE/Safety:   Safety Devices  Type of devices:  All fall risk precautions in place      Therapy Time:   Individual   Time In 1400   Time Out 1430   Minutes 30     Minutes:30      Transfer/Bed mobility training:10      Gait training:10      Neuro re education:10          Alexander Farley PTA, 04/06/19 at 2:27 PM

## 2019-04-06 NOTE — PROGRESS NOTES
Hospitalist Progress Note      PCP: Rosio Khan MD    Date of Admission: 4/4/2019    Subjective     Pt denies any  complaint overnight     Medications:  Reviewed    Infusion Medications   Scheduled Medications    ALPRAZolam  0.25 mg Oral TID    cyanocobalamin  1,000 mcg Intramuscular Weekly    coenzyme Q10  100 mg Oral BID AC    glipiZIDE  10 mg Oral BID AC    insulin glargine  27 Units Subcutaneous Nightly    insulin lispro  0-12 Units Subcutaneous TID WC    insulin lispro  0-6 Units Subcutaneous Nightly    lactobacillus acidophilus  1 tablet Oral Daily    levothyroxine  125 mcg Oral Daily    metoprolol succinate  100 mg Oral Daily    traZODone  150 mg Oral Nightly    lidocaine  3 patch Transdermal Daily    analgesic ointment   Topical TID    miconazole   Topical BID    nitrofurantoin (macrocrystal-monohydrate)  100 mg Oral BID AC     PRN Meds: hydrALAZINE, acetaminophen, HYDROcodone 5 mg - acetaminophen, ammonium lactate    No intake or output data in the 24 hours ending 04/06/19 1347    Exam:    BP (!) 140/90   Pulse 72   Temp 97 °F (36.1 °C) (Oral)   Resp 16   Ht 5' 6.5\" (1.689 m)   Wt 275 lb (124.7 kg)   LMP  (LMP Unknown)   SpO2 95%   BMI 43.72 kg/m²     General appearance: No apparent distress, appears stated age and cooperative. HEENT: Pupils equal, round, and reactive to light. Conjunctivae/corneas clear. Neck: Supple, with full range of motion. No jugular venous distention. Trachea midline. Respiratory:  Normal respiratory effort. Clear to auscultation, bilaterally without Rales/Wheezes/Rhonchi. Cardiovascular: Regular rate and rhythm with normal S1/S2 without murmurs, rubs or gallops. Abdomen: Soft, non-tender, non-distended with normal bowel sounds. Musculoskeletal: No clubbing, cyanosis or edema bilaterally. Full range of motion without deformity. Skin: Skin color, texture, turgor normal.  No rashes or lesions. Neuro: Non Focal. Symetrical motor and tone.  Nl Comprehension, Alert,awake and oriented. NL CN. Symetrical tone and reflexes. Psychiatric: Alert and oriented, thought content appropriate, normal insight  Capillary Refill: Brisk,< 3 seconds   Peripheral Pulses: +2 palpable, equal bilaterally       Labs:   Recent Labs     04/03/19 1742 04/04/19  0455 04/05/19  0530   WBC 8.8 8.4 7.0   HGB 10.8* 10.6* 11.3*   HCT 33.4* 32.2* 34.7*    212 236     Recent Labs     04/03/19 1742 04/04/19  0455    139   K 4.3 3.9   CL 99 101   CO2 28 27   BUN 26* 24*   CREATININE 1.83* 1.78*   CALCIUM 9.0 8.7     Recent Labs     04/03/19 1742   AST 11   ALT 10   BILITOT <0.2   ALKPHOS 114     No results for input(s): INR in the last 72 hours. No results for input(s): Uriel Risser in the last 72 hours. Urinalysis:      Lab Results   Component Value Date    NITRU Negative 04/03/2019    WBCUA 20-50 04/03/2019    BACTERIA Negative 04/03/2019    RBCUA 3-5 04/03/2019    BLOODU Negative 04/03/2019    SPECGRAV 1.017 04/03/2019    GLUCOSEU Negative 04/03/2019       Radiology:  No orders to display           Assessment/Plan:  Gait and mobility abnormality 2/2 OA exacerbation : continue Rehab POC, PT/OT    HTN: continue antihypertensives with b/p monitoring , add hydralazine for adequate b/p control     DM II: continue POCT glucose with SSI     Hypothyroid: continue daily levothyroxine     BAR/CKD: baseline unknown, slightly improved, monitor renal function and avoid nephrotoxin     Morbid obesity: weight loss and activity encouraged       Additional work up or/and treatment plan may be added today or then after based on clinical progression. I am managing a portion of pt care. Some medical issues are handled by other specialists. Additional work up and treatment should be done in out pt setting by pt PCP and other out pt providers. In addition to examining and evaluating pt, I spent additional time explaining care, normal and abnormal findings, and treatment plan. All of pt questions were answered. Counseling, diet and education were  provided. Case will be discussed with nursing staff when appropriate. Family will be updated if and when appropriate.       Diet: DIET GENERAL; Carb Control: 4 carb choices (60 gms)/meal; Low Sodium (2 GM)  Dietary Nutrition Supplements: Low Calorie High Protein Supplement    Code Status: DNR-CCA    Electronically signed by MARCO Umaña CNP on 4/6/2019 at 1:47 PM

## 2019-04-06 NOTE — CONSULTS
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 Christen Roche: \"I feel better\"    HISTORY OF PRESENT ILLNESS:  Seb Bazan  is a 76 y.o. female who was admitted on 2019 due to complaints of weakness and falls. She noted that her gait has become worse over the last 10 days. She was initially admitted to the ICU, and then eventually transferred to to Rehab. Consult was requested because the patient has been on Benzodiazepines (Xanax), with the request to help her weaning off, and possibly start a different medication. When interviewed today, the patient said she had been admitted because of weakness in her legs, with falls. She said her anxiety was mostly related to the physical condition that she was in, and the fact that she could not do the things she had liked to do anymore (she said she used to be a very active person). She said she had been depressed because of that same reason. However, she said she was much more hopeful now, that she was getting help, and seeing that her strength in her legs was improving. She said her sleep and appetite were OK. She denied having any suicidal or homicidal ideations. She denied having any hallucinations, paranoia or other delusions. PAST PSYCHIATRIC HISTORY:  She said she had been on Xanax since her father had , 3 years ago, and had been stable on 0.5 mg tid, but said she would not always take it as much as prescribed, and some days would only take one tablet. She denied having seen a psychiatrist; the medications were prescribed by her PCP. She denied having been admitted to Psychiatry.       PAST MEDICAL HISTORY:       Diagnosis Date    Anxiety 2019    Arthritis     Bronchitis     [facility]     Diabetic neuropathy associated with diabetes mellitus due to underlying condition ([facility] [facility] 11/3/2017    Essential hypertension 2019    Hypercholesterolemia     Hypertension     Morbid obesity due to excess calories ([facility] [address] 4/4/2019    Thyroid disease     Wound infection 2018    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 without evidence of necrosis          PAST SURGICAL HISTORY:       Procedure Laterality Date    APPENDECTOMY      HERNIA REPAIR      ORTHOPEDIC SURGERY Left 2017    broken left foot. surgeries x3    TONSILLECTOMY N/A        MEDICATIONS: Current Facility-Administered Medications: ALPRAZolam (XANAX) tablet 0.25 mg, 0.25 mg, Oral, TID  hydrALAZINE (APRESOLINE) tablet 25 mg, 25 mg, Oral, Q6H PRN  cyanocobalamin injection 1,000 mcg, 1,000 mcg, Intramuscular, Weekly  coenzyme Q10 capsule 100 mg, 100 mg, Oral, BID AC  glipiZIDE (GLUCOTROL) tablet 10 mg, 10 mg, Oral, BID AC  insulin glargine (LANTUS) injection vial 27 Units, 27 Units, Subcutaneous, Nightly  insulin lispro (HUMALOG) injection vial 0-12 Units, 0-12 Units, Subcutaneous, TID WC  insulin lispro (HUMALOG) injection vial 0-6 Units, 0-6 Units, Subcutaneous, Nightly  lactobacillus acidophilus (FLORANEX) 1 tablet, 1 tablet, Oral, Daily  levothyroxine (SYNTHROID) tablet 125 mcg, 125 mcg, Oral, Daily  metoprolol succinate (TOPROL XL) extended release tablet 100 mg, 100 mg, Oral, Daily  traZODone (DESYREL) tablet 150 mg, 150 mg, Oral, Nightly  acetaminophen (TYLENOL) tablet 650 mg, 650 mg, Oral, Q4H PRN  lidocaine 4 % external patch 3 patch, 3 patch, Transdermal, Daily  HYDROcodone-acetaminophen (NORCO) 5-325 MG per tablet 1 tablet, 1 tablet, Oral, Q4H PRN  analgesic ointment ointment, , Topical, TID  miconazole (MICOTIN) 2 % powder, , Topical, BID  ammonium lactate (LAC-HYDRIN) 12 % lotion, , Topical, PRN  nitrofurantoin (macrocrystal-monohydrate) (MACROBID) capsule 100 mg, 100 mg, Oral, BID AC    ALLERGIES:  Sulfa antibiotics    FAMILY PSYCHIATRIC HISTORY: denied     SOCIAL HISTORY: she was born and raised in [place], [place].  She is a high school graduate. She worked for [employer], doing [employer], and at [employer]. She has been  for 48 years. She has 2 children. She lives with her , who is supportive. SUBSTANCE ABUSE HISTORY:  reports that she has never smoked. She has never used smokeless tobacco. She reports that she does not drink alcohol or use drugs.     VITALS:   Vitals:    04/06/19 1315   BP: (!) 140/90   Pulse:    Resp:    Temp:    SpO2:        LABS:   Admission on 04/04/2019   Component Date Value Ref Range Status    POC Glucose 04/04/2019 122* 60 - 115 mg/dl Final    Performed on 04/04/2019 ACCU-CHEK   Final    WBC 04/05/2019 7.0  4.8 - 10.8 K/uL Final    RBC 04/05/2019 3.98* 4.20 - 5.40 M/uL Final    Hemoglobin 04/05/2019 11.3* 12.0 - 16.0 g/dL Final    Hematocrit 04/05/2019 34.7* 37.0 - 47.0 % Final    MCV 04/05/2019 87.1  82.0 - 100.0 fL Final    MCH 04/05/2019 28.5  27.0 - 31.3 pg Final    MCHC 04/05/2019 32.7* 33.0 - 37.0 % Final    RDW 04/05/2019 14.5  11.5 - 14.5 % Final    Platelets 93/13/6431 236  130 - 400 K/uL Final    POC Glucose 04/04/2019 122* 60 - 115 mg/dl Final    Performed on 04/04/2019 ACCU-CHEK   Final    POC Glucose 04/05/2019 114  60 - 115 mg/dl Final    Performed on 04/05/2019 ACCU-CHEK   Final    POC Glucose 04/05/2019 184* 60 - 115 mg/dl Final    Performed on 04/05/2019 ACCU-CHEK   Final    POC Glucose 04/05/2019 154* 60 - 115 mg/dl Final    Performed on 04/05/2019 ACCU-CHEK   Final    POC Glucose 04/05/2019 152* 60 - 115 mg/dl Final    Performed on 04/05/2019 ACCU-CHEK   Final    POC Glucose 04/06/2019 126* 60 - 115 mg/dl Final    Performed on 04/06/2019 ACCU-CHEK   Final    POC Glucose 04/06/2019 185* 60 - 115 mg/dl Final    Performed on 04/06/2019 ACCU-CHEK   Final           IMAGING:   No orders to display       MENTAL STATUS EXAMINATION  Appearance: alert, oriented, obese, laying in bed Behavior: cooperative Mood: stated to be \"better\"; acknowledged some depression and

## 2019-04-06 NOTE — PROGRESS NOTES
Subjective: The patient complains of severe  acute  on chronic right shoulder pain and bilateral lower extremity weakness partially relieved by  rest, PT, OT and exacerbated by activity and ambulation. I am concerned about patients learned helplessness behaviors and poor motivation with her own care at times as reported by the nurses. ROS x10: The patient also complains of severely impaired mobility and activities of daily living. Otherwise no new problems with vision, hearing, nose, mouth, throat, dermal, cardiovascular, GI, , pulmonary, musculoskeletal, psychiatric or neurological. See Rehab H&P on Rehab chart dated . Vital signs:  BP (!) 189/69   Pulse 72   Temp 97 °F (36.1 °C) (Oral)   Resp 16   Ht 5' 6.5\" (1.689 m)   Wt 275 lb (124.7 kg)   LMP  (LMP Unknown)   SpO2 95%   BMI 43.72 kg/m²   I/O:   PO/Intake:  fair PO intake, no problems observed or reported. Bowel/Bladder:  continent, no problems noted. General:  Patient is well developed, adequately nourished, non-obese and     well kempt. HEENT:    PERRLA, hearing intact to loud voice, external inspection of ear     and nose benign. Inspection of lips, tongue and gums benign  Musculoskeletal: No significant change in strength or tone. All joints stable. Inspection and palpation of digits and nails show no clubbing,       cyanosis or inflammatory conditions. Neuro/Psychiatric: Affect: flat but pleasant. Alert and oriented to person, place and     situation. No significant change in deep tendon reflexes or     sensation  Lungs:  Diminished, CTA-B. Respiration effort is normal at rest.     Heart:   S1 = S2, RRR. No loud murmurs. Abdomen:  Obese, Soft, non-tender, no enlargement of liver or spleen. Extremities:  No significant lower extremity edema diffusely superficial tenderness.   Decreased shoulder range of motion especially on the right  Skin:   Intact to general survey, no visualized or palpated to be able to participate in acute intensive comprehensive inpatient rehabilitation program including PT/OT to improve balance, ambulation, ADLs, and to improve the P/AROM. Therapeutic modifications regarding activities in therapies, place, amount of time per day and intensity of therapy made daily. In bed therapies or bedside therapies prn.   2. Bowel opiate-related constipation and Bladder dysfunction:  frequent toileting, ambulate to bathroom with assistance, check post void residuals. Check for C.difficile x1 if >2 loose stools in 24 hours, continue bowel & bladder program.  Monitor bowel and bladder function. Lactinex 2 PO every AC. MOM prn, Brown Bomb prn, Glycerin suppository prn, enema prn.  3. Severe neuropathic and severe right shoulder pain generalized OA pain: reassess pain every shift and prior to and after each therapy session, give prn Tylenol and  titrate opiates I was using lowest effective dose, modalities prn in therapy, Lidoderm, K-pad prn.   4. Skin healing and breakdown risk:  continue pressure relief program.  Daily skin exams and reports from nursing. 5. Severe Fatigue due to nutritional and hydration deficiency:  continue to monitor I&Os, calorie counts prn, dietary consult prn. And vitamin B12 vitamin D and CoQ10  6. Acute episodic insomnia with situational adjustment disorder:  prn Ambien, monitor for day time sedation. 7. Falls risk elevated:  patient to use call light to get nursing assistance to get up, bed and chair alarm. 8. Elevated DVT risk: progressive activities in PT, continue prophylaxis SANTIAGO hose, elevation  . 9. Complex discharge planning:  Weekly team meeting every Monday to assess progress towards goals, discuss and address social, psychological and medical comorbidities and to address difficulties they may be having progressing in therapy. Patient and family education is in progress. The patient is to follow-up with their family physician after discharge. Complex Active General Medical Issues that complicate care Assess & Plan:    1. Peripheral angiopathy,   Hx of Chronic ulcer of left foot-0 pressure to her heels bilaterally, at E T-max, add podiatry consult  2. Diabetic neuropathy associated with diabetes mellitus due to underlying condition -low carb diet, add Glucotrol, Lantus, titrate Humalog  3. Ataxia-focus on balance in physical therapy  4. Hypothyroid-monitor symptoms, titrate Synthroid  5. Morbid obesity due to excess calories -add diabetic and obesity related dietary counseling, restrict carbohydrates and diet  6. Essential hypertension, veer peripheral artery disease,  HLD (hyperlipidemia)-vital signs every shift, dose and titrate cardiac medications accordingly to include Toprol, recheck CBC BMP and consult hospitalist for backup medical  7. Severe active chronic Anxiety and depression with learned helplessness behaviors-emotional support daily, add rec therapy  rehabilitation psychology and trazodone consider SSRI-wean off of Xanax, consult rehabilitation psychology and health psychiatry regarding alternative measures for depression and anxiety, add vitamin B12 shot  8. Bronchitis-pulse ox checks every shift, dose and titrate oxygen and aerosol treatments  9.   OA (osteoarthritis) with severe osteoarthritic and severe neuropathic pain-titrate Tylenol versus Norco, always use the lowest effective dose of medication, add Desyrel at night for neuropathic pain, add topical agents, check [facility] prescription monitoring database to review for overuse or abuse  10. Acute on chronic renal failure-recheck CBC BMP and limit toxic medications discontinue milk of magnesia  11. Acute UTI-check postvoid residuals, check urinary CNS, titrate Macrobid, monitor for urinary retention, improved peroneal care  12. Severe Yeast rash in her abdominal folds and skin folds-add Mycostatin powder  13.  High risk opiate [facility]. [facility] monitoring database, use nonnarcotic measures for pain when appropriate.   He is lowest effective dose with informed consent             Melquiades Madsen D.O., PM&R     Attending    [address]

## 2019-04-06 NOTE — PROGRESS NOTES
Physical Therapy Rehab Treatment Note  [facility]  [facility]       NAME: Tory Yañez  : 1950 (89 y.o.)  MRN: [id number]  CODE STATUS: DNR-CCA    Date of Service: 2019  Chart Reviewed: Yes  Family / Caregiver Present: No    Restrictions:  Restrictions/Precautions: Fall Risk    SUBJECTIVE: Subjective: Patient reports she had a shower today and she is in pain. Pain Screening  Patient Currently in Pain: Yes  Pre Treatment Pain Screening  Pain at present: 8  Scale Used: Numeric Score  Intervention List: Patient able to continue with treatment  Comments / Details: \"my whole body\"    Post Treatment Pain Screenin/10 all over pain   Pain Assessment  Pain Assessment: 0-10  Pain Level: 8  Pain Type: Acute pain  Pain Location: Neck; Shoulder;Leg    OBJECTIVE:              Bed mobility  Rolling to Left: Contact guard assistance  Rolling to Right: Contact guard assistance  Supine to Sit: Contact guard assistance(up from right)  Sit to Supine: Contact guard assistance(uses right ue )  Scooting: Stand by assistance  Comment: with increased time and effort     Transfers  Sit to Stand: Maximum Assistance(+2)  Stand to sit: Minimal Assistance  Bed to Chair: Minimal assistance(ww)  Comment: vc for technique and sequentcing with fair carryover +2    Ambulation  Ambulation?: Yes  More Ambulation?: Yes  Ambulation 1  Surface: carpet  Device: Rolling Walker  Assistance: Contact guard assistance  Quality of Gait: slow flat steps, slow guarded steps, rle buckling after 4 steps  Distance: 1 step fwd with WC follow. Side stepping L and R edge of bed 5ft ea.     Ambulation 2  Surface - 2: carpet  Device 2: Rolling Walker  Other Apparatus 2: Wheelchair follow  Quality of Gait 2: increased jean claude, short flat steps, cues to keep safe distance within ad, heavy ue bracing  Distance: 6 feet  Stairs/Curb  Stairs?: No(safety concerns)      Activity Tolerance  Activity Tolerance: Patient Tolerated treatment well    Exercises  Hamstring Sets: ytb x20   Hip Flexion: x10x2 seated dls  Hip Abduction: ytb x10x2 ; add with ball and abd bracing x10x2  Knee Long Arc Quad: x10x2 with 3 sec hold  Ankle Pumps: inv/ev/df/pf isolated movement to improve ankle circles without compensation   Core Strengthening: x20 3sec hold abd bracing  Other exercises  Other exercises?: Yes  Other exercises 1: lat push down in chair x10x2      ASSESSMENT/COMMENTS:  Body structures, Functions, Activity limitations: Decreased functional mobility ; Decreased endurance;Decreased balance;Decreased strength  Assessment: Patient with improved ability to tolerate gait training this date. Encouragment and redirection needed throught session. PLAN OF CARE/Safety:   Safety Devices  Type of devices:  All fall risk precautions in place    Therapy Time:   Individual   Time In 1030   Time Out 1130   Minutes 60     Minutes:60      Transfer/Bed mobility trainin      Gait training:15          Therapeutic ex:25    Ben Crockett PTA, 19 at 11:25 AM

## 2019-04-06 NOTE — CONSULTS
Consult Note    Reason for Consult:  Medical management     Requesting Physician:  Kilo Webb DO    HISTORY OF PRESENT ILLNESS:    The patient is a 76 y.o. female admitted to rehab following complaint of bilateral lower extremity weakness and impaired mobility with orthopedic specialist recommendation for rehab. Pt denies any SOB, CP, N/V, fever , chills, constipation or diarrhea. Past Medical History:   Diagnosis Date    Anxiety 4/4/2019    Arthritis     Bronchitis     [facility] mellitus ([facility] [address]     Diabetic neuropathy associated with diabetes mellitus due to underlying condition ([facility] [address] 11/3/2017    Essential hypertension 4/4/2019    Hypercholesterolemia     Hypertension     Morbid obesity due to excess calories ([facility] [address] 4/4/2019    Thyroid disease     Wound infection 2018    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 without evidence of necrosis        Past Surgical History:   Procedure Laterality Date    APPENDECTOMY      HERNIA REPAIR      ORTHOPEDIC SURGERY Left 2017    broken left foot. surgeries x3    TONSILLECTOMY N/A        Prior to Admission medications    Medication Sig Start Date End Date Taking? Authorizing Provider   ciprofloxacin (CIPRO) 500 MG tablet Take 0.5 tablets by mouth 2 times daily for 3 days 4/4/19 4/7/19  Pavithra Chisholm MD   ibuprofen (IBU) 400 MG tablet Take 1 tablet by mouth every 6 hours as needed for Pain  Patient taking differently: Take 400 mg by mouth every 4-6 hours as needed for Pain  1/12/19   Anastacia Portillo PA-C   gabapentin (NEURONTIN) 300 MG capsule Take 300 mg by mouth 2 times daily.     Historical Provider, MD   valsartan-hydrochlorothiazide (DIOVAN-HCT) 320-25 MG per tablet Take 1 tablet by mouth daily    Historical Provider, MD   glipiZIDE (GLUCOTROL) 10 MG tablet Take 10 mg by mouth 2 times daily (before meals)    Historical Provider, MD   Lactobacillus (PROBIOTIC ACIDOPHILUS) TABS Take 1 tablet by mouth daily 9/26/17   Nixon Tai, DPM   metoprolol succinate (TOPROL XL) 100 MG extended release tablet Take 100 mg by mouth daily    Historical Provider, MD   Multiple Vitamins-Minerals (CENTRUM SILVER PO) Take 1 tablet by mouth daily    Historical Provider, MD   levothyroxine (SYNTHROID) 125 MCG tablet Take 125 mcg by mouth Daily    Historical Provider, MD   traZODone (DESYREL) 150 MG tablet Take 150 mg by mouth nightly    Historical Provider, MD   metFORMIN (GLUCOPHAGE) 1000 MG tablet Take 500 mg by mouth 2 times daily (with meals)     Historical Provider, MD   ALPRAZolam (XANAX) 0.5 MG tablet Take 0.5 mg by mouth three times daily    Historical Provider, MD   insulin glargine (TOUJEO SOLOSTAR) 300 UNIT/ML injection pen Inject 34 Units into the skin nightly     Historical Provider, MD       Scheduled Meds:   ALPRAZolam  0.25 mg Oral TID    cyanocobalamin  1,000 mcg Intramuscular Weekly    coenzyme Q10  100 mg Oral BID AC    glipiZIDE  10 mg Oral BID AC    insulin glargine  27 Units Subcutaneous Nightly    insulin lispro  0-12 Units Subcutaneous TID WC    insulin lispro  0-6 Units Subcutaneous Nightly    lactobacillus acidophilus  1 tablet Oral Daily    levothyroxine  125 mcg Oral Daily    metoprolol succinate  100 mg Oral Daily    traZODone  150 mg Oral Nightly    lidocaine  3 patch Transdermal Daily    analgesic ointment   Topical TID    miconazole   Topical BID    nitrofurantoin (macrocrystal-monohydrate)  100 mg Oral BID AC     Continuous Infusions:  PRN Meds:hydrALAZINE, acetaminophen, HYDROcodone 5 mg - acetaminophen, ammonium lactate    Allergies   Allergen Reactions    Sulfa Antibiotics Rash       Social History     Socioeconomic History    Marital status:      Spouse name: geo amador    Number of children: 2    Years of education: 12    Highest education level: Not on file   Occupational History    Occupation: retired   Social Needs    Financial resource strain: Not on file    Food insecurity:     Worry: Not on file     Inability: Not on file    Transportation needs:     Medical: Not on file     Non-medical: Not on file   Tobacco Use    Smoking status: Never Smoker    Smokeless tobacco: Never Used   Substance and Sexual Activity    Alcohol use: No    Drug use: No    Sexual activity: Not Currently   Lifestyle    Physical activity:     Days per week: Not on file     Minutes per session: Not on file    Stress: Not on file   Relationships    Social connections:     Talks on phone: Not on file     Gets together: Not on file     Attends [employer] service: Not on file     Active member of club or organization: Not on file     Attends meetings of clubs or organizations: Not on file     Relationship status: Not on file    Intimate partner violence:     Fear of current or ex partner: Not on file     Emotionally abused: Not on file     Physically abused: Not on file     Forced sexual activity: Not on file   Other Topics Concern    Not on file   Social History Narrative         Lives With: Spouse    Type of Home: House Two level, Able to Live on Main level with bedroom/bathroom    Home Access: Stairs to enter with rails - Number of Steps: 3- Rails: Both    Bathroom Shower/Tub: Walk-in shower(Patient states she has not been using shower since fall in January 2019)    Bathroom Toilet: Standard    Bathroom Equipment: 3-in-1 commode(Reports no room for a shower chair)    Home Equipment: 4 wheeled walker    ADL Assistance: Needs assistance    Bath: (Patient states  assists to wash feet)    Dressing: (Patient reports  provides assistance for socks and shoes and to help pull pants over hips)    Homemaking Assistance: Needs assistance( performs)    Ambulation Assistance: Needs assistance(Rollator)    Transfer Assistance: Needs assistance(Spouse assists with transfers due to increasing weakness)    Active : No    Additional Comments: has been minimally ambulatory for

## 2019-04-07 LAB
GLUCOSE BLD-MCNC: 109 MG/DL (ref 60–115)
GLUCOSE BLD-MCNC: 114 MG/DL (ref 60–115)
GLUCOSE BLD-MCNC: 130 MG/DL (ref 60–115)
GLUCOSE BLD-MCNC: 219 MG/DL (ref 60–115)
PERFORMED ON: ABNORMAL
PERFORMED ON: ABNORMAL
PERFORMED ON: NORMAL
PERFORMED ON: NORMAL

## 2019-04-07 PROCEDURE — 6370000000 HC RX 637 (ALT 250 FOR IP): Performed by: PHYSICAL MEDICINE & REHABILITATION

## 2019-04-07 PROCEDURE — 6370000000 HC RX 637 (ALT 250 FOR IP): Performed by: INTERNAL MEDICINE

## 2019-04-07 PROCEDURE — 1180000000 HC REHAB R&B

## 2019-04-07 PROCEDURE — 97535 SELF CARE MNGMENT TRAINING: CPT

## 2019-04-07 PROCEDURE — 99232 SBSQ HOSP IP/OBS MODERATE 35: CPT | Performed by: PHYSICAL MEDICINE & REHABILITATION

## 2019-04-07 RX ADMIN — GLIPIZIDE 10 MG: 5 TABLET ORAL at 17:33

## 2019-04-07 RX ADMIN — HYDROCODONE BITARTRATE AND ACETAMINOPHEN 1 TABLET: 5; 325 TABLET ORAL at 23:12

## 2019-04-07 RX ADMIN — MICONAZOLE NITRATE: 20 POWDER TOPICAL at 09:08

## 2019-04-07 RX ADMIN — Medication 100 MG: at 17:39

## 2019-04-07 RX ADMIN — MICONAZOLE NITRATE: 20 POWDER TOPICAL at 21:53

## 2019-04-07 RX ADMIN — NITROFURANTOIN MONOHYDRATE/MACROCRYSTALLINE 100 MG: 25; 75 CAPSULE ORAL at 05:26

## 2019-04-07 RX ADMIN — ACETAMINOPHEN 650 MG: 325 TABLET ORAL at 12:32

## 2019-04-07 RX ADMIN — ALPRAZOLAM 0.25 MG: 0.25 TABLET ORAL at 13:27

## 2019-04-07 RX ADMIN — NITROFURANTOIN MONOHYDRATE/MACROCRYSTALLINE 100 MG: 25; 75 CAPSULE ORAL at 17:39

## 2019-04-07 RX ADMIN — ANALGESIC BALM: 1.74; 4.06 OINTMENT TOPICAL at 17:47

## 2019-04-07 RX ADMIN — ANALGESIC BALM: 1.74; 4.06 OINTMENT TOPICAL at 09:08

## 2019-04-07 RX ADMIN — INSULIN GLARGINE 27 UNITS: 100 INJECTION, SOLUTION SUBCUTANEOUS at 21:41

## 2019-04-07 RX ADMIN — Medication 100 MG: at 09:06

## 2019-04-07 RX ADMIN — LEVOTHYROXINE SODIUM 125 MCG: 125 TABLET ORAL at 05:26

## 2019-04-07 RX ADMIN — ACETAMINOPHEN 650 MG: 325 TABLET ORAL at 17:33

## 2019-04-07 RX ADMIN — METOPROLOL SUCCINATE 100 MG: 100 TABLET, EXTENDED RELEASE ORAL at 09:06

## 2019-04-07 RX ADMIN — GLIPIZIDE 10 MG: 5 TABLET ORAL at 09:10

## 2019-04-07 RX ADMIN — HYDROCODONE BITARTRATE AND ACETAMINOPHEN 1 TABLET: 5; 325 TABLET ORAL at 09:07

## 2019-04-07 RX ADMIN — LACTOBACILLUS TAB 1 TABLET: TAB at 09:06

## 2019-04-07 RX ADMIN — INSULIN LISPRO 4 UNITS: 100 INJECTION, SOLUTION INTRAVENOUS; SUBCUTANEOUS at 12:33

## 2019-04-07 RX ADMIN — ACETAMINOPHEN 650 MG: 325 TABLET ORAL at 21:31

## 2019-04-07 RX ADMIN — TRAZODONE HYDROCHLORIDE 150 MG: 150 TABLET ORAL at 21:32

## 2019-04-07 RX ADMIN — ANALGESIC BALM: 1.74; 4.06 OINTMENT TOPICAL at 21:26

## 2019-04-07 RX ADMIN — ACETAMINOPHEN 650 MG: 325 TABLET ORAL at 05:26

## 2019-04-07 RX ADMIN — ALPRAZOLAM 0.25 MG: 0.25 TABLET ORAL at 09:06

## 2019-04-07 RX ADMIN — ALPRAZOLAM 0.25 MG: 0.25 TABLET ORAL at 21:32

## 2019-04-07 ASSESSMENT — PAIN DESCRIPTION - ORIENTATION: ORIENTATION: LEFT

## 2019-04-07 ASSESSMENT — PAIN SCALES - GENERAL
PAINLEVEL_OUTOF10: 3
PAINLEVEL_OUTOF10: 10
PAINLEVEL_OUTOF10: 0
PAINLEVEL_OUTOF10: 8
PAINLEVEL_OUTOF10: 8
PAINLEVEL_OUTOF10: 9
PAINLEVEL_OUTOF10: 5
PAINLEVEL_OUTOF10: 10

## 2019-04-07 ASSESSMENT — PAIN DESCRIPTION - DESCRIPTORS: DESCRIPTORS: SORE

## 2019-04-07 ASSESSMENT — PAIN DESCRIPTION - LOCATION: LOCATION: SHOULDER

## 2019-04-07 ASSESSMENT — PAIN DESCRIPTION - PAIN TYPE: TYPE: ACUTE PAIN

## 2019-04-07 NOTE — PROGRESS NOTES
Physical Therapy Rehab Treatment Note  [facility]  [facility]       NAME: Joceline Matos  : 1950 (78 y.o.)  MRN: [id number]  CODE STATUS: DNR-CCA    Date of Service: 2019  Chart Reviewed: Yes    Restrictions:  Restrictions/Precautions: Fall Risk       SUBJECTIVE: Subjective: Pt agreeable for make up time. \"I'm doing better the two days I've been here\"  Pain Screening  Patient Currently in Pain: Yes       Post Treatment Pain Screening:  Pain Assessment  Pain Level: 3  Pain Type: Acute pain  Pain Location: Shoulder  Pain Orientation: Left  Pain Descriptors: Sore    OBJECTIVE:   Overall Orientation Status: Within Functional Limits                  Bed mobility  Rolling to Right: Stand by assistance  Supine to Sit: Stand by assistance  Comment: HOB elevated; use of HR    Transfers  Sit to Stand: Contact guard assistance;Minimal Assistance  Stand to sit: Contact guard assistance  Lateral Transfers: Contact guard assistance;Minimal Assistance  Comment: VCs for anterior weightshifting; 2 attempts before completing - hinged Rt knee brace and Lt knee brace donned                        ASSESSMENT/COMMENTS:  Post pain: Lt shoulder; sore; 5/10; declined intervention  Body structures, Functions, Activity limitations: Decreased functional mobility ; Decreased endurance;Decreased balance;Decreased strength  Assessment: Tx focused on transfers. Encouraged pt to perform to abilities first before asking for assistance to complete transfers. Prognosis: Good    PLAN OF CARE/Safety:   Safety Devices  Type of devices: All fall risk precautions in place      Therapy Time:   Individual   Time In 1450   Time Out 1505   Minutes 15     Missed treatment time made up from 19 d/t pt arriving to PT late.    Minutes:15      Transfer/Bed mobility training: 15      Gait training:      Neuro re education:     Therapeutic ex:      Zaynab Hackett PTA, 19 at 3:12 PM    Electronically signed by Garo Tavarez Gisselle, PTA on 4/7/2019 at 3:13 PM

## 2019-04-07 NOTE — PROGRESS NOTES
Subjective: The patient complains of severe  acute  on chronic right shoulder pain and bilateral lower extremity weakness partially relieved by  rest, PT, OT and exacerbated by activity and ambulation. I am concerned about patients learned helplessness behaviors and poor motivation with her own care at times as reported by the nurses. I reviewed her labs she is nearly in renal failure with a GFR of only 30 likely secondary to her diabetes. She is tolerating the wean of her Xanax fairly well. The goal was to wean her completely off and speed on alternate medication which she is refusing. She would like to be weaned but she does not want the all turn it medications he just wants off of any psychiatric medications. Per the psychiatric evaluation it is recommended that she have gradual tapering off (for long term use of benzodiazepines, the recommendations are 20% of the dose/week). No other medications are recommended at this time, since the patient declines any. ROS x10: The patient also complains of severely impaired mobility and activities of daily living. Otherwise no new problems with vision, hearing, nose, mouth, throat, dermal, cardiovascular, GI, , pulmonary, musculoskeletal, psychiatric or neurological. See Rehab H&P on Rehab chart dated . Vital signs:  BP (!) 184/65   Pulse 70   Temp 97 °F (36.1 °C) (Oral)   Resp 16   Ht 5' 6.5\" (1.689 m)   Wt 275 lb (124.7 kg)   LMP  (LMP Unknown)   SpO2 92%   BMI 43.72 kg/m²   I/O:   PO/Intake:  fair PO intake, no problems observed or reported. Bowel/Bladder:  continent, no problems noted. General:  Patient is well developed, adequately nourished, non-obese and     well kempt. HEENT:    PERRLA, hearing intact to loud voice, external inspection of ear     and nose benign. Inspection of lips, tongue and gums benign  Musculoskeletal: No significant change in strength or tone. All joints stable.       Inspection and palpation of digits and nails show no clubbing,       cyanosis or inflammatory conditions. Neuro/Psychiatric: Affect: flat but pleasant. Alert and oriented to person, place and     situation. No significant change in deep tendon reflexes or     sensation  Lungs:  Diminished, CTA-B. Respiration effort is normal at rest.     Heart:   S1 = S2, RRR. No loud murmurs. Abdomen:  Obese, Soft, non-tender, no enlargement of liver or spleen. Extremities:  No significant lower extremity edema diffusely superficial tenderness. Decreased shoulder range of motion especially on the right  Skin:   Intact to general survey, no visualized or palpated problems.     Rehabilitation:  Physical therapy: FIMS:  Bed Mobility: Scooting: Stand by assistance    Transfers: Sit to Stand: Contact guard assistance  Stand to sit: Contact guard assistance  Bed to Chair: Minimal assistance, Contact guard assistance, Ambulation 1  Surface: carpet  Device: Rolling Walker  Other Apparatus: Wheelchair follow  Assistance: Contact guard assistance  Quality of Gait: improved jean claude, no buckling this pm, rigid trunk and elevated shoulders   Distance: 15 feet   Comments: needs to sit quickly with fatigue,      FIMS: Bed, Chair, Wheel Chair: 4 - Requires steadying assistance only <25% assist  and/or requires assist with one leg only  Walk: 1 - Total Assistance Walks < 50 feet OR requires two or more people OR patient performs < 25% of locomotion effort  Distance Walked: 15 feet  Stairs: 0 - Activity Does not Occur ( 0 only for the admission assessment), PT Equipment Recommendations  Other: TBD, Assessment: Patient progressed from Max assist sit to stand this am to cga this pm.     Occupational therapy: FIMS:  Eatin - Patient feeds self  Groomin - Requires setup/cues to do all tasks  Bathing: 3 - Able to bathe 5-7 areas  Dressing-Upper: 5 - Requires setup/supervision/cues and/or requires assist with presthesis/brace only  Dressing-Lower: 1 - Total assist with lower body dressing  Toiletin - Total assist  Toilet Transfer: 1 - Requires > 75% assist getting on & off toilet  Tub Transfer: 0 - Activity does not occur(new admit)  Shower Transfer: 1 - Total assistance, pt. expends less than 25% effort,  , Assessment: Pt is 76 y.o female with above mentioned deficits impacting ability to complete ADL's IND'ly and safely. Pt would benefit from skilled OT to increase level of functioning.      Speech therapy: FIMS: Comprehension: 6 - Complex ideas 90% or device (hearing aid/glasses)  Expression: 7 - Patient expresses complex ideas/needs  Social Interaction: 5 - Patient is appropriate with supervision/cues  Problem Solvin - Patient able to solve simple/routine tasks  Memory: 5 - Patient requires prompting with stress/unfamiliar situations      Lab/X-ray studies reviewed, analyzed and discussed with patient and staff:   Recent Results (from the past 24 hour(s))   POCT Glucose    Collection Time: 19 12:03 PM   Result Value Ref Range    POC Glucose 185 (H) 60 - 115 mg/dl    Performed on ACCU-CHEK    Basic Metabolic Panel    Collection Time: 19  2:42 PM   Result Value Ref Range    Sodium 134 (L) 135 - 144 mEq/L    Potassium 4.7 3.4 - 4.9 mEq/L    Chloride 94 (L) 95 - 107 mEq/L    CO2 29 20 - 31 mEq/L    Anion Gap 11 9 - 15 mEq/L    Glucose 172 (H) 70 - 99 mg/dL    BUN 17 8 - 23 mg/dL    CREATININE 1.66 (H) 0.50 - 0.90 mg/dL    GFR Non-African American 30.6 (L) >60    GFR  37.1 (L) >60    Calcium 9.1 8.5 - 9.9 mg/dL   POCT Glucose    Collection Time: 19  4:40 PM   Result Value Ref Range    POC Glucose 141 (H) 60 - 115 mg/dl    Performed on ACCU-CHEK    POCT Glucose    Collection Time: 19  9:56 PM   Result Value Ref Range    POC Glucose 174 (H) 60 - 115 mg/dl    Performed on ACCU-CHEK    POCT Glucose    Collection Time: 19  6:41 AM   Result Value Ref Range    POC Glucose 109 60 - 115 mg/dl    Performed on ACCU-CHEK        Ct Head Wo   4/4/2019  Impression: Mild cerebral atrophy. No acute findings. Ct Lumbar Spine Wo   4/4/2019   NO EVIDENCE OF FRACTURE. MINIMAL ANTEROLISTHESIS L2-3. DEGENERATIVE CHANGES, SEE INDIVIDUAL LEVELS ABOVE. LUMBAR CANAL STENOSIS L3-4. CONSIDER CORRELATION WITH MRI. Previous extensive, complex labs, notes and diagnostics reviewed and analyzed. ALLERGIES:    Allergies as of 04/04/2019 - Review Complete 04/04/2019   Allergen Reaction Noted    Sulfa antibiotics Rash 09/22/2017      (please also verify by checking [facility])       Complex Physical Medicine & Rehab Issues Assess & Plan:   1. Severe abnormality of gait and mobility and impaired self-care and ADL's secondary to progressive  flareup of osteoarthritis . Functional and medical status reassessed regarding patients ability to participate in therapies and patient found to be able to participate in acute intensive comprehensive inpatient rehabilitation program including PT/OT to improve balance, ambulation, ADLs, and to improve the P/AROM. Therapeutic modifications regarding activities in therapies, place, amount of time per day and intensity of therapy made daily. In bed therapies or bedside therapies prn.   2. Bowel opiate-related constipation and Bladder dysfunction:  frequent toileting, ambulate to bathroom with assistance, check post void residuals. Check for C.difficile x1 if >2 loose stools in 24 hours, continue bowel & bladder program.  Monitor bowel and bladder function. Lactinex 2 PO every AC.   MOM prn, Brown Bomb prn, Glycerin suppository prn, enema prn.  3. Severe neuropathic and severe right shoulder pain generalized OA pain: reassess pain every shift and prior to and after each therapy session, give prn Tylenol and  titrate opiates I was using lowest effective dose, modalities prn in therapy, Lidoderm, K-pad prn.   4. Skin healing and breakdown risk:  continue pressure relief program.  Daily skin exams and reports from

## 2019-04-08 LAB
GLUCOSE BLD-MCNC: 175 MG/DL (ref 60–115)
GLUCOSE BLD-MCNC: 183 MG/DL (ref 60–115)
GLUCOSE BLD-MCNC: 92 MG/DL (ref 60–115)
PERFORMED ON: ABNORMAL
PERFORMED ON: ABNORMAL
PERFORMED ON: NORMAL

## 2019-04-08 PROCEDURE — 99233 SBSQ HOSP IP/OBS HIGH 50: CPT | Performed by: PHYSICAL MEDICINE & REHABILITATION

## 2019-04-08 PROCEDURE — 6370000000 HC RX 637 (ALT 250 FOR IP): Performed by: INTERNAL MEDICINE

## 2019-04-08 PROCEDURE — 6370000000 HC RX 637 (ALT 250 FOR IP): Performed by: PHYSICAL MEDICINE & REHABILITATION

## 2019-04-08 PROCEDURE — 97530 THERAPEUTIC ACTIVITIES: CPT

## 2019-04-08 PROCEDURE — 97535 SELF CARE MNGMENT TRAINING: CPT

## 2019-04-08 PROCEDURE — 97116 GAIT TRAINING THERAPY: CPT

## 2019-04-08 PROCEDURE — 97110 THERAPEUTIC EXERCISES: CPT

## 2019-04-08 PROCEDURE — 1180000000 HC REHAB R&B

## 2019-04-08 RX ADMIN — LEVOTHYROXINE SODIUM 125 MCG: 125 TABLET ORAL at 06:04

## 2019-04-08 RX ADMIN — INSULIN GLARGINE 27 UNITS: 100 INJECTION, SOLUTION SUBCUTANEOUS at 21:51

## 2019-04-08 RX ADMIN — HYDROCODONE BITARTRATE AND ACETAMINOPHEN 1 TABLET: 5; 325 TABLET ORAL at 20:14

## 2019-04-08 RX ADMIN — HYDROCODONE BITARTRATE AND ACETAMINOPHEN 1 TABLET: 5; 325 TABLET ORAL at 14:35

## 2019-04-08 RX ADMIN — INSULIN LISPRO 2 UNITS: 100 INJECTION, SOLUTION INTRAVENOUS; SUBCUTANEOUS at 16:24

## 2019-04-08 RX ADMIN — TRAZODONE HYDROCHLORIDE 150 MG: 150 TABLET ORAL at 20:14

## 2019-04-08 RX ADMIN — ACETAMINOPHEN 650 MG: 325 TABLET ORAL at 11:03

## 2019-04-08 RX ADMIN — Medication 100 MG: at 12:12

## 2019-04-08 RX ADMIN — METOPROLOL SUCCINATE 100 MG: 100 TABLET, EXTENDED RELEASE ORAL at 09:09

## 2019-04-08 RX ADMIN — NITROFURANTOIN MONOHYDRATE/MACROCRYSTALLINE 100 MG: 25; 75 CAPSULE ORAL at 16:23

## 2019-04-08 RX ADMIN — GLIPIZIDE 10 MG: 5 TABLET ORAL at 16:23

## 2019-04-08 RX ADMIN — NITROFURANTOIN MONOHYDRATE/MACROCRYSTALLINE 100 MG: 25; 75 CAPSULE ORAL at 06:04

## 2019-04-08 RX ADMIN — ALPRAZOLAM 0.25 MG: 0.25 TABLET ORAL at 14:11

## 2019-04-08 RX ADMIN — Medication 100 MG: at 09:08

## 2019-04-08 RX ADMIN — MICONAZOLE NITRATE: 20 POWDER TOPICAL at 20:20

## 2019-04-08 RX ADMIN — GLIPIZIDE 10 MG: 5 TABLET ORAL at 06:05

## 2019-04-08 RX ADMIN — LACTOBACILLUS TAB 1 TABLET: TAB at 09:09

## 2019-04-08 RX ADMIN — ANALGESIC BALM: 1.74; 4.06 OINTMENT TOPICAL at 20:20

## 2019-04-08 RX ADMIN — HYDROCODONE BITARTRATE AND ACETAMINOPHEN 1 TABLET: 5; 325 TABLET ORAL at 06:05

## 2019-04-08 RX ADMIN — ALPRAZOLAM 0.25 MG: 0.25 TABLET ORAL at 09:09

## 2019-04-08 RX ADMIN — MICONAZOLE NITRATE: 20 POWDER TOPICAL at 09:10

## 2019-04-08 RX ADMIN — ALPRAZOLAM 0.25 MG: 0.25 TABLET ORAL at 20:14

## 2019-04-08 ASSESSMENT — PAIN DESCRIPTION - LOCATION
LOCATION: KNEE;ARM
LOCATION: ARM
LOCATION: ARM
LOCATION: KNEE;ARM

## 2019-04-08 ASSESSMENT — PAIN DESCRIPTION - ORIENTATION
ORIENTATION: LEFT
ORIENTATION: LEFT;UPPER
ORIENTATION: RIGHT;LEFT
ORIENTATION: RIGHT;LEFT

## 2019-04-08 ASSESSMENT — PAIN DESCRIPTION - DESCRIPTORS
DESCRIPTORS: SORE
DESCRIPTORS: ACHING;SORE
DESCRIPTORS: SPASM
DESCRIPTORS: SORE

## 2019-04-08 ASSESSMENT — PAIN SCALES - GENERAL
PAINLEVEL_OUTOF10: 8
PAINLEVEL_OUTOF10: 8
PAINLEVEL_OUTOF10: 6
PAINLEVEL_OUTOF10: 8
PAINLEVEL_OUTOF10: 6
PAINLEVEL_OUTOF10: 8
PAINLEVEL_OUTOF10: 9
PAINLEVEL_OUTOF10: 5

## 2019-04-08 NOTE — PROGRESS NOTES
murmurs. Abdomen:  Obese, Soft, non-tender, no enlargement of liver or spleen. Extremities:  No significant lower extremity edema diffusely superficial tenderness. Decreased shoulder range of motion especially on the right  Skin:   Intact to general survey, no visualized or palpated problems. Rehabilitation:  Physical therapy: FIMS:  Bed Mobility: Scooting: Stand by assistance    Transfers: Sit to Stand: Contact guard assistance, Minimal Assistance  Stand to sit: Contact guard assistance  Bed to Chair: Minimal assistance, Contact guard assistance, Ambulation 1  Surface: carpet  Device: Rolling Walker  Other Apparatus: Wheelchair follow  Assistance: Contact guard assistance  Quality of Gait: improved jean claude, no buckling this pm, rigid trunk and elevated shoulders   Distance: 15 feet   Comments: needs to sit quickly with fatigue,      FIMS: Bed, Chair, Wheel Chair: 4 - Requires steadying assistance only <25% assist  and/or requires assist with one leg only  Walk: 1 - Total Assistance Walks < 50 feet OR requires two or more people OR patient performs < 25% of locomotion effort  Distance Walked: 15 feet  Stairs: 0 - Activity Does not Occur ( 0 only for the admission assessment), PT Equipment Recommendations  Other: TBD, Assessment: Tx focused on transfers. Encouraged pt to perform to abilities first before asking for assistance to complete transfers.      Occupational therapy: FIMS:  Eatin - Patient feeds self  Groomin - Requires setup/cues to do all tasks  Bathing: 3 - Able to bathe 5-7 areas  Dressing-Upper: 5 - Requires setup/supervision/cues and/or requires assist with presthesis/brace only  Dressing-Lower: 1 - Total assist with lower body dressing  Toiletin - Total assist  Toilet Transfer: 1 - Requires > 75% assist getting on & off toilet  Tub Transfer: 0 - Activity does not occur(new admit)  Shower Transfer: 1 - Total assistance, pt. expends less than 25% effort,  , Assessment: Pt is 76 y.o female with above mentioned deficits impacting ability to complete ADL's IND'ly and safely. Pt would benefit from skilled OT to increase level of functioning. Speech therapy: FIMS: Comprehension: 6 - Complex ideas 90% or device (hearing aid/glasses)  Expression: 7 - Patient expresses complex ideas/needs  Social Interaction: 5 - Patient is appropriate with supervision/cues  Problem Solvin - Patient able to solve simple/routine tasks  Memory: 5 - Patient requires prompting with stress/unfamiliar situations      Lab/X-ray studies reviewed, analyzed and discussed with patient and staff:   Recent Results (from the past 24 hour(s))   POCT Glucose    Collection Time: 19 12:02 PM   Result Value Ref Range    POC Glucose 219 (H) 60 - 115 mg/dl    Performed on ACCU-CHEK    POCT Glucose    Collection Time: 19  4:43 PM   Result Value Ref Range    POC Glucose 114 60 - 115 mg/dl    Performed on ACCU-CHEK    POCT Glucose    Collection Time: 19  9:37 PM   Result Value Ref Range    POC Glucose 130 (H) 60 - 115 mg/dl    Performed on ACCU-CHEK    POCT Glucose    Collection Time: 19  6:00 AM   Result Value Ref Range    POC Glucose 92 60 - 115 mg/dl    Performed on ACCU-CHEK        Ct Head Wo   2019  Impression: Mild cerebral atrophy. No acute findings. Ct Lumbar Spine Wo   2019   NO EVIDENCE OF FRACTURE. MINIMAL ANTEROLISTHESIS L2-3. DEGENERATIVE CHANGES, SEE INDIVIDUAL LEVELS ABOVE. LUMBAR CANAL STENOSIS L3-4. CONSIDER CORRELATION WITH MRI. Previous extensive, complex labs, notes and diagnostics reviewed and analyzed. ALLERGIES:    Allergies as of 2019 - Review Complete 2019   Allergen Reaction Noted    Sulfa antibiotics Rash 2017      (please also verify by checking [facility])     Today I evaluated this patient for periodic reassessment of medical and functional status.   The patient was discussed in detail at the treatment team meeting focusing on current medical issues, progress in therapies, social issues, psychological issues, barriers to progress and strategies to address these barriers, and discharge planning. See the hand written addendum to rehab progress note. The patient continues to be high risk for future disability and their medical and rehabilitation prognosis continue to be good and therefore, we will continue the patient's rehabilitation course as planned. The patient's tentative discharge date was set. Patient and family education was discussed. The patient was made aware of the team discussion regarding their progress. Complex Physical Medicine & Rehab Issues Assess & Plan:   1. Severe abnormality of gait and mobility and impaired self-care and ADL's secondary to progressive  flareup of osteoarthritis . Functional and medical status reassessed regarding patients ability to participate in therapies and patient found to be able to participate in acute intensive comprehensive inpatient rehabilitation program including PT/OT to improve balance, ambulation, ADLs, and to improve the P/AROM. Therapeutic modifications regarding activities in therapies, place, amount of time per day and intensity of therapy made daily. In bed therapies or bedside therapies prn.   2. Bowel opiate-related constipation and Bladder dysfunction:  frequent toileting, ambulate to bathroom with assistance, check post void residuals. Check for C.difficile x1 if >2 loose stools in 24 hours, continue bowel & bladder program.  Monitor bowel and bladder function. Lactinex 2 PO every AC.   MOM prn, Brown Bomb prn, Glycerin suppository prn, enema prn.  3. Severe neuropathic and severe right shoulder pain generalized OA pain: reassess pain every shift and prior to and after each therapy session, give prn Tylenol and  titrate opiates I was using lowest effective dose, modalities prn in therapy, Lidoderm, K-pad prn.   4. Skin healing and breakdown risk:  continue pressure relief program.  Daily skin exams and reports from nursing. 5. Severe Fatigue due to nutritional and hydration deficiency:  continue to monitor I&Os, calorie counts prn, dietary consult prn. And vitamin B12 vitamin D and CoQ10  6. Acute episodic insomnia with situational adjustment disorder: Slow titration off of Xanax. prn Ambien, monitor for day time sedation. 7. Falls risk elevated:  patient to use call light to get nursing assistance to get up, bed and chair alarm. 8. Elevated DVT risk: progressive activities in PT, continue prophylaxis SANTIAGO hose, elevation  . 9. Complex discharge planning:  Weekly team meeting every Monday to assess progress towards goals, discuss and address social, psychological and medical comorbidities and to address difficulties they may be having progressing in therapy. Patient and family education is in progress. The patient is to follow-up with their family physician after discharge. Complex Active General Medical Issues that complicate care Assess & Plan:    1. Peripheral angiopathy,   Hx of Chronic ulcer of left foot-0 pressure to her heels bilaterally, at E T-max, add podiatry consult  2. Diabetic neuropathy associated with diabetes mellitus due to underlying condition -low carb diet, add Glucotrol, Lantus, titrate Humalog low carbohydrate diet add diabetic education  3. Ataxia-focus on balance in physical therapy  4. Hypothyroid-monitor symptoms, titrate Synthroid  5. Morbid obesity due to excess calories -add diabetic and obesity related dietary counseling, restrict carbohydrates and diet  6. Essential hypertension, veer peripheral artery disease,  HLD (hyperlipidemia)-vital signs every shift, dose and titrate cardiac medications accordingly to include Toprol, recheck CBC BMP and consult hospitalist for backup medical  7.  Severe active chronic Anxiety and depression with learned helplessness behaviors-emotional support daily, add rec therapy  rehabilitation psychology and trazodone consider SSRI-wean off of Xanax, consult rehabilitation psychology and health psychiatry regarding alternative measures for depression and anxiety, add vitamin B12 shot, gradual tapering off (for long term use of benzodiazepines, the recommendations are 20% of the dose/week). No other medications are recommended at this time, since the patient declines any. 8.   Bronchitis-pulse ox checks every shift, dose and titrate oxygen and aerosol treatments  9.   OA (osteoarthritis) with severe osteoarthritic and severe neuropathic pain-titrate Tylenol versus Norco, always use the lowest effective dose of medication, add Desyrel at night for neuropathic pain, add topical agents, check [facility] prescription monitoring database to review for overuse or abuse  10. Acute on chronic renal failure-recheck CBC BMP and limit toxic medications discontinue milk of magnesia  11. Acute UTI-check postvoid residuals, check urinary CNS, titrate Macrobid, monitor for urinary retention, improved peroneal care  12. Severe Yeast rash in her abdominal folds and skin folds-add Mycostatin powder  13. High risk opiate use-with active chronic benzodiazepine use check [facility] physician monitoring database, use nonnarcotic measures for pain when appropriate. He is lowest effective dose with informed consent-gradual tapering off (for long term use of benzodiazepines, the recommendations are 20% of the dose/week). No other medications are recommended at this time, since the patient declines any.        Dora Allen D.O., PM&R     Attending    [address] [facility]

## 2019-04-08 NOTE — PROGRESS NOTES
Occupational Therapy  Facility/Department: [facility]  Daily Treatment Note  NAME: Magno Foreman  : 1950  MRN: [id number]    Date of Service: 2019    Discharge Recommendations:  Continue to assess pending progress       Assessment      Activity Tolerance  Activity Tolerance: Patient Tolerated treatment well  Safety Devices  Safety Devices in place: Yes  Type of devices: All fall risk precautions in place         Patient Diagnosis(es): There were no encounter diagnoses. has a past medical history of Anxiety, Arthritis, Bronchitis, Diabetes mellitus ([facility] [address] Diabetic neuropathy associated with diabetes mellitus due to underlying condition [facility]), Essential hypertension, Hypercholesterolemia, Hypertension, Morbid obesity due to excess calories ([facility] [address] Thyroid disease, and Wound infection. has a past surgical history that includes orthopedic surgery (Left, 2017); hernia repair; Appendectomy; and Tonsillectomy (N/A). Restrictions  Restrictions/Precautions  Restrictions/Precautions: Fall Risk  Subjective   General  Chart Reviewed: Yes  Patient assessed for rehabilitation services?: Yes  Response to previous treatment: Patient reporting fatigue but able to participate  Family / Caregiver Present: No  Referring Practitioner: Dr. Fawn Jones   Diagnosis: Impaired mobility 2° to flare up of O. A. Pain Assessment  Pain Level: 6  Pain Location: Arm  Pain Orientation: Left;Upper  Pain Descriptors: Spasm   Orientation     Objective      ADL  LE Dressing: Other (Comment)   Pt educated in AE for lower body dressing. Pt able to doff B socks with dressing stick with MIN difficulty req'ing demo and MIN v/c's. Pt able to  B socks from floor with reacher with MIN difficulty. Pt able to thread B socks onto sock aid with MIN difficulty req'ing demo and MIN v/c's. Pt able to don B socks with sock aid with MIN difficulty req'ing demo and MOD v/c's.  Pt able to doff B shoes with dressing stick and reacher with MAX difficulty req'ing demo and MAX v/c's. Pt able to don B shoes with long handled shoe horn with MAX difficulty req'ing demo and MAX v/c's. Instrumental ADL's  Instrumental ADLs: Yes  Health Management  Health Management Level: Wheelchair  Health Management Level of Assistance: Minimal assistance   Pt completed medication management simulation utilizing 7 provided medication bottles with written instruction to place a total of 74 beads into the provided weekly medication container. Pt req'ed 0 A to open medication bottles. Pt with 0 difficulty opening container boxes. Pt placed a total of 74 beads into container with MIN v/c's and a total of 60 being correct. Pt with MIN difficulty manipulating beads. Pt able to sort beads back into correct bottles with MIN errors. Upper Extremity Function  UE Strengthing:  Pt engaged in B UE ROM/strengthening and B FM coordination to increase I with ADL's, IADL's and transfers. Pt donned B 1 # wrist wts, able to reach in lateral planes with MIN difficulty and in lower vertical planes with MOD difficulty. Pt able to  ribbons from container with 0 difficulty and plastic eggs from container with 0 difficulty. Pt able to thread ribbon through loop of plastic eggs with 0 difficulty. Pt able to tie bows with ribbons with MIN difficulty onto horizontal dowel rods in various vertical planes with plastic eggs hanging on ribbons. Pt req'ed frequent RB's resting against back of w/c 2° decreased core strength.                Plan   Plan  Times per week: 5-7x/ wk   Plan weeks: 2-3  Current Treatment Recommendations: Strengthening, Balance Training, Functional Mobility Training, Endurance Training, Wheelchair Mobility Training, Safety Education & Training, Pain Management, Patient/Caregiver Education & Training, Equipment Evaluation, Education, & procurement, Self-Care / ADL, Home Management Training  Plan Comment: Con't OT POC  G-Code     OutComes Score

## 2019-04-08 NOTE — PROGRESS NOTES
Occupational Therapy  Fac[facility]/Department: [facility]  Daily Treatment Note  NAME: Seb Bazan  : 1950  MRN: [id number]    Date of Service: 2019    Discharge Recommendations:  Continue to assess pending progress       Assessment      Activity Tolerance  Activity Tolerance: Patient Tolerated treatment well  Safety Devices  Safety Devices in place: Yes  Type of devices: All fall risk precautions in place         Patient Diagnosis(es): There were no encounter diagnoses. has a past medical history of Anxiety, Arthritis, Bronchitis, Diabetes mellitus ([facility] [address] Diabetic neuropathy associated with diabetes mellitus due to underlying condition [facility]), Essential hypertension, Hypercholesterolemia, Hypertension, Morbid obesity due to excess calories ([facility] [address] Thyroid disease, and Wound infection. has a past surgical history that includes orthopedic surgery (Left, 2017); hernia repair; Appendectomy; and Tonsillectomy (N/A). Restrictions  Restrictions/Precautions  Restrictions/Precautions: Fall Risk  Subjective   General  Chart Reviewed: Yes  Patient assessed for rehabilitation services?: Yes  Response to previous treatment: Patient reporting fatigue but able to participate  Family / Caregiver Present: No  Referring Practitioner: Dr. Chelsey Parker   Diagnosis: Impaired mobility 2° to flare up of O. A. Pain Assessment  Pain Level: 8  Pain Location: Knee;Arm  Pain Orientation: Right;Left  Pain Descriptors: Aching; Sore   Orientation     Objective             Balance  Standing Balance: Contact guard assistance  Standing Balance  Time: able to stand up to 2 min 30 sec with 1-2 UE support X multiple trials, 0 LOB  Activity: placing and removing 100 lg pegs in pegboard  Sit to stand: Minimal assistance  Stand to sit: Contact guard assistance  Comment: v/c's for reaching back when completing stand -> sit     Transfers  Sit to stand: Minimal assistance  Stand to sit: Contact guard assistance               Plan   Plan  Times per week: 5-7x/ wk   Plan weeks: 2-3  Current Treatment Recommendations: Strengthening, Balance Training, Functional Mobility Training, Endurance Training, Wheelchair Mobility Training, Safety Education & Training, Pain Management, Patient/Caregiver Education & Training, Equipment Evaluation, Education, & procurement, Self-Care / ADL, Home Management Training  Plan Comment: Con't OT POC  G-Code     OutComes Score                                                  AM-PAC Score             Goals  Patient Goals   Patient goals : \"To go home. \"        Therapy Time   Individual Concurrent Group Co-treatment   Time In 1030         Time Out 1100         Minutes 30               Electronically signed by Valeria Patel on 4/8/19 at 12:57 PM    BOB Naylor

## 2019-04-08 NOTE — PROGRESS NOTES
Physical Therapy Rehab Treatment Note  Facility/Department: [facility]  Room: R259R259-01       NAME: Chika Wallace  : 1950 (80 y.o.)  MRN: [id number]  CODE STATUS: DNR-CCA    Date of Service: 2019  Chart Reviewed: Yes    Restrictions:  Restrictions/Precautions: Fall Risk       SUBJECTIVE: Subjective: Pt states her room is way to hot. States she sat up in the chair for 2.5 hrs yesterday and that was way to long. States she had in brace on in bed and now has increased pain. Pt states she just had pain meds. Pain Screening  Patient Currently in Pain: Yes  Pre Treatment Pain Screening  Pain at present: 10  Intervention List: Patient able to continue with treatment;Patient declined any intervention    Post Treatment Pain Screening:  Pain Assessment  Pain Level: 8  Pain Location: Knee;Arm(R knee, L arm)  Pain Orientation: Right;Left  Pain Descriptors: Sore  Non-Pharmaceutical Pain Intervention(s): Cold applied;Repositioned    OBJECTIVE:              Bed mobility  Rolling to Left: Stand by assistance  Rolling to Right: Stand by assistance  Supine to Sit: Stand by assistance  Sit to Supine: Stand by assistance  Comment: Increased time and effort to complete. B braces removed after supine and positioned in bed. Transfers  Sit to Stand: Contact guard assistance  Stand to sit: Contact guard assistance  Bed to Chair: Contact guard assistance(with [employer])  Comment: VCs for technique. Increased time and encouragemet to complete. Ambulation  Ambulation?: Yes  Ambulation 1  Surface: carpet  Device: Rolling Walker  Other Apparatus: Wheelchair follow(B knee braces)  Assistance: Contact guard assistance  Quality of Gait: R knee buckeling x1. VCs for gait sequence and [employer] management. Fatigued quickly. Distance: 20ft  Comments: sidestepping EOB with [employer] CGA             ASSESSMENT/COMMENTS:  Assessment: Pt required decreased assist with functional mobility. Able to amabulate increased distance.   Pt fatigues quickly. Requires encouragement and redirection to task. PLAN OF CARE/Safety:   Plan Comment: Cont per POC.     Therapy Time:   Individual   Time In 1100   Time Out 1130   Minutes 30     Minutes:      Transfer/Bed mobility training:15      Gait training:15      Neuro re education:0     Therapeutic ex:0      Flonnie Goldberg, PTA, 04/08/19 at 11:46 AM

## 2019-04-08 NOTE — CARE COORDINATION
Spoke with patient and explained role in the team. Patient questions answered appropriately. Explained discharge process. Patient stated understanding. Patient lives with spouse in a two level home but stays on the main level. Patient has children who are not local. Patient has a rollator and a cane. Patient prior had [facility] and OP. Patient stated that her spouse helps her with ADLs. Patient does not do any cooking or cleaning at this time. Electronically signed by Kimmie Mayfield RN on 4/8/2019 at 9:40 AM

## 2019-04-08 NOTE — PROGRESS NOTES
Physical Therapy Rehab Treatment Note  [facility]  [facility]       NAME: Ximena Dean  : 1950 (76 y.o.)  MRN: [id number]  CODE STATUS: DNR-CCA    Date of Service: 2019  Chart Reviewed: Yes    Restrictions:  Restrictions/Precautions: Fall Risk       SUBJECTIVE: Subjective: Pt states her orthopedic Dr told her to only wear the leg braces when she is up and when she walks. States she is to take them off all other times and this is what she has been doing at home. Pain Screening  Patient Currently in Pain: Yes  Pre Treatment Pain Screening  Pain at present: 6  Intervention List: Patient able to continue with treatment;Patient declined any intervention    Post Treatment Pain Screening:  Pain Assessment  Pain Assessment: 0-10  Pain Level: 6  Pain Location: Arm  Pain Orientation: Left  Pain Descriptors: Sore  Non-Pharmaceutical Pain Intervention(s): Cold applied;Repositioned    OBJECTIVE:              Bed mobility  Rolling to Left: Stand by assistance  Rolling to Right: Stand by assistance  Supine to Sit: Stand by assistance  Sit to Supine: Stand by assistance  Comment: Increased time and effort to complete. B braces removed after supine and positioned in bed. Transfers  Sit to Stand: Contact guard assistance  Stand to sit: Contact guard assistance  Bed to Chair: Contact guard assistance(with [employer])  Comment: VCs for technique. Increased time and encouragemet to complete. Ambulation  Ambulation?: Yes  Ambulation 1  Surface: carpet  Device: Rolling Walker  Other Apparatus: Wheelchair follow  Assistance: Contact guard assistance  Quality of Gait: VCs for gait sequence. Vcs for breathing technique. Heavy UE support through [employer].  VCs to decrease.     Distance: 20ft  Comments: sidestepping EOB with [employer] CGA             Exercises  Hip Abduction: YTB x20; add with ball x20  Knee Long Arc Quad: 2x10  Ankle Pumps: x20  Core Strengthening: seated abdominal and lat isometrics pushing

## 2019-04-08 NOTE — FLOWSHEET NOTE
Agree with the previous assessment. The patient does complain of generalized pain and has been medicated as ordered. No acute distress is noted. The patient does ring multiple times for varius things, and emotional support provided. We have allowed the patient to express her feelings, and continue to provide ongoing comfort measures.

## 2019-04-09 LAB
GLUCOSE BLD-MCNC: 112 MG/DL (ref 60–115)
GLUCOSE BLD-MCNC: 124 MG/DL (ref 60–115)
GLUCOSE BLD-MCNC: 192 MG/DL (ref 60–115)
HBA1C MFR BLD: 7 % (ref 4.8–5.9)
PERFORMED ON: ABNORMAL
PERFORMED ON: ABNORMAL
PERFORMED ON: NORMAL
T4 FREE: 1.88 NG/DL (ref 0.84–1.68)
TSH SERPL DL<=0.05 MIU/L-ACNC: 1.69 UIU/ML (ref 0.44–3.86)

## 2019-04-09 PROCEDURE — 83036 HEMOGLOBIN GLYCOSYLATED A1C: CPT

## 2019-04-09 PROCEDURE — 99232 SBSQ HOSP IP/OBS MODERATE 35: CPT | Performed by: PHYSICAL MEDICINE & REHABILITATION

## 2019-04-09 PROCEDURE — 97110 THERAPEUTIC EXERCISES: CPT

## 2019-04-09 PROCEDURE — 84443 ASSAY THYROID STIM HORMONE: CPT

## 2019-04-09 PROCEDURE — 84439 ASSAY OF FREE THYROXINE: CPT

## 2019-04-09 PROCEDURE — 94762 N-INVAS EAR/PLS OXIMTRY CONT: CPT

## 2019-04-09 PROCEDURE — 1180000000 HC REHAB R&B

## 2019-04-09 PROCEDURE — 97535 SELF CARE MNGMENT TRAINING: CPT

## 2019-04-09 PROCEDURE — 99222 1ST HOSP IP/OBS MODERATE 55: CPT | Performed by: INTERNAL MEDICINE

## 2019-04-09 PROCEDURE — 97116 GAIT TRAINING THERAPY: CPT

## 2019-04-09 PROCEDURE — 97112 NEUROMUSCULAR REEDUCATION: CPT

## 2019-04-09 PROCEDURE — 6370000000 HC RX 637 (ALT 250 FOR IP): Performed by: INTERNAL MEDICINE

## 2019-04-09 PROCEDURE — 36415 COLL VENOUS BLD VENIPUNCTURE: CPT

## 2019-04-09 PROCEDURE — 6370000000 HC RX 637 (ALT 250 FOR IP): Performed by: PHYSICAL MEDICINE & REHABILITATION

## 2019-04-09 RX ORDER — ALPRAZOLAM 0.25 MG/1
0.25 TABLET ORAL
Status: DISCONTINUED | OUTPATIENT
Start: 2019-04-09 | End: 2019-04-20 | Stop reason: HOSPADM

## 2019-04-09 RX ORDER — ALPRAZOLAM 0.5 MG/1
0.5 TABLET ORAL NIGHTLY
Status: DISCONTINUED | OUTPATIENT
Start: 2019-04-09 | End: 2019-04-18

## 2019-04-09 RX ORDER — INSULIN GLARGINE 100 [IU]/ML
20 INJECTION, SOLUTION SUBCUTANEOUS NIGHTLY
Status: DISCONTINUED | OUTPATIENT
Start: 2019-04-09 | End: 2019-04-10

## 2019-04-09 RX ADMIN — ANALGESIC BALM: 1.74; 4.06 OINTMENT TOPICAL at 08:45

## 2019-04-09 RX ADMIN — GLIPIZIDE 10 MG: 5 TABLET ORAL at 16:32

## 2019-04-09 RX ADMIN — ALPRAZOLAM 0.25 MG: 0.25 TABLET ORAL at 16:31

## 2019-04-09 RX ADMIN — METOPROLOL SUCCINATE 100 MG: 100 TABLET, EXTENDED RELEASE ORAL at 08:43

## 2019-04-09 RX ADMIN — GLIPIZIDE 10 MG: 5 TABLET ORAL at 08:42

## 2019-04-09 RX ADMIN — ACETAMINOPHEN 650 MG: 325 TABLET ORAL at 05:27

## 2019-04-09 RX ADMIN — HYDROCODONE BITARTRATE AND ACETAMINOPHEN 1 TABLET: 5; 325 TABLET ORAL at 22:05

## 2019-04-09 RX ADMIN — ANALGESIC BALM: 1.74; 4.06 OINTMENT TOPICAL at 22:11

## 2019-04-09 RX ADMIN — ALPRAZOLAM 0.25 MG: 0.25 TABLET ORAL at 08:42

## 2019-04-09 RX ADMIN — MICONAZOLE NITRATE: 20 POWDER TOPICAL at 08:45

## 2019-04-09 RX ADMIN — HYDROCODONE BITARTRATE AND ACETAMINOPHEN 1 TABLET: 5; 325 TABLET ORAL at 08:42

## 2019-04-09 RX ADMIN — Medication 100 MG: at 11:58

## 2019-04-09 RX ADMIN — TRAZODONE HYDROCHLORIDE 150 MG: 150 TABLET ORAL at 22:01

## 2019-04-09 RX ADMIN — HYDROCODONE BITARTRATE AND ACETAMINOPHEN 1 TABLET: 5; 325 TABLET ORAL at 00:47

## 2019-04-09 RX ADMIN — INSULIN GLARGINE 20 UNITS: 100 INJECTION, SOLUTION SUBCUTANEOUS at 22:06

## 2019-04-09 RX ADMIN — LACTOBACILLUS TAB 1 TABLET: TAB at 08:42

## 2019-04-09 RX ADMIN — NITROFURANTOIN MONOHYDRATE/MACROCRYSTALLINE 100 MG: 25; 75 CAPSULE ORAL at 16:31

## 2019-04-09 RX ADMIN — MICONAZOLE NITRATE: 20 POWDER TOPICAL at 22:12

## 2019-04-09 RX ADMIN — LEVOTHYROXINE SODIUM 125 MCG: 125 TABLET ORAL at 05:27

## 2019-04-09 RX ADMIN — Medication 100 MG: at 08:42

## 2019-04-09 RX ADMIN — NITROFURANTOIN MONOHYDRATE/MACROCRYSTALLINE 100 MG: 25; 75 CAPSULE ORAL at 05:27

## 2019-04-09 RX ADMIN — ACETAMINOPHEN 650 MG: 325 TABLET ORAL at 13:00

## 2019-04-09 RX ADMIN — ALPRAZOLAM 0.5 MG: 0.5 TABLET ORAL at 22:01

## 2019-04-09 RX ADMIN — HYDROCODONE BITARTRATE AND ACETAMINOPHEN 1 TABLET: 5; 325 TABLET ORAL at 17:34

## 2019-04-09 ASSESSMENT — PAIN SCALES - GENERAL
PAINLEVEL_OUTOF10: 10
PAINLEVEL_OUTOF10: 3
PAINLEVEL_OUTOF10: 5
PAINLEVEL_OUTOF10: 5
PAINLEVEL_OUTOF10: 10
PAINLEVEL_OUTOF10: 9
PAINLEVEL_OUTOF10: 8
PAINLEVEL_OUTOF10: 4
PAINLEVEL_OUTOF10: 0
PAINLEVEL_OUTOF10: 6
PAINLEVEL_OUTOF10: 10
PAINLEVEL_OUTOF10: 8

## 2019-04-09 ASSESSMENT — PAIN DESCRIPTION - DESCRIPTORS
DESCRIPTORS: ACHING;SORE;TIGHTNESS
DESCRIPTORS: ACHING;SORE
DESCRIPTORS: SORE
DESCRIPTORS: TIGHTNESS;PRESSURE;SORE

## 2019-04-09 ASSESSMENT — PAIN DESCRIPTION - PAIN TYPE: TYPE: ACUTE PAIN

## 2019-04-09 ASSESSMENT — PAIN DESCRIPTION - LOCATION
LOCATION: ARM;NECK;KNEE
LOCATION: BACK;ARM;LEG
LOCATION: KNEE;ARM
LOCATION: BACK;ARM

## 2019-04-09 ASSESSMENT — PAIN DESCRIPTION - ORIENTATION
ORIENTATION: RIGHT;LEFT

## 2019-04-09 NOTE — PROGRESS NOTES
Occupational Therapy  Facility/Department: [facility]  Daily Treatment Note  NAME: Nilo Lopez  : 1950  MRN: [id number]    Date of Service: 2019    Discharge Recommendations:  Continue to assess pending progress       Assessment      Activity Tolerance  Activity Tolerance: Patient Tolerated treatment well  Safety Devices  Safety Devices in place: Yes  Type of devices: All fall risk precautions in place         Patient Diagnosis(es): There were no encounter diagnoses. has a past medical history of Anxiety, Arthritis, Bronchitis, Diabetes mellitus ([facility] [address] Diabetic neuropathy associated with diabetes mellitus due to underlying condition [facility]), Essential hypertension, Hypercholesterolemia, Hypertension, Morbid obesity due to excess calories ([facility] [address] Thyroid disease, and Wound infection. has a past surgical history that includes orthopedic surgery (Left, 2017); hernia repair; Appendectomy; and Tonsillectomy (N/A). Restrictions  Restrictions/Precautions  Restrictions/Precautions: Fall Risk  Subjective   General  Chart Reviewed: Yes  Patient assessed for rehabilitation services?: Yes  Response to previous treatment: Patient reporting fatigue but able to participate  Family / Caregiver Present: No  Referring Practitioner: Dr. Mic Porter   Diagnosis: Impaired mobility 2° to flare up of O. A. Pain Assessment  Pain Level: 5  Pain Location: Arm;Neck;Knee  Pain Orientation: Right;Left  Pain Descriptors: Tightness;Pressure; Sore   Orientation     Objective      ADL  LE Dressing: Other (Comment)   Pt reinforced on education of AE for lower body dressing. Pt able to doff B socks with dressing stick with MIN difficulty req'ing MIN v/c's. Pt able to  B socks from floor with reacher with 0 difficulty. Pt able to thread B socks onto sock aid with MIN difficulty req'ing MIN v/c's. Pt able to don B socks with sock aid with MIN difficulty req'ing demo and  MOD v/c's.  Pt able to thread B LE's into underwear with MIN difficulty utilizing reacher req'ing demo and MOD v/c's. Pt able to doff underwear with dressing stick with MIN difficulty req'ing MIN v/c's. Pt able to don B shoes with long handled shoe horn with MOD difficulty req'ing demo, MAX v/c's and MIN A to fasten R velcro strap. Pt req'ed RB's 2° decreased core strength. Plan   Plan  Times per week: 5-7x/ wk   Plan weeks: 2-3  Current Treatment Recommendations: Strengthening, Balance Training, Functional Mobility Training, Endurance Training, Wheelchair Mobility Training, Safety Education & Training, Pain Management, Patient/Caregiver Education & Training, Equipment Evaluation, Education, & procurement, Self-Care / ADL, Home Management Training  Plan Comment: Con't OT POC for d/c on 4-21-19  G-Code     OutComes Score                                                  AM-PAC Score             Goals  Patient Goals   Patient goals : \"To go home. \"        Therapy Time   Individual Concurrent Group Co-treatment   Time In 1030         Time Out 1100         Minutes 30               Electronically signed by Kristyn Kuhn on 4/9/19 at [address] [place] [address] [place]

## 2019-04-09 NOTE — PROGRESS NOTES
fatigue. Pt fatigued quickly in standing. PLAN OF CARE/Safety:   Plan Comment: Cont per POC.       Therapy Time:   Individual   Time In 1400   Time Out 1430   Minutes 30     Minutes:      Transfer/Bed mobility trainin      Gait training:10      Neuro re education:5     Therapeutic ex:10    Isabel Germain PTA, 19 at 4:37 PM

## 2019-04-09 NOTE — PROGRESS NOTES
Assumed care of pt at 1930 this evening. Pt complains of pain to left arm and shoulder. Pt complaining that she cannot get a good night sleep here. Pt wants something else for pain or to help her sleep. Dr. Fawn Jones notified about pt wanting something more for sleep and pain. Dr connolly also ordered one touches to be done 4x day since pt gets insulin at bedtime  and to consulted Endocrinology for DM. Pt was given xanax 0.25mg po for anxiety and rest tonight at 2014 as ordered. Pt using heating pad to left back of neck and left arm. One touch tonight was 175. /80 at 1939. Retaken and was 150/80. Pt states that she needs stronger meds for sleep at night. Pt is sleeping at this time. Call bell in reach. Bed alarm on.  DIANAORRKATHRYN AKHTAR

## 2019-04-09 NOTE — PROGRESS NOTES
significant lower extremity edema diffusely superficial tenderness. Decreased shoulder range of motion especially on the right  Skin:   Intact to general survey, no visualized or palpated problems. Rehabilitation:  Physical therapy: FIMS:  Bed Mobility: Scooting: Stand by assistance    Transfers: Sit to Stand: Contact guard assistance  Stand to sit: Contact guard assistance  Bed to Chair: Contact guard assistance(with [employer]), Ambulation 1  Surface: carpet  Device: Rolling Walker  Other Apparatus: Wheelchair follow  Assistance: Contact guard assistance  Quality of Gait: VCs for gait sequence. Vcs for breathing technique. Heavy UE support through [employer].  VCs to decrease. Distance: 20ft  Comments: sidestepping EOB with [employer] CGA,      FIMS: Bed, Chair, Wheel Chair: 1 - Requires >75% assitance to transfer  Walk: 1 - Total Assistance Walks < 50 feet OR requires two or more people OR patient performs < 25% of locomotion effort  Distance Walked: 20ft  Stairs: 0 - Activity Does not Occur ( 0 only for the admission assessment), PT Equipment Recommendations  Other: TBD, Assessment: Pt required decreased assist with functional mobility. Able to amabulate increased distance. Pt fatigues quickly. Requires encouragement and redirection to task. Occupational therapy: FIMS:  Eatin - Patient feeds self  Groomin - Requires setup/cues to do all tasks  Bathing: 3 - Able to bathe 5-7 areas  Dressing-Upper: 5 - Requires setup/supervision/cues and/or requires assist with presthesis/brace only  Dressing-Lower: 1 - Total assist with lower body dressing  Toiletin - Able to perform 1 task only (e.g. hygiene)  Toilet Transfer: 1 - Requires > 75% assist getting on & off toilet  Tub Transfer: 0 - Activity does not occur(new admit)  Shower Transfer: 1 - Total assistance, pt. expends less than 25% effort,  , Assessment: Pt is 76 y.o female with above mentioned deficits impacting ability to complete ADL's IND'ly and safely.  Pt would benefit from skilled OT to increase level of functioning. Speech therapy: FIMS: Comprehension: 6 - Complex ideas 90% or device (hearing aid/glasses)  Expression: 6 - Device used to express complex ideas/needs  Social Interaction: 6 - Patient requires medication for mood and/or effect  Problem Solvin - Independent with device (e.g. notes, schedules)  Memory: 6 - Patient requires device to recall (e.g. memory book)      Lab/X-ray studies reviewed, analyzed and discussed with patient and staff:   Recent Results (from the past 24 hour(s))   POCT Glucose    Collection Time: 19  4:05 PM   Result Value Ref Range    POC Glucose 183 (H) 60 - 115 mg/dl    Performed on ACCU-CHEK    POCT Glucose    Collection Time: 19  9:42 PM   Result Value Ref Range    POC Glucose 175 (H) 60 - 115 mg/dl    Performed on ACCU-CHEK    POCT Glucose    Collection Time: 19  6:54 AM   Result Value Ref Range    POC Glucose 112 60 - 115 mg/dl    Performed on ACCU-CHEK        Ct Head Wo   2019  Impression: Mild cerebral atrophy. No acute findings. Ct Lumbar Spine Wo   2019   NO EVIDENCE OF FRACTURE. MINIMAL ANTEROLISTHESIS L2-3. DEGENERATIVE CHANGES, SEE INDIVIDUAL LEVELS ABOVE. LUMBAR CANAL STENOSIS L3-4. CONSIDER CORRELATION WITH MRI. Previous extensive, complex labs, notes and diagnostics reviewed and analyzed. ALLERGIES:    Allergies as of 2019 - Review Complete 2019   Allergen Reaction Noted    Sulfa antibiotics Rash 2017      (please also verify by checking MAR)    Yesterday I evaluated this patient for periodic reassessment of medical and functional status. The patient was discussed in detail at the treatment team meeting focusing on current medical issues, progress in therapies, social issues, psychological issues, barriers to progress and strategies to address these barriers, and discharge planning. See the hand written addendum to rehab progress note.   The patient continues to be high risk for future disability and their medical and rehabilitation prognosis continue to be good and therefore, we will continue the patient's rehabilitation course as planned. The patient's tentative discharge date was set. Patient and family education was discussed. The patient was made aware of the team discussion regarding their progress. Discharge plans were discussed along with barriers to progress and strategies to address these barriers, patient encouraged to continue to discuss discharge plans with . Complex Physical Medicine & Rehab Issues Assess & Plan:   1. Severe abnormality of gait and mobility and impaired self-care and ADL's secondary to progressive  flareup of osteoarthritis . Functional and medical status reassessed regarding patients ability to participate in therapies and patient found to be able to participate in acute intensive comprehensive inpatient rehabilitation program including PT/OT to improve balance, ambulation, ADLs, and to improve the P/AROM. Therapeutic modifications regarding activities in therapies, place, amount of time per day and intensity of therapy made daily. In bed therapies or bedside therapies prn.   2. Bowel opiate-related constipation and Bladder dysfunction:  frequent toileting, ambulate to bathroom with assistance, check post void residuals. Check for C.difficile x1 if >2 loose stools in 24 hours, continue bowel & bladder program.  Monitor bowel and bladder function. Lactinex 2 PO every AC. MOM prn, Brown Bomb prn, Glycerin suppository prn, enema prn. Patient's work on independence with bathing hygiene and toileting. She is not having her  wipe her bottom and we're fearful that that type of dependency will create a unnecessary burden on family. We would like her to become more independent in that I will offer assistive devices and training.   3. Severe lateral shoulder pain neuropathic and eneralized OA pain: reassess pain every shift and prior to and after each therapy session, give prn Tylenol and  titrate opiates I was using lowest effective dose, modalities prn in therapy, Lidoderm, K-pad prn. Offer injection into the lateral shoulders. 4. Skin healing and breakdown risk:  continue pressure relief program.  Daily skin exams and reports from nursing. 5. Severe Fatigue due to nutritional and hydration deficiency:  continue to monitor I&Os, calorie counts prn, dietary consult prn. And vitamin B12 vitamin D and CoQ10  6. Acute episodic insomnia with situational adjustment disorder: Slow titration off of Xanax. prn Ambien, monitor for day time sedation. 7. Falls risk elevated:  patient to use call light to get nursing assistance to get up, bed and chair alarm. 8. Elevated DVT risk: progressive activities in PT, continue prophylaxis SANTIAGO hose, elevation  . 9. Complex discharge planning:  Discharge April 21, 2019 home with her  did help from her daughters that live far away. He will also have home health care. Weekly team meeting  Monday to assess progress towards goals, discuss and address social, psychological and medical comorbidities and to address difficulties they may be having progressing in therapy. Patient and family education is in progress. The patient is to follow-up with their family physician after discharge. Complex Active General Medical Issues that complicate care Assess & Plan:    1. Peripheral angiopathy,   Hx of Chronic ulcer of left foot-0 pressure to her heels bilaterally, at E T-max, add podiatry consult  2. Diabetic neuropathy associated with diabetes mellitus due to underlying condition -low carb diet, add Glucotrol, Lantus, titrate Humalog low carbohydrate diet add diabetic education  3. Ataxia-focus on balance in physical therapy  4. Hypothyroid-monitor symptoms, titrate Synthroid  5.    Morbid obesity due to excess calories -add diabetic and obesity related dietary counseling, restrict carbohydrates and diet  6. Essential hypertension, veer peripheral artery disease,  HLD (hyperlipidemia)-vital signs every shift, dose and titrate cardiac medications accordingly to include Toprol, recheck CBC BMP and consult hospitalist for backup medical  7. Severe active chronic Anxiety and depression with learned helplessness behaviors-emotional support daily, add rec therapy  rehabilitation psychology and trazodone consider SSRI-wean off of Xanax, consult rehabilitation psychology and health psychiatry regarding alternative measures for depression and anxiety, add vitamin B12 shot, gradual tapering off (for long term use of benzodiazepines, the recommendations are 20% of the dose/week). No other medications are recommended at this time, since the patient declines any. 8.   Bronchitis-pulse ox checks every shift, dose and titrate oxygen and aerosol treatments  9.   OA (osteoarthritis) with severe osteoarthritic and severe neuropathic pain-titrate Tylenol versus Norco, always use the lowest effective dose of medication, add Desyrel at night for neuropathic pain, add topical agents, check [facility] prescription monitoring database to review for overuse or abuse  10. Acute on chronic renal failure-recheck CBC BMP and limit toxic medications discontinue milk of magnesia  11. Acute UTI-check postvoid residuals, check urinary CNS, titrate Macrobid, monitor for urinary retention, improved peroneal care  12. Severe Yeast rash in her abdominal folds and skin folds-add Mycostatin powder  13. High risk opiate use-with active chronic benzodiazepine use check [facility] physician monitoring database, use nonnarcotic measures for pain when appropriate. He is lowest effective dose with informed consent-gradual tapering off (for long term use of benzodiazepines, the recommendations are 20% of the dose/week). No other medications are recommended at this time, since the patient declines any. Jennifer West D.O., PM&R     Attending    [address] [facility]

## 2019-04-09 NOTE — PROGRESS NOTES
Physical Therapy Rehab Treatment Note  Facility/Department: [facility]  Room: R262/R262-01       NAME: Hernando Collins  : 1950 (76 y.o.)  MRN: [id number]  CODE STATUS: DNR-CCA    Date of Service: 2019  Chart Reviewed: Yes  Family / Caregiver Present: No    Restrictions:  Restrictions/Precautions: Fall Risk    SUBJECTIVE: Subjective: Pt states her L upper arm has been hurting since she fell on the . Pt states she is not getting any sleep here d/t neck pain. Pt states she sleeps in a recliner at home. Pre Treatment Pain Screening  Pain at present: 6  Intervention List: Patient able to continue with treatment;Patient declined any intervention    Post Treatment Pain Screening:  Pain Assessment  Pain Level: 6  Pain Location: Knee;Arm(R knee;L upper arm)  Pain Orientation: Right;Left  Pain Descriptors: Sore  Non-Pharmaceutical Pain Intervention(s): Cold applied;Repositioned    OBJECTIVE:              Bed mobility  Rolling to Left: Stand by assistance  Rolling to Right: Stand by assistance  Supine to Sit: Stand by assistance  Sit to Supine: Stand by assistance  Comment: VCs for technique. Increased time and effort. Educated pt on various positioning in bed and use of neck towel roll for support. Transfers  Sit to Stand: Contact guard assistance  Stand to sit: Contact guard assistance  Bed to Chair: Contact guard assistance(with [employer])    Ambulation  Ambulation?: Yes  Ambulation 1  Surface: carpet  Device: Rolling Walker(bariatric)  Assistance: Contact guard assistance  Quality of Gait: VCs for gait sequence. Heavy UE support through [employer].  VCs to decrease. Distance: 20ft  Comments: No WC follow. Pt able to ambulate to destination. Exercises: Focused on pain control and stretching of neck and L shld along with core ex.     Quad Sets: x20  Gluteal Sets: x20  Hip Abduction: YTB x20; add with ball x20  Core Strengthening: seated abdominal and lat isometrics pushing down on [employer] with breathing x10.  Seated DLS marching and LAQ x20. Supine abdom isometrics x10. Comments: CP applied to L upper arm/shld during supine therex x10 min for pain control. Other exercises  Other exercises 2: L posteior shld and tricep stretch 20 sec x3 to decrease muscle tightness. Other exercises 3: UT stretch [id number]     ASSESSMENT/COMMENTS:  Body structures, Functions, Activity limitations: Decreased functional mobility ; Decreased endurance; Increased Pain;Decreased strength  Assessment: Pt fatigues in unsupported sitting. Able to ambulate without WC follow today. PLAN OF CARE/Safety:   Plan Comment: Cont per POC.       Therapy Time:   Individual   Time In 0900   Time Out 1000   Minutes 60     Minutes:      Transfer/Bed mobility training:10      Gait training:10      Neuro re education:0     Therapeutic ex:40    Robe Santana PTA, 04/09/19 at 1:59 PM

## 2019-04-09 NOTE — PROGRESS NOTES
Moderate assistance  Wheelchair Transfers Comments: CGA for EOB -> w/c; MOD A for w/c <-> EOM 2° A to complete sit -> stand from mat    Bed mobility  Supine to Sit: Stand by assistance              Neuromuscular Education  Trunk Control: Pt seated EOM while completing activities to increase core strength for increased functional seated activities such as LB Dressing. Pt able to reach with R UE across midline to L lateral side with MOD difficulty req'ing L UE counterbalance to retrieve 16 rings 1 at a time X 2 sets. Pt able to reach in forward planes and place rings on approx 3 ft vertical dowel misael with MIN difficulty. Pt able to reach with L UE across midline to R lateral side with MOD difficulty req'ing R UE counterbalance to retrieve 16 rings 1 at a time X 2 sets. Pt able to reach in forward planes and place rings on approx 3 ft vertical dowel misael with MIN difficulty. Pt able to reach to R lateral side flexing at waist with MIN difficulty to retrieve 18 cones 1 at a time X 2 sets. Pt transferred cones to L hand and able to reach to L lateral side flexing at waist with MIN difficulty to place cones on base. Pt able to reach to L lateral side flexing at waist with MIN difficulty to retrieve 18 cones 1 at a time X 2 sets. Pt transferred cones to R hand and able to reach to R lateral side flexing at waist with MIN difficulty to place cones on base.               Plan   Plan  Times per week: 5-7x/ wk   Plan weeks: 2-3  Current Treatment Recommendations: Strengthening, Balance Training, Functional Mobility Training, Endurance Training, Wheelchair Mobility Training, Safety Education & Training, Pain Management, Patient/Caregiver Education & Training, Equipment Evaluation, Education, & procurement, Self-Care / ADL, Home Management Training  Plan Comment: Con't OT POC for d/c on 4-21-19  G-Code     OutComes Score                                                  AM-PAC Score             Goals  Patient Goals   Patient goals [place] \"To go home. \"        Therapy Time   Individual Concurrent Group Co-treatment   Time In 1300         Time Out 1400         Minutes 60               Electronically signed by Christiano ROJAS on 4/9/19 at 4:17 PM    BOB Morales

## 2019-04-10 PROBLEM — F13.20 BENZODIAZEPINE DEPENDENCE (HCC): Chronic | Status: ACTIVE | Noted: 2019-04-04

## 2019-04-10 PROBLEM — E66.9 NOCTURNAL HYPOXEMIA DUE TO OBESITY: Chronic | Status: ACTIVE | Noted: 2019-04-10

## 2019-04-10 PROBLEM — G47.36 NOCTURNAL HYPOXEMIA DUE TO OBESITY: Chronic | Status: ACTIVE | Noted: 2019-04-10

## 2019-04-10 LAB
GLUCOSE BLD-MCNC: 107 MG/DL (ref 60–115)
GLUCOSE BLD-MCNC: 216 MG/DL (ref 60–115)
PERFORMED ON: ABNORMAL
PERFORMED ON: NORMAL

## 2019-04-10 PROCEDURE — 99232 SBSQ HOSP IP/OBS MODERATE 35: CPT | Performed by: INTERNAL MEDICINE

## 2019-04-10 PROCEDURE — 6370000000 HC RX 637 (ALT 250 FOR IP): Performed by: INTERNAL MEDICINE

## 2019-04-10 PROCEDURE — 6370000000 HC RX 637 (ALT 250 FOR IP): Performed by: PHYSICAL MEDICINE & REHABILITATION

## 2019-04-10 PROCEDURE — 97116 GAIT TRAINING THERAPY: CPT

## 2019-04-10 PROCEDURE — 97535 SELF CARE MNGMENT TRAINING: CPT

## 2019-04-10 PROCEDURE — 1180000000 HC REHAB R&B

## 2019-04-10 PROCEDURE — 97110 THERAPEUTIC EXERCISES: CPT

## 2019-04-10 PROCEDURE — 99232 SBSQ HOSP IP/OBS MODERATE 35: CPT | Performed by: PHYSICAL MEDICINE & REHABILITATION

## 2019-04-10 PROCEDURE — 97530 THERAPEUTIC ACTIVITIES: CPT

## 2019-04-10 PROCEDURE — 99222 1ST HOSP IP/OBS MODERATE 55: CPT | Performed by: INTERNAL MEDICINE

## 2019-04-10 RX ORDER — DOCUSATE SODIUM 100 MG/1
100 CAPSULE, LIQUID FILLED ORAL 2 TIMES DAILY
Status: DISCONTINUED | OUTPATIENT
Start: 2019-04-10 | End: 2019-04-20 | Stop reason: HOSPADM

## 2019-04-10 RX ORDER — INSULIN GLARGINE 100 [IU]/ML
10 INJECTION, SOLUTION SUBCUTANEOUS NIGHTLY
Status: DISCONTINUED | OUTPATIENT
Start: 2019-04-10 | End: 2019-04-15

## 2019-04-10 RX ORDER — OXYMETAZOLINE HYDROCHLORIDE 0.05 G/100ML
2 SPRAY NASAL 2 TIMES DAILY
Status: DISPENSED | OUTPATIENT
Start: 2019-04-10 | End: 2019-04-13

## 2019-04-10 RX ORDER — LACTULOSE 10 G/15ML
20 SOLUTION ORAL 2 TIMES DAILY PRN
Status: DISCONTINUED | OUTPATIENT
Start: 2019-04-10 | End: 2019-04-20 | Stop reason: HOSPADM

## 2019-04-10 RX ADMIN — ANALGESIC BALM: 1.74; 4.06 OINTMENT TOPICAL at 14:49

## 2019-04-10 RX ADMIN — ALPRAZOLAM 0.25 MG: 0.25 TABLET ORAL at 14:44

## 2019-04-10 RX ADMIN — OXYMETAZOLINE HYDROCHLORIDE 2 SPRAY: 5 SPRAY NASAL at 22:51

## 2019-04-10 RX ADMIN — ALPRAZOLAM 0.5 MG: 0.5 TABLET ORAL at 22:48

## 2019-04-10 RX ADMIN — Medication 100 MG: at 19:06

## 2019-04-10 RX ADMIN — LACTOBACILLUS TAB 1 TABLET: TAB at 09:30

## 2019-04-10 RX ADMIN — HYDROCODONE BITARTRATE AND ACETAMINOPHEN 1 TABLET: 5; 325 TABLET ORAL at 05:54

## 2019-04-10 RX ADMIN — MICONAZOLE NITRATE: 20 POWDER TOPICAL at 22:50

## 2019-04-10 RX ADMIN — INSULIN GLARGINE 10 UNITS: 100 INJECTION, SOLUTION SUBCUTANEOUS at 22:43

## 2019-04-10 RX ADMIN — DOCUSATE SODIUM 100 MG: 100 CAPSULE, LIQUID FILLED ORAL at 22:49

## 2019-04-10 RX ADMIN — ACETAMINOPHEN 650 MG: 325 TABLET ORAL at 19:06

## 2019-04-10 RX ADMIN — NITROFURANTOIN MONOHYDRATE/MACROCRYSTALLINE 100 MG: 25; 75 CAPSULE ORAL at 14:43

## 2019-04-10 RX ADMIN — METOPROLOL SUCCINATE 100 MG: 100 TABLET, EXTENDED RELEASE ORAL at 10:24

## 2019-04-10 RX ADMIN — NITROFURANTOIN MONOHYDRATE/MACROCRYSTALLINE 100 MG: 25; 75 CAPSULE ORAL at 05:51

## 2019-04-10 RX ADMIN — MICONAZOLE NITRATE: 20 POWDER TOPICAL at 14:47

## 2019-04-10 RX ADMIN — ANALGESIC BALM: 1.74; 4.06 OINTMENT TOPICAL at 10:00

## 2019-04-10 RX ADMIN — ALPRAZOLAM 0.25 MG: 0.25 TABLET ORAL at 05:51

## 2019-04-10 RX ADMIN — HYDROCODONE BITARTRATE AND ACETAMINOPHEN 1 TABLET: 5; 325 TABLET ORAL at 10:24

## 2019-04-10 RX ADMIN — ANALGESIC BALM: 1.74; 4.06 OINTMENT TOPICAL at 22:49

## 2019-04-10 RX ADMIN — Medication 100 MG: at 10:24

## 2019-04-10 RX ADMIN — LEVOTHYROXINE SODIUM 125 MCG: 125 TABLET ORAL at 05:51

## 2019-04-10 RX ADMIN — HYDROCODONE BITARTRATE AND ACETAMINOPHEN 1 TABLET: 5; 325 TABLET ORAL at 14:44

## 2019-04-10 ASSESSMENT — PAIN SCALES - GENERAL
PAINLEVEL_OUTOF10: 6
PAINLEVEL_OUTOF10: 7
PAINLEVEL_OUTOF10: 6
PAINLEVEL_OUTOF10: 7
PAINLEVEL_OUTOF10: 7
PAINLEVEL_OUTOF10: 5
PAINLEVEL_OUTOF10: 5

## 2019-04-10 ASSESSMENT — PAIN DESCRIPTION - LOCATION: LOCATION: NECK;ARM;SHOULDER

## 2019-04-10 ASSESSMENT — PAIN DESCRIPTION - DESCRIPTORS: DESCRIPTORS: SORE

## 2019-04-10 ASSESSMENT — PAIN DESCRIPTION - ORIENTATION: ORIENTATION: LEFT

## 2019-04-10 NOTE — PROGRESS NOTES
Occupational Therapy  Facility/Department: [facility]  Daily Treatment Note  NAME: Joceline Matos  : 1950  MRN: [id number]    Date of Service: 4/10/2019    Discharge Recommendations:  Continue to assess pending progress       Assessment      Activity Tolerance  Activity Tolerance: Patient Tolerated treatment well  Safety Devices  Safety Devices in place: Yes  Type of devices: All fall risk precautions in place  Restraints  Initially in place: No         Patient Diagnosis(es): There were no encounter diagnoses. has a past medical history of Anxiety, Arthritis, Bronchitis, Diabetes mellitus ([facility] [address] Diabetic neuropathy associated with diabetes mellitus due to underlying condition [facility]), Essential hypertension, Hypercholesterolemia, Hypertension, Morbid obesity due to excess calories ([facility] [address] Thyroid disease, and Wound infection. has a past surgical history that includes orthopedic surgery (Left, 2017); hernia repair; Appendectomy; and Tonsillectomy (N/A). Restrictions  Restrictions/Precautions  Restrictions/Precautions: Fall Risk  Subjective \"In the last few months my modesty has changed. \"  \"I've never tried this before. \" \"You can tell that I worked my core yesterday. \"  General  Chart Reviewed: Yes  Patient assessed for rehabilitation services?: Yes  Response to previous treatment: Patient reporting fatigue but able to participate  Family / Caregiver Present: No  Referring Practitioner: Dr. Lisa Chavira   Diagnosis: Impaired mobility 2° to flare up of O. A.       Orientation     Objective    ADL  Grooming: Setup  UE Bathing: Supervision  LE Bathing: Moderate assistance  UE Dressing: Setup  LE Dressing: Maximum assistance  Toileting: Maximum assistance        Toilet Transfers  Toilet - Technique: Stand step  Equipment Used: Grab bars  Toilet Transfer: Minimal assistance  Shower Transfers  Shower - Transfer From: Wheelchair  Shower - Transfer Type: To and From  Shower - Transfer To:  Transfer tub bench  Shower Transfers: Minimal assistance         Pt completed AM ADL routine with functional levels as stated above. Pt dependent for donning bilateral knee braces. Pt instructed with fair return verbalized and demonstrated for completion of functional mobility with use of w/c. Pt reports pain level 8-9/10in \"the muscles\" at end of session. Plan   Plan  Times per week: 5-7x/ wk   Plan weeks: 2-3  Current Treatment Recommendations: Strengthening, Balance Training, Functional Mobility Training, Endurance Training, Wheelchair Mobility Training, Safety Education & Training, Pain Management, Patient/Caregiver Education & Training, Equipment Evaluation, Education, & procurement, Self-Care / ADL, Home Management Training  Plan Comment: Con't OT POC for d/c on 4-21-19  G-Code     OutComes Score                                                  AM-PAC Score             Goals  Patient Goals   Patient goals : \"To go home. \"        Therapy Time   Individual Concurrent Group Co-treatment   Time In 0800         Time Out 0900         Minutes 61                 NIELS Dalton/L Electronically signed by NIELS Mazariegos/L on 8/47/48 at 9:13 AM

## 2019-04-10 NOTE — CONSULTS
Inpatient consult to Pulmonology  Consult performed by: Sona Warren MD  Consult ordered by: Amita Rose DO        Pulmonary Medicine  Consult Note      Reason for consultation: Nocturnal hypoxia    Consulting physician: Dr. Nikhil Gallegos:      This is 63-year-old female was presented to emergency with complain of weakness and fall. Her gait become worse over last 10 days. She has a history of hypertension, diabetes mellitus, hypothyroidism, hyperlipidemia and chronic kidney disease. She has no complaint of shortness of breath or chest pain. She has no fever or chills. Patient is currently in rehab. She underwent for overnight pulse oximetry which shows significant O2 desaturation for about 1 hour and 40 minutes during sleep. According to  she sleeps with  Recliner due to pain. She does have occasional snoring but no witness apnea. Pulmonary was consulted regarding nocturnal hypoxia. Patient never tested for sleep apnea. Patient said she has multiple friends who has been diagnosed with sleep apnea and none of them able to use CPAP. Patient said she is not sleeping well due to her chronic pain and not sure she can even use CPAP mask over her face. She said she is willing to use oxygen if she tolerate. Due to nocturnal hypoxia so started on 2 L O2 via nasal cannula during sleep.     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 No Rales. No wheezing. No rhonchi. Heart[de-identified] Normal  rate. Regular rhythm. No mumur ,  Rub or gallop  ABD: Non-tender. Non-distended. No masses. No organmegaly. Normal bowel sounds. No hernia. Extremities: Trace pitting in both lower leg , No Cyanosis ,No clubbing  Neuro: no cranial nerve abnormality, normal reflex and sensation, no focal weakness   Skin: Warm and dry. No erythema rash on exposed extremities. Data Review  No results for input(s): WBC, HGB, HCT, PLT in the last 72 hours. No results for input(s): NA, K, CL, CO2, BUN, CREATININE, GLUCOSE in the last 72 hours. Invalid input(s): CA    MV Settings: ABGs: No results for input(s): PHART, IJY8BQB, PO2ART, OSW5DRS, BEART, A2AUSBKE, HIY6HWS in the last 72 hours. O2 Device: None (Room air)  O2 Flow Rate (L/min): 0 L/min  [facility] found [facility]: [facility]    Radiology  Ct Head Wo Contrast    Result Date: 4/4/2019  CT Brain Contrast medium:  Not utilized. History:  Inability to ambulate. Comparison:  None Findings: Extra-axial spaces:  Normal. Intracranial hemorrhage:  None. Ventricular system:  Ventricles are mildly enlarged. Sulci mildly prominent. . Basal Cisterns:  Normal. Cerebral Parenchyma:  Normal. Midline Shift:  None. Cerebellum:  Normal. Paranasal sinuses and mastoid air cells:  Normal. Visualized Orbits:  Normal.     Impression: Mild cerebral atrophy. No acute findings. . All CT scans at this facility use dose modulation, iterative reconstruction, and/or weight based dosing when appropriate to reduce radiation dose to as low as reasonably achievable. Ct Lumbar Spine Wo Contrast    Result Date: 4/4/2019  EXAMINATION: CT LUMBER SPINE CLINICAL HISTORY: BLE weakness , inability to ambulate. TECHNIQUE: THIN AXIAL SECTIONS WERE OBTAINED THROUGH THE LUMBER SPINE. IMAGES WERE REFORMATTED IN THE SAGITTAL AND CORONAL PLANES. COMPARISONS: None available.  FINDINGS: Bones are demineralized. There is good alignment of spine present. There is narrowing of the L2-3 and L3-4 disc levels with vacuum disc phenomenon. Minimal anterolisthesis at L2-3 level. Otherwise, good alignment of lumbar spine. Vertebral heights are maintained. There is no sign of osseous destruction. It is difficult to evaluate the disks with CT. L1/L2: Unremarkable. L2/L3: Narrowed disc vacuum disc phenomenon. Minimal anterolisthesis of L2 vertebra. Hypertrophied facet joints. L3/L4: Narrowed discs with vacuum disc phenomenon. There is hypertrophy of the facet joints and ligamentum flavum. Moderate central canal stenosis. Posterior bulging of disc. L4/L5: Suspect posterior central protrusion of disc of carotid hypertrophied facet joints. Mild canal stenosis. L5/S1: Hypertrophied facet joints. NO EVIDENCE OF FRACTURE. MINIMAL ANTEROLISTHESIS L2-3. DEGENERATIVE CHANGES, SEE INDIVIDUAL LEVELS ABOVE. LUMBAR CANAL STENOSIS L3-4. CONSIDER CORRELATION WITH MRI. All CT scans at this facility use dose modulation, iterative reconstruction, and/or weight based dosing when appropriate to reduce radiation dose to as low as reasonably achievable. Assessment and  plan:     1. Nocturnal hypoxia, and morbid obesity rule out JENNIFER  2. Gait and mobility abnormality  3. Hypothyroidism  4. Hypertension  5. Diabetes mellitus  6. BAR on CKD    Patient had overnight pulse oximetry done which shows severe O2 desaturation during sleep. Patient has low O2 saturation below 88% for about 1 hour 40 minute during sleep. Recommend patient to use 3 L O2 during sleep. The patient's morbid obesity, snoring and O2 desaturation suggests possibility of sleep apnea and recommended to have a sleep study done as outpatient. Patient is not sure she wants to have a sleep study done but she is willing to use oxygen at this time. I did explain risk related to hypoxia and sleep apnea to patient and her .   Patient will get a repeat overnight

## 2019-04-10 NOTE — PROGRESS NOTES
Physical Therapy Rehab Treatment Note  Facility/Department: [facility]  Room: R262/R262-01       NAME: Jj Dennison  : 1950 (52 y.o.)  MRN: [id number]  CODE STATUS: DNR-CCA    Date of Service: 4/10/2019     Restrictions:  Restrictions/Precautions: Fall Risk    SUBJECTIVE: Subjective: Pt states she did not sleep well. States she had a test during the night. States she just had a shower and pushed her own WC with her legs and arms from the bathroom. Pre Treatment Pain Screening  Pain at present: 7  Intervention List: Patient able to continue with treatment;Patient declined any intervention  Post Treatment Pain Screening:  Pain Assessment  Pain Level: 5  Pain Location: Neck;Arm; Shoulder  Pain Orientation: Left  Pain Descriptors: Sore  Non-Pharmaceutical Pain Intervention(s): Cold applied;Massage;Repositioned(RN notified to apply pain patch if available.  )  OBJECTIVE:              Bed mobility  Rolling to Left: Stand by assistance  Rolling to Right: Stand by assistance  Supine to Sit: Stand by assistance  Sit to Supine: Stand by assistance    Transfers  Sit to Stand: Contact guard assistance; Moderate Assistance  Stand to sit: Contact guard assistance  Comment: VCs for technique. Assist level varies. Increased assist required with fatigue. Trial'd high back chair in room for increased support. Discussed possible recliner in room with RN for increased support. Ambulation 1  Quality of Gait: Decreased R knee stability. Unable to amb increased distances. Heavy UE support through [facility]. Distance: 2-3 steps x2  B knee braces  Wheelchair Activities  Wheelchair Parts Management: Yes(Educated pt on management of breaks and leg rests. Pt able to lock breaks with cues. Required assist with leg rests.  )        Exercises  Quad Sets: x20  Heelslides: x20  Core Strengthening: Supine abdom isometrics x10. Comments: Gentle stretches and STM to L UTs to decrease muscle tightness and pain.   CP applied to L

## 2019-04-10 NOTE — CONSULTS
[address]                      [address], [address] [facility]                                  CONSULTATION    PATIENT NAME: Melida Heard                        :        1950  MED REC NO:   [id number]                            ROOM:       R262  ACCOUNT NO:   [de-identified]                           ADMIT DATE: 2019  PROVIDER:     Salazar Zapata MD    CONSULT DATE:  2019    REFERRING PROVIDER:  Kristal Hernandez DO.    REASON FOR CONSULT:  Management of uncontrolled diabetes,  hypothyroidism. CHIEF COMPLAINT AND HISTORY OF PRESENT ILLNESS:  The patient is a  70-year-old female with known history of diabetes and hypothyroidism,  admitted with weakness with ambulation. The patient's metformin was on  hold because of elevated creatinine. Blood sugars have been mostly  stable between 100 to 180 range. She also has a history of  hypothyroidism. According to patient, her family doctor was tapering  her medications including metformin to be taken off. A1c done today was  7. Thyroid function was also done. Free T4 was 1.8, it is slightly  high, TSH was 1.690. Chemistries were reviewed from three days ago. Sodium was 134, potassium 4.7, chloride was 94, CO2 was 29, BUN 17, and  creatinine 1.6. The patient is only on Humalog coverage. Fasting blood  sugars have been slightly high. Overall blood sugars between 100 to 200  range. The patient also had ulcer of her left foot for which she was  seeing a podiatrist and arthritis for which she was seeing orthopedic  specialist.    PAST MEDICAL HISTORY:  Type 2 diabetes, hypothyroidism, chronic kidney  disease, hypertension, and morbid obesity. SURGICAL HISTORY:  Tonsillectomy, orthopedic surgery, left broken foot. FAMILY HISTORY:  Cancer. PERSONAL AND SOCIAL HISTORY:  Denies any smoking. ALLERGIES:  SULFA.     MEDICATIONS:  Here include glipizide 10 mg twice daily, Humalog  coverage, Synthroid 20 mcg, Lantus 20 units daily, Toprol, Micatin,  Desyrel, nitrofurantoin, miconazole, and Xanax. REVIEW OF SYSTEMS:  Other than arthritis and weakness, 14-point of  review of system was negative. PHYSICAL EXAMINATION:  GENERAL:  The patient is alert and awake. VITAL SIGNS:  Blood pressure was 164/76, pulse rate was 65, respiratory  rate was 18, and temperature 97. NECK:  Supple. No goiter or thyromegaly was noted. LUNGS:  Showing bilateral clear equal air entry. HEART:  Heart sounds were normal.  ABDOMEN:  Soft, moderately obese. Bowel sounds are present. EXTREMITIES:  Reveal brace over the right knee, 1+ edema both lower  legs. LABORATORIES:  As above. ASSESSMENT:  Uncontrolled diabetes, chronic kidney disease,  hypothyroidism, morbid obesity, and severe osteoarthritis. PLAN:  Discontinue glipizide. Continue on Lantus 20 at night, Humalog  coverage. Continue with Synthroid at this time at 125 mcg daily. Goal  will be to get her sugars between 140 to 200. Continue replacement with  Synthroid and supportive measures. Thank you for the consult.         Neeta Ureña MD    D: 04/09/2019 23:11:55       T: 04/10/2019 1:58:46     [email or web address]  Job#: [id number]     Doc#: [id number]    CC:

## 2019-04-10 NOTE — PROGRESS NOTES
Occupational Therapy  Facility/Department: [facility]  Daily Treatment Note  NAME: Jj Dennison  : 1950  MRN: [id number]    Date of Service: 4/10/2019    Discharge Recommendations:  Continue to assess pending progress       Assessment Pt tolerated treatment fair. Pt participating in activities to increase performance with ADL's. Patient Diagnosis(es): There were no encounter diagnoses. has a past medical history of Anxiety, Arthritis, Bronchitis, Diabetes mellitus ([facility] [address] Diabetic neuropathy associated with diabetes mellitus due to underlying condition [facility]), Essential hypertension, Hypercholesterolemia, Hypertension, Morbid obesity due to excess calories ([facility] [address] Thyroid disease, and Wound infection. has a past surgical history that includes orthopedic surgery (Left, 2017); hernia repair; Appendectomy; and Tonsillectomy (N/A). Restrictions  Restrictions/Precautions  Restrictions/Precautions: Fall Risk     Subjective   General  Chart Reviewed: Yes  Patient assessed for rehabilitation services?: Yes  Response to previous treatment: Patient reporting fatigue but able to participate  Family / Caregiver Present: No  Referring Practitioner: Dr. Abigail Denise   Diagnosis: Impaired mobility 2° to flare up of O. A. Pain Assessment  Pain Level: 6(everywhere )  Vital Signs  Patient Currently in Pain: Yes   Initial pain 8/10 \"every where\"      Objective      Standing endurance: Pt sit to stand with Mod to Min A. Pt with \"buckle\" in legs on 3rd trial of standing. Pt using hand to stabilize on table. Pt stood for 9 minutes with frequent rest breaks (~ every 2 minutes) during leisure activity with checkers with SBA into CGA. Pt reported R knee \"giving out\". Pt stated after 9 minutes L knee feels tired. \"     Dynamic sitting balance and core strengtheing: Pt seated in w/c and used reacher to  10 rings with reacher (10 with each hand) and placed on table.  Pt reported some difficulty with L arm, but stated stretch feels goods. Plan   Plan  Times per week: 5-7x/ wk   Plan weeks: 2-3  Current Treatment Recommendations: Strengthening, Balance Training, Functional Mobility Training, Endurance Training, Wheelchair Mobility Training, Safety Education & Training, Pain Management, Patient/Caregiver Education & Training, Equipment Evaluation, Education, & procurement, Self-Care / ADL, Home Management Training  Plan Comment: Con't OT POC for d/c on 4-21-19    Goals  Patient Goals   Patient goals : \"To go home. \"        Therapy Time   Individual Concurrent Group Co-treatment   Time In 1330         Time Out 1400         Minutes 30         Electronically signed by KALYANI Millan on 4/10/2019 at 3:27 PM

## 2019-04-10 NOTE — PROGRESS NOTES
Patient has c/o pain to her left side and was medicated with PRN Norco with good pin relief noted. Overnight pulse oximeter placed by RT.       0115 patient SpO2 dropped down between 50-60% on RA. Patient denied any SOB or dyspnea at this time.  This nurse applied O2 @ 2L and notified the respiratory therapist.

## 2019-04-10 NOTE — PROGRESS NOTES
Subjective: The patient complains of severe  acute  on chronic right shoulder pain and bilateral lower extremity weakness partially relieved by  rest, PT, OT and exacerbated by activity and ambulation. I am concerned about patients learned helplessness behaviors and poor motivation with her own care at times as reported by the nurses. I am still concerned about her benzodiazepine reliance. She is used to complaining combining high-dose scheduled benzodiazepines and pain medication. He is to wean off of the benzodiazepines and minimize most effective dose of the Norco.  She continues to complain of left arm pain. She refuses injections at this time will continue to offer them. Her blood sugars are still slightly high. Her TSH levels within normal limits that are free thyroxine levels slightly elevated at 1.88. And concerned about her anxiety. Staff states that she is highly manipulative regarding her pain and mid medications. We'll continue to provide emotional supports and teach coping skills. ROS x10: The patient also complains of severely impaired mobility and activities of daily living. Otherwise no new problems with vision, hearing, nose, mouth, throat, dermal, cardiovascular, GI, , pulmonary, musculoskeletal, psychiatric or neurological. See Rehab H&P on Rehab chart dated . Vital signs:  /77   Pulse 60   Temp 98 °F (36.7 °C) (Oral)   Resp 17   Ht 5' 6.5\" (1.689 m)   Wt 275 lb (124.7 kg)   LMP  (LMP Unknown)   SpO2 96%   BMI 43.72 kg/m²   I/O:   PO/Intake:  fair PO intake, no problems observed or reported. Bowel/Bladder:  continent, no problems noted. General:  Patient is well developed, adequately nourished, non-obese and     well kempt. HEENT:    PERRLA, hearing intact to loud voice, external inspection of ear     and nose benign. Inspection of lips, tongue and gums benign  Musculoskeletal: No significant change in strength or tone. All joints stable. eMERGENCY dEPARTMENT eNCOUnter      CHIEF COMPLAINT    Chief Complaint   Patient presents with   • STD       HPI    Alonso Taylor is a 40 year old male who presents to the urgent care for fear that he was exposed to chlamydia. Patient states that he had protected intercourse with a condom. Patient states he had a text message stating that this patient may have had chlamydia several months ago. Patient states he is here just to get checked. Patient denies any drainage from the penis. Patient denies any testicular pain. Patient denies any fevers chills. Patient denies any abdominal pain, nausea vomiting.    ALLERGIES    ALLERGIES:   Allergen Reactions   • Ibuprofen HIVES   • Tuberculin RASH       CURRENT MEDICATIONS    No current facility-administered medications for this encounter.      No current outpatient prescriptions on file.       PAST MEDICAL HISTORY    History reviewed. No pertinent past medical history.    SURGICAL HISTORY    History reviewed. No pertinent surgical history.    SOCIAL HISTORY    Social History     Social History   • Marital status: Significant other     Spouse name: N/A   • Number of children: N/A   • Years of education: N/A     Social History Main Topics   • Smoking status: Former Smoker     Packs/day: 0.00     Types: Cigarettes   • Smokeless tobacco: Never Used   • Alcohol use 7.2 oz/week     12 Cans of beer per week   • Drug use: Yes     Types: Marijuana      Comment: recreational use   • Sexual activity: Not Asked     Other Topics Concern   • None     Social History Narrative   • None       FAMILY HISTORY    Family History   Problem Relation Age of Onset   • Diabetes Mother    • High blood pressure Mother    • Cancer Father    • Cancer Brother    • High blood pressure Paternal Grandmother        REVIEW OF SYSTEMS    13 systems reviewed, all negative with the exception of previously noted      PHYSICAL EXAM    ED Triage Vitals [06/21/18 1050]   BP (!) 142/95   Pulse 64   Resp 12   Temp  97.7 °F (36.5 °C)   SpO2 99 %       Constitutional:  Appears stated age, anxious  Eyes:  PERRL, conjunctivae normal.   HENT:  Atraumatic. External ears normal. Nose normal. Oropharynx moist.  Respiratory:  No respiratory distress, normal breath sounds. No rales. No wheezing.   Cardiovascular:  Normal rate, normal rhythm. No murmurs, gallops, or rubs.  GI:  Soft, nondistended. Normal bowel sounds, nontender. No hepatomegaly, splenomegaly, mass, rebound or guarding. Testicular exam with RN chaperone, no penile drainage noted, no tenderness to palpation over the testicle bilaterally.   Integument:  Well hydrated, no rash.   Lymphatic:  No lymphadenopathy noted.   Neurologic:  Alert & oriented x 3.  Normal motor function. Normal sensory function. No focal deficits noted.       RADIOLOGY    No orders to display       LABS    No results found for this visit on 06/21/18.      ED MEDICATIONS  ED Medication Orders     None          PROCEDURES        CONSULTS:  10:56 AM     ED COURSE & MEDICAL DECISION MAKING    Patient is a 40-year-old male who presented to the urgent care for an STD check. Patient denies any symptoms at this time. Patient did provide a urine sample for GC chlamydia. Patient did refuse a penile swab and is currently requesting to give a urine sample. Patient will be discharged and notified of the positive findings if any. Patient agrees with this plan. The patient will not be started on antibiotics at this time.      FINAL IMPRESSION      The encounter diagnosis was Possible exposure to STD.      Reed Garcia DO  [address] [facility] [address]  [phone]    Schedule an appointment as soon as possible for a visit          New Prescriptions    No medications on file     Closure:  The above stated findings were discussed with the patient in full detail.  All questions and concerns addressed.  The patient understands that this is a provisional diagnosis. Provisional diagnosis can and do change. The  Inspection and palpation of digits and nails show no clubbing,       cyanosis or inflammatory conditions. Neuro/Psychiatric: Affect: flat but pleasant. Alert and oriented to person, place and     situation. No significant change in deep tendon reflexes or     sensation  Lungs:  Diminished, CTA-B. Respiration effort is normal at rest.     Heart:   S1 = S2, RRR. No loud murmurs. Abdomen:  Obese, Soft, non-tender, no enlargement of liver or spleen. Extremities:  No significant lower extremity edema diffusely superficial tenderness. Decreased shoulder range of motion especially on the right  Skin:   Intact to general survey, no visualized or palpated problems. Rehabilitation:  Physical therapy: FIMS:  Bed Mobility: Scooting: Stand by assistance    Transfers: Sit to Stand: Contact guard assistance  Stand to sit: Contact guard assistance  Bed to Chair: Contact guard assistance(with [address]), Ambulation 1  Surface: carpet  Device: Rolling Walker(bariatric)  Other Apparatus: (B knee braces)  Assistance: Contact guard assistance  Quality of Gait: VCs for gait sequence. Heavy UE support through [address].  VCs to decrease. Slow pace. Distance: 20ft  Comments: No WC follow. Pt able to ambulate to destination. ,      FIMS: Bed, Chair, Wheel Chair: 2 - Requires 50-74% assistance to transfer  Walk: 1 - Total Assistance Walks < 50 feet OR requires two or more people OR patient performs < 25% of locomotion effort  Distance Walked: 20ft  Stairs: 0 - Activity Does not Occur ( 0 only for the admission assessment), PT Equipment Recommendations  Other: TBD, Assessment: Pt limited by increased pain and fatigue. Pt fatigued quickly in standing.       Occupational therapy: FIMS:  Eatin - Patient feeds self  Groomin - Able to perform 3-4 tasks with touching help  Bathin - Able to bathe 2 or less areas/total assist  Dressing-Upper: 2 - Requires assist with 3 tasks  Dressing-Lower: 2 - Requires assist with 4-5 parts diagnosis that you are discharged with today is based on the symptoms with which you presented today. If any new symptoms occur or worsen, you should seek immediate attention for re-evaluation.         Cole Bautista, DO  06/21/18 1100     of dressing  Toiletin - Able to perform 1 task only (e.g. hygiene)  Toilet Transfer: 2 - Requires 50-74% assist getting off toilet  Tub Transfer: 0 - Activity does not occur(new admit)  Shower Transfer: 1 - Total assistance, pt. expends less than 25% effort,  , Assessment: Pt is 76 y.o female with above mentioned deficits impacting ability to complete ADL's IND'ly and safely. Pt would benefit from skilled OT to increase level of functioning. Speech therapy: FIMS: Comprehension: 5 - Patient understands basic needs (hungry/hot/pain)  Expression: 5 - Expresses basic ideas/needs only (hungry/hot/pain)  Social Interaction: 5 - Patient is appropriate with supervision/cues  Problem Solvin - Patient able to solve simple/routine tasks  Memory: 5 - Patient requires prompting with stress/unfamiliar situations      Lab/X-ray studies reviewed, analyzed and discussed with patient and staff:   Recent Results (from the past 24 hour(s))   POCT Glucose    Collection Time: 19  4:23 PM   Result Value Ref Range    POC Glucose 192 (H) 60 - 115 mg/dl    Performed on ACCU-CHEK    Hemoglobin A1C    Collection Time: 19  6:19 PM   Result Value Ref Range    Hemoglobin A1C 7.0 (H) 4.8 - 5.9 %   T4, Free    Collection Time: 19  6:19 PM   Result Value Ref Range    T4 Free 1.88 (H) 0.84 - 1.68 ng/dL   TSH without Reflex    Collection Time: 19  6:19 PM   Result Value Ref Range    TSH 1.690 0.440 - 3.860 uIU/mL   POCT Glucose    Collection Time: 19  9:48 PM   Result Value Ref Range    POC Glucose 124 (H) 60 - 115 mg/dl    Performed on ACCU-CHEK    POCT Glucose    Collection Time: 04/10/19  6:55 AM   Result Value Ref Range    POC Glucose 107 60 - 115 mg/dl    Performed on ACCU-CHEK        Ct Head Wo   2019  Impression: Mild cerebral atrophy. No acute findings. Ct Lumbar Spine Wo   2019   NO EVIDENCE OF FRACTURE. MINIMAL ANTEROLISTHESIS L2-3. DEGENERATIVE CHANGES, SEE INDIVIDUAL LEVELS ABOVE. LUMBAR CANAL STENOSIS L3-4. CONSIDER CORRELATION WITH MRI. Previous extensive, complex labs, notes and diagnostics reviewed and analyzed. ALLERGIES:    Allergies as of 04/04/2019 - Review Complete 04/04/2019   Allergen Reaction Noted    Sulfa antibiotics Rash 09/22/2017      (please also verify by checking MAR)      I reviewed her [facility] prescription monitoring service data sheets in hopes of eliminating polypharmacy and weaning to the lowest effective dose of pain medications and eliminating the concomitant use of benzodiazepines. I see  medications of concern. I see habits of combining sedatives and narcotics. Complex Physical Medicine & Rehab Issues Assess & Plan:   1. Severe abnormality of gait and mobility and impaired self-care and ADL's secondary to progressive  flareup of osteoarthritis . Functional and medical status reassessed regarding patients ability to participate in therapies and patient found to be able to participate in acute intensive comprehensive inpatient rehabilitation program including PT/OT to improve balance, ambulation, ADLs, and to improve the P/AROM. Therapeutic modifications regarding activities in therapies, place, amount of time per day and intensity of therapy made daily. In bed therapies or bedside therapies prn.   2. Bowel opiate-related constipation and Bladder dysfunction:  frequent toileting, ambulate to bathroom with assistance, check post void residuals. Check for C.difficile x1 if >2 loose stools in 24 hours, continue bowel & bladder program.  Monitor bowel and bladder function. Lactinex 2 PO every AC. MOM prn, Brown Bomb prn, Glycerin suppository prn, enema prn. Patient's work on independence with bathing hygiene and toileting. She is not having her  wipe her bottom and we're fearful that that type of dependency will create a unnecessary burden on family.   We would like her to become more independent in that I will offer assistive devices Hypothyroid-monitor symptoms, titrate Synthroid  5. Morbid obesity due to excess calories -add diabetic and obesity related dietary counseling, restrict carbohydrates and diet  6. Essential hypertension, veer peripheral artery disease,  HLD (hyperlipidemia)-vital signs every shift, dose and titrate cardiac medications accordingly to include Toprol, recheck CBC BMP and consult hospitalist for backup medical  7. Severe active chronic Anxiety and depression with learned helplessness behaviors-emotional support daily, add rec therapy  rehabilitation psychology and trazodone consider SSRI-wean off of Xanax, consult rehabilitation psychology and health psychiatry regarding alternative measures for depression and anxiety, add vitamin B12 shot, gradual tapering off (for long term use of benzodiazepines, the recommendations are 20% of the dose/week). No other medications are recommended at this time, since the patient declines any. 8.   Bronchitis-pulse ox checks every shift, dose and titrate oxygen and aerosol treatments  9.   OA (osteoarthritis) with severe osteoarthritic and severe neuropathic pain-titrate Tylenol versus Norco, always use the lowest effective dose of medication, add Desyrel at night for neuropathic pain, add topical agents, check [facility] prescription monitoring database to review for overuse or abuse  10. Acute on chronic renal failure-recheck CBC BMP and limit toxic medications discontinue milk of magnesia  11. Acute UTI-check postvoid residuals, check urinary CNS, titrate Macrobid, monitor for urinary retention, improved peroneal care  12. Severe Yeast rash in her abdominal folds and skin folds-add Mycostatin powder  13. High risk opiate use-with active chronic benzodiazepine use check [facility] physician monitoring database, use nonnarcotic measures for pain when appropriate.   He is lowest effective dose with informed consent-gradual tapering off (for long term use of benzodiazepines, the recommendations are 20% of the dose/week). No other medications are recommended at this time, since the patient declines any.        Jovany Rob D.O., PM&R     Attending    [address]

## 2019-04-11 LAB
GLUCOSE BLD-MCNC: 147 MG/DL (ref 60–115)
GLUCOSE BLD-MCNC: 197 MG/DL (ref 60–115)
GLUCOSE BLD-MCNC: 221 MG/DL (ref 60–115)
GLUCOSE BLD-MCNC: 247 MG/DL (ref 60–115)
PERFORMED ON: ABNORMAL

## 2019-04-11 PROCEDURE — 97112 NEUROMUSCULAR REEDUCATION: CPT

## 2019-04-11 PROCEDURE — 97140 MANUAL THERAPY 1/> REGIONS: CPT

## 2019-04-11 PROCEDURE — 99232 SBSQ HOSP IP/OBS MODERATE 35: CPT | Performed by: PHYSICIAN ASSISTANT

## 2019-04-11 PROCEDURE — APPSS15 APP SPLIT SHARED TIME 0-15 MINUTES: Performed by: PHYSICIAN ASSISTANT

## 2019-04-11 PROCEDURE — 6370000000 HC RX 637 (ALT 250 FOR IP): Performed by: PHYSICAL MEDICINE & REHABILITATION

## 2019-04-11 PROCEDURE — 97535 SELF CARE MNGMENT TRAINING: CPT

## 2019-04-11 PROCEDURE — 1180000000 HC REHAB R&B

## 2019-04-11 PROCEDURE — 6370000000 HC RX 637 (ALT 250 FOR IP): Performed by: INTERNAL MEDICINE

## 2019-04-11 PROCEDURE — 2700000000 HC OXYGEN THERAPY PER DAY

## 2019-04-11 PROCEDURE — 99232 SBSQ HOSP IP/OBS MODERATE 35: CPT | Performed by: INTERNAL MEDICINE

## 2019-04-11 PROCEDURE — 99233 SBSQ HOSP IP/OBS HIGH 50: CPT | Performed by: PHYSICAL MEDICINE & REHABILITATION

## 2019-04-11 PROCEDURE — 97110 THERAPEUTIC EXERCISES: CPT

## 2019-04-11 PROCEDURE — 97116 GAIT TRAINING THERAPY: CPT

## 2019-04-11 PROCEDURE — 97530 THERAPEUTIC ACTIVITIES: CPT

## 2019-04-11 RX ORDER — NICOTINE POLACRILEX 4 MG
15 LOZENGE BUCCAL PRN
Status: DISCONTINUED | OUTPATIENT
Start: 2019-04-11 | End: 2019-04-20 | Stop reason: HOSPADM

## 2019-04-11 RX ORDER — DEXTROSE MONOHYDRATE 50 MG/ML
100 INJECTION, SOLUTION INTRAVENOUS PRN
Status: DISCONTINUED | OUTPATIENT
Start: 2019-04-11 | End: 2019-04-20 | Stop reason: HOSPADM

## 2019-04-11 RX ORDER — DEXTROSE MONOHYDRATE 25 G/50ML
12.5 INJECTION, SOLUTION INTRAVENOUS PRN
Status: DISCONTINUED | OUTPATIENT
Start: 2019-04-11 | End: 2019-04-20 | Stop reason: HOSPADM

## 2019-04-11 RX ADMIN — ANALGESIC BALM: 1.74; 4.06 OINTMENT TOPICAL at 21:48

## 2019-04-11 RX ADMIN — ALPRAZOLAM 0.5 MG: 0.5 TABLET ORAL at 21:47

## 2019-04-11 RX ADMIN — ACETAMINOPHEN 650 MG: 325 TABLET ORAL at 03:49

## 2019-04-11 RX ADMIN — HYDROCODONE BITARTRATE AND ACETAMINOPHEN 1 TABLET: 5; 325 TABLET ORAL at 00:04

## 2019-04-11 RX ADMIN — METOPROLOL SUCCINATE 100 MG: 100 TABLET, EXTENDED RELEASE ORAL at 09:30

## 2019-04-11 RX ADMIN — MICONAZOLE NITRATE: 20 POWDER TOPICAL at 12:53

## 2019-04-11 RX ADMIN — NITROFURANTOIN MONOHYDRATE/MACROCRYSTALLINE 100 MG: 25; 75 CAPSULE ORAL at 06:59

## 2019-04-11 RX ADMIN — ANALGESIC BALM: 1.74; 4.06 OINTMENT TOPICAL at 15:32

## 2019-04-11 RX ADMIN — DOCUSATE SODIUM 100 MG: 100 CAPSULE, LIQUID FILLED ORAL at 08:30

## 2019-04-11 RX ADMIN — ACETAMINOPHEN 650 MG: 325 TABLET ORAL at 15:31

## 2019-04-11 RX ADMIN — ANALGESIC BALM: 1.74; 4.06 OINTMENT TOPICAL at 10:00

## 2019-04-11 RX ADMIN — HYDROCODONE BITARTRATE AND ACETAMINOPHEN 1 TABLET: 5; 325 TABLET ORAL at 19:20

## 2019-04-11 RX ADMIN — LEVOTHYROXINE SODIUM 125 MCG: 125 TABLET ORAL at 06:58

## 2019-04-11 RX ADMIN — Medication 100 MG: at 12:34

## 2019-04-11 RX ADMIN — Medication 100 MG: at 08:30

## 2019-04-11 RX ADMIN — ALPRAZOLAM 0.25 MG: 0.25 TABLET ORAL at 06:58

## 2019-04-11 RX ADMIN — ACETAMINOPHEN 650 MG: 325 TABLET ORAL at 21:46

## 2019-04-11 RX ADMIN — LACTOBACILLUS TAB 1 TABLET: TAB at 09:30

## 2019-04-11 RX ADMIN — HYDROCODONE BITARTRATE AND ACETAMINOPHEN 1 TABLET: 5; 325 TABLET ORAL at 12:35

## 2019-04-11 RX ADMIN — INSULIN GLARGINE 10 UNITS: 100 INJECTION, SOLUTION SUBCUTANEOUS at 21:39

## 2019-04-11 RX ADMIN — OXYMETAZOLINE HYDROCHLORIDE 2 SPRAY: 5 SPRAY NASAL at 21:50

## 2019-04-11 ASSESSMENT — PAIN DESCRIPTION - LOCATION
LOCATION: NECK;ARM;SHOULDER

## 2019-04-11 ASSESSMENT — PAIN SCALES - GENERAL
PAINLEVEL_OUTOF10: 10
PAINLEVEL_OUTOF10: 9
PAINLEVEL_OUTOF10: 9
PAINLEVEL_OUTOF10: 6
PAINLEVEL_OUTOF10: 9
PAINLEVEL_OUTOF10: 9
PAINLEVEL_OUTOF10: 6
PAINLEVEL_OUTOF10: 6
PAINLEVEL_OUTOF10: 0

## 2019-04-11 ASSESSMENT — PAIN DESCRIPTION - DESCRIPTORS
DESCRIPTORS: SORE

## 2019-04-11 ASSESSMENT — PAIN DESCRIPTION - FREQUENCY
FREQUENCY: CONTINUOUS
FREQUENCY: CONTINUOUS

## 2019-04-11 ASSESSMENT — PAIN DESCRIPTION - ORIENTATION
ORIENTATION: LEFT
ORIENTATION: LEFT

## 2019-04-11 ASSESSMENT — PAIN DESCRIPTION - PAIN TYPE
TYPE: CHRONIC PAIN
TYPE: CHRONIC PAIN

## 2019-04-11 NOTE — PROGRESS NOTES
Dayo Shafer is a 76 y.o. female patient.   Chief complains uncontrolled diabetes /hypothyroidism    Current Facility-Administered Medications   Medication Dose Route Frequency Provider Last Rate Last Dose    docusate sodium (COLACE) capsule 100 mg  100 mg Oral BID Mee Scullin, DO        lactulose (CHRONULAC) 10 GM/15ML solution 20 g  20 g Oral BID PRN Mee Scullin, DO        oxymetazoline (AFRIN) 0.05 % nasal spray 2 spray  2 spray Each Nare BID Mee Scullin, DO        sodium chloride (OCEAN, BABY AYR) 0.65 % nasal spray 1 spray  1 spray Each Nare Q2H PRN Mee Scullin, DO        insulin glargine (LANTUS) injection vial 10 Units  10 Units Subcutaneous Nightly Issac Grewal MD        ALPRAZolam Manny Darien) tablet 0.25 mg  0.25 mg Oral BID AC Mee Scullin, DO   0.25 mg at 04/10/19 1444    ALPRAZolam (XANAX) tablet 0.5 mg  0.5 mg Oral Nightly Mee Scullin, DO   0.5 mg at 04/09/19 2201    insulin lispro (HUMALOG) injection vial 0-6 Units  0-6 Units Subcutaneous TID WC Issac Grewal MD   1 Units at 04/09/19 1632    insulin lispro (HUMALOG) injection vial 0-3 Units  0-3 Units Subcutaneous Nightly Issac Grewal MD        hydrALAZINE (APRESOLINE) tablet 25 mg  25 mg Oral Q6H PRN MARCO Harvey - CNP        cyanocobalamin injection 1,000 mcg  1,000 mcg Intramuscular Weekly Mee Scullin, DO   1,000 mcg at 04/05/19 1226    coenzyme Q10 capsule 100 mg  100 mg Oral BID AC Mee Scullin, DO   100 mg at 04/10/19 1906    lactobacillus acidophilus [facility]) 1 tablet  1 tablet Oral Daily Pavithra Chisholm MD   1 tablet at 04/10/19 0930    levothyroxine (SYNTHROID) tablet 125 mcg  125 mcg Oral Daily Pavithra Chisholm MD   125 mcg at 04/10/19 0551    metoprolol succinate (TOPROL XL) extended release tablet 100 mg  100 mg Oral Daily Pavithra Chisholm MD   100 mg at 04/10/19 1024    acetaminophen (TYLENOL) tablet 650 mg  650 mg Oral Q4H PRN Mee Ferguson DO   650 mg at 04/10/19 1901    lidocaine 4 % external patch 3 patch  3 patch Transdermal Daily Mee Scullin, DO   3 patch at 04/09/19 0844    HYDROcodone-acetaminophen (NORCO) 5-325 MG per tablet 1 tablet  1 tablet Oral Q4H PRN Josephus Kobe, DO   1 tablet at 04/10/19 1444    analgesic ointment ointment   Topical TID Mee Scullin, DO        miconazole (MICOTIN) 2 % powder   Topical BID Mee Scullin, DO        ammonium lactate (LAC-HYDRIN) 12 % lotion   Topical PRN Mee Scullin, DO        nitrofurantoin (macrocrystal-monohydrate) (MACROBID) capsule 100 mg  100 mg Oral BID AC Mee Scullin, DO   100 mg at 04/10/19 1443     Allergies   Allergen Reactions    Sulfa Antibiotics Rash     Principal Problem:    Abnormality of gait and mobility due to Impaired Mobility secondary to Flare up of OA. [facility] Re[facility] admit 4/04/19. Active Problems:    Peripheral angiopathy (HCC)    Diabetic neuropathy associated with diabetes mellitus due to underlying condition ([facility] [address]    Ataxia    Hypothyroid    Morbid obesity due to excess calories (HCC)    Essential hypertension    Benzodiazepine dependence (HCC)    Anxiety    Bronchitis    OA (osteoarthritis)    Acute on chronic renal failure (HCC)    Hx of Chronic ulcer of left foot (HCC)    HLD (hyperlipidemia)    Fracture of right tibia and fibula    Uncontrolled type 2 diabetes mellitus with hyperglycemia (HCC)    Nocturnal hypoxemia due to obesity    Nocturnal hypoxia    Sleep apnea  Resolved Problems:    * No resolved hospital problems. *    Blood pressure (!) 151/68, pulse 61, temperature 97 °F (36.1 °C), resp. rate 15, height 5' 6.5\" (1.689 m), weight 275 lb (124.7 kg), SpO2 94 %. Subjective:  Symptoms:  Stable. Diet:  Adequate intake. Activity level: Impaired due to pain. Objective:  General Appearance:  Comfortable. Vital signs: (most recent): Blood pressure (!) 151/68, pulse 61, temperature 97 °F (36.1 °C), resp.  rate 15, height 5' 6.5\" (1.689 m), weight 275 lb (124.7 kg), SpO2 94 %. Vital signs are normal.    HEENT: Normal HEENT exam.    Lungs:  Breath sounds clear to auscultation. Heart: S1 normal and S2 normal.    Extremities: Decreased range of motion. Neurological: Patient is alert. Skin:  Warm. Results for Kee Toscano (MRN [id number]) as of 4/10/2019 22:30   Ref. Range 4/9/2019 16:23 4/9/2019 18:19 4/9/2019 21:48 4/10/2019 06:55 4/10/2019 21:34   POC Glucose Latest Ref Range: 60 - 115 mg/dl 192 (H)  124 (H) 107 216 (H)     Results for Kee Toscano (MRN [id number]) as of 4/10/2019 22:30   Ref. Range 4/9/2019 18:19   Hemoglobin A1C Latest Ref Range: 4.8 - 5.9 % 7.0 (H)   TSH Latest Ref Range: 0.440 - 3.860 uIU/mL 1.690   T4 Free Latest Ref Range: 0.84 - 1.68 ng/dL 1.88 (H)       Assessment:    Condition: In stable condition. Improving. (Uncontrolled diabetes   hypothyroidism   ckd   Obesity ). Plan:   (Lower lantus to 10 units hs plus Humalog coverage   Continue synthroid 125 mcg daily ).        Myrna Watters MD  4/10/2019

## 2019-04-11 NOTE — PROGRESS NOTES
MIN difficulty. Pt req'ed opposite UE on knee for support when reaching. Pt engaged in dynamic trunk control activity in all planes to increase core strength for increased I with ADL's, IADL's and transfers. Pt sat EOM, able to complete 2 minutes of dynamic trunk control to flex at core to R lateral side and then to flex at L lateral side. Pt able to complete 2 minutes leaning posteriorly and then flexing forward. Pt with RB's as needed. Plan   Plan  Times per week: 5-7x/ wk   Plan weeks: 2-3  Current Treatment Recommendations: Strengthening, Balance Training, Functional Mobility Training, Endurance Training, Wheelchair Mobility Training, Safety Education & Training, Pain Management, Patient/Caregiver Education & Training, Equipment Evaluation, Education, & procurement, Self-Care / ADL, Home Management Training  Plan Comment: Con't OT POC for d/c on 4-21-19  G-Code     OutComes Score                                                  AM-PAC Score             Goals  Patient Goals   Patient goals : \"To go home. \"        Therapy Time   Individual Concurrent Group Co-treatment   Time In 1130         Time Out 1200         Minutes 30               Electronically signed by Maco Muse on 4/11/19 at 12:59 PM    BOB Lara

## 2019-04-11 NOTE — PROGRESS NOTES
[facility]   Acute  Rehabilitation  RECREATIONAL THERAPY  NOTE    Date:  4/11/2019        Patient Name: Eileen Gaspar       MRN: [id number]        YOB: 1950 (77 y.o.)       Gender: female  Diagnosis: Impaired mobility 2° to flare up of O. A. Referring Practitioner: Dr. Moises Thacker     RESTRICTIONS/PRECAUTIONS:  Restrictions/Precautions: Fall Risk  Vision: Impaired  Hearing: Within functional limits    TIME:  7787-1557 and 2555-8756  Two sessions    INDIVIDUAL TREATMENT SESSION GOAL  To demonstrate leisure independence and personal enjoyment through participation in appropriate leisure opportunities. RECREATION PARTICIPATION  To maintain or increase: Motivation and activity level through successful participation and Positive attitudes leading to a healthy leisure lifestyle    FUNCTIONAL LEISURE SKILLS  Socialization/Emotional Skills  Increase adjustment to disability/illness    Physical Skills  Increase relaxation response     Cognitive Skills  Increase concentration attention span      METHOD  Structured leisure activity in a one on one session. INTERVENTION UTILIZED: Guided Imagery Introduction    Patient INDEPENDENTassist in interventions utilized. COMMENTS: Pt was educated on relaxation techniques. Pt open to trying guided imagery. Pt completed one short guided imagery session which was of a beach scene with deep breathing and some mindfulness to shift focus away from pain and stress.      Electronically signed by: Abhilash Arriaga, Date: 4/11/2019  Electronically signed by Jennifer Doe on 4/11/2019 at 12:01 PM

## 2019-04-11 NOTE — PROGRESS NOTES
Physical Therapy Rehab Treatment Note  Facility/Department: [facility]  Room: R262/R262-01       NAME: Gil Piper  : 1950 (88 y.o.)  MRN: [id number]  CODE STATUS: DNR-CCA    Date of Service: 2019  Chart Reviewed: Yes  Family / Caregiver Present: No  General Comment  Comments: agreeable to therapy. Restrictions:  Restrictions/Precautions: Fall Risk       SUBJECTIVE: Subjective: \"they are going to x-ray my left arm today\"  Pain Screening  Patient Currently in Pain: Yes  Pre Treatment Pain Screening  Pain at present: 9  Scale Used: Numeric Score  Intervention List: Patient able to continue with treatment    Post Treatment Pain Screenin  Pain Assessment  Pain Assessment: 0-10  Pain Level: 9  Pain Type: Chronic pain  Pain Location: Neck;Arm; Shoulder  Pain Orientation: Left  Pain Descriptors: Sore  Pain Frequency: Continuous    OBJECTIVE:   Overall Orientation Status: Within Functional Limits    Exercises  Quad Sets: x 20  Gluteal Sets: x 20  Ankle Pumps: x 20  Core Strengthening: seated fwd flexion with hha with legs on ball. Physio/Swiss Ball: lateral motions, knee flexion. lateral stretches, heel presses into ball with knee presses. pt needed reminders to focus on keeping neutral position of legs and not external rotation. Comments: pt presenting with spasms in right leg during legs on ball exercise. pt says this happens when she is tired and ready to rest. also height of ball seemed to increase spasm she states. Manual therapy  Other: gastroc stretches to marty. pt able to get more range on right but very guarded. left presents with spasms. pt grimaces with motions but agreed to continue. ASSESSMENT/COMMENTS: pt unable to ambulate this pm due to extreme fatigue but did tolerate therex and stretches. PLAN OF CARE/Safety:   Safety Devices  Type of devices:  All fall risk precautions in place  Therapy Time:   Individual   Time In 1400   Time Out 1430   Minutes 30     Minutes:30 Transfer/Bed mobility trainin      Gait trainin      Neuro re education:0     Therapeutic ex:30      Madiha Sorenson PTA, 19 at 2:26 PM

## 2019-04-11 NOTE — PROGRESS NOTES
Subjective: The patient complains of severe  acute  on chronic right shoulder pain and bilateral lower extremity weakness partially relieved by  rest, PT, OT and exacerbated by activity and ambulation. I am concerned about patients learned helplessness behaviors and poor motivation with her own care at times as reported by the nurses. I am still concerned about her active chronic high-dose benzodiazepine use and lack of insight into her deficits. Though she will say she went off of them she will refuse to get off of them. I am concerned about her reported manipulative behaviors with staff regarding the medications. I do feel that she has at least a psychological as well as a physical dependence equating to a addiction to them. She is quite resistant to any advice. Part of that problem is that she is hypoxic for a good part of the night. Almost 2 hours a night she is getting low O2 likely secondary to her morbid obesity body habitus as well as her multiple sedatives. She needs to be compliant on her oxygen at night elevate the head of bed and transition to sleep apnea program as an outpatient. I have consult with pulmonology. I spent quite a bit of time talking to her  about this. She seems to want to be in denial and toxin circles when I try to have her address these problems seriously. ROS x10: The patient also complains of severely impaired mobility and activities of daily living. Otherwise no new problems with vision, hearing, nose, mouth, throat, dermal, cardiovascular, GI, , pulmonary, musculoskeletal, psychiatric or neurological. See Rehab H&P on Rehab chart dated . Vital signs:  BP (!) 151/68   Pulse 61   Temp 97 °F (36.1 °C)   Resp 15   Ht 5' 6.5\" (1.689 m)   Wt 275 lb (124.7 kg)   LMP  (LMP Unknown)   SpO2 94%   BMI 43.72 kg/m²   I/O:   PO/Intake:  fair PO intake, no problems observed or reported.     Bowel/Bladder:  continent, no problems noted.  General:  Patient is well developed, adequately nourished, non-obese and     well kempt. HEENT:    PERRLA, hearing intact to loud voice, external inspection of ear     and nose benign. Inspection of lips, tongue and gums benign  Musculoskeletal: No significant change in strength or tone. All joints stable. Inspection and palpation of digits and nails show no clubbing,       cyanosis or inflammatory conditions. Neuro/Psychiatric: Affect: flat but pleasant. Alert and oriented to person, place and     situation. No significant change in deep tendon reflexes or     sensation  Lungs:  Diminished, CTA-B. Respiration effort is normal at rest.     Heart:   S1 = S2, RRR. No loud murmurs. Abdomen:  Obese, Soft, non-tender, no enlargement of liver or spleen. Extremities:  No significant lower extremity edema diffusely superficial tenderness. Decreased shoulder    range of motion especially on the right  Skin:   Intact to general survey, no visualized or palpated problems. Rehabilitation:  Physical therapy: FIMS:  Bed Mobility: Scooting: Stand by assistance    Transfers: Sit to Stand: Minimal Assistance  Stand to sit: Contact guard assistance  Bed to Chair: Contact guard assistance(with [employer]), Ambulation 1  Surface: carpet  Device: Rolling Walker(bariatric)  Other Apparatus: (B knee braces)  Assistance: Minimal assistance  Quality of Gait: Decreased R knee stability with 1st ambulation. VCs and demo for gait sequence with [employer]. Distance: 10ftx3  Comments: No WC follow. Pt able to ambulate to destination.   ,      FIMS: Bed, Chair, Wheel Chair: 3 - Requires 25-49% assistance to transfer  Walk: 1 - Total Assistance Walks < 50 feet OR requires two or more people OR patient performs < 25% of locomotion effort  Distance Walked: 10ft  Stairs: 0 - Activity Does not Occur ( 0 only for the admission assessment), PT Equipment Recommendations  Other: TBD, Assessment: Improved ease with sit to stand this PM. Pt fatigues quickly with gait training. Occupational therapy: FIMS:  Eatin - Patient feeds self  Groomin - Requires setup/cues to do all tasks  Bathing: 3 - Able to bathe 5-7 areas  Dressing-Upper: 5 - Requires setup/supervision/cues and/or requires assist with presthesis/brace only  Dressing-Lower: 2 - Requires assist with 4-5 parts of dressing  Toiletin - Able to perform 1 task only (e.g. hygiene)  Toilet Transfer: 3 - Requires 25-49% assist getting off toilet  Tub Transfer: 0 - Activity does not occur(new admit)  Shower Transfer: 4 - Minimal contact assistance, pt. expends 75% or more effort(use of grab bars),  , Assessment: Pt is 76 y.o female with above mentioned deficits impacting ability to complete ADL's IND'ly and safely. Pt would benefit from skilled OT to increase level of functioning. Speech therapy: FIMS: Comprehension: 5 - Patient understands basic needs (hungry/hot/pain)  Expression: 5 - Expresses basic ideas/needs only (hungry/hot/pain)  Social Interaction: 5 - Patient is appropriate with supervision/cues  Problem Solvin - Patient able to solve simple/routine tasks  Memory: 5 - Patient requires prompting with stress/unfamiliar situations      Lab/X-ray studies reviewed, analyzed and discussed with patient and staff:   Recent Results (from the past 24 hour(s))   POCT Glucose    Collection Time: 04/10/19  9:34 PM   Result Value Ref Range    POC Glucose 216 (H) 60 - 115 mg/dl    Performed on ACCU-CHEK    POCT Glucose    Collection Time: 19  5:55 AM   Result Value Ref Range    POC Glucose 147 (H) 60 - 115 mg/dl    Performed on ACCU-CHEK        Ct Head Wo   2019  Impression: Mild cerebral atrophy. No acute findings. Ct Lumbar Spine Wo   2019   NO EVIDENCE OF FRACTURE. MINIMAL ANTEROLISTHESIS L2-3. DEGENERATIVE CHANGES, SEE INDIVIDUAL LEVELS ABOVE. LUMBAR CANAL STENOSIS L3-4.  CONSIDER CORRELATION WITH MRI.       1 hour 40 minutes less than 89% on nocturnal pulse oximetry study. Study cut short because of severe hypoxia patient placed on O2    Previous extensive, complex labs, notes and diagnostics reviewed and analyzed. ALLERGIES:    Allergies as of 04/04/2019 - Review Complete 04/04/2019   Allergen Reaction Noted    Sulfa antibiotics Rash 09/22/2017      (please also verify by checking MAR)      I reviewed her [facility] prescription monitoring service data sheets in hopes of eliminating polypharmacy and weaning to the lowest effective dose of pain medications and eliminating the concomitant use of benzodiazepines. I see  medications of concern. I see habits of combining sedatives and narcotics. Complex Physical Medicine & Rehab Issues Assess & Plan:   1. Severe abnormality of gait and mobility and impaired self-care and ADL's secondary to progressive  flareup of osteoarthritis . Functional and medical status reassessed regarding patients ability to participate in therapies and patient found to be able to participate in acute intensive comprehensive inpatient rehabilitation program including PT/OT to improve balance, ambulation, ADLs, and to improve the P/AROM. Therapeutic modifications regarding activities in therapies, place, amount of time per day and intensity of therapy made daily. In bed therapies or bedside therapies prn.   2. Bowel opiate-related constipation and Bladder dysfunction:  frequent toileting, ambulate to bathroom with assistance, check post void residuals. Check for C.difficile x1 if >2 loose stools in 24 hours, continue bowel & bladder program.  Monitor bowel and bladder function. Lactinex 2 PO every AC. MOM prn, Brown Bomb prn, Glycerin suppository prn, enema prn. Patient's work on independence with bathing hygiene and toileting. She is not having her  wipe her bottom and we're fearful that that type of dependency will create a unnecessary burden on family.   We would like her to become more independent in that I will offer assistive devices and training. 3. Severe lateral shoulder pain neuropathic and eneralized OA pain: reassess pain every shift and prior to and after each therapy session, give prn Tylenol and  titrate opiates I was using lowest effective dose, modalities prn in therapy, Lidoderm, K-pad prn. Offer injection into the lateral shoulders. Patient is to wean off of her benzodiazepines. 4. Skin healing and breakdown risk:  continue pressure relief program.  Daily skin exams and reports from nursing. 5. Severe Fatigue due to nutritional and hydration deficiency:  continue to monitor I&Os, calorie counts prn, dietary consult prn. And vitamin B12 vitamin D and CoQ10  Part of patient's rehabilitation program I am encouraging them to attend game day today under recreational therapy to improve their dexterity, cognition, socialization and endurance. 6. Acute episodic insomnia with situational adjustment disorder: Slow titration off of Xanax. prn Ambien, monitor for day time sedation. 7. Falls risk elevated:  patient to use call light to get nursing assistance to get up, bed and chair alarm. 8. Elevated DVT risk: progressive activities in PT, continue prophylaxis SANTIAGO hose, elevation  . 9. Complex discharge planning:  Discharge April 21, 2019 home with her  did help from her daughters that live far away. He will also have home health care. Weekly team meeting  Monday to assess progress towards goals, discuss and address social, psychological and medical comorbidities and to address difficulties they may be having progressing in therapy. Patient and family education is in progress. The patient is to follow-up with their family physician after discharge. Complex Active General Medical Issues that complicate care Assess & Plan:    1. Peripheral angiopathy,   Hx of Chronic ulcer of left foot-0 pressure to her heels bilaterally, at E T-max, add podiatry consult  2.    Diabetic neuropathy associated with diabetes mellitus due to underlying condition -low carb diet, add Glucotrol, Lantus, titrate Humalog low carbohydrate diet add diabetic education  3. Ataxia-focus on balance in physical therapy  4. Hypothyroid-monitor symptoms, titrate Synthroid  5. Morbid obesity due to excess calories -add diabetic and obesity related dietary counseling, restrict carbohydrates and diet  6. Essential hypertension, veer peripheral artery disease,  HLD (hyperlipidemia)-vital signs every shift, dose and titrate cardiac medications accordingly to include Toprol, recheck CBC BMP and consult hospitalist for backup medical  7. Severe active chronic Anxiety and depression with learned helplessness behaviors-emotional support daily, add rec therapy  rehabilitation psychology and trazodone consider SSRI-wean off of Xanax, consult rehabilitation psychology and health psychiatry regarding alternative measures for depression and anxiety, add vitamin B12 shot, gradual tapering off (for long term use of benzodiazepines, the recommendations are 20% of the dose/week). No other medications are recommended at this time, since the patient declines any. 8.   Bronchitis-pulse ox checks every shift, dose and titrate oxygen and aerosol treatments  9.   OA (osteoarthritis) with severe osteoarthritic and severe neuropathic pain-titrate Tylenol versus Norco, always use the lowest effective dose of medication, add Desyrel at night for neuropathic pain, add topical agents, check [address] prescription monitoring database to review for overuse or abuse  10. Acute on chronic renal failure-recheck CBC BMP and limit toxic medications discontinue milk of magnesia  11. Acute UTI-check postvoid residuals, check urinary CNS, titrate Macrobid, monitor for urinary retention, improved peroneal care  12. Severe Yeast rash in her abdominal folds and skin folds-add Mycostatin powder  13.  Significant nocturnal hypoxemia as well as High risk opiate use-with active chronic benzodiazepine use check [facility] monitoring database, use nonnarcotic measures for pain when appropriate. He is lowest effective dose with informed consent-gradual tapering off (for long term use of benzodiazepines, the recommendations are 20% of the dose/week). No other medications are recommended at this time, since the patient declines any. Consult pulmonology for sleep apnea to treat as an outpatient and further diagnostics in the meantime limit doses of opiates transition to non-benzodiazepine use and elevate head of bed with nasal cannula O2.        Shirlene Vallejo D.O., PM&R     Attending    [address]

## 2019-04-11 NOTE — PROGRESS NOTES
Endocrinology Progress Note    Assessment and Plan:   Assessment-  1. Type 2 insulin-dependent diabetes  2. Weakness, falls and deconditioning  3. Hypothyroidism    Plan-  1. Continue Lantus 10 units at bedtime  2. Start Tradjenta 5 mg by mouth daily  3. Monitor glycemic control closely  4. Continue Synthroid 125 µg by mouth daily    POC Glucose:   Recent Labs     04/09/19  2148 04/10/19  0655 04/10/19  2134 04/11/19  0555 04/11/19  1210   POCGLU 124* 107 216* 147* 247*     HGBA1C:  Recent Labs     04/09/19  1819   LABA1C 7.0*     CBC:   No results for input(s): WBC, HGB, PLT in the last 72 hours. CMP:  No results for input(s): NA, K, CL, CO2, BUN, CREATININE, GLUCOSE, CALCIUM, LABGLOM in the last 72 hours. CC: No chief complaint on file. Subjective: Interval History: Patient is a 80-year-old type II diabetic female admitted to our rehabilitation unit for strengthening and conditioning following an admission for gait abnormalities, weakness and fall. Glycemic control is improving on current insulin dosing regiment.   Postprandial hyperglycemia    Review of systems: denies polyuria, polydipsia, ABD pain, flank pain, N/V/D, or diaphoresis  Medications:   Scheduled Meds:   linagliptin  5 mg Oral Daily    docusate sodium  100 mg Oral BID    oxymetazoline  2 spray Each Nare BID    insulin glargine  10 Units Subcutaneous Nightly    ALPRAZolam  0.25 mg Oral BID AC    ALPRAZolam  0.5 mg Oral Nightly    insulin lispro  0-6 Units Subcutaneous TID WC    insulin lispro  0-3 Units Subcutaneous Nightly    cyanocobalamin  1,000 mcg Intramuscular Weekly    coenzyme Q10  100 mg Oral BID AC    lactobacillus acidophilus  1 tablet Oral Daily    levothyroxine  125 mcg Oral Daily    metoprolol succinate  100 mg Oral Daily    lidocaine  3 patch Transdermal Daily    analgesic ointment   Topical TID    miconazole   Topical BID     Continuous Infusions:    Objective:   Vitals: BP (!) 151/68   Pulse 61   Temp 97 °F (36.1 °C)   Resp 15   Ht 5' 6.5\" (1.689 m)   Wt 275 lb (124.7 kg)   LMP  (LMP Unknown)   SpO2 94%   BMI 43.72 kg/m²    Wt Readings from Last 3 Encounters:   04/04/19 275 lb (124.7 kg)   04/04/19 273 lb 2.4 oz (123.9 kg)   01/12/19 270 lb (122.5 kg)        General appearance: alert, appears stated age, cooperative, no distress and morbidly obese  Skin: Skin color, texture, turgor normal. No rashes or lesions. Neck: no lymphadenopathy  Lungs: clear to auscultation bilaterally  Heart: regular rate and rhythm, S1, S2 normal, no murmur, click, rub or gallop  Abdomen: soft, non-tender. Bowel sounds normal. No masses,  no organomegaly. Extremities: extremities normal, atraumatic, no cyanosis or edema    Patient Active Problem List:     Diabetes mellitus with diabetic peripheral angiopathy without gangrene, without long-term current use of insulin (HCC)     Peripheral angiopathy (HCC)     Non-healing surgical wound     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 without evidence of necrosis (CODE)     Diabetic neuropathy associated with diabetes mellitus due to underlying condition ([address]     Abscess of foot including toes, left     Unable to ambulate     Ataxia     Acute renal failure (ARF) (HCC)     Hypothyroid     Morbid obesity due to excess calories (HCC)     Essential hypertension     Benzodiazepine dependence (HCC)     Anxiety     Bronchitis     OA (osteoarthritis)     Acute on chronic renal failure (HCC)     Hx of Chronic ulcer of left foot (HCC)     HLD (hyperlipidemia)     Fracture of right tibia and fibula     Abnormality of gait and mobility due to Impaired Mobility secondary to Flare up of OA. [facility] Rehab admit 4/04/19.      Uncontrolled type 2 diabetes mellitus with hyperglycemia ([address]     Nocturnal hypoxemia due to obesity     Nocturnal hypoxia     Sleep apnea            Electronically signed by NAVIN Arnold on 4/11/2019 at 4:35 PM

## 2019-04-11 NOTE — PROGRESS NOTES
Physical Therapy Rehab Treatment Note  [facility]       NAME: Laney   : 1950 (76 y.o.)  MRN: [id number]  CODE STATUS: DNR-CCA    Date of Service: 2019  Chart Reviewed: Yes  Family / Caregiver Present: No  General Comment  Comments: pt perseverates about past.     Restrictions:  Restrictions/Precautions: Fall Risk       SUBJECTIVE: Subjective: pt states she is already tired from getting   Pain Screening  Patient Currently in Pain: Yes  Pre Treatment Pain Screening  Pain at present: 9  Scale Used: Numeric Score  Intervention List: Patient able to continue with treatment    Post Treatment Pain Screenin  Pain Assessment  Pain Assessment: 0-10  Pain Level: 9  Pain Type: Chronic pain  Pain Location: Neck;Arm; Shoulder  Pain Descriptors: Sore  Pain Frequency: Continuous    OBJECTIVE:   Overall Orientation Status: Within Functional Limits  Neuromuscular Education  NDT Treatment: Standing  Trunk Control: static standing holding onto ww. pt able to keep upright position and stand for about 30 to 60 seconds. pt able to perform this 2 x. heavy bracing on ww. Ambulation  Ambulation?: Yes  Ambulation 1  Surface: carpet  Device: Rolling Walker  Other Apparatus: (marty knees)  Assistance: Minimal assistance  Quality of Gait: increased weight bear through upper extremities, decreased right knee stability  Distance: 10 feet  Comments: pt with one instance of right knee buckling but was able to maintain upright position and not lose control         Activity Tolerance  Activity Tolerance: Patient Tolerated treatment well  Activity Tolerance: self-limiting from fear    Exercises  Quad Sets: x 20  Gluteal Sets: x 20  Ankle Pumps: x 20  Physio/Swiss Ball: lateral motions, knee flexion. lateral stretches, heel presses into ball with knee presses. pt needed reminders to focus on keeping neutral position of legs and not external rotation.   Manual therapy  Other: gastroc stretches

## 2019-04-12 LAB
GLUCOSE BLD-MCNC: 203 MG/DL (ref 60–115)
GLUCOSE BLD-MCNC: 210 MG/DL (ref 60–115)
GLUCOSE BLD-MCNC: 218 MG/DL (ref 60–115)
GLUCOSE BLD-MCNC: 237 MG/DL (ref 60–115)
PERFORMED ON: ABNORMAL

## 2019-04-12 PROCEDURE — 97116 GAIT TRAINING THERAPY: CPT

## 2019-04-12 PROCEDURE — 6360000002 HC RX W HCPCS: Performed by: PHYSICAL MEDICINE & REHABILITATION

## 2019-04-12 PROCEDURE — 97150 GROUP THERAPEUTIC PROCEDURES: CPT

## 2019-04-12 PROCEDURE — 6370000000 HC RX 637 (ALT 250 FOR IP): Performed by: INTERNAL MEDICINE

## 2019-04-12 PROCEDURE — 97535 SELF CARE MNGMENT TRAINING: CPT

## 2019-04-12 PROCEDURE — 6370000000 HC RX 637 (ALT 250 FOR IP): Performed by: PHYSICAL MEDICINE & REHABILITATION

## 2019-04-12 PROCEDURE — 97110 THERAPEUTIC EXERCISES: CPT

## 2019-04-12 PROCEDURE — 6370000000 HC RX 637 (ALT 250 FOR IP): Performed by: PHYSICIAN ASSISTANT

## 2019-04-12 PROCEDURE — 1180000000 HC REHAB R&B

## 2019-04-12 PROCEDURE — 99232 SBSQ HOSP IP/OBS MODERATE 35: CPT | Performed by: PHYSICAL MEDICINE & REHABILITATION

## 2019-04-12 RX ADMIN — ALPRAZOLAM 0.25 MG: 0.25 TABLET ORAL at 16:42

## 2019-04-12 RX ADMIN — INSULIN GLARGINE 10 UNITS: 100 INJECTION, SOLUTION SUBCUTANEOUS at 21:58

## 2019-04-12 RX ADMIN — Medication 100 MG: at 12:00

## 2019-04-12 RX ADMIN — Medication 100 MG: at 08:59

## 2019-04-12 RX ADMIN — ACETAMINOPHEN 650 MG: 325 TABLET ORAL at 16:42

## 2019-04-12 RX ADMIN — HYDROCODONE BITARTRATE AND ACETAMINOPHEN 1 TABLET: 5; 325 TABLET ORAL at 12:11

## 2019-04-12 RX ADMIN — LEVOTHYROXINE SODIUM 125 MCG: 125 TABLET ORAL at 06:41

## 2019-04-12 RX ADMIN — ALPRAZOLAM 0.25 MG: 0.25 TABLET ORAL at 06:42

## 2019-04-12 RX ADMIN — LACTOBACILLUS TAB 1 TABLET: TAB at 09:00

## 2019-04-12 RX ADMIN — OXYMETAZOLINE HYDROCHLORIDE 2 SPRAY: 5 SPRAY NASAL at 09:00

## 2019-04-12 RX ADMIN — LINAGLIPTIN 5 MG: 5 TABLET, FILM COATED ORAL at 08:59

## 2019-04-12 RX ADMIN — DOCUSATE SODIUM 100 MG: 100 CAPSULE, LIQUID FILLED ORAL at 09:00

## 2019-04-12 RX ADMIN — MICONAZOLE NITRATE: 20 POWDER TOPICAL at 09:01

## 2019-04-12 RX ADMIN — HYDROCODONE BITARTRATE AND ACETAMINOPHEN 1 TABLET: 5; 325 TABLET ORAL at 21:25

## 2019-04-12 RX ADMIN — CYANOCOBALAMIN 1000 MCG: 1000 INJECTION, SOLUTION INTRAMUSCULAR; SUBCUTANEOUS at 12:00

## 2019-04-12 RX ADMIN — DOCUSATE SODIUM 100 MG: 100 CAPSULE, LIQUID FILLED ORAL at 21:25

## 2019-04-12 RX ADMIN — HYDROCODONE BITARTRATE AND ACETAMINOPHEN 1 TABLET: 5; 325 TABLET ORAL at 01:54

## 2019-04-12 RX ADMIN — ALPRAZOLAM 0.5 MG: 0.5 TABLET ORAL at 21:24

## 2019-04-12 RX ADMIN — ACETAMINOPHEN 650 MG: 325 TABLET ORAL at 05:43

## 2019-04-12 RX ADMIN — METOPROLOL SUCCINATE 100 MG: 100 TABLET, EXTENDED RELEASE ORAL at 09:00

## 2019-04-12 RX ADMIN — OXYMETAZOLINE HYDROCHLORIDE 2 SPRAY: 5 SPRAY NASAL at 21:54

## 2019-04-12 ASSESSMENT — PAIN SCALES - GENERAL
PAINLEVEL_OUTOF10: 8
PAINLEVEL_OUTOF10: 5
PAINLEVEL_OUTOF10: 6
PAINLEVEL_OUTOF10: 5
PAINLEVEL_OUTOF10: 6
PAINLEVEL_OUTOF10: 8
PAINLEVEL_OUTOF10: 7
PAINLEVEL_OUTOF10: 4
PAINLEVEL_OUTOF10: 7

## 2019-04-12 ASSESSMENT — PAIN DESCRIPTION - LOCATION
LOCATION: ARM
LOCATION: ARM

## 2019-04-12 ASSESSMENT — PAIN DESCRIPTION - DESCRIPTORS
DESCRIPTORS: SORE
DESCRIPTORS: ACHING

## 2019-04-12 ASSESSMENT — PAIN DESCRIPTION - ORIENTATION
ORIENTATION: LEFT
ORIENTATION: LEFT

## 2019-04-12 NOTE — FLOWSHEET NOTE
notified per perfect serve of 1945 incident and patient being lowered to the floor by pca and patient and pca stating that she did not hit anything when being lowered to the floor. Dr. Colby Loredo stated that he would come and assess patient.

## 2019-04-12 NOTE — PROGRESS NOTES
Physical Therapy Rehab Treatment Note  Facility/Department: [facility]  Room: R262/R262-01       NAME: Raven Santiago  : 1950 (50 y.o.)  MRN: [id number]  CODE STATUS: DNR-CCA    Date of Service: 2019  Chart Reviewed: Yes  Family / Caregiver Present: No  General Comment  Comments: Xray of L arm ordered yesterday. Pt declined xray. Dr Ana Padron states she thinks is musculature and arthritis in the neck. Ok'd pt to cont with therapies. Restrictions:  Restrictions/Precautions: Fall Risk       SUBJECTIVE: Subjective: Pt states she talked to her ortho Dr on the phone and he told her she could transfer to the bed and toilet without the brace on. Pt states she tried that last night with nursing and she fell in the bathroom. Pt states she was  not hurt just a few scrapes. States she is a little sore from all the exercise yesterday. OT states pt walked to the bathroom with the [employer].    Pain Screening  Patient Currently in Pain: Yes  Pre Treatment Pain Screening  Pain at present: 6  Intervention List: Patient able to continue with treatment;Patient declined any intervention    Post Treatment Pain Screening:  Pain Assessment  Pain Level: 5  Pain Location: Arm  Pain Orientation: Left  Pain Descriptors: Aching  Non-Pharmaceutical Pain Intervention(s): Repositioned  OBJECTIVE:              Bed mobility  Rolling to Left: Stand by assistance  Rolling to Right: Stand by assistance  Supine to Sit: Stand by assistance  Sit to Supine: Stand by assistance  Comment: Increased effort and use of bed rail. Transfers  Sit to Stand: Minimal Assistance  Stand to sit: Contact guard assistance  Bed to Chair: Contact guard assistance  Comment: VCs to reach back with R UE. Ambulation 1  Surface: level tile  Device: Rolling Walker  Other Apparatus: (B knee braces)  Assistance: Minimal assistance  Quality of Gait: Knees stable. Fatigues quickly. Heavy UE support through [employer].     Distance: 4-5 steps WC to bed with side stepping +1         Exercises  Straight Leg Raise: x10  Quad Sets: x 20  Heelslides: x20  Gluteal Sets: x 20  Hip Abduction: supine x20  Knee Short Arc Quad: x20  Ankle Pumps: x 20  Physio/Swiss Ball: Seated DLS LAQ and marches Z46 ea. Supine abd isometrics x20. ASSESSMENT/COMMENTS:  Assessment: Pt tolerated treatment well. Requires redirection to focus on her progress and task at hand. PLAN OF CARE/Safety:   Plan Comment: Cont per POC.       Therapy Time:   Individual   Time In 0900   Time Out 1000   Minutes 60     Minutes:      Transfer/Bed mobility training:10      Gait training:10      Neuro re education:0     Therapeutic ex:40    Francisco J Tolbert PTA, 04/12/19 at 12:17 PM

## 2019-04-12 NOTE — FLOWSHEET NOTE
Dr. Nikole Dowd notified about patient being lowered to the floor by pca in the bathroom at shift change and patient and pca stating that she did not hit anything only minor abrasions to bilateral elbows and left knee noted and that hospitalist is coming to assess patient.

## 2019-04-12 NOTE — PROGRESS NOTES
Pt not given opportunity to don B shoes 2° time constraint. Pt req'ed MAX encouragement. ADL  Equipment Provided: Reacher;Sock aid;Dressing stick  Grooming: Setup  UE Bathing: Supervision  LE Bathing: Minimal assistance;Verbal cueing; Increased time to complete(kinza; buttocks)  UE Dressing: Setup  LE Dressing: Contact guard assistance;Verbal cueing; Increased time to complete  Toileting: Moderate assistance(hygiene; v/c's)  Additional Comments: A to don B knee braces        Toilet Transfers  Toilet - Technique: Ambulating  Equipment Used: Grab bars  Toilet Transfer: Contact guard assistance  Toilet Transfers Comments: ww with w/c follow; v/c's  Shower Transfers  Shower - Transfer From: Cely Solo - Transfer Type: To  Shower - Transfer To: Shower seat with back  Shower - Technique: Ambulating  Shower Transfers: Contact Guard;Verbal cues               Plan   Plan  Times per week: 5-7x/ wk   Plan weeks: 2-3  Current Treatment Recommendations: Strengthening, Balance Training, Functional Mobility Training, Endurance Training, Wheelchair Mobility Training, Safety Education & Training, Pain Management, Patient/Caregiver Education & Training, Equipment Evaluation, Education, & procurement, Self-Care / ADL, Home Management Training  Plan Comment: Con't OT POC for d/c on 4-21-19  G-Code     OutComes Score                                                  AM-PAC Score             Goals  Patient Goals   Patient goals : \"To go home. \"        Therapy Time   Individual Concurrent Group Co-treatment   Time In 0800        Time Out 0900        Minutes 60              Electronically signed by BOB Araujo on 4/12/19 at 3:28 PM    BOB Araujo

## 2019-04-12 NOTE — PROGRESS NOTES
Subjective: The patient complains of severe  acute  on chronic right shoulder pain and bilateral lower extremity weakness partially relieved by  rest, PT, OT and exacerbated by activity and ambulation. I am concerned about patients learned helplessness behaviors and poor motivation with her own care at times as reported by the nurses. I am still concerned about her bilateral lower extremity weakness. She fatigues easily she had difficulty with a toilet transfer last evening and had to be lowered to the ground. She denied any injury. Previously and a day I ordered an x-ray of her left shoulder because of persistent pain in her left shoulder. She didn't neither she needed the x-ray and refused it later. ROS x10: The patient also complains of severely impaired mobility and activities of daily living. Otherwise no new problems with vision, hearing, nose, mouth, throat, dermal, cardiovascular, GI, , pulmonary, musculoskeletal, psychiatric or neurological. See Rehab H&P on Rehab chart dated . Vital signs:  BP (!) 182/79   Pulse 72   Temp 97 °F (36.1 °C) (Oral)   Resp 17   Ht 5' 6.5\" (1.689 m)   Wt 275 lb (124.7 kg)   LMP  (LMP Unknown)   SpO2 95%   BMI 43.72 kg/m²   I/O:   PO/Intake:  fair PO intake, no problems observed or reported. Bowel/Bladder:  continent, no problems noted. General:  Patient is well developed, adequately nourished, non-obese and     well kempt. HEENT:    PERRLA, hearing intact to loud voice, external inspection of ear     and nose benign. Inspection of lips, tongue and gums benign  Musculoskeletal: No significant change in strength or tone. All joints stable. Inspection and palpation of digits and nails show no clubbing,       cyanosis or inflammatory conditions. Neuro/Psychiatric: Affect: flat but pleasant. Alert and oriented to person, place and     situation.   No significant change in deep tendon reflexes or     sensation  Lungs:  Diminished, CTA-B. Respiration effort is normal at rest.     Heart:   S1 = S2, RRR. No loud murmurs. Abdomen:  Obese, Soft, non-tender, no enlargement of liver or spleen. Extremities:  No significant lower extremity edema diffusely superficial tenderness. Decreased shoulder    range of motion especially on the right  Skin:   Intact to general survey, no visualized or palpated problems. Rehabilitation:  Physical therapy: FIMS:  Bed Mobility: Scooting: Stand by assistance    Transfers: Sit to Stand: Minimal Assistance  Stand to sit: Contact guard assistance  Bed to Chair: Contact guard assistance(with [employer]), Ambulation 1  Surface: carpet  Device: Rolling Walker  Other Apparatus: (marty knees)  Assistance: Minimal assistance  Quality of Gait: increased weight bear through upper extremities, decreased right knee stability  Distance: 10 feet  Comments: pt with one instance of right knee buckling but was able to maintain upright position and not lose control ,      FIMS: Bed, Chair, Wheel Chair: 3 - Requires 25-49% assistance to transfer  Walk: 1 - Total Assistance Walks < 50 feet OR requires two or more people OR patient performs < 25% of locomotion effort  Distance Walked: 10ft  Stairs: 0 - Activity Does not Occur ( 0 only for the admission assessment), PT Equipment Recommendations  Other: TBD, Assessment: pt perseverates on her past injuries and has such a fear of falling. pt says she is able to control when her right knee ismael and that she feels more confident. pt still limited with distance of 10 feet.      Occupational therapy: FIMS:  Eatin - Patient feeds self  Groomin - Requires setup/cues to do all tasks  Bathing: 3 - Able to bathe 5-7 areas  Dressing-Upper: 5 - Requires setup/supervision/cues and/or requires assist with presthesis/brace only  Dressing-Lower: 3 - Requires assist with 2-3 parts of dressing(socks and shoes with A.E.)  Toiletin - Able to perform 1 task only (e.g. hygiene)  Toilet Transfer: 3 - Requires 25-49% assist getting off toilet  Tub Transfer: 0 - Activity does not occur(new admit)  Shower Transfer: 4 - Minimal contact assistance, pt. expends 75% or more effort(use of grab bars),  , Assessment: Pt is 76 y.o female with above mentioned deficits impacting ability to complete ADL's IND'ly and safely. Pt would benefit from skilled OT to increase level of functioning. Speech therapy: FIMS: Comprehension: 5 - Patient understands basic needs (hungry/hot/pain)  Expression: 5 - Expresses basic ideas/needs only (hungry/hot/pain)  Social Interaction: 5 - Patient is appropriate with supervision/cues  Problem Solvin - Patient able to solve simple/routine tasks  Memory: 5 - Patient requires prompting with stress/unfamiliar situations      Lab/X-ray studies reviewed, analyzed and discussed with patient and staff:   Recent Results (from the past 24 hour(s))   POCT Glucose    Collection Time: 19 12:10 PM   Result Value Ref Range    POC Glucose 247 (H) 60 - 115 mg/dl    Performed on ACCU-CHEK    POCT Glucose    Collection Time: 19  4:35 PM   Result Value Ref Range    POC Glucose 197 (H) 60 - 115 mg/dl    Performed on ACCU-CHEK    POCT Glucose    Collection Time: 19  9:15 PM   Result Value Ref Range    POC Glucose 221 (H) 60 - 115 mg/dl    Performed on ACCU-CHEK    POCT Glucose    Collection Time: 19  6:27 AM   Result Value Ref Range    POC Glucose 218 (H) 60 - 115 mg/dl    Performed on ACCU-CHEK        Ct Head Wo   2019  Impression: Mild cerebral atrophy. No acute findings. Ct Lumbar Spine Wo   2019   NO EVIDENCE OF FRACTURE. MINIMAL ANTEROLISTHESIS L2-3. DEGENERATIVE CHANGES, SEE INDIVIDUAL LEVELS ABOVE. LUMBAR CANAL STENOSIS L3-4. CONSIDER CORRELATION WITH MRI.       1 hour 40 minutes less than 89% on nocturnal pulse oximetry study.   Study cut short because of severe hypoxia patient placed on O2    Previous extensive, complex labs, notes and diagnostics reviewed and analyzed. ALLERGIES:    Allergies as of 04/04/2019 - Review Complete 04/04/2019   Allergen Reaction Noted    Sulfa antibiotics Rash 09/22/2017      (please also verify by checking [facility])           Complex Physical Medicine & Rehab Issues Assess & Plan:   1. Severe abnormality of gait and mobility and impaired self-care and ADL's secondary to progressive  flareup of osteoarthritis . Functional and medical status reassessed regarding patients ability to participate in therapies and patient found to be able to participate in acute intensive comprehensive inpatient rehabilitation program including PT/OT to improve balance, ambulation, ADLs, and to improve the P/AROM. Therapeutic modifications regarding activities in therapies, place, amount of time per day and intensity of therapy made daily. In bed therapies or bedside therapies prn.   2. Bowel opiate-related constipation and Bladder dysfunction:  frequent toileting, ambulate to bathroom with assistance, check post void residuals. Check for C.difficile x1 if >2 loose stools in 24 hours, continue bowel & bladder program.  Monitor bowel and bladder function. Lactinex 2 PO every AC. MOM prn, Brown Bomb prn, Glycerin suppository prn, enema prn. Patient's work on independence with bathing hygiene and toileting. She is not having her  wipe her bottom and we're fearful that that type of dependency will create a unnecessary burden on family. We would like her to become more independent in that I will offer assistive devices and training. 3. Severe lateral shoulder pain neuropathic and eneralized OA pain: reassess pain every shift and prior to and after each therapy session, give prn Tylenol and  titrate opiates I was using lowest effective dose, modalities prn in therapy, Lidoderm, K-pad prn. Offer injection into the lateral shoulders. Patient is to wean off of her benzodiazepines.   4. Skin healing and breakdown risk:  continue pressure relief program. Daily skin exams and reports from nursing. 5. Severe Fatigue due to nutritional and hydration deficiency:  continue to monitor I&Os, calorie counts prn, dietary consult prn. And vitamin B12 vitamin D and CoQ10  Part of patient's rehabilitation program I am encouraging them to attend game day today under recreational therapy to improve their dexterity, cognition, socialization and endurance. 6. Acute episodic insomnia with situational adjustment disorder: Slow titration off of Xanax. prn Ambien, monitor for day time sedation. 7. Falls risk elevated:  patient to use call light to get nursing assistance to get up, bed and chair alarm. 8. Elevated DVT risk: progressive activities in PT, continue prophylaxis SANTIAGO hose, elevation  . 9. Complex discharge planning:  Discharge April 21, 2019 home with her  did help from her daughters that live far away. He will also have home health care. Weekly team meeting  Monday to assess progress towards goals, discuss and address social, psychological and medical comorbidities and to address difficulties they may be having progressing in therapy. Patient and family education is in progress. The patient is to follow-up with their family physician after discharge. Complex Active General Medical Issues that complicate care Assess & Plan:    1. Peripheral angiopathy,   Hx of Chronic ulcer of left foot-0 pressure to her heels bilaterally, at E T-max, add podiatry consult  2. Diabetic neuropathy associated with diabetes mellitus due to underlying condition -low carb diet, add Glucotrol, Lantus, titrate Humalog low carbohydrate diet add diabetic education  3. Ataxia-focus on balance in physical therapy  4. Hypothyroid-monitor symptoms, titrate Synthroid  5. Morbid obesity due to excess calories -add diabetic and obesity related dietary counseling, restrict carbohydrates and diet  6.    Essential hypertension, veer peripheral artery disease,  HLD (hyperlipidemia)-vital signs every shift, dose and titrate cardiac medications accordingly to include Toprol, recheck CBC BMP and consult hospitalist for backup medical  7. Severe active chronic Anxiety and depression with learned helplessness behaviors-emotional support daily, add rec therapy  rehabilitation psychology and trazodone consider SSRI-wean off of Xanax, consult rehabilitation psychology and health psychiatry regarding alternative measures for depression and anxiety, add vitamin B12 shot, gradual tapering off (for long term use of benzodiazepines, the recommendations are 20% of the dose/week). No other medications are recommended at this time, since the patient declines any. 8.   Bronchitis-pulse ox checks every shift, dose and titrate oxygen and aerosol treatments  9.   OA (osteoarthritis) with severe osteoarthritic and severe neuropathic pain-titrate Tylenol versus Norco, always use the lowest effective dose of medication, add Desyrel at night for neuropathic pain, add topical agents, check [facility]  prescription monitoring database to review for overuse or abuse  10. Acute on chronic renal failure-recheck CBC BMP and limit toxic medications discontinue milk of magnesia  11. Acute UTI-check postvoid residuals, check urinary CNS, titrate Macrobid, monitor for urinary retention, improved peroneal care  12. Severe Yeast rash in her abdominal folds and skin folds-add Mycostatin powder  13. Significant nocturnal hypoxemia as well as High risk opiate use-with active chronic benzodiazepine use check [facility]  physician monitoring database, use nonnarcotic measures for pain when appropriate. He is lowest effective dose with informed consent-gradual tapering off (for long term use of benzodiazepines, the recommendations are 20% of the dose/week). No other medications are recommended at this time, since the patient declines any.   Consult pulmonology for sleep apnea to treat as an outpatient and further diagnostics in

## 2019-04-12 NOTE — PROGRESS NOTES
Physical Therapy Rehab Treatment Note  Facility/Department: [facility]  [facility]       NAME: Magno Foreman  : 1950 (51 y.o.)  MRN: [id number]  CODE STATUS: DNR-CCA    Date of Service: 2019  Chart Reviewed: Yes  Family / Caregiver Present: Yes()  General Comment  Comments: Xray of L arm ordered yesterday. Pt declined xray. Dr Fawn Jones states she thinks is musculature and arthritis in the neck. Restrictions:  Restrictions/Precautions: Fall Risk    SUBJECTIVE: Subjective: Pt states she is sore all over from the exercise but is ok. States she took meds before she came. Pain Screening  Patient Currently in Pain: Yes  Pre Treatment Pain Screening  Pain at present: 5  Intervention List: Patient able to continue with treatment;Patient declined any intervention    Post Treatment Pain Screening:  Pain Assessment  Pain Level: 5  Pain Location: Arm  Pain Orientation: Left  Pain Descriptors: Aching  Non-Pharmaceutical Pain Intervention(s): Repositioned    OBJECTIVE: Pt's  observed treatment. Transfers  Sit to Stand: Minimal Assistance;Contact guard assistance  Stand to sit: Contact guard assistance  Bed to Chair: Contact guard assistance  Comment: VCs to reach back with R UE. Ambulation  Ambulation?: Yes  Ambulation 1  Surface: carpet  Device: Rolling Walker  Other Apparatus: Wheelchair follow  Assistance: Minimal assistance  Quality of Gait: Decreased R knee stability however pt able to correct. Fatigues quickly. Heavy UE support through [employer].  VCs for upright posture   Distance: 10ft         Exercises  Standing lateral wt shifts with VCs for posture. ASSESSMENT/COMMENTS:  Assessment: Fatigued quickly with gait requiring WC follow. PLAN OF CARE/Safety:   Plan Comment: Cont per POC.       Therapy Time:   Individual   Time In 1430   Time Out 1500   Minutes 30     Minutes:      Transfer/Bed mobility training:10      Gait training:15      Neuro re education:5 Therapeutic ex:0      Birtha JACQUIE Fuentes, 04/12/19 at 4:52 PM

## 2019-04-12 NOTE — FLOWSHEET NOTE
Dr. Jeff Black came and assessed patient after being lowered to the floor earlier. Noted minor abrasions to bilateral elbows and left knee. No orders received.

## 2019-04-12 NOTE — PROGRESS NOTES
input(s): NA, K, CL, CO2, BUN, CREATININE, GLUCOSE, CALCIUM, PROT, LABALBU, BILITOT, ALKPHOS, AST, ALT, LABGLOM, GFRAA, AGRATIO, GLOB in the last 72 hours. MV Settings:          No results for input(s): PHART, UTO0DGY, PO2ART, DOY7EHD, BEART, P5NNZUDS in the last 72 hours. O2 Device: None (Room air)  O2 Flow Rate (L/min): 2 L/min(for hs)    DIET GENERAL; Carb Control: 4 carb choices (60 gms)/meal; Low Sodium (2 GM)  Dietary Nutrition Supplements: Snack (see comment)  Dietary Nutrition Supplements: Protein Modular     MEDICATIONS during current hospitalization:    Continuous Infusions:   dextrose         Scheduled Meds:   linagliptin  5 mg Oral Daily    docusate sodium  100 mg Oral BID    oxymetazoline  2 spray Each Nare BID    insulin glargine  10 Units Subcutaneous Nightly    ALPRAZolam  0.25 mg Oral BID AC    ALPRAZolam  0.5 mg Oral Nightly    insulin lispro  0-6 Units Subcutaneous TID WC    insulin lispro  0-3 Units Subcutaneous Nightly    cyanocobalamin  1,000 mcg Intramuscular Weekly    coenzyme Q10  100 mg Oral BID AC    lactobacillus acidophilus  1 tablet Oral Daily    levothyroxine  125 mcg Oral Daily    metoprolol succinate  100 mg Oral Daily    lidocaine  3 patch Transdermal Daily    analgesic ointment   Topical TID    miconazole   Topical BID       PRN Meds:glucose, dextrose, glucagon (rDNA), dextrose, lactulose, sodium chloride, hydrALAZINE, acetaminophen, HYDROcodone 5 mg - acetaminophen, ammonium lactate    Radiology  Ct Head Wo Contrast    Result Date: 4/4/2019  CT Brain Contrast medium:  Not utilized. History:  Inability to ambulate. Comparison:  None Findings: Extra-axial spaces:  Normal. Intracranial hemorrhage:  None. Ventricular system:  Ventricles are mildly enlarged. Sulci mildly prominent. . Basal Cisterns:  Normal. Cerebral Parenchyma:  Normal. Midline Shift:  None.  Cerebellum:  Normal. Paranasal sinuses and mastoid air cells:  Normal. Visualized Orbits:  Normal. Impression: Mild cerebral atrophy. No acute findings. . All CT scans at this facility use dose modulation, iterative reconstruction, and/or weight based dosing when appropriate to reduce radiation dose to as low as reasonably achievable. Ct Lumbar Spine Wo Contrast    Result Date: 4/4/2019  EXAMINATION: CT LUMBER SPINE CLINICAL HISTORY: BLE weakness , inability to ambulate. TECHNIQUE: THIN AXIAL SECTIONS WERE OBTAINED THROUGH THE LUMBER SPINE. IMAGES WERE REFORMATTED IN THE SAGITTAL AND CORONAL PLANES. COMPARISONS: None available. FINDINGS: Bones are demineralized. There is good alignment of spine present. There is narrowing of the L2-3 and L3-4 disc levels with vacuum disc phenomenon. Minimal anterolisthesis at L2-3 level. Otherwise, good alignment of lumbar spine. Vertebral heights are maintained. There is no sign of osseous destruction. It is difficult to evaluate the disks with CT. L1/L2: Unremarkable. L2/L3: Narrowed disc vacuum disc phenomenon. Minimal anterolisthesis of L2 vertebra. Hypertrophied facet joints. L3/L4: Narrowed discs with vacuum disc phenomenon. There is hypertrophy of the facet joints and ligamentum flavum. Moderate central canal stenosis. Posterior bulging of disc. L4/L5: Suspect posterior central protrusion of disc of carotid hypertrophied facet joints. Mild canal stenosis. L5/S1: Hypertrophied facet joints. NO EVIDENCE OF FRACTURE. MINIMAL ANTEROLISTHESIS L2-3. DEGENERATIVE CHANGES, SEE INDIVIDUAL LEVELS ABOVE. LUMBAR CANAL STENOSIS L3-4. CONSIDER CORRELATION WITH MRI. All CT scans at this facility use dose modulation, iterative reconstruction, and/or weight based dosing when appropriate to reduce radiation dose to as low as reasonably achievable. IMPRESSION AND SUGGESTION:  1. Nocturnal hypoxia, and morbid obesity rule out JENNIFER  2. Gait and mobility abnormality  3. Hypothyroidism  4. Hypertension  5. Diabetes mellitus  6.  BAR on CKD    At this time continue O2 3 L via nasal cannula during sleep. Overnight pulse oximetry nightly for discharge and sleep study as outpatient. Patient said she is not sleeping well in bed because she does not feel bad is comfortable. She will try to sleep in recliner tonight.   We'll follow        Electronically signed by Princess Jae MD, FCCP on 4/11/2019 at 10:45 PM

## 2019-04-12 NOTE — FLOWSHEET NOTE
Nurse manager Lulu notifed of 1945 incident of patient being lowered to the floor by pca while in the bathroom and patient and pca stating that she did not hit anything.

## 2019-04-12 NOTE — PROGRESS NOTES
Occupational Therapy  Facility/Department: [facility]  Daily Treatment Note  NAME: Iliana Calderon  : 1950  MRN: [id number]    Date of Service: 2019    Discharge Recommendations:  Continue to assess pending progress       Assessment   Performance deficits / Impairments: Decreased functional mobility ; Decreased ADL status; Decreased strength;Decreased safe awareness;Decreased endurance;Decreased balance  Assessment: Pt is 76 y.o female with above mentioned deficits impacting ability to complete ADL's IND'ly and safely. Pt would benefit from skilled OT to increase level of functioning. History: Mulit comorb   Exam: 6 perf imp   Assistance / Modification: Mod/Max A   REQUIRES OT FOLLOW UP: Yes  Activity Tolerance  Activity Tolerance: Patient Tolerated treatment well  Safety Devices  Safety Devices in place: Yes  Type of devices: All fall risk precautions in place  Restraints  Initially in place: No         Patient Diagnosis(es): There were no encounter diagnoses. has a past medical history of Anxiety, Arthritis, Bronchitis, Diabetes mellitus ([facility] [address] Diabetic neuropathy associated with diabetes mellitus due to underlying condition [facility]), Essential hypertension, Hypercholesterolemia, Hypertension, Morbid obesity due to excess calories ([facility] [address] Thyroid disease, and Wound infection. has a past surgical history that includes orthopedic surgery (Left, 2017); hernia repair; Appendectomy; and Tonsillectomy (N/A). Restrictions  Restrictions/Precautions  Restrictions/Precautions: Fall Risk  Subjective   General  Chart Reviewed: Yes  Patient assessed for rehabilitation services?: Yes  Response to previous treatment: Patient reporting fatigue but able to participate  Family / Caregiver Present: No  Referring Practitioner: Dr. Ysabel Renee   Diagnosis: Impaired mobility 2° to flare up of O. A.       Orientation  A&Ox3     Objective          Pt participated and instructed in group activity with playing [employer] game.   Pt did well with social interaction during game. Pt  Required min verb cues to folow direction to complete activity and follow rules of the game. Pt required encouragement to bring self away from back of chair for reaching during game. Plan   Plan  Times per week: 5-7x/ wk   Plan weeks: 2-3  Current Treatment Recommendations: Strengthening, Balance Training, Functional Mobility Training, Endurance Training, Wheelchair Mobility Training, Safety Education & Training, Pain Management, Patient/Caregiver Education & Training, Equipment Evaluation, Education, & procurement, Self-Care / ADL, Home Management Training  Plan Comment: Con't OT POC for d/c on 4-21-19  G-Code     OutComes Score                                                  AM-PAC Score             Goals  Patient Goals   Patient goals : \"To go home. \"        Therapy Time   Individual Concurrent Group Co-treatment   Time In     1330     Time Out     1400     Minutes     30          Electronically signed by NIELS Estrella/L on 4/12/19 at 3:14 PM      NIELS Estrella/WILIAN

## 2019-04-12 NOTE — FLOWSHEET NOTE
Patient refused x-ray to left arm for tonight previously ordered on day shift. Resting in bed with no new complaints of pain since being lowered to the floor.

## 2019-04-12 NOTE — FLOWSHEET NOTE
Nursing supervisor Korin Sanabria notified of [id number] incident of patient being lowered to the floor by Chato Lozano while in the bathroom and only noted minor abrasions to bilateral elbows and left knee area and patient and pca stated that patient did not hit anything.

## 2019-04-13 LAB
GLUCOSE BLD-MCNC: 170 MG/DL (ref 60–115)
GLUCOSE BLD-MCNC: 177 MG/DL (ref 60–115)
GLUCOSE BLD-MCNC: 269 MG/DL (ref 60–115)
GLUCOSE BLD-MCNC: 271 MG/DL (ref 60–115)
PERFORMED ON: ABNORMAL

## 2019-04-13 PROCEDURE — 6370000000 HC RX 637 (ALT 250 FOR IP): Performed by: INTERNAL MEDICINE

## 2019-04-13 PROCEDURE — 97116 GAIT TRAINING THERAPY: CPT

## 2019-04-13 PROCEDURE — 1180000000 HC REHAB R&B

## 2019-04-13 PROCEDURE — 6370000000 HC RX 637 (ALT 250 FOR IP): Performed by: PHYSICIAN ASSISTANT

## 2019-04-13 PROCEDURE — 6370000000 HC RX 637 (ALT 250 FOR IP): Performed by: PHYSICAL MEDICINE & REHABILITATION

## 2019-04-13 PROCEDURE — 97110 THERAPEUTIC EXERCISES: CPT

## 2019-04-13 RX ADMIN — MICONAZOLE NITRATE: 20 POWDER TOPICAL at 09:23

## 2019-04-13 RX ADMIN — MICONAZOLE NITRATE: 20 POWDER TOPICAL at 21:27

## 2019-04-13 RX ADMIN — HYDROCODONE BITARTRATE AND ACETAMINOPHEN 1 TABLET: 5; 325 TABLET ORAL at 04:43

## 2019-04-13 RX ADMIN — ANALGESIC BALM: 1.74; 4.06 OINTMENT TOPICAL at 21:17

## 2019-04-13 RX ADMIN — ANALGESIC BALM: 1.74; 4.06 OINTMENT TOPICAL at 15:10

## 2019-04-13 RX ADMIN — LEVOTHYROXINE SODIUM 125 MCG: 125 TABLET ORAL at 06:20

## 2019-04-13 RX ADMIN — ALPRAZOLAM 0.5 MG: 0.5 TABLET ORAL at 21:16

## 2019-04-13 RX ADMIN — INSULIN GLARGINE 10 UNITS: 100 INJECTION, SOLUTION SUBCUTANEOUS at 21:23

## 2019-04-13 RX ADMIN — DOCUSATE SODIUM 100 MG: 100 CAPSULE, LIQUID FILLED ORAL at 09:18

## 2019-04-13 RX ADMIN — METOPROLOL SUCCINATE 100 MG: 100 TABLET, EXTENDED RELEASE ORAL at 09:18

## 2019-04-13 RX ADMIN — ANALGESIC BALM: 1.74; 4.06 OINTMENT TOPICAL at 09:22

## 2019-04-13 RX ADMIN — HYDROCODONE BITARTRATE AND ACETAMINOPHEN 1 TABLET: 5; 325 TABLET ORAL at 15:10

## 2019-04-13 RX ADMIN — LINAGLIPTIN 5 MG: 5 TABLET, FILM COATED ORAL at 09:18

## 2019-04-13 RX ADMIN — ALPRAZOLAM 0.25 MG: 0.25 TABLET ORAL at 06:20

## 2019-04-13 RX ADMIN — HYDROCODONE BITARTRATE AND ACETAMINOPHEN 1 TABLET: 5; 325 TABLET ORAL at 11:04

## 2019-04-13 RX ADMIN — ACETAMINOPHEN 650 MG: 325 TABLET ORAL at 07:03

## 2019-04-13 RX ADMIN — ALPRAZOLAM 0.25 MG: 0.25 TABLET ORAL at 16:45

## 2019-04-13 RX ADMIN — DOCUSATE SODIUM 100 MG: 100 CAPSULE, LIQUID FILLED ORAL at 21:16

## 2019-04-13 RX ADMIN — LACTOBACILLUS TAB 1 TABLET: TAB at 09:18

## 2019-04-13 RX ADMIN — Medication 100 MG: at 12:02

## 2019-04-13 RX ADMIN — Medication 100 MG: at 09:18

## 2019-04-13 RX ADMIN — HYDROCODONE BITARTRATE AND ACETAMINOPHEN 1 TABLET: 5; 325 TABLET ORAL at 21:16

## 2019-04-13 ASSESSMENT — PAIN DESCRIPTION - ORIENTATION
ORIENTATION: LEFT
ORIENTATION: LEFT

## 2019-04-13 ASSESSMENT — PAIN DESCRIPTION - LOCATION
LOCATION: SHOULDER
LOCATION: SHOULDER

## 2019-04-13 ASSESSMENT — PAIN SCALES - GENERAL
PAINLEVEL_OUTOF10: 10
PAINLEVEL_OUTOF10: 6
PAINLEVEL_OUTOF10: 0
PAINLEVEL_OUTOF10: 4
PAINLEVEL_OUTOF10: 7
PAINLEVEL_OUTOF10: 7
PAINLEVEL_OUTOF10: 9

## 2019-04-13 ASSESSMENT — PAIN DESCRIPTION - PAIN TYPE: TYPE: CHRONIC PAIN

## 2019-04-13 NOTE — PROGRESS NOTES
Physical Therapy Rehab Treatment Note  [facility]  [id number]       NAME: Aretha Gamez  : 1950 (84 y.o.)  MRN: [id number]  CODE STATUS: DNR-CCA    Date of Service: 2019       Restrictions:  Restrictions/Precautions: Fall Risk       SUBJECTIVE: Subjective: Pt reports pain in L shoulder/tricep region. Pre Treatment Pain Screening  Pain at present: 5  Scale Used: Numeric Score  Intervention List: Patient able to continue with treatment  Comments / Details: L shoulder, spasms BLE    Post Treatment Pain Screening:  Pain Assessment  Pain Assessment: 0-10  Pain Level: 4  Pain Location: Shoulder  Pain Orientation: Left    OBJECTIVE:   Transfers  Sit to Stand: Minimal Assistance;Contact guard assistance  Stand to sit: Contact guard assistance    Ambulation 1  Surface: carpet  Device: Rolling Walker  Other Apparatus: Wheelchair follow  Assistance: Minimal assistance;Contact guard assistance  Quality of Gait: Decreased step length b/l with VC for reciprocal pattern with good carryover, heavy reliance on UE support, increased tolerance to gait this date  Distance: 15 ft, 10 ft, 25 ft  Comments: 1 instance of R knee buckling with ambulation     Exercises  Hamstring Sets: x20 GTB  Hip Flexion: x20  Hip Abduction: Hip add into ball 5s x 20/abd into GTB x20  Knee Long Arc Quad: x20     ASSESSMENT/COMMENTS:  Assessment: Multiple trials of ambulation this date with increased distance. PLAN OF CARE/Safety:   Safety Devices  Type of devices:  All fall risk precautions in place      Therapy Time:   Individual   Time In 1330   Time Out 1400   Minutes 30     Minutes: 30      Gait training: 15     Therapeutic ex: [address]uniga, PTA, 19 at 2:20 PM

## 2019-04-13 NOTE — PROGRESS NOTES
[facility]  Rehabilitation  RECREATIONAL THERAPY      Date:  4/13/2019        Patient Name: Joceline Matos       MRN: [id number]        YOB: 1950 (77 y.o.)       Gender: female  Diagnosis: Impaired mobility 2° to flare up of O. A. Referring Practitioner: Dr. Lisa Chavira     RESTRICTIONS/PRECAUTIONS:  Restrictions/Precautions: Fall Risk  Vision: Impaired  Hearing: Within functional limits    Note:  Patient declined participating in art therapy opportunity to shift her focus away from discomfort and what creates anxiety at [phone] stating she did not want to attend.      Wero Romero    4/13/2019  Electronically signed by Wero Romero on 4/13/2019 at 1:24 PM

## 2019-04-13 NOTE — PROGRESS NOTES
Subjective: The patient complains of severe  acute  on chronic right shoulder pain and bilateral lower extremity weakness partially relieved by  rest, PT, OT and exacerbated by activity and ambulation. I am concerned about patients learned helplessness behaviors and poor motivation with her own care at times as reported by the nurses. ROS x10: The patient also complains of severely impaired mobility and activities of daily living. Otherwise no new problems with vision, hearing, nose, mouth, throat, dermal, cardiovascular, GI, , pulmonary, musculoskeletal, psychiatric or neurological. See Rehab H&P on Rehab chart dated . Vital signs:  BP (!) 190/85   Pulse 59   Temp 98 °F (36.7 °C) (Oral)   Resp 17   Ht 5' 6.5\" (1.689 m)   Wt 275 lb (124.7 kg)   LMP  (LMP Unknown)   SpO2 97%   BMI 43.72 kg/m²   I/O:   PO/Intake:  fair PO intake, no problems observed or reported. Bowel/Bladder:  continent, no problems noted. General:  Patient is well developed, adequately nourished, non-obese and     well kempt. HEENT:    PERRLA, hearing intact to loud voice, external inspection of ear     and nose benign. Inspection of lips, tongue and gums benign  Musculoskeletal: No significant change in strength or tone. All joints stable. Inspection and palpation of digits and nails show no clubbing,       cyanosis or inflammatory conditions. Neuro/Psychiatric: Affect: flat but pleasant. Alert and oriented to person, place and     situation. No significant change in deep tendon reflexes or     sensation  Lungs:  Diminished, CTA-B. Respiration effort is normal at rest.     Heart:   S1 = S2, RRR. No loud murmurs. Abdomen:  Obese, Soft, non-tender, no enlargement of liver or spleen. Extremities:  No significant lower extremity edema diffusely superficial tenderness.   Decreased shoulder    range of motion especially on the right  Skin:   Intact to general survey, no visualized or palpated problems. Rehabilitation:  Physical therapy: FIMS:  Bed Mobility: Scooting: Stand by assistance    Transfers: Sit to Stand: Minimal Assistance, Contact guard assistance  Stand to sit: Contact guard assistance  Bed to Chair: Contact guard assistance, Ambulation 1  Surface: carpet  Device: Rolling Walker  Other Apparatus: Wheelchair follow  Assistance: Minimal assistance, Contact guard assistance  Quality of Gait: Decreased step length b/l with VC for reciprocal pattern with good carryover, heavy reliance on UE support, increased tolerance to gait this date  Distance: 15 ft, 10 ft, 25 ft  Comments: 1 instance of R knee buckling with ambulation,      FIMS: Bed, Chair, Wheel Chair: 2 - Requires 50-74% assistance to transfer  Walk: 1 - Total Assistance Walks < 50 feet OR requires two or more people OR patient performs < 25% of locomotion effort  Distance Walked: 8  Stairs: 0 - Activity Does not Occur ( 0 only for the admission assessment), PT Equipment Recommendations  Other: TBD, Assessment: Multiple trials of ambulation this date with increased distance. Occupational therapy: FIMS:  Eatin - Feeds self with adaptive equipment/dentures and/or feeds self with modified diet and/or performs own tube feeding  Groomin - Requires setup/cues to do all tasks  Bathin - Able to bathe 8-9 areas  Dressing-Upper: 5 - Requires setup/supervision/cues and/or requires assist with presthesis/brace only  Dressing-Lower: 4 - Requires assist with buttons/zippers/shoelaces and/or assist with shoes only  Toileting: 3 - Able to perform 2 tasks  Toilet Transfer: 4 - Requires steadying assistance only < 25% assist  Tub Transfer: 0 - Activity does not occur(new admit)  Shower Transfer: 4 - Minimal contact assistance, pt. expends 75% or more effort,  , Assessment: Pt is 76 y.o female with above mentioned deficits impacting ability to complete ADL's IND'ly and safely.  Pt would benefit from skilled OT to increase level of functioning. Speech therapy: FIMS: Comprehension: 6 - Complex ideas 90% or device (hearing aid/glasses)  Expression: 7 - Patient expresses complex ideas/needs  Social Interaction: 6 - Patient requires medication for mood and/or effect  Problem Solvin - Patient able to solve simple/routine tasks  Memory: 5 - Patient requires prompting with stress/unfamiliar situations      Lab/X-ray studies reviewed, analyzed and discussed with patient and staff:   Recent Results (from the past 24 hour(s))   POCT Glucose    Collection Time: 19  9:51 PM   Result Value Ref Range    POC Glucose 203 (H) 60 - 115 mg/dl    Performed on ACCU-CHEK    POCT Glucose    Collection Time: 19  6:16 AM   Result Value Ref Range    POC Glucose 170 (H) 60 - 115 mg/dl    Performed on ACCU-CHEK    POCT Glucose    Collection Time: 19 11:27 AM   Result Value Ref Range    POC Glucose 269 (H) 60 - 115 mg/dl    Performed on ACCU-CHEK    POCT Glucose    Collection Time: 19  4:35 PM   Result Value Ref Range    POC Glucose 177 (H) 60 - 115 mg/dl    Performed on ACCU-CHEK        Ct Head Wo   2019  Impression: Mild cerebral atrophy. No acute findings. Ct Lumbar Spine Wo   2019   NO EVIDENCE OF FRACTURE. MINIMAL ANTEROLISTHESIS L2-3. DEGENERATIVE CHANGES, SEE INDIVIDUAL LEVELS ABOVE. LUMBAR CANAL STENOSIS L3-4. CONSIDER CORRELATION WITH MRI.       1 hour 40 minutes less than 89% on nocturnal pulse oximetry study. Study cut short because of severe hypoxia patient placed on O2    Previous extensive, complex labs, notes and diagnostics reviewed and analyzed.      ALLERGIES:    Allergies as of 2019 - Review Complete 2019   Allergen Reaction Noted    Sulfa antibiotics Rash 2017      (please also verify by checking MAR)      I reviewed her [facility] prescription monitoring service data sheets in hopes of eliminating polypharmacy and weaning to the lowest effective dose of pain medications and eliminating the concomitant use of benzodiazepines. I see  medications of concern. I see habits of combining sedatives and narcotics. Complex Physical Medicine & Rehab Issues Assess & Plan:   1. Severe abnormality of gait and mobility and impaired self-care and ADL's secondary to progressive  flareup of osteoarthritis . Functional and medical status reassessed regarding patients ability to participate in therapies and patient found to be able to participate in acute intensive comprehensive inpatient rehabilitation program including PT/OT to improve balance, ambulation, ADLs, and to improve the P/AROM. Therapeutic modifications regarding activities in therapies, place, amount of time per day and intensity of therapy made daily. In bed therapies or bedside therapies prn.   2. Bowel opiate-related constipation and Bladder dysfunction:  frequent toileting, ambulate to bathroom with assistance, check post void residuals. Check for C.difficile x1 if >2 loose stools in 24 hours, continue bowel & bladder program.  Monitor bowel and bladder function. Lactinex 2 PO every AC. MOM prn, Brown Bomb prn, Glycerin suppository prn, enema prn. Patient's work on independence with bathing hygiene and toileting. She is not having her  wipe her bottom and we're fearful that that type of dependency will create a unnecessary burden on family. We would like her to become more independent in that I will offer assistive devices and training. 3. Severe lateral shoulder pain neuropathic and eneralized OA pain: reassess pain every shift and prior to and after each therapy session, give prn Tylenol and  titrate opiates I was using lowest effective dose, modalities prn in therapy, Lidoderm, K-pad prn. Offer injection into the lateral shoulders. Patient is to wean off of her benzodiazepines. 4. Skin healing and breakdown risk:  continue pressure relief program.  Daily skin exams and reports from nursing.   5. Severe Fatigue due to accordingly to include Toprol, recheck CBC BMP and consult hospitalist for backup medical  7. Severe active chronic Anxiety and depression with learned helplessness behaviors-emotional support daily, add rec therapy  rehabilitation psychology and trazodone consider SSRI-wean off of Xanax, consult rehabilitation psychology and health psychiatry regarding alternative measures for depression and anxiety, add vitamin B12 shot, gradual tapering off (for long term use of benzodiazepines, the recommendations are 20% of the dose/week). No other medications are recommended at this time, since the patient declines any. 8.   Bronchitis-pulse ox checks every shift, dose and titrate oxygen and aerosol treatments  9.   OA (osteoarthritis) with severe osteoarthritic and severe neuropathic pain-titrate Tylenol versus Norco, always use the lowest effective dose of medication, add Desyrel at night for neuropathic pain, add topical agents, check [address] [facility] [address] prescription monitoring database to review for overuse or abuse  10. Acute on chronic renal failure-recheck CBC BMP and limit toxic medications discontinue milk of magnesia  11. Acute UTI-check postvoid residuals, check urinary CNS, titrate Macrobid, monitor for urinary retention, improved peroneal care  12. Severe Yeast rash in her abdominal folds and skin folds-add Mycostatin powder  13. Significant nocturnal hypoxemia as well as High risk opiate use-with active chronic benzodiazepine use check [address] [facility]  physician monitoring database, use nonnarcotic measures for pain when appropriate. He is lowest effective dose with informed consent-gradual tapering off (for long term use of benzodiazepines, the recommendations are 20% of the dose/week). No other medications are recommended at this time, since the patient declines any.   Consult pulmonology for sleep apnea to treat as an outpatient and further diagnostics in the meantime limit doses of opiates transition to non-benzodiazepine use and elevate head of bed with nasal cannula O2.     Lan Clarke D.O., PM&R     Attending    [address]

## 2019-04-13 NOTE — PLAN OF CARE
Plan of Care:  Coping  Patient attended and actively participated in the rehab support group this date.     Electronically signed by Christal Portillo on 4/13/2019 at 4:39 PM

## 2019-04-14 LAB
GLUCOSE BLD-MCNC: 170 MG/DL (ref 60–115)
GLUCOSE BLD-MCNC: 223 MG/DL (ref 60–115)
GLUCOSE BLD-MCNC: 228 MG/DL (ref 60–115)
GLUCOSE BLD-MCNC: 289 MG/DL (ref 60–115)
PERFORMED ON: ABNORMAL

## 2019-04-14 PROCEDURE — 6370000000 HC RX 637 (ALT 250 FOR IP): Performed by: PHYSICAL MEDICINE & REHABILITATION

## 2019-04-14 PROCEDURE — 6370000000 HC RX 637 (ALT 250 FOR IP): Performed by: PHYSICIAN ASSISTANT

## 2019-04-14 PROCEDURE — 1180000000 HC REHAB R&B

## 2019-04-14 PROCEDURE — 6370000000 HC RX 637 (ALT 250 FOR IP): Performed by: INTERNAL MEDICINE

## 2019-04-14 RX ADMIN — LINAGLIPTIN 5 MG: 5 TABLET, FILM COATED ORAL at 09:30

## 2019-04-14 RX ADMIN — HYDROCODONE BITARTRATE AND ACETAMINOPHEN 1 TABLET: 5; 325 TABLET ORAL at 06:38

## 2019-04-14 RX ADMIN — HYDROCODONE BITARTRATE AND ACETAMINOPHEN 1 TABLET: 5; 325 TABLET ORAL at 01:24

## 2019-04-14 RX ADMIN — MICONAZOLE NITRATE: 20 POWDER TOPICAL at 21:22

## 2019-04-14 RX ADMIN — LEVOTHYROXINE SODIUM 125 MCG: 125 TABLET ORAL at 06:31

## 2019-04-14 RX ADMIN — METOPROLOL SUCCINATE 100 MG: 100 TABLET, EXTENDED RELEASE ORAL at 09:30

## 2019-04-14 RX ADMIN — MICONAZOLE NITRATE: 20 POWDER TOPICAL at 09:31

## 2019-04-14 RX ADMIN — INSULIN GLARGINE 10 UNITS: 100 INJECTION, SOLUTION SUBCUTANEOUS at 21:11

## 2019-04-14 RX ADMIN — HYDROCODONE BITARTRATE AND ACETAMINOPHEN 1 TABLET: 5; 325 TABLET ORAL at 15:51

## 2019-04-14 RX ADMIN — Medication 100 MG: at 09:30

## 2019-04-14 RX ADMIN — Medication 100 MG: at 12:11

## 2019-04-14 RX ADMIN — DOCUSATE SODIUM 100 MG: 100 CAPSULE, LIQUID FILLED ORAL at 09:30

## 2019-04-14 RX ADMIN — ANALGESIC BALM: 1.74; 4.06 OINTMENT TOPICAL at 09:30

## 2019-04-14 RX ADMIN — DOCUSATE SODIUM 100 MG: 100 CAPSULE, LIQUID FILLED ORAL at 21:11

## 2019-04-14 RX ADMIN — ANALGESIC BALM: 1.74; 4.06 OINTMENT TOPICAL at 21:11

## 2019-04-14 RX ADMIN — LACTOBACILLUS TAB 1 TABLET: TAB at 09:30

## 2019-04-14 RX ADMIN — HYDROCODONE BITARTRATE AND ACETAMINOPHEN 1 TABLET: 5; 325 TABLET ORAL at 21:11

## 2019-04-14 RX ADMIN — ALPRAZOLAM 0.5 MG: 0.5 TABLET ORAL at 21:11

## 2019-04-14 RX ADMIN — ALPRAZOLAM 0.25 MG: 0.25 TABLET ORAL at 06:31

## 2019-04-14 RX ADMIN — HYDROCODONE BITARTRATE AND ACETAMINOPHEN 1 TABLET: 5; 325 TABLET ORAL at 11:24

## 2019-04-14 RX ADMIN — ANALGESIC BALM: 1.74; 4.06 OINTMENT TOPICAL at 14:43

## 2019-04-14 RX ADMIN — ALPRAZOLAM 0.25 MG: 0.25 TABLET ORAL at 15:51

## 2019-04-14 ASSESSMENT — PAIN DESCRIPTION - PAIN TYPE
TYPE: CHRONIC PAIN
TYPE: CHRONIC PAIN

## 2019-04-14 ASSESSMENT — PAIN SCALES - GENERAL
PAINLEVEL_OUTOF10: 6
PAINLEVEL_OUTOF10: 8
PAINLEVEL_OUTOF10: 7
PAINLEVEL_OUTOF10: 0
PAINLEVEL_OUTOF10: 5
PAINLEVEL_OUTOF10: 7

## 2019-04-14 ASSESSMENT — PAIN DESCRIPTION - ORIENTATION
ORIENTATION: LEFT
ORIENTATION: LEFT

## 2019-04-14 ASSESSMENT — PAIN DESCRIPTION - LOCATION
LOCATION: SHOULDER
LOCATION: SHOULDER

## 2019-04-14 NOTE — PROGRESS NOTES
Subjective: The patient complains of severe  acute  on chronic right shoulder pain and bilateral lower extremity weakness partially relieved by  rest, PT, OT and exacerbated by activity and ambulation. I am concerned about patients learned helplessness behaviors and poor motivation with her own care at times as reported by the nurses. ROS x10: The patient also complains of severely impaired mobility and activities of daily living. Otherwise no new problems with vision, hearing, nose, mouth, throat, dermal, cardiovascular, GI, , pulmonary, musculoskeletal, psychiatric or neurological. See Rehab H&P on Rehab chart dated . Vital signs:  BP (!) 184/77   Pulse 68   Temp 98 °F (36.7 °C) (Oral)   Resp 17   Ht 5' 6.5\" (1.689 m)   Wt 275 lb (124.7 kg)   LMP  (LMP Unknown)   SpO2 96%   BMI 43.72 kg/m²   I/O:   PO/Intake:  fair PO intake, no problems observed or reported. Bowel/Bladder:  continent, no problems noted. General:  Patient is well developed, adequately nourished, non-obese and     well kempt. HEENT:    PERRLA, hearing intact to loud voice, external inspection of ear     and nose benign. Inspection of lips, tongue and gums benign  Musculoskeletal: No significant change in strength or tone. All joints stable. Inspection and palpation of digits and nails show no clubbing,       cyanosis or inflammatory conditions. Neuro/Psychiatric: Affect: flat but pleasant. Alert and oriented to person, place and     situation. No significant change in deep tendon reflexes or     sensation  Lungs:  Diminished, CTA-B. Respiration effort is normal at rest.     Heart:   S1 = S2, RRR. No loud murmurs. Abdomen:  Obese, Soft, non-tender, no enlargement of liver or spleen. Extremities:  No significant lower extremity edema diffusely superficial tenderness.   Decreased shoulder    range of motion especially on the right  Skin:   Intact to general survey, no visualized or palpated problems. Rehabilitation:  Physical therapy: FIMS:  Bed Mobility: Scooting: Stand by assistance    Transfers: Sit to Stand: Minimal Assistance, Contact guard assistance  Stand to sit: Contact guard assistance  Bed to Chair: Contact guard assistance, Ambulation 1  Surface: carpet  Device: Rolling Walker  Other Apparatus: Wheelchair follow  Assistance: Minimal assistance, Contact guard assistance  Quality of Gait: Decreased step length b/l with VC for reciprocal pattern with good carryover, heavy reliance on UE support, increased tolerance to gait this date  Distance: 15 ft, 10 ft, 25 ft  Comments: 1 instance of R knee buckling with ambulation,      FIMS: Bed, Chair, Wheel Chair: 2 - Requires 50-74% assistance to transfer  Walk: 1 - Total Assistance Walks < 50 feet OR requires two or more people OR patient performs < 25% of locomotion effort  Distance Walked: 8  Stairs: 0 - Activity Does not Occur ( 0 only for the admission assessment), PT Equipment Recommendations  Other: TBD, Assessment: Multiple trials of ambulation this date with increased distance. Occupational therapy: FIMS:  Eatin - Feeds self with adaptive equipment/dentures and/or feeds self with modified diet and/or performs own tube feeding  Groomin - Requires setup/cues to do all tasks  Bathin - Able to bathe 8-9 areas  Dressing-Upper: 5 - Requires setup/supervision/cues and/or requires assist with presthesis/brace only  Dressing-Lower: 4 - Requires assist with buttons/zippers/shoelaces and/or assist with shoes only  Toileting: 3 - Able to perform 2 tasks  Toilet Transfer: 4 - Requires steadying assistance only < 25% assist  Tub Transfer: 0 - Activity does not occur(new admit)  Shower Transfer: 4 - Minimal contact assistance, pt. expends 75% or more effort,  , Assessment: Pt is 76 y.o female with above mentioned deficits impacting ability to complete ADL's IND'ly and safely.  Pt would benefit from skilled OT to increase level of functioning. Speech therapy: FIMS: Comprehension: 6 - Complex ideas 90% or device (hearing aid/glasses)  Expression: 7 - Patient expresses complex ideas/needs  Social Interaction: 6 - Patient requires medication for mood and/or effect  Problem Solvin - Patient able to solve simple/routine tasks  Memory: 5 - Patient requires prompting with stress/unfamiliar situations      Lab/X-ray studies reviewed, analyzed and discussed with patient and staff:   Recent Results (from the past 24 hour(s))   POCT Glucose    Collection Time: 19  9:05 PM   Result Value Ref Range    POC Glucose 271 (H) 60 - 115 mg/dl    Performed on ACCU-CHEK    POCT Glucose    Collection Time: 19  6:25 AM   Result Value Ref Range    POC Glucose 170 (H) 60 - 115 mg/dl    Performed on ACCU-CHEK    POCT Glucose    Collection Time: 19 11:55 AM   Result Value Ref Range    POC Glucose 289 (H) 60 - 115 mg/dl    Performed on ACCU-CHEK    POCT Glucose    Collection Time: 19  4:26 PM   Result Value Ref Range    POC Glucose 223 (H) 60 - 115 mg/dl    Performed on ACCU-CHEK        Ct Head Wo   2019  Impression: Mild cerebral atrophy. No acute findings. Ct Lumbar Spine Wo   2019   NO EVIDENCE OF FRACTURE. MINIMAL ANTEROLISTHESIS L2-3. DEGENERATIVE CHANGES, SEE INDIVIDUAL LEVELS ABOVE. LUMBAR CANAL STENOSIS L3-4. CONSIDER CORRELATION WITH MRI.       1 hour 40 minutes less than 89% on nocturnal pulse oximetry study. Study cut short because of severe hypoxia patient placed on O2    Previous extensive, complex labs, notes and diagnostics reviewed and analyzed.      ALLERGIES:    Allergies as of 2019 - Review Complete 2019   Allergen Reaction Noted    Sulfa antibiotics Rash 2017      (please also verify by checking MAR)      I reviewed her [facility] prescription monitoring service data sheets in hopes of eliminating polypharmacy and weaning to the lowest effective dose of pain medications and eliminating the concomitant use of benzodiazepines. I see  medications of concern. I see habits of combining sedatives and narcotics. Complex Physical Medicine & Rehab Issues Assess & Plan:   1. Severe abnormality of gait and mobility and impaired self-care and ADL's secondary to progressive  flareup of osteoarthritis . Functional and medical status reassessed regarding patients ability to participate in therapies and patient found to be able to participate in acute intensive comprehensive inpatient rehabilitation program including PT/OT to improve balance, ambulation, ADLs, and to improve the P/AROM. Therapeutic modifications regarding activities in therapies, place, amount of time per day and intensity of therapy made daily. In bed therapies or bedside therapies prn.   2. Bowel opiate-related constipation and Bladder dysfunction:  frequent toileting, ambulate to bathroom with assistance, check post void residuals. Check for C.difficile x1 if >2 loose stools in 24 hours, continue bowel & bladder program.  Monitor bowel and bladder function. Lactinex 2 PO every AC. MOM prn, Brown Bomb prn, Glycerin suppository prn, enema prn. Patient's work on independence with bathing hygiene and toileting. She is not having her  wipe her bottom and we're fearful that that type of dependency will create a unnecessary burden on family. We would like her to become more independent in that I will offer assistive devices and training. 3. Severe lateral shoulder pain neuropathic and eneralized OA pain: reassess pain every shift and prior to and after each therapy session, give prn Tylenol and  titrate opiates I was using lowest effective dose, modalities prn in therapy, Lidoderm, K-pad prn. Offer injection into the lateral shoulders. Patient is to wean off of her benzodiazepines. 4. Skin healing and breakdown risk:  continue pressure relief program.  Daily skin exams and reports from nursing.   5. Severe Fatigue due to nutritional and hydration deficiency:  continue to monitor I&Os, calorie counts prn, dietary consult prn. And vitamin B12 vitamin D and CoQ10  Part of patient's rehabilitation program I am encouraging them to attend game day today under recreational therapy to improve their dexterity, cognition, socialization and endurance. 6. Acute episodic insomnia with situational adjustment disorder: Slow titration off of Xanax. prn Ambien, monitor for day time sedation. 7. Falls risk elevated:  patient to use call light to get nursing assistance to get up, bed and chair alarm. 8. Elevated DVT risk: progressive activities in PT, continue prophylaxis SANTIAGO hose, elevation  . 9. Complex discharge planning:  Discharge April 21, 2019 home with her  did help from her daughters that live far away. He will also have home health care. Weekly team meeting  Monday to assess progress towards goals, discuss and address social, psychological and medical comorbidities and to address difficulties they may be having progressing in therapy. Patient and family education is in progress. The patient is to follow-up with their family physician after discharge. Complex Active General Medical Issues that complicate care Assess & Plan:    1. Peripheral angiopathy,   Hx of Chronic ulcer of left foot-0 pressure to her heels bilaterally, at E T-max, add podiatry consult  2. Diabetic neuropathy associated with diabetes mellitus due to underlying condition -low carb diet, add Glucotrol, Lantus, titrate Humalog low carbohydrate diet add diabetic education  3. Ataxia-focus on balance in physical therapy  4. Hypothyroid-monitor symptoms, titrate Synthroid  5. Morbid obesity due to excess calories -add diabetic and obesity related dietary counseling, restrict carbohydrates and diet  6.    Essential hypertension, veer peripheral artery disease,  HLD (hyperlipidemia)-vital signs every shift, dose and titrate cardiac medications elevate head of bed with nasal cannula O2.     Sam Hopkins D.O., PM&R     Attending    [address]

## 2019-04-15 LAB
GLUCOSE BLD-MCNC: 214 MG/DL (ref 60–115)
GLUCOSE BLD-MCNC: 224 MG/DL (ref 60–115)
GLUCOSE BLD-MCNC: 284 MG/DL (ref 60–115)
GLUCOSE BLD-MCNC: 307 MG/DL (ref 60–115)
PERFORMED ON: ABNORMAL

## 2019-04-15 PROCEDURE — 6370000000 HC RX 637 (ALT 250 FOR IP): Performed by: PHYSICIAN ASSISTANT

## 2019-04-15 PROCEDURE — 6370000000 HC RX 637 (ALT 250 FOR IP): Performed by: INTERNAL MEDICINE

## 2019-04-15 PROCEDURE — 97116 GAIT TRAINING THERAPY: CPT

## 2019-04-15 PROCEDURE — 1180000000 HC REHAB R&B

## 2019-04-15 PROCEDURE — 97110 THERAPEUTIC EXERCISES: CPT

## 2019-04-15 PROCEDURE — 99232 SBSQ HOSP IP/OBS MODERATE 35: CPT | Performed by: INTERNAL MEDICINE

## 2019-04-15 PROCEDURE — 97112 NEUROMUSCULAR REEDUCATION: CPT

## 2019-04-15 PROCEDURE — 97535 SELF CARE MNGMENT TRAINING: CPT

## 2019-04-15 PROCEDURE — 6370000000 HC RX 637 (ALT 250 FOR IP): Performed by: NURSE PRACTITIONER

## 2019-04-15 PROCEDURE — 6370000000 HC RX 637 (ALT 250 FOR IP): Performed by: PHYSICAL MEDICINE & REHABILITATION

## 2019-04-15 PROCEDURE — 99233 SBSQ HOSP IP/OBS HIGH 50: CPT | Performed by: PHYSICIAN ASSISTANT

## 2019-04-15 PROCEDURE — 99233 SBSQ HOSP IP/OBS HIGH 50: CPT | Performed by: PHYSICAL MEDICINE & REHABILITATION

## 2019-04-15 RX ORDER — HYDRALAZINE HYDROCHLORIDE 25 MG/1
25 TABLET, FILM COATED ORAL DAILY
Status: DISCONTINUED | OUTPATIENT
Start: 2019-04-15 | End: 2019-04-16

## 2019-04-15 RX ORDER — INSULIN GLARGINE 100 [IU]/ML
15 INJECTION, SOLUTION SUBCUTANEOUS NIGHTLY
Status: DISCONTINUED | OUTPATIENT
Start: 2019-04-15 | End: 2019-04-16

## 2019-04-15 RX ADMIN — Medication 100 MG: at 11:40

## 2019-04-15 RX ADMIN — ALPRAZOLAM 0.25 MG: 0.25 TABLET ORAL at 05:51

## 2019-04-15 RX ADMIN — LACTOBACILLUS TAB 1 TABLET: TAB at 08:58

## 2019-04-15 RX ADMIN — LACTULOSE 20 G: 20 SOLUTION ORAL at 16:30

## 2019-04-15 RX ADMIN — METOPROLOL SUCCINATE 100 MG: 100 TABLET, EXTENDED RELEASE ORAL at 08:58

## 2019-04-15 RX ADMIN — ALPRAZOLAM 0.5 MG: 0.5 TABLET ORAL at 22:24

## 2019-04-15 RX ADMIN — HYDROCODONE BITARTRATE AND ACETAMINOPHEN 1 TABLET: 5; 325 TABLET ORAL at 09:05

## 2019-04-15 RX ADMIN — SALINE NASAL SPRAY 1 SPRAY: 1.5 SOLUTION NASAL at 21:30

## 2019-04-15 RX ADMIN — ALPRAZOLAM 0.25 MG: 0.25 TABLET ORAL at 16:30

## 2019-04-15 RX ADMIN — ANALGESIC BALM: 1.74; 4.06 OINTMENT TOPICAL at 21:02

## 2019-04-15 RX ADMIN — LEVOTHYROXINE SODIUM 125 MCG: 125 TABLET ORAL at 05:52

## 2019-04-15 RX ADMIN — LINAGLIPTIN 5 MG: 5 TABLET, FILM COATED ORAL at 08:58

## 2019-04-15 RX ADMIN — HYDRALAZINE HYDROCHLORIDE 25 MG: 25 TABLET, FILM COATED ORAL at 21:24

## 2019-04-15 RX ADMIN — ANALGESIC BALM: 1.74; 4.06 OINTMENT TOPICAL at 09:26

## 2019-04-15 RX ADMIN — DOCUSATE SODIUM 100 MG: 100 CAPSULE, LIQUID FILLED ORAL at 08:58

## 2019-04-15 RX ADMIN — HYDROCODONE BITARTRATE AND ACETAMINOPHEN 1 TABLET: 5; 325 TABLET ORAL at 03:18

## 2019-04-15 RX ADMIN — HYDROCODONE BITARTRATE AND ACETAMINOPHEN 1 TABLET: 5; 325 TABLET ORAL at 21:21

## 2019-04-15 RX ADMIN — ANALGESIC BALM: 1.74; 4.06 OINTMENT TOPICAL at 13:26

## 2019-04-15 RX ADMIN — Medication 100 MG: at 08:58

## 2019-04-15 RX ADMIN — INSULIN GLARGINE 15 UNITS: 100 INJECTION, SOLUTION SUBCUTANEOUS at 21:15

## 2019-04-15 RX ADMIN — MICONAZOLE NITRATE: 20 POWDER TOPICAL at 21:38

## 2019-04-15 RX ADMIN — ACETAMINOPHEN 650 MG: 325 TABLET ORAL at 12:56

## 2019-04-15 RX ADMIN — ACETAMINOPHEN 650 MG: 325 TABLET ORAL at 05:54

## 2019-04-15 RX ADMIN — MICONAZOLE NITRATE: 20 POWDER TOPICAL at 09:26

## 2019-04-15 ASSESSMENT — PAIN DESCRIPTION - ORIENTATION
ORIENTATION: LEFT
ORIENTATION: LEFT

## 2019-04-15 ASSESSMENT — PAIN SCALES - GENERAL
PAINLEVEL_OUTOF10: 6
PAINLEVEL_OUTOF10: 5
PAINLEVEL_OUTOF10: 3
PAINLEVEL_OUTOF10: 5
PAINLEVEL_OUTOF10: 5
PAINLEVEL_OUTOF10: 7
PAINLEVEL_OUTOF10: 7
PAINLEVEL_OUTOF10: 8
PAINLEVEL_OUTOF10: 9

## 2019-04-15 ASSESSMENT — PAIN DESCRIPTION - LOCATION
LOCATION: ARM;SHOULDER
LOCATION: ARM;SHOULDER

## 2019-04-15 ASSESSMENT — PAIN DESCRIPTION - DESCRIPTORS: DESCRIPTORS: ACHING;SORE

## 2019-04-15 NOTE — PROGRESS NOTES
Physical Therapy Rehab Treatment Note  Facility/Department: [facility]  Room: R262/R262-01       NAME: Jayant Barbour  : 1950 (76 y.o.)  MRN: [id number]  CODE STATUS: [id number]-CCA    Date of Service: 4/15/2019  Chart Reviewed: Yes  Family / Caregiver Present: No    Restrictions:  Restrictions/Precautions: Fall Risk    SUBJECTIVE:    Pain Screening  Patient Currently in Pain: Yes  Pre Treatment Pain Screening  Pain at present: 8  Intervention List: Patient able to continue with treatment;Nsg medicated pt. Post Treatment Pain Screening:  Pain Assessment  Pain Assessment: 0-10  Pain Level: 7  Pain Location: Arm; Shoulder  Pain Orientation: Left  Non-Pharmaceutical Pain Intervention(s): Distraction;Repositioned    OBJECTIVE:              Bed mobility  Rolling to Left: Unable to assess(Pt states she is unable to roll to L d/t arm pain)  Rolling to Right: Modified independent  Supine to Sit: Supervision  Sit to Supine: Supervision    Transfers  Sit to Stand: Minimal Assistance  Stand to sit: Contact guard assistance  Bed to Chair: Contact guard assistance(with [employer])    Ambulation  Ambulation?: Yes  Ambulation 1  Surface: carpet  Device: Rolling Walker  Other Apparatus: Wheelchair follow  Assistance: Minimal assistance;Contact guard assistance  Quality of Gait: Decreased step length b/l with VC for reciprocal pattern, Heavy UE support on [employer], fatigues quickly. Distance: 15ft  Stairs/Curb  Stairs?: NoWheelchair Activities  Propulsion: Yes  Propulsion 1  Propulsion: Manual  Level: Level Tile  Level of Assistance: Stand by assistance  Description/ Details: VCs for technique with UEs to improve effeciency. Distance: 50ft            ASSESSMENT/COMMENTS:  Body structures, Functions, Activity limitations: Decreased functional mobility ; Decreased endurance;Decreased balance; Increased Pain;Decreased strength  Assessment: Pt fatigues quickly. PLAN OF CARE/Safety:   Plan Comment: Cont per POC.     Therapy Time:   Individual   Time

## 2019-04-15 NOTE — PROGRESS NOTES
Subjective: The patient complains of severe  acute  on chronic right shoulder pain and bilateral lower extremity weakness partially relieved by  rest, PT, OT and exacerbated by activity and ambulation. I am concerned about patients learned helplessness behaviors and poor motivation with her own care at times as reported by the nurses. I'm concerned about her elevated blood pressure poor tolerance for pain and anxiety and overreliance on opiates and benzodiazepines especially given her sleep apnea. I have multiple conversations with her about this and each time she clutches her allegiance to getting better and sitting off these medications and within minutes is asking for them again. ROS x10: The patient also complains of severely impaired mobility and activities of daily living. Otherwise no new problems with vision, hearing, nose, mouth, throat, dermal, cardiovascular, GI, , pulmonary, musculoskeletal, psychiatric or neurological. See Rehab H&P on Rehab chart dated . Vital signs:  BP (!) 190/63   Pulse 72   Temp 97 °F (36.1 °C) (Oral)   Resp 18   Ht 5' 6.5\" (1.689 m)   Wt 275 lb (124.7 kg)   LMP  (LMP Unknown)   SpO2 94%   BMI 43.72 kg/m²   I/O:   PO/Intake:  fair PO intake, no problems observed or reported. Bowel/Bladder:  continent, no problems noted. General:  Patient is well developed, adequately nourished, non-obese and     well kempt. HEENT:    PERRLA, hearing intact to loud voice, external inspection of ear     and nose benign. Inspection of lips, tongue and gums benign  Musculoskeletal: No significant change in strength or tone. All joints stable. Inspection and palpation of digits and nails show no clubbing,       cyanosis or inflammatory conditions. Neuro/Psychiatric: Affect: flat but pleasant. Alert and oriented to person, place and     situation. No significant change in deep tendon reflexes or     sensation  Lungs:  Diminished, CTA-B.  Respiration effort is normal at rest.     Heart:   S1 = S2, RRR. No loud murmurs. Abdomen:  Obese, Soft, non-tender, no enlargement of liver or spleen. Extremities:  No significant lower extremity edema diffusely superficial tenderness. Decreased shoulder    range of motion especially on the right  Skin:   Intact to general survey, no visualized or palpated problems. Rehabilitation:  Physical therapy: FIMS:  Bed Mobility: Scooting: Stand by assistance    Transfers: Sit to Stand: Minimal Assistance, Contact guard assistance  Stand to sit: Contact guard assistance  Bed to Chair: Contact guard assistance, Ambulation 1  Surface: carpet  Device: Rolling Walker  Other Apparatus: Wheelchair follow  Assistance: Minimal assistance, Contact guard assistance  Quality of Gait: Decreased step length b/l with VC for reciprocal pattern with good carryover, heavy reliance on UE support, increased tolerance to gait this date  Distance: 15 ft, 10 ft, 25 ft  Comments: 1 instance of R knee buckling with ambulation,      FIMS: Bed, Chair, Wheel Chair: 2 - Requires 50-74% assistance to transfer  Walk: 1 - Total Assistance Walks < 50 feet OR requires two or more people OR patient performs < 25% of locomotion effort  Distance Walked: 8  Stairs: 0 - Activity Does not Occur ( 0 only for the admission assessment), PT Equipment Recommendations  Other: TBD, Assessment: Multiple trials of ambulation this date with increased distance.      Occupational therapy: FIMS:  Eatin - Feeds self with adaptive equipment/dentures and/or feeds self with modified diet and/or performs own tube feeding  Groomin - Requires setup/cues to do all tasks  Bathin - Able to bathe 8-9 areas  Dressing-Upper: 5 - Requires setup/supervision/cues and/or requires assist with presthesis/brace only  Dressing-Lower: 4 - Requires assist with buttons/zippers/shoelaces and/or assist with shoes only  Toileting: 3 - Able to perform 2 tasks  Toilet Transfer: 4 - Requires steadying assistance only < 25% assist  Tub Transfer: 0 - Activity does not occur  Shower Transfer: 0 - Activity does not occur,  , Assessment: Pt is 76 y.o female with above mentioned deficits impacting ability to complete ADL's IND'ly and safely. Pt would benefit from skilled OT to increase level of functioning. Speech therapy: FIMS: Comprehension: 6 - Complex ideas 90% or device (hearing aid/glasses)  Expression: 7 - Patient expresses complex ideas/needs  Social Interaction: 6 - Patient requires medication for mood and/or effect  Problem Solvin - Patient able to solve simple/routine tasks  Memory: 5 - Patient requires prompting with stress/unfamiliar situations      Lab/X-ray studies reviewed, analyzed and discussed with patient and staff:   Recent Results (from the past 24 hour(s))   POCT Glucose    Collection Time: 19 11:55 AM   Result Value Ref Range    POC Glucose 289 (H) 60 - 115 mg/dl    Performed on ACCU-CHEK    POCT Glucose    Collection Time: 19  4:26 PM   Result Value Ref Range    POC Glucose 223 (H) 60 - 115 mg/dl    Performed on ACCU-CHEK    POCT Glucose    Collection Time: 19  8:59 PM   Result Value Ref Range    POC Glucose 228 (H) 60 - 115 mg/dl    Performed on ACCU-CHEK    POCT Glucose    Collection Time: 04/15/19  6:20 AM   Result Value Ref Range    POC Glucose 224 (H) 60 - 115 mg/dl    Performed on ACCU-CHEK        Ct Head Wo   2019  Impression: Mild cerebral atrophy. No acute findings. Ct Lumbar Spine Wo   2019   NO EVIDENCE OF FRACTURE. MINIMAL ANTEROLISTHESIS L2-3. DEGENERATIVE CHANGES, SEE INDIVIDUAL LEVELS ABOVE. LUMBAR CANAL STENOSIS L3-4. CONSIDER CORRELATION WITH MRI.       1 hour 40 minutes less than 89% on nocturnal pulse oximetry study. Study cut short because of severe hypoxia patient placed on O2    Previous extensive, complex labs, notes and diagnostics reviewed and analyzed.      ALLERGIES:    Allergies as of 2019 - Review Complete 04/04/2019   Allergen Reaction Noted    Sulfa antibiotics Rash 09/22/2017      (please also verify by checking MAR)       Today I evaluated this patient for periodic reassessment of medical and functional status. The patient was discussed in detail at the treatment team meeting focusing on current medical issues, progress in therapies, social issues, psychological issues, barriers to progress and strategies to address these barriers, and discharge planning. See the hand written addendum to rehab progress note. The patient continues to be high risk for future disability and their medical and rehabilitation prognosis continue to be good and therefore, we will continue the patient's rehabilitation course as planned. The patient's tentative discharge date was set. Patient and family education was discussed. The patient was made aware of the team discussion regarding their progress. Complex Physical Medicine & Rehab Issues Assess & Plan:   1. Severe abnormality of gait and mobility and impaired self-care and ADL's secondary to progressive  flareup of osteoarthritis . Functional and medical status reassessed regarding patients ability to participate in therapies and patient found to be able to participate in acute intensive comprehensive inpatient rehabilitation program including PT/OT to improve balance, ambulation, ADLs, and to improve the P/AROM. Therapeutic modifications regarding activities in therapies, place, amount of time per day and intensity of therapy made daily. In bed therapies or bedside therapies prn.   2. Bowel opiate-related constipation and Bladder dysfunction:  frequent toileting, ambulate to bathroom with assistance, check post void residuals. Check for C.difficile x1 if >2 loose stools in 24 hours, continue bowel & bladder program.  Monitor bowel and bladder function. Lactinex 2 PO every AC. MOM prn, Brown Bomb prn, Glycerin suppository prn, enema prn.   Patient's work on independence with bathing hygiene and toileting. She is not having her  wipe her bottom and we're fearful that that type of dependency will create a unnecessary burden on family. We would like her to become more independent in that I will offer assistive devices and training. 3. Severe lateral shoulder pain neuropathic and eneralized OA pain: reassess pain every shift and prior to and after each therapy session, give prn Tylenol and  titrate opiates I was using lowest effective dose, modalities prn in therapy, Lidoderm, K-pad prn. Offer injection into the lateral shoulders. Patient is to wean off of her benzodiazepines. 4. Skin healing and breakdown risk:  continue pressure relief program.  Daily skin exams and reports from nursing. 5. Severe Fatigue due to nutritional and hydration deficiency:  continue to monitor I&Os, calorie counts prn, dietary consult prn. And vitamin B12 vitamin D and CoQ10  Part of patient's rehabilitation program I am encouraging them to attend game day today under recreational therapy to improve their dexterity, cognition, socialization and endurance. 6. Acute episodic insomnia with situational adjustment disorder: Resume Slow titration off of Xanax. prn Ambien, monitor for day time sedation. Status post psychiatry consult try to get her off these medications not very helpful. Did not offer any examples of other medications because she refused any other medications. 7. Falls risk elevated:  patient to use call light to get nursing assistance to get up, bed and chair alarm. 8. Elevated DVT risk: progressive activities in PT, continue prophylaxis SANTIAGO hose, elevation  . 9. Complex discharge planning:  Discharge April 21, 2019 home with her  did help from her daughters that live far away. He will also have home health care.   SP weekly team meeting  Monday to assess progress towards goals, discuss and address social, psychological and medical comorbidities and to address difficulties they may be having progressing in therapy. Patient and family education is in progress. The patient is to follow-up with their family physician after discharge. Complex Active General Medical Issues that complicate care Assess & Plan:    1. Peripheral angiopathy,   Hx of Chronic ulcer of left foot-0 pressure to her heels bilaterally, at E T-max, add podiatry consult  2. Diabetic neuropathy associated with diabetes mellitus due to underlying condition -low carb diet, add Glucotrol, Lantus, titrate Humalog low carbohydrate diet add diabetic education  3. Ataxia-focus on balance in physical therapy  4. Hypothyroid-monitor symptoms, titrate Synthroid  5. Morbid obesity due to excess calories -add diabetic and obesity related dietary counseling, restrict carbohydrates and diet  6. Essential hypertension, veer peripheral artery disease,  HLD (hyperlipidemia)-vital signs every shift, dose and titrate cardiac medications accordingly to include Toprol, recheck CBC BMP and consult hospitalist for backup medical  7. Severe active chronic Anxiety and depression with learned helplessness behaviors-emotional support daily, add rec therapy  rehabilitation psychology and trazodone consider SSRI-wean off of Xanax, consult rehabilitation psychology and health psychiatry regarding alternative measures for depression and anxiety, add vitamin B12 shot, gradual tapering off (for long term use of benzodiazepines, the recommendations are 20% of the dose/week). No other medications are recommended at this time, since the patient declines any.   8.   Bronchitis-pulse ox checks every shift, dose and titrate oxygen and aerosol treatments  9.   OA (osteoarthritis) with severe osteoarthritic and severe neuropathic pain-titrate Tylenol versus Norco, always use the lowest effective dose of medication, add Desyrel at night for neuropathic pain, add topical agents, check [facility] prescription monitoring database to review for overuse or abuse  10. Acute on chronic renal failure-recheck CBC BMP and limit toxic medications discontinue milk of magnesia  11. Acute UTI-check postvoid residuals, check urinary CNS, titrate Macrobid, monitor for urinary retention, improved peroneal care  12. Severe Yeast rash in her abdominal folds and skin folds-add Mycostatin powder  13. Significant nocturnal hypoxemia as well as High risk opiate use-with active chronic benzodiazepine use check [facility] physician monitoring database, use nonnarcotic measures for pain when appropriate. He is lowest effective dose with informed consent-gradual tapering off (for long term use of benzodiazepines, the recommendations are 20% of the dose/week). No other medications are recommended at this time, since the patient declines any. Consult pulmonology for sleep apnea to treat as an outpatient and further diagnostics in the meantime limit doses of opiates transition to non-benzodiazepine use and elevate head of bed with nasal cannula O2.  14. Lowered to the ground without injury on the evening of April 11, 2019. Patient does not want any x-rays done.        Shirlene Vallejo D.O., PM&R     Attending    [address] [facility]

## 2019-04-15 NOTE — PROGRESS NOTES
INPATIENT PROGRESS NOTES    PATIENT NAME: Radha Love  MRN: [id number]  SERVICE DATE:  April 15, 2019   SERVICE TIME:  4:01 PM      PRIMARY SERVICE: Pulmonary Disease    CHIEF COMPLAIN:hypoxia during sleep       INTERVAL HPI: Patient seen and examined at bedside, Interval Notes, orders reviewed. Nursing notes noted  She said she is on 3 lit O2 with sleep . She has no complaint of shortness of breath. No chest pain or pleuritic pain no cough or sputum production. Room air O2 saturation 94%. patient will need overnight pulse oximetry night before discharge to see if she still have low oxygen during sleep and still had O2 before discharge.  at bed side. OBJECTIVE    Body mass index is 43.72 kg/m². PHYSICAL EXAM:  Vitals:  BP (!) 190/63   Pulse 72   Temp 97 °F (36.1 °C) (Oral)   Resp 18   Ht 5' 6.5\" (1.689 m)   Wt 275 lb (124.7 kg)   LMP  (LMP Unknown)   SpO2 94%   BMI 43.72 kg/m²    General: , Obese Alert, awake, Oriented x3  .comfortable in bed, No distress. Head: Atraumatic , Normocephalic   Eyes: PERRL. No sclera icterus. No conjunctival injection. No discharge   ENT: No nasal  discharge. Pharynx clear. Neck:  Trachea midline. No thyromegaly, no JVD, No cervical adenopathy. Chest : Bilaterally symmetrical ,Normal effort,  No accessory muscle use  Lung : Diminished BS bilateraly. No Rales. No wheezing. No rhonchi. Heart[de-identified] Normal  rate. Regular rhythm. No mumur ,  Rub or gallop  ABD: Non-tender. Non-distended. No masses. No organmegaly. Normal bowel sounds. No hernia. Extremities: Trace pitting in both lower leg , No Cyanosis ,No clubbing  Neuro: no cranial nerve abnormality, normal reflex and sensation, no focal weakness   Skin: Warm and dry. No erythema rash on exposed extremities. DATA:   No results for input(s): WBC, HGB, HCT, MCV, PLT in the last 72 hours.   No results for input(s): NA, K, CL, CO2, BUN, CREATININE, GLUCOSE, CALCIUM, PROT, LABALBU, BILITOT, ALKPHOS, AST, ALT, LABGLOM, GFRAA, AGRATIO, GLOB in the last 72 hours. MV Settings:          No results for input(s): PHART, DCT7ZCZ, PO2ART, BCE0LIF, BEART, Q8NSTVJG in the last 72 hours. O2 Device: None (Room air)  O2 Flow Rate (L/min): 2 L/min    DIET GENERAL; Carb Control: 4 carb choices (60 gms)/meal; Low Sodium (2 GM)  Dietary Nutrition Supplements: Snack (see comment)  Dietary Nutrition Supplements: Protein Modular     MEDICATIONS during current hospitalization:    Continuous Infusions:   dextrose         Scheduled Meds:   insulin lispro  0-12 Units Subcutaneous TID WC    insulin lispro  0-6 Units Subcutaneous Nightly    linagliptin  5 mg Oral Daily    docusate sodium  100 mg Oral BID    insulin glargine  10 Units Subcutaneous Nightly    ALPRAZolam  0.25 mg Oral BID AC    ALPRAZolam  0.5 mg Oral Nightly    cyanocobalamin  1,000 mcg Intramuscular Weekly    coenzyme Q10  100 mg Oral BID AC    lactobacillus acidophilus  1 tablet Oral Daily    levothyroxine  125 mcg Oral Daily    metoprolol succinate  100 mg Oral Daily    lidocaine  3 patch Transdermal Daily    analgesic ointment   Topical TID    miconazole   Topical BID       PRN Meds:glucose, dextrose, glucagon (rDNA), dextrose, lactulose, sodium chloride, hydrALAZINE, acetaminophen, HYDROcodone 5 mg - acetaminophen, ammonium lactate    Radiology  Ct Head Wo Contrast    Result Date: 4/4/2019  CT Brain Contrast medium:  Not utilized. History:  Inability to ambulate. Comparison:  None Findings: Extra-axial spaces:  Normal. Intracranial hemorrhage:  None. Ventricular system:  Ventricles are mildly enlarged. Sulci mildly prominent. . Basal Cisterns:  Normal. Cerebral Parenchyma:  Normal. Midline Shift:  None. Cerebellum:  Normal. Paranasal sinuses and mastoid air cells:  Normal. Visualized Orbits:  Normal.     Impression: Mild cerebral atrophy. No acute findings. . All CT scans at this facility use dose modulation, iterative reconstruction, and/or weight based dosing when appropriate to reduce radiation dose to as low as reasonably achievable. Ct Lumbar Spine Wo Contrast    Result Date: 4/4/2019  EXAMINATION: CT LUMBER SPINE CLINICAL HISTORY: BLE weakness , inability to ambulate. TECHNIQUE: THIN AXIAL SECTIONS WERE OBTAINED THROUGH THE LUMBER SPINE. IMAGES WERE REFORMATTED IN THE SAGITTAL AND CORONAL PLANES. COMPARISONS: None available. FINDINGS: Bones are demineralized. There is good alignment of spine present. There is narrowing of the L2-3 and L3-4 disc levels with vacuum disc phenomenon. Minimal anterolisthesis at L2-3 level. Otherwise, good alignment of lumbar spine. Vertebral heights are maintained. There is no sign of osseous destruction. It is difficult to evaluate the disks with CT. L1/L2: Unremarkable. L2/L3: Narrowed disc vacuum disc phenomenon. Minimal anterolisthesis of L2 vertebra. Hypertrophied facet joints. L3/L4: Narrowed discs with vacuum disc phenomenon. There is hypertrophy of the facet joints and ligamentum flavum. Moderate central canal stenosis. Posterior bulging of disc. L4/L5: Suspect posterior central protrusion of disc of carotid hypertrophied facet joints. Mild canal stenosis. L5/S1: Hypertrophied facet joints. NO EVIDENCE OF FRACTURE. MINIMAL ANTEROLISTHESIS L2-3. DEGENERATIVE CHANGES, SEE INDIVIDUAL LEVELS ABOVE. LUMBAR CANAL STENOSIS L3-4. CONSIDER CORRELATION WITH MRI. All CT scans at this facility use dose modulation, iterative reconstruction, and/or weight based dosing when appropriate to reduce radiation dose to as low as reasonably achievable. IMPRESSION AND SUGGESTION:  1. Nocturnal hypoxia, and morbid obesity rule out JENNIFER  2. Gait and mobility abnormality  3. Hypothyroidism  4. Hypertension  5. Diabetes mellitus  6. BAR on CKD    continue O2 3 L via nasal cannula during sleep. Overnight pulse oximetry nightly for discharge and sleep study as outpatient. recommend sleep study as out patient.  Will follow as needed Electronically signed by Vik Kumari MD, FCCP on 4/15/2019 at 4:01 PM

## 2019-04-15 NOTE — PROGRESS NOTES
Ambulating  Shower Transfers: Dependent;2 Person assistance;Verbal cues  Shower Transfers Comments: Pt with LOB req'ing 2 people to A to tub transfer bench; following shower pt completed stand step at grab bars -> w/c at SBA with v/c's. Pt stated that she bumped her R cheek somewhere on front shower wall when LOB occurred. Therapist unable to see signs of redness, swelling or bruising at this time. Stacey AKHTAR notified assessed. Plan   Plan  Times per week: 5-7x/ wk   Plan weeks: 2-3  Current Treatment Recommendations: Strengthening, Balance Training, Functional Mobility Training, Endurance Training, Wheelchair Mobility Training, Safety Education & Training, Pain Management, Patient/Caregiver Education & Training, Equipment Evaluation, Education, & procurement, Self-Care / ADL, Home Management Training  Plan Comment: Con't OT POC for d/c on 4-21-19  G-Code     OutComes Score                                                  AM-PAC Score             Goals  Patient Goals   Patient goals : \"To go home. \"        Therapy Time   Individual Concurrent Group Co-treatment   Time In 1000         Time Out 1100         Minutes 60               Electronically signed by Brooklyn ROJAS on 4/15/19 at 11:56 AM    BOB Lloyd

## 2019-04-15 NOTE — PROGRESS NOTES
Occupational Therapy  Facility/Department: [facility]  Daily Treatment Note  NAME: Sonia Muniz  : 1950  MRN: [id number]    Date of Service: 4/15/2019    Discharge Recommendations:  Continue to assess pending progress       Assessment   Performance deficits / Impairments: Decreased functional mobility ; Decreased ADL status; Decreased strength;Decreased safe awareness;Decreased endurance;Decreased balance;Decreased high-level IADLs;Decreased coordination;Decreased vision/visual deficit; Decreased ROM  Assessment: Pt is 76 y.o female with above mentioned deficits impacting ability to complete ADL's IND'ly and safely. Pt would benefit from skilled OT to increase level of functioning. History: Mulit comorb   Exam: 6 perf imp   Assistance / Modification: Mod/Max A   REQUIRES OT FOLLOW UP: Yes  Activity Tolerance  Activity Tolerance: Patient Tolerated treatment well  Safety Devices  Safety Devices in place: Yes  Type of devices: All fall risk precautions in place  Restraints  Initially in place: No         Patient Diagnosis(es): There were no encounter diagnoses. has a past medical history of Anxiety, Arthritis, Bronchitis, Diabetes mellitus ([facility] [address] Diabetic neuropathy associated with diabetes mellitus due to underlying condition [facility]), Essential hypertension, Hypercholesterolemia, Hypertension, Morbid obesity due to excess calories ([facility] [address] Thyroid disease, and Wound infection. has a past surgical history that includes orthopedic surgery (Left, 2017); hernia repair; Appendectomy; and Tonsillectomy (N/A).     Restrictions  Restrictions/Precautions  Restrictions/Precautions: Fall Risk  Subjective   General  Chart Reviewed: Yes  Patient assessed for rehabilitation services?: Yes  Response to previous treatment: Patient reporting fatigue but able to participate  Family / Caregiver Present: No  Referring Practitioner: Dr. Glynis Aschoff   Diagnosis: Impaired mobility 2° to flare up of O.A.          Objective     Pt reported being very fatigued after session with PT. Pt  present, educated pt and  on AD needs and use and where to obtain equipment needed. Pt instructed and performed reaching and [address] with beading and cone on table top activity. Pt gabby well. Min rest breaks with extended time with stringing beads  required. Plan   Plan  Times per week: 5-7x/ wk   Plan weeks: 2-3  Current Treatment Recommendations: Strengthening, Balance Training, Functional Mobility Training, Endurance Training, Wheelchair Mobility Training, Safety Education & Training, Pain Management, Patient/Caregiver Education & Training, Equipment Evaluation, Education, & procurement, Self-Care / ADL, Home Management Training  Plan Comment: Con't OT POC for d/c on 4-21-19               Goals  Patient Goals   Patient goals : \"To go home. \"        Therapy Time   Individual Concurrent Group Co-treatment   Time In 1330         Time Out 1400         Minutes 30              Electronically signed by NIELS Leal/L on 4/15/19 at 4:34 PM      NIELS Leal/WILIAN

## 2019-04-15 NOTE — PROGRESS NOTES
Physical Therapy Rehab Treatment Note  [facility]  [facility]       NAME: Tory Yañez  : 1950 (76 y.o.)  MRN: [id number]  CODE STATUS: DNR-CCA    Date of Service: 4/15/2019  Chart Reviewed: Yes  Family / Caregiver Present: Yes    Restrictions:  Restrictions/Precautions: Fall Risk    SUBJECTIVE:    Pain Screening  Patient Currently in Pain: Yes  Pre Treatment Pain Screening  Pain at present: 8  Intervention List: Patient able to continue with treatment;Patient declined any intervention    Post Treatment Pain Screening:  Pain Assessment  Pain Assessment: 0-10  Pain Level: 7  Pain Location: Arm; Shoulder  Pain Orientation: Left  Non-Pharmaceutical Pain Intervention(s): Distraction;Repositioned    OBJECTIVE: Pt's  present and observed. Spoke with pt and  regarding WC being main mode of locomotion at this time.  states their home is not big enough to fit a WC throughout. Bed mobility  Rolling to Left: Unable to assess(Pt states she is unable to roll to L d/t arm pain)  Rolling to Right: Modified independent  Supine to Sit: Supervision  Sit to Supine: Supervision    Transfers  Sit to Stand: Minimal Assistance  Stand to sit: Contact guard assistance  Bed to Chair: Contact guard assistance(with [employer])  Comment: Multiple sit to stands focusing on technique. Increased time and effort to complete. Ambulation  Ambulation?: Yes  Ambulation 1  Surface: carpet  Device: Parallel Bars  Other Apparatus: Wheelchair follow  Assistance: Contact guard assistance  Quality of Gait: Focused gait training on posture and increasing step length  Distance: 8ftx2    Stairs/Curb  Stairs?: No not safe at this time    Wheelchair Activities  Propulsion: Yes  Propulsion 1  Propulsion: Manual   Level: Carpet  Level of Assistance: Stand by assistance  Description/ Details: VCs for technique with UEs to improve effeciency.   Distance: 75ft     Exercises  Hip Flexion: x20  Knee Long Arc Quad: x20  Neurodevelopmental Techniques: Standing posture and wt shifting activities. Other exercises  Other exercises 2: L posteior shld stretch 20 sec x3 to decrease muscle tightness. Other exercises 4: squats x10     ASSESSMENT/COMMENTS:  Body structures, Functions, Activity limitations: Decreased functional mobility ; Decreased endurance;Decreased balance; Increased Pain;Decreased strength  Assessment: Pt fatigues quickly. PLAN OF CARE/Safety:   Plan Comment: Cont per POC.       Therapy Time:   Individual   Time In 1430   Time Out 1530   Minutes 60     Minutes:      Transfer/Bed mobility training:15      Gait training:15      Neuro re education: 10     Therapeutic ex:10     WC 10    Robe Santana PTA, 04/15/19 at 4:44 PM

## 2019-04-15 NOTE — PROGRESS NOTES
Endocrinology Progress Note    Assessment and Plan:   Assessment-  1. Type 2 insulin-dependent diabetes  2. Weakness, falls and deconditioning  3. Hypothyroidism  4. Hyperglycemia, insulin dosing adjustments will be made    Plan-  1. Increase Lantus 15 units at bedtime  2. Start Humalog 4 units 3 times a day with meals  3. Continue Medium dose sliding scale coverage  4. Continue Tradjenta 5 mg by mouth daily  5. Monitor glycemic control closely  6. Target glucose 100-180  7. Continue Synthroid 125 µg by mouth daily    POC Glucose:   Recent Labs     04/14/19  1626 04/14/19  2059 04/15/19  0620 04/15/19  1128 04/15/19  1624   POCGLU 223* 228* 224* 307* 214*     HGBA1C:Results for Mary Berry (MRN [id number]) as of 4/15/2019 16:49   Ref. Range 4/9/2019 18:19   Hemoglobin A1C Latest Ref Range: 4.8 - 5.9 % 7.0 (H)     CBC:   No results for input(s): WBC, HGB, PLT in the last 72 hours. CMP:  No results for input(s): NA, K, CL, CO2, BUN, CREATININE, GLUCOSE, CALCIUM, LABGLOM in the last 72 hours. CC: No chief complaint on file. Subjective: Interval History: Patient is a 63-year-old type II diabetic female admitted to our rehabilitation unit for strengthening and conditioning following an admission for gait abnormalities, weakness and fall. Glycemic control is improving on current insulin dosing regiment. Postprandial hyperglycemia continues  30 minutes patient education including pathophysiology of diabetes, worsening hyperglycemia and the reasons for it, including poor dietary choices, sedentary lifestyle, pain and stress of being in the hospital and rehabilitation. the necessity for insulin and why we have discontinued her metformin and sulfonylureas due to her renal failure. We'll order a dietitian consult to discuss diabetic diet and other food choices as well as carbohydrate counting and the importance of all of the above.   Patient and  verbalized understanding    Review of systems: denies

## 2019-04-16 LAB
GLUCOSE BLD-MCNC: 192 MG/DL (ref 60–115)
GLUCOSE BLD-MCNC: 194 MG/DL (ref 60–115)
GLUCOSE BLD-MCNC: 247 MG/DL (ref 60–115)
GLUCOSE BLD-MCNC: 301 MG/DL (ref 60–115)
PERFORMED ON: ABNORMAL

## 2019-04-16 PROCEDURE — 6370000000 HC RX 637 (ALT 250 FOR IP): Performed by: PHYSICAL MEDICINE & REHABILITATION

## 2019-04-16 PROCEDURE — 97530 THERAPEUTIC ACTIVITIES: CPT

## 2019-04-16 PROCEDURE — 97535 SELF CARE MNGMENT TRAINING: CPT

## 2019-04-16 PROCEDURE — 97127 HC OT THER IVNTJ W/FOCUS COG FUNCJ: CPT

## 2019-04-16 PROCEDURE — 97110 THERAPEUTIC EXERCISES: CPT

## 2019-04-16 PROCEDURE — 97116 GAIT TRAINING THERAPY: CPT

## 2019-04-16 PROCEDURE — 1180000000 HC REHAB R&B

## 2019-04-16 PROCEDURE — 6370000000 HC RX 637 (ALT 250 FOR IP): Performed by: INTERNAL MEDICINE

## 2019-04-16 PROCEDURE — 6370000000 HC RX 637 (ALT 250 FOR IP): Performed by: NURSE PRACTITIONER

## 2019-04-16 PROCEDURE — 99232 SBSQ HOSP IP/OBS MODERATE 35: CPT | Performed by: PHYSICAL MEDICINE & REHABILITATION

## 2019-04-16 PROCEDURE — 2700000000 HC OXYGEN THERAPY PER DAY

## 2019-04-16 PROCEDURE — 6370000000 HC RX 637 (ALT 250 FOR IP): Performed by: PHYSICIAN ASSISTANT

## 2019-04-16 RX ORDER — INSULIN GLARGINE 100 [IU]/ML
16 INJECTION, SOLUTION SUBCUTANEOUS NIGHTLY
Status: DISCONTINUED | OUTPATIENT
Start: 2019-04-16 | End: 2019-04-17

## 2019-04-16 RX ORDER — LISINOPRIL 20 MG/1
20 TABLET ORAL DAILY
Status: DISCONTINUED | OUTPATIENT
Start: 2019-04-16 | End: 2019-04-20 | Stop reason: HOSPADM

## 2019-04-16 RX ADMIN — DOCUSATE SODIUM 100 MG: 100 CAPSULE, LIQUID FILLED ORAL at 20:08

## 2019-04-16 RX ADMIN — LINAGLIPTIN 5 MG: 5 TABLET, FILM COATED ORAL at 08:51

## 2019-04-16 RX ADMIN — HYDROCODONE BITARTRATE AND ACETAMINOPHEN 1 TABLET: 5; 325 TABLET ORAL at 01:27

## 2019-04-16 RX ADMIN — ACETAMINOPHEN 650 MG: 325 TABLET ORAL at 11:46

## 2019-04-16 RX ADMIN — DOCUSATE SODIUM 100 MG: 100 CAPSULE, LIQUID FILLED ORAL at 08:51

## 2019-04-16 RX ADMIN — ACETAMINOPHEN 650 MG: 325 TABLET ORAL at 22:14

## 2019-04-16 RX ADMIN — HYDROCODONE BITARTRATE AND ACETAMINOPHEN 1 TABLET: 5; 325 TABLET ORAL at 20:08

## 2019-04-16 RX ADMIN — LEVOTHYROXINE SODIUM 125 MCG: 125 TABLET ORAL at 06:11

## 2019-04-16 RX ADMIN — ALPRAZOLAM 0.5 MG: 0.5 TABLET ORAL at 20:08

## 2019-04-16 RX ADMIN — MICONAZOLE NITRATE: 20 POWDER TOPICAL at 22:15

## 2019-04-16 RX ADMIN — MICONAZOLE NITRATE: 20 POWDER TOPICAL at 08:59

## 2019-04-16 RX ADMIN — ALPRAZOLAM 0.25 MG: 0.25 TABLET ORAL at 06:10

## 2019-04-16 RX ADMIN — INSULIN GLARGINE 16 UNITS: 100 INJECTION, SOLUTION SUBCUTANEOUS at 20:11

## 2019-04-16 RX ADMIN — ANALGESIC BALM: 1.74; 4.06 OINTMENT TOPICAL at 08:57

## 2019-04-16 RX ADMIN — HYDROCODONE BITARTRATE AND ACETAMINOPHEN 1 TABLET: 5; 325 TABLET ORAL at 08:59

## 2019-04-16 RX ADMIN — ACETAMINOPHEN 650 MG: 325 TABLET ORAL at 04:40

## 2019-04-16 RX ADMIN — ANALGESIC BALM: 1.74; 4.06 OINTMENT TOPICAL at 22:14

## 2019-04-16 RX ADMIN — METOPROLOL SUCCINATE 100 MG: 100 TABLET, EXTENDED RELEASE ORAL at 08:51

## 2019-04-16 RX ADMIN — HYDROCODONE BITARTRATE AND ACETAMINOPHEN 1 TABLET: 5; 325 TABLET ORAL at 16:17

## 2019-04-16 RX ADMIN — ALPRAZOLAM 0.25 MG: 0.25 TABLET ORAL at 16:17

## 2019-04-16 RX ADMIN — HYDRALAZINE HYDROCHLORIDE 25 MG: 25 TABLET, FILM COATED ORAL at 08:51

## 2019-04-16 RX ADMIN — LISINOPRIL 20 MG: 20 TABLET ORAL at 13:02

## 2019-04-16 RX ADMIN — Medication 100 MG: at 11:46

## 2019-04-16 RX ADMIN — LACTOBACILLUS TAB 1 TABLET: TAB at 08:51

## 2019-04-16 RX ADMIN — Medication 100 MG: at 08:51

## 2019-04-16 ASSESSMENT — PAIN DESCRIPTION - LOCATION
LOCATION: ARM;SHOULDER
LOCATION: SHOULDER;ARM
LOCATION: NECK;SHOULDER

## 2019-04-16 ASSESSMENT — PAIN SCALES - GENERAL
PAINLEVEL_OUTOF10: 6
PAINLEVEL_OUTOF10: 4
PAINLEVEL_OUTOF10: 10
PAINLEVEL_OUTOF10: 5
PAINLEVEL_OUTOF10: 10
PAINLEVEL_OUTOF10: 6
PAINLEVEL_OUTOF10: 9
PAINLEVEL_OUTOF10: 7
PAINLEVEL_OUTOF10: 8

## 2019-04-16 ASSESSMENT — PAIN DESCRIPTION - ORIENTATION
ORIENTATION: LEFT
ORIENTATION: LEFT

## 2019-04-16 ASSESSMENT — PAIN DESCRIPTION - DESCRIPTORS
DESCRIPTORS: ACHING;SORE
DESCRIPTORS: ACHING;SORE

## 2019-04-16 ASSESSMENT — PAIN DESCRIPTION - PROGRESSION: CLINICAL_PROGRESSION: NOT CHANGED

## 2019-04-16 ASSESSMENT — PAIN DESCRIPTION - ONSET: ONSET: ON-GOING

## 2019-04-16 ASSESSMENT — PAIN DESCRIPTION - PAIN TYPE: TYPE: ACUTE PAIN

## 2019-04-16 ASSESSMENT — PAIN DESCRIPTION - FREQUENCY: FREQUENCY: CONTINUOUS

## 2019-04-16 NOTE — PROGRESS NOTES
Physical Therapy Rehab Treatment Note  Facility/Department: [facility]  Room: R262/R262-01       NAME: Candance Sievert  : 1950 (89 y.o.)  MRN: [id number]  CODE STATUS: DNR-CCA    Date of Service: 2019  Chart Reviewed: Yes  Family / Caregiver Present: No    Restrictions:  Restrictions/Precautions: Fall Risk     SUBJECTIVE: Subjective: Pt states her arm is feeling a little better. States her goal is to be able to walk with a walker. Pain Screening  Patient Currently in Pain: Yes     Post Treatment Pain Screening:  Pain Assessment  Pain Assessment: 0-10  Pain Level: 5  Pain Location: Arm; Shoulder  Pain Orientation: Left  Pain Descriptors: Aching; Sore  Non-Pharmaceutical Pain Intervention(s): Distraction    OBJECTIVE:                 Transfers  Sit to Stand: Contact guard assistance;Stand by assistance  Stand to sit: Contact guard assistance;Stand by assistance  Increased time to complete    Ambulation  Ambulation?: Yes  Ambulation 1  Surface: carpet  Device: Rolling Walker  Other Apparatus: Wheelchair follow  Assistance: Contact guard assistance  Distance: 15ft  Slow pace, fatigues quickly, decreased step length, R knee buckling x2  Wheelchair Activities  Propulsion: Yes  Propulsion 1  Propulsion: Manual  Level: Level Tile  Level of Assistance: Supervision  Distance: 100ft        Exercises  Neurodevelopmental Techniques: Standing posture and wt shifting activities. Other exercises  Other exercises 4: squats x10     ASSESSMENT/COMMENTS:  Body structures, Functions, Activity limitations: Decreased functional mobility ; Decreased endurance;Decreased balance; Increased Pain;Decreased strength  PLAN OF CARE/Safety:   Plan Comment: Cont per POC.     Therapy Time:   Individual   Time In 0900   Time Out 0930   Minutes 30     Minutes:      Transfer/Bed mobility training:10      Gait training:10      Neuro re education:5     Therapeutic ex:5    Antonino Otero PTA, 19 at 12:19 PM

## 2019-04-16 NOTE — PROGRESS NOTES
Physical Therapy Rehab Treatment Note  [facility]  [facility]       NAME: Jj Dennison  : 1950 (68 y.o.)  MRN: [id number]  CODE STATUS: DNR-CCA    Date of Service: 2019  Chart Reviewed: Yes  Family / Caregiver Present: No    Restrictions:  Restrictions/Precautions: Fall Risk     SUBJECTIVE: Subjective: Pt states she was having muscle spasms in legs when she was laying down. Pain Screening  Patient Currently in Pain: Yes  Pre Treatment Pain Screening  Pain at present: 8  Intervention List: Patient able to continue with treatment;Patient declined any intervention    Post Treatment Pain Screening:  Pain Assessment  Pain Assessment: 0-10  Pain Level: 5  Pain Location: Neck; Shoulder  Pain Orientation: Left  Pain Descriptors: Aching; Sore  Non-Pharmaceutical Pain Intervention(s): Distraction    OBJECTIVE:              Bed mobility  Rolling to Right: Modified independent  Supine to Sit: Supervision  Sit to Supine: Supervision  Transfers  Sit to Stand: Contact guard assistance;Stand by assistance  Stand to sit: Contact guard assistance;Stand by assistance    Ambulation  Ambulation?: Yes  Ambulation 1  Surface: carpet  Device: Rolling Walker  Other Apparatus: Wheelchair follow  Assistance: Contact guard assistance  Quality of Gait: Pt with reports of increased neck and arm pain, heavy UE support through [employer], fatigues quickly, decreased step length, VCs for breathing technique    Distance: 10ftWheelchair Activities  Propulsion: Yes  Propulsion 1  Propulsion: Manual  Level: Level Tile  Level of Assistance: Supervision  Distance: 100ft        Exercises  Bridging: (x10)  Hip Abduction: Hip add into ball 3s x 20/abd x20 with manual resistance  Core Strengthening: supine TA isos [id number],hooklying marches x10  Neurodevelopmental Techniques: Side stepping with WW edge of mat  Comments: CP to neck and shlds x10 min during supine ex for pain control.     Other exercises  Other exercises 2: L posteior shld stretch 20 sec x3 to decrease muscle tightness. Other exercises 4: squats x10  Other exercises 5: scap retract x10     ASSESSMENT/COMMENTS:  Body structures, Functions, Activity limitations: Decreased functional mobility ; Decreased endurance;Decreased balance; Increased Pain;Decreased strength  Assessment: VCs to decrease UE support through [address] throughout. Pt cont to fatigue quickly in standing. PLAN OF CARE/Safety:   Plan Comment: Cont per POC.       Therapy Time:   Individual   Time In 1430   Time Out 1530   Minutes 60     Minutes:      Transfer/Bed mobility training:15      Gait training:15     Therapeutic ex:30    Levora JACQUIE Carter, 04/16/19 at 4:43 PM

## 2019-04-16 NOTE — PROGRESS NOTES
Hospitalist Progress Note      PCP: Ralph Grayson MD    Date of Admission: 4/4/2019    Chief Complaint:      Subjective:    Pt denies any  complaint       Medications:  Reviewed    Infusion Medications    dextrose       Scheduled Medications    insulin glargine  15 Units Subcutaneous Nightly    insulin lispro  4 Units Subcutaneous TID WC    insulin lispro  0-12 Units Subcutaneous TID WC    insulin lispro  0-6 Units Subcutaneous Nightly    linagliptin  5 mg Oral Daily    docusate sodium  100 mg Oral BID    ALPRAZolam  0.25 mg Oral BID AC    ALPRAZolam  0.5 mg Oral Nightly    cyanocobalamin  1,000 mcg Intramuscular Weekly    coenzyme Q10  100 mg Oral BID AC    lactobacillus acidophilus  1 tablet Oral Daily    levothyroxine  125 mcg Oral Daily    metoprolol succinate  100 mg Oral Daily    lidocaine  3 patch Transdermal Daily    analgesic ointment   Topical TID    miconazole   Topical BID     PRN Meds: glucose, dextrose, glucagon (rDNA), dextrose, lactulose, sodium chloride, hydrALAZINE, acetaminophen, HYDROcodone 5 mg - acetaminophen, ammonium lactate    No intake or output data in the 24 hours ending 04/15/19 2002    Exam:    BP (!) 186/75   Pulse 59   Temp 97 °F (36.1 °C) (Oral)   Resp 18   Ht 5' 6.5\" (1.689 m)   Wt 275 lb (124.7 kg)   LMP  (LMP Unknown)   SpO2 96%   BMI 43.72 kg/m²     General appearance: No apparent distress, appears stated age and cooperative. HEENT: Pupils equal, round, and reactive to light. Conjunctivae/corneas clear. Neck: Supple, with full range of motion. No jugular venous distention. Trachea midline. Respiratory:  Normal respiratory effort. Clear to auscultation, bilaterally without Rales/Wheezes/Rhonchi. Cardiovascular: Regular rate and rhythm with normal S1/S2 without murmurs, rubs or gallops. Abdomen: Soft, non-tender, non-distended with normal bowel sounds.   Musculoskeletal: No clubbing, cyanosis, 1+ BLE,    Skin: Skin color, texture, turgor normal. No rashes or lesions. Neuro: alert and oriented   Psychiatric: Alert and oriented, thought content appropriate, normal insight  Capillary Refill: Brisk,< 3 seconds   Peripheral Pulses: +2 palpable, equal bilaterally       Labs:   No results for input(s): WBC, HGB, HCT, PLT in the last 72 hours. No results for input(s): NA, K, CL, CO2, BUN, CREATININE, CALCIUM, PHOS in the last 72 hours. Invalid input(s): MAGNES  No results for input(s): AST, ALT, BILIDIR, BILITOT, ALKPHOS in the last 72 hours. No results for input(s): INR in the last 72 hours. No results for input(s): Victorine [place] in the last 72 hours. Urinalysis:      Lab Results   Component Value Date    NITRU Negative 04/03/2019    WBCUA 20-50 04/03/2019    BACTERIA Negative 04/03/2019    RBCUA 3-5 04/03/2019    BLOODU Negative 04/03/2019    SPECGRAV 1.017 04/03/2019    GLUCOSEU Negative 04/03/2019       Radiology:  RADIOLOGY REPORT   Final Result      XR HUMERUS LEFT (MIN 2 VIEWS)    (Results Pending)       Assessment/Plan:  Gait and mobility abnormality 2/2 OA exacerbation : continue Rehab POC, PT/OT     HTN:uncontrolled  continue antihypertensives with b/p monitoring , start lisinopril and prn hydralazine for adequate b/p control      DM II with hyperglycemia : continue POCT glucose with SSI , endocrinology on board managing       Hypothyroid: continue daily levothyroxine      BAR on CKD: baseline unknown,improved renal function,  avoid nephrotoxin, HCTZ and valsartan on hold       Morbid obesity: weight loss and activity encouraged        Additional work up or/and treatment plan may be added today or then after based on clinical progression. I am managing a portion of pt care. Some medical issues are handled by other specialists. Additional work up and treatment should be done in out pt setting by pt PCP and other out pt providers.      In addition to examining and evaluating pt, I spent additional time explaining care, normal and abnormal findings, and treatment plan. All of pt questions were answered. Counseling, diet and education were  provided. Case will be discussed with nursing staff when appropriate. Family will be updated if and when appropriate.       Diet: DIET GENERAL; Carb Control: 4 carb choices (60 gms)/meal; Low Sodium (2 GM)  Dietary Nutrition Supplements: Snack (see comment)  Dietary Nutrition Supplements: Protein Modular    Code Status: DNR-CCA    PT/OT Eval     Electronically signed by MARCO Mercer CNP on 4/15/2019 at 8:02 PM

## 2019-04-16 NOTE — PROGRESS NOTES
Occupational Therapy  Facility/Department: [facility]  Daily Treatment Note  NAME: Magno Foreman  : 1950  MRN: [id number]    Date of Service: 2019    Discharge Recommendations:  Continue to assess pending progress    Assessment  Activity Tolerance  Activity Tolerance: Patient limited by fatigue  Safety Devices  Safety Devices in place: Yes  Type of devices: All fall risk precautions in place         Patient Diagnosis(es): There were no encounter diagnoses. has a past medical history of Anxiety, Arthritis, Bronchitis, Diabetes mellitus ([facility] [address] Diabetic neuropathy associated with diabetes mellitus due to underlying condition [facility]), Essential hypertension, Hypercholesterolemia, Hypertension, Morbid obesity due to excess calories ([facility] [address] Thyroid disease, and Wound infection. has a past surgical history that includes orthopedic surgery (Left, 2017); hernia repair; Appendectomy; and Tonsillectomy (N/A). Restrictions  Restrictions/Precautions  Restrictions/Precautions: Fall Risk     Subjective: \"My legs are spaghetti and so is my body, that's what I'm working on. \"   General  Chart Reviewed: Yes  Patient assessed for rehabilitation services?: Yes  Response to previous treatment: Patient with no complaints from previous session  Family / Caregiver Present: No  Referring Practitioner: Dr. Fawn Jones   Diagnosis: Impaired mobility 2° to flare up of O. A. Pain Assessment  Pain Assessment: 0-10  Pain Level: 8  Pain Type: Acute pain  Pain Location: Shoulder;Arm  Pain Orientation: Left  Pain Descriptors: Aching; Sore  Pain Frequency: Continuous  Pain Onset: On-going  Clinical Progression: Not changed  Non-Pharmaceutical Pain Intervention(s): Cold applied  Response to Pain Intervention: Patient Satisfied  Vital Signs  Patient Currently in Pain: Yes     Objective  Standing Balance  Time: 1 minute x1 trial   Activity: Sorting and folding laundry   Sit to stand: Minimal assistance  Stand to sit: Contact guard assistance  Pt stood/sat to sort and fold socks. Pt stood with P+ balance and required contact guard assistance from therapist throughout. Pt's left knee buckled during standing requiring pt to initiate seated rest break. Pt reports that her legs feel weak this day. To increase standing tolerance for mgmt of pants during ADL completion. During sitting pt was placed further from table to facilitate forward flexing. Laundry basket placed to the left of pt on the floor to facilitate trunk rotation and lateral flexion when placing socks into basket. Problem solving incorporated into activity. Transfers  Sit to stand: Minimal assistance  Stand to sit: Contact guard assistance     Cognition  Overall Cognitive Status: WFL  Arousal/Alertness: Appropriate responses to stimuli  Attention Span: Appears intact  Problem Solving: Assistance required to implement solutions;Assistance required to generate solutions  Insights: Fully aware of deficits  Initiation: Requires cues for some  Sequencing: Does not require cues  Cognition Comment: \"[place]\" Puzzle: Pt completed puzzle with Min/Mod difficulty. Pt initially required encouragement for participation. Pt reports visual deficits limiting her. Cued pt to locate states based on their shape and the outline on the puzzle. Pt's performance improved when she began to look at shapes and pt even reports using the colors on the map to assist her in locating pieces. To improve cognition for safe ADL completion. Upper Extremity Function  UE Strengthing: Pt wore 1# wrist weights  Pt wore 1 # wrist weights to decorate ROM \"tree\" (horizontal rods at graded heights and distances) with ornaments and ribbons. Pt threaded a large ribbon through loop in ornament > placed the ribbon onto one of the graded rods > and tied the ribbon into a bow on the misael. Pt had Mod difficulty with activity and required rest breaks prn.  Repetitive reaching at and above shoulder level to increase BUE strength and endurance. Hand fine motor coordination incorporated into activity. Provided \"Energy Conservation Techniques\" handout to patient and reviewed with her. Pt verbalized good understanding of techniques and reports that she already does some at home. Pt reports that she would benefit from incorporating more rest breaks into activities rather than attempting to rush and finish everything. Plan   Plan  Times per week: 5-7x/ wk   Plan weeks: 2-3  Current Treatment Recommendations: Strengthening, Balance Training, Functional Mobility Training, Endurance Training, Wheelchair Mobility Training, Safety Education & Training, Pain Management, Patient/Caregiver Education & Training, Equipment Evaluation, Education, & procurement, Self-Care / ADL, Home Management Training  Plan Comment: Con't OT POC for d/c on 4-21-19    Pt's plan of care was discussed by team Brina and Gregoiro at Monday POC review meeting. Goals  Patient Goals   Patient goals : \"To go home. \"     Therapy Time   Individual Concurrent Group Co-treatment   Time In 1000         Time Out 1100         Minutes 60           Electronically signed by Mellie Phoenix, OTA on 4/16/2019 at [address] [place] [address]

## 2019-04-16 NOTE — PROGRESS NOTES
intact  Memory: Decreased short term memory  Safety Judgement: Decreased awareness of need for assistance  Problem Solving: Assistance required to identify errors made;Assistance required to implement solutions  Insights: Fully aware of deficits  Initiation: Requires cues for some  Sequencing: Does not require cues  Cognition Comment: Card Matching Board: Pt successfully matched 22 of 25 cards with Min/Mod difficulty. Pt had difficulty with comprehension of directions and required additional education and cuing. Pt initially completed activity incorrectly. After further education pt was able to comprehend task and correct her errors. Pt states \"I must have not been listening\". To improve cognition for safe ADL completion. Repetitive tabletop reaching to increase BUE endurance. Pop Beads: Pt used bilateral hands to connect/disconnect pop beads. Pt had Min difficulty with activity. To improve hand strength for mgmt of ADL containers. Pt sat in her WC to toss various sized rings at a pole target that was move to various graded distances (3 sets x 16 rings). To facilitate dynamic sitting with weight shifting. Plan   Plan  Times per week: 5-7x/ wk   Plan weeks: 2-3  Current Treatment Recommendations: Strengthening, Balance Training, Functional Mobility Training, Endurance Training, Wheelchair Mobility Training, Safety Education & Training, Pain Management, Patient/Caregiver Education & Training, Equipment Evaluation, Education, & procurement, Self-Care / ADL, Home Management Training  Plan Comment: Con't OT POC for d/c on 4-21-19    Goals  Patient Goals   Patient goals : \"To go home. \"        Therapy Time   Individual Concurrent Group Co-treatment   Time In 1400         Time Out 1430         Minutes 30           Electronically signed by BOB Waters on 4/16/2019 at 2:36 PM  BOB Waters

## 2019-04-16 NOTE — PROGRESS NOTES
Nutrition Education    Type and Reason for Visit: Consult, Patient Education(Diabetes Guidelines)    Consult recieved for pt education regarding Diabetes Guidelines. Pt requested written information to review independently. Provided handouts on Basic Diabetes Meal planning, Food label reading and sample meal plans.   Will F/U prior to discharge to anwser questions as needed    Electronically signed by Truman Davenport RD, LD on 4/16/19 at 4:35 PM

## 2019-04-16 NOTE — PROGRESS NOTES
Subjective: The patient complains of severe  acute  on chronic right shoulder pain and bilateral lower extremity weakness partially relieved by  rest, PT, OT and exacerbated by activity and ambulation. I am concerned about patients learned helplessness behaviors and poor motivation with her own care at times as reported by the nurses. Patient continues to make consistent progress in therapy. She will need to continue to come off of her benzodiazepines. I will encourage her to use the oxygen at night and elevate her head of bed. I reviewed pulmonologists consult. She will need a sleep study after discharge. For now I will order a nocturnal pulse ox to qualify her for nocturnal O2 at home. She may also need benefit from a hospital bed. ROS x10: The patient also complains of severely impaired mobility and activities of daily living. Otherwise no new problems with vision, hearing, nose, mouth, throat, dermal, cardiovascular, GI, , pulmonary, musculoskeletal, psychiatric or neurological. See Rehab H&P on Rehab chart dated . Vital signs:  BP (!) 194/84   Pulse 74   Temp 98 °F (36.7 °C) (Oral)   Resp (!) 84   Ht 5' 6.5\" (1.689 m)   Wt 275 lb (124.7 kg)   LMP  (LMP Unknown)   SpO2 95%   BMI 43.72 kg/m²   I/O:   PO/Intake:  fair PO intake, no problems observed or reported. Bowel/Bladder:  continent, no problems noted. General:  Patient is well developed, adequately nourished, non-obese and     well kempt. HEENT:    PERRLA, hearing intact to loud voice, external inspection of ear     and nose benign. Inspection of lips, tongue and gums benign  Musculoskeletal: No significant change in strength or tone. All joints stable. Inspection and palpation of digits and nails show no clubbing,       cyanosis or inflammatory conditions. Neuro/Psychiatric: Affect: flat but pleasant. Alert and oriented to person, place and     situation.   No significant change in deep tendon reflexes or     sensation  Lungs:  Diminished, CTA-B. Respiration effort is normal at rest.     Heart:   S1 = S2, RRR. No loud murmurs. Abdomen:  Obese, Soft, non-tender, no enlargement of liver or spleen. Extremities:  No significant lower extremity edema diffusely superficial tenderness. Decreased shoulder    range of motion especially on the right  Skin:   Intact to general survey, no visualized or palpated problems. Rehabilitation:  Physical therapy: FIMS:  Bed Mobility: Scooting: Stand by assistance    Transfers: Sit to Stand: Minimal Assistance  Stand to sit: Contact guard assistance  Bed to Chair: Contact guard assistance(with [employer]), Ambulation 1  Surface: carpet  Device: Parallel Bars  Other Apparatus: Wheelchair follow  Assistance: Contact guard assistance  Quality of Gait: Focused gait training on posture and increasing step length  Distance: 8ftx2  Comments: 1 instance of R knee buckling with ambulation, Stairs  Comment: NT d/t safety    FIMS: Bed, Chair, Wheel Chair: 4 - Requires steadying assistance only <25% assist  and/or requires assist with one leg only  Walk: 1 - Total Assistance Walks < 50 feet OR requires two or more people OR patient performs < 25% of locomotion effort  Distance Walked: 15ft  Wheel Chair: 2 - Maximal Assistance Requires up to [address] requires assistance of one person to operate wheelchair between  feet  Distance Traveled in Wheel Chair: 50ft  Stairs: 0 - Activity Does not Occur ( 0 only for the admission assessment), PT Equipment Recommendations  Other: TBD, Assessment: Pt fatigues quickly.     Occupational therapy: FIMS:  Eatin - Patient feeds self  Groomin - Requires setup/cues to do all tasks  Bathin - Able to bathe 8-9 areas  Dressing-Upper: 5 - Requires setup/supervision/cues and/or requires assist with presthesis/brace only  Dressing-Lower: 5 - Requires setup/supervision/cues and/or staff applies TEDS/prosthesis/brace only  Toileting: 3 - Able to perform 2 tasks  Toilet Transfer: 4 - Requires steadying assistance only < 25% assist  Tub Transfer: 0 - Activity does not occur  Shower Transfer: 1 - Total assistance, pt. expends less than 25% effort,  , Assessment: Pt is 76 y.o female with above mentioned deficits impacting ability to complete ADL's IND'ly and safely. Pt would benefit from skilled OT to increase level of functioning. Speech therapy: FIMS: Comprehension: 6 - Complex ideas 90% or device (hearing aid/glasses)  Expression: 7 - Patient expresses complex ideas/needs  Social Interaction: 6 - Patient requires medication for mood and/or effect  Problem Solvin - Patient able to solve simple/routine tasks  Memory: 5 - Patient requires prompting with stress/unfamiliar situations      Lab/X-ray studies reviewed, analyzed and discussed with patient and staff:   Recent Results (from the past 24 hour(s))   POCT Glucose    Collection Time: 04/15/19 11:28 AM   Result Value Ref Range    POC Glucose 307 (H) 60 - 115 mg/dl    Performed on ACCU-CHEK    POCT Glucose    Collection Time: 04/15/19  4:24 PM   Result Value Ref Range    POC Glucose 214 (H) 60 - 115 mg/dl    Performed on ACCU-CHEK    POCT Glucose    Collection Time: 04/15/19  8:22 PM   Result Value Ref Range    POC Glucose 284 (H) 60 - 115 mg/dl    Performed on ACCU-CHEK    POCT Glucose    Collection Time: 19  6:12 AM   Result Value Ref Range    POC Glucose 192 (H) 60 - 115 mg/dl    Performed on ACCU-CHEK        Ct Head Wo   2019  Impression: Mild cerebral atrophy. No acute findings. Ct Lumbar Spine Wo   2019   NO EVIDENCE OF FRACTURE. MINIMAL ANTEROLISTHESIS L2-3. DEGENERATIVE CHANGES, SEE INDIVIDUAL LEVELS ABOVE. LUMBAR CANAL STENOSIS L3-4. CONSIDER CORRELATION WITH MRI.       1 hour 40 minutes less than 89% on nocturnal pulse oximetry study.   Study cut short because of severe hypoxia patient placed on O2    Previous extensive, complex labs, notes and diagnostics check post void residuals. Check for C.difficile x1 if >2 loose stools in 24 hours, continue bowel & bladder program.  Monitor bowel and bladder function. Lactinex 2 PO every AC. MOM prn, Brown Bomb prn, Glycerin suppository prn, enema prn. Patient's work on independence with bathing hygiene and toileting. She is not having her  wipe her bottom and we're fearful that that type of dependency will create a unnecessary burden on family. We would like her to become more independent in that I will offer assistive devices and training. 3. Severe lateral shoulder pain neuropathic and eneralized OA pain: reassess pain every shift and prior to and after each therapy session, give prn Tylenol and  titrate opiates I was using lowest effective dose, modalities prn in therapy, Lidoderm, K-pad prn. Offer injection into the lateral shoulders. Patient is to wean off of her benzodiazepines. 4. Skin healing and breakdown risk:  continue pressure relief program.  Daily skin exams and reports from nursing. 5. Severe Fatigue due to nutritional and hydration deficiency:  continue to monitor I&Os, calorie counts prn, dietary consult prn. And vitamin B12 vitamin D and CoQ10  Part of patient's rehabilitation program I am encouraging them to attend game day today under recreational therapy to improve their dexterity, cognition, socialization and endurance. 6. Acute episodic insomnia with situational adjustment disorder: Resume Slow titration off of Xanax. prn Ambien, monitor for day time sedation. Status post psychiatry consult try to get her off these medications not very helpful. Did not offer any examples of other medications because she refused any other medications. 7. Falls risk elevated:  patient to use call light to get nursing assistance to get up, bed and chair alarm. 8. Elevated DVT risk: progressive activities in PT, continue prophylaxis SANTIAGO hose, elevation  . 9.  Complex discharge planning:  She will

## 2019-04-17 LAB
GLUCOSE BLD-MCNC: 167 MG/DL (ref 60–115)
GLUCOSE BLD-MCNC: 215 MG/DL (ref 60–115)
GLUCOSE BLD-MCNC: 217 MG/DL (ref 60–115)
GLUCOSE BLD-MCNC: 237 MG/DL (ref 60–115)
PERFORMED ON: ABNORMAL

## 2019-04-17 PROCEDURE — 6370000000 HC RX 637 (ALT 250 FOR IP): Performed by: NURSE PRACTITIONER

## 2019-04-17 PROCEDURE — 97110 THERAPEUTIC EXERCISES: CPT

## 2019-04-17 PROCEDURE — 97535 SELF CARE MNGMENT TRAINING: CPT

## 2019-04-17 PROCEDURE — 6370000000 HC RX 637 (ALT 250 FOR IP): Performed by: PHYSICAL MEDICINE & REHABILITATION

## 2019-04-17 PROCEDURE — 6370000000 HC RX 637 (ALT 250 FOR IP): Performed by: INTERNAL MEDICINE

## 2019-04-17 PROCEDURE — 99232 SBSQ HOSP IP/OBS MODERATE 35: CPT | Performed by: PHYSICIAN ASSISTANT

## 2019-04-17 PROCEDURE — 6370000000 HC RX 637 (ALT 250 FOR IP): Performed by: PHYSICIAN ASSISTANT

## 2019-04-17 PROCEDURE — 97116 GAIT TRAINING THERAPY: CPT

## 2019-04-17 PROCEDURE — 97530 THERAPEUTIC ACTIVITIES: CPT

## 2019-04-17 PROCEDURE — 99232 SBSQ HOSP IP/OBS MODERATE 35: CPT | Performed by: PHYSICAL MEDICINE & REHABILITATION

## 2019-04-17 PROCEDURE — 1180000000 HC REHAB R&B

## 2019-04-17 RX ORDER — INSULIN GLARGINE 100 [IU]/ML
18 INJECTION, SOLUTION SUBCUTANEOUS NIGHTLY
Status: DISCONTINUED | OUTPATIENT
Start: 2019-04-17 | End: 2019-04-19

## 2019-04-17 RX ADMIN — HYDROCODONE BITARTRATE AND ACETAMINOPHEN 1 TABLET: 5; 325 TABLET ORAL at 03:03

## 2019-04-17 RX ADMIN — LISINOPRIL 20 MG: 20 TABLET ORAL at 08:00

## 2019-04-17 RX ADMIN — ANALGESIC BALM: 1.74; 4.06 OINTMENT TOPICAL at 13:28

## 2019-04-17 RX ADMIN — LACTOBACILLUS TAB 1 TABLET: TAB at 08:00

## 2019-04-17 RX ADMIN — ALPRAZOLAM 0.25 MG: 0.25 TABLET ORAL at 06:23

## 2019-04-17 RX ADMIN — HYDROCODONE BITARTRATE AND ACETAMINOPHEN 1 TABLET: 5; 325 TABLET ORAL at 15:47

## 2019-04-17 RX ADMIN — ANALGESIC BALM: 1.74; 4.06 OINTMENT TOPICAL at 07:59

## 2019-04-17 RX ADMIN — HYDROCODONE BITARTRATE AND ACETAMINOPHEN 1 TABLET: 5; 325 TABLET ORAL at 20:27

## 2019-04-17 RX ADMIN — Medication: at 11:45

## 2019-04-17 RX ADMIN — ALPRAZOLAM 0.5 MG: 0.5 TABLET ORAL at 21:34

## 2019-04-17 RX ADMIN — ACETAMINOPHEN 650 MG: 325 TABLET ORAL at 06:23

## 2019-04-17 RX ADMIN — MICONAZOLE NITRATE: 20 POWDER TOPICAL at 08:00

## 2019-04-17 RX ADMIN — MICONAZOLE NITRATE: 20 POWDER TOPICAL at 21:34

## 2019-04-17 RX ADMIN — METOPROLOL SUCCINATE 100 MG: 100 TABLET, EXTENDED RELEASE ORAL at 08:00

## 2019-04-17 RX ADMIN — LEVOTHYROXINE SODIUM 125 MCG: 125 TABLET ORAL at 06:23

## 2019-04-17 RX ADMIN — ANALGESIC BALM: 1.74; 4.06 OINTMENT TOPICAL at 21:34

## 2019-04-17 RX ADMIN — INSULIN GLARGINE 18 UNITS: 100 INJECTION, SOLUTION SUBCUTANEOUS at 21:38

## 2019-04-17 RX ADMIN — Medication 100 MG: at 08:00

## 2019-04-17 RX ADMIN — Medication 100 MG: at 11:46

## 2019-04-17 RX ADMIN — ACETAMINOPHEN 650 MG: 325 TABLET ORAL at 11:45

## 2019-04-17 RX ADMIN — ALPRAZOLAM 0.25 MG: 0.25 TABLET ORAL at 15:47

## 2019-04-17 RX ADMIN — LINAGLIPTIN 5 MG: 5 TABLET, FILM COATED ORAL at 08:00

## 2019-04-17 RX ADMIN — ACETAMINOPHEN 650 MG: 325 TABLET ORAL at 18:27

## 2019-04-17 RX ADMIN — DOCUSATE SODIUM 100 MG: 100 CAPSULE, LIQUID FILLED ORAL at 08:00

## 2019-04-17 RX ADMIN — DOCUSATE SODIUM 100 MG: 100 CAPSULE, LIQUID FILLED ORAL at 21:34

## 2019-04-17 RX ADMIN — HYDROCODONE BITARTRATE AND ACETAMINOPHEN 1 TABLET: 5; 325 TABLET ORAL at 08:00

## 2019-04-17 ASSESSMENT — PAIN DESCRIPTION - ORIENTATION
ORIENTATION: LEFT;RIGHT;UPPER
ORIENTATION: UPPER
ORIENTATION: LEFT;RIGHT

## 2019-04-17 ASSESSMENT — PAIN SCALES - GENERAL
PAINLEVEL_OUTOF10: 0
PAINLEVEL_OUTOF10: 8
PAINLEVEL_OUTOF10: 6
PAINLEVEL_OUTOF10: 6
PAINLEVEL_OUTOF10: 10
PAINLEVEL_OUTOF10: 9
PAINLEVEL_OUTOF10: 5
PAINLEVEL_OUTOF10: 7
PAINLEVEL_OUTOF10: 6
PAINLEVEL_OUTOF10: 7

## 2019-04-17 ASSESSMENT — PAIN DESCRIPTION - DESCRIPTORS
DESCRIPTORS: SORE

## 2019-04-17 ASSESSMENT — PAIN DESCRIPTION - LOCATION
LOCATION: ARM
LOCATION: ARM;SHOULDER
LOCATION: ARM;SHOULDER;BACK

## 2019-04-17 NOTE — PROGRESS NOTES
Occupational Therapy  Facility/Department: [facility]  Daily Treatment Note  NAME: Ralf Montanez  : 1950  MRN: [id number]    Date of Service: 2019    Discharge Recommendations:  Continue to assess pending progress       Assessment      Activity Tolerance  Activity Tolerance: Patient Tolerated treatment well  Safety Devices  Safety Devices in place: Yes  Type of devices: All fall risk precautions in place         Patient Diagnosis(es): There were no encounter diagnoses. has a past medical history of Anxiety, Arthritis, Bronchitis, Diabetes mellitus ([facility] [address] Diabetic neuropathy associated with diabetes mellitus due to underlying condition [facility]), Essential hypertension, Hypercholesterolemia, Hypertension, Morbid obesity due to excess calories ([facility] [address] Thyroid disease, and Wound infection. has a past surgical history that includes orthopedic surgery (Left, 2017); hernia repair; Appendectomy; and Tonsillectomy (N/A). Restrictions  Restrictions/Precautions  Restrictions/Precautions: Fall Risk  Subjective   General  Chart Reviewed: Yes  Patient assessed for rehabilitation services?: Yes  Response to previous treatment: Patient with no complaints from previous session  Family / Caregiver Present: No  Referring Practitioner: Dr. Nikole Dowd   Diagnosis: Impaired mobility 2° to flare up of O. A. Pain Assessment  Pain Level: 7  Pain Location: Arm; Shoulder  Pain Orientation: Left;Right  Pain Descriptors: Sore   Orientation     Objective      Pt unable to recall that she had let go with 1 hand at a time to complete LB Dressing during prior ADL's. ADL  Equipment Provided: Reacher;Sock aid;Dressing stick  Grooming: Setup  UE Bathing: Setup  LE Bathing: Minimal assistance;Verbal cueing(kinza, buttocks)  UE Dressing: Setup  LE Dressing: Stand by assistance;Verbal cueing; Increased time to complete(0 shoes)  Toileting: None  Additional Comments: A to don B knee braces        Shower Transfers  Shower - Transfer From: Walker  Shower - Transfer Type: To  Shower - Transfer To: Transfer tub bench  Shower - Technique: Ambulating  Shower Transfers: Contact Guard  Shower Transfers Comments: ww -> tub transfer bench -> w/c            Plan   Plan  Times per week: 5-7x/ wk   Plan weeks: 2-3  Current Treatment Recommendations: Strengthening, Balance Training, Functional Mobility Training, Endurance Training, Wheelchair Mobility Training, Safety Education & Training, Pain Management, Patient/Caregiver Education & Training, Equipment Evaluation, Education, & procurement, Self-Care / ADL, Home Management Training  Plan Comment: Con't OT POC for d/c on 4-21-19  G-Code     OutComes Score                                                  AM-PAC Score             Goals  Patient Goals   Patient goals : \"To go home. \"        Therapy Time   Individual Concurrent Group Co-treatment   Time In 1000         Time Out 1100         Minutes 60               Electronically signed by Nanci Palumbo on 4/17/19 at 11:33 AM    BOB Lindsay

## 2019-04-17 NOTE — PROGRESS NOTES
Endocrinology Progress Note    Assessment and Plan:   Assessment-  1. Type 2 insulin-dependent diabetes  2. Weakness, falls and deconditioning  3. Hypothyroidism  4. Hyperglycemia, insulin dosing adjustments will be made    Plan-  1. Increase Lantus 18 units at bedtime  2. Start Humalog 10 units 3 times a day with meals  3. Continue Medium dose sliding scale coverage  4. discontinue Tradjenta 5 mg by mouth daily  5. Monitor glycemic control closely  6. Target glucose 100-180  7. Continue Synthroid 125 µg by mouth daily    POC Glucose:   Recent Labs     04/16/19  1123 04/16/19  1607 04/16/19 2010 04/17/19  0604 04/17/19  1110   POCGLU 301* 194* 247* 217* 237*     HGBA1C:Results for Tierney Alford (MRN [id number]) as of 4/15/2019 16:49   Ref. Range 4/9/2019 18:19   Hemoglobin A1C Latest Ref Range: 4.8 - 5.9 % 7.0 (H)     CBC:   No results for input(s): WBC, HGB, PLT in the last 72 hours. CMP:  No results for input(s): NA, K, CL, CO2, BUN, CREATININE, GLUCOSE, CALCIUM, LABGLOM in the last 72 hours. CC: No chief complaint on file. Subjective: Interval History: Patient is a 28-year-old type II diabetic female admitted to our rehabilitation unit for strengthening and conditioning following an admission for gait abnormalities, weakness and fall. Glycemic control is improving on current insulin dosing regiment. Postprandial hyperglycemia continues  30 minutes patient education done this week including pathophysiology of diabetes, worsening hyperglycemia and the reasons for it, including poor dietary choices, sedentary lifestyle, pain and stress of being in the hospital and rehabilitation. the necessity for insulin and why we have discontinued her metformin and sulfonylureas due to her renal failure. We'll order a dietitian consult to discuss diabetic diet and other food choices as well as carbohydrate counting and the importance of all of the above.   Patient and  verbalized understanding    Review of systems: denies polyuria, polydipsia, ABD pain, flank pain, N/V/D, or diaphoresis  Medications:   Scheduled Meds:   insulin glargine  18 Units Subcutaneous Nightly    insulin lispro  10 Units Subcutaneous TID WC    lisinopril  20 mg Oral Daily    insulin lispro  0-12 Units Subcutaneous TID WC    insulin lispro  0-6 Units Subcutaneous Nightly    linagliptin  5 mg Oral Daily    docusate sodium  100 mg Oral BID    ALPRAZolam  0.25 mg Oral BID AC    ALPRAZolam  0.5 mg Oral Nightly    cyanocobalamin  1,000 mcg Intramuscular Weekly    coenzyme Q10  100 mg Oral BID AC    lactobacillus acidophilus  1 tablet Oral Daily    levothyroxine  125 mcg Oral Daily    metoprolol succinate  100 mg Oral Daily    lidocaine  3 patch Transdermal Daily    analgesic ointment   Topical TID    miconazole   Topical BID     Continuous Infusions:   dextrose         Objective:   Vitals: BP (!) 149/72   Pulse 85   Temp 97 °F (36.1 °C)   Resp 16   Ht 5' 6.5\" (1.689 m)   Wt 275 lb (124.7 kg)   LMP  (LMP Unknown)   SpO2 95%   BMI 43.72 kg/m²    Wt Readings from Last 3 Encounters:   04/04/19 275 lb (124.7 kg)   04/04/19 273 lb 2.4 oz (123.9 kg)   01/12/19 270 lb (122.5 kg)        General appearance: alert, appears stated age, cooperative, no distress and morbidly obese  Skin: Skin color, texture, turgor normal. No rashes or lesions. Neck: no lymphadenopathy  Lungs: clear to auscultation bilaterally  Heart: regular rate and rhythm, S1, S2 normal, no murmur, click, rub or gallop  Abdomen: soft, non-tender. Bowel sounds normal. No masses,  no organomegaly.   Extremities: extremities normal, atraumatic, no cyanosis or edema    Patient Active Problem List:     Diabetes mellitus with diabetic peripheral angiopathy without gangrene, without long-term current use of insulin (HCC)     Peripheral angiopathy (HCC)     Non-healing surgical wound     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 without evidence of necrosis (CODE)     Diabetic neuropathy associated with diabetes mellitus due to underlying condition (HCC)     Abscess of foot including toes, left     Unable to ambulate     Ataxia     Acute renal failure (ARF) (HCC)     Hypothyroid     Morbid obesity due to excess calories (HCC)     Essential hypertension     Benzodiazepine dependence (HCC)     Anxiety     Bronchitis     OA (osteoarthritis)     Acute on chronic renal failure (HCC)     Hx of Chronic ulcer of left foot (HCC)     HLD (hyperlipidemia)     Fracture of right tibia and fibula     Abnormality of gait and mobility due to Impaired Mobility secondary to Flare up of OA. [facility] admit 4/04/19.      Uncontrolled type 2 diabetes mellitus with hyperglycemia ([facility] [address]     Nocturnal hypoxemia due to obesity     Nocturnal hypoxia     Sleep apnea            Electronically signed by NAVIN Yanez on 4/17/2019 at 2:51 PM

## 2019-04-17 NOTE — CARE COORDINATION
Spoke with patients spouse, Al in regards to discharge plan. Spouse is very worried about patient coming home at a wheelchair level. Asked spouse to get measurements of doorways so that we are able to assess the situation. Educated also regarding a ramp for the home to enter and exit and [place]. Spouse concerned that patient will not get an adequate amount of therapy at home. Electronically signed by Annette Cristobal RN on 4/17/2019 at 4:31 PM    Spouse Al home phone is [phone].  Electronically signed by Annette Cristobal RN on 4/17/2019 at 4:53 PM

## 2019-04-17 NOTE — PROGRESS NOTES
Medicated with prn Tylenol for c/o left arm pain. Patient resting both arms on ice while up in wheelchair and analgesic cream applied as ordered. States the ice helps ease the pain.  Electronically signed by Memo Magdaleno RN on 4/17/19 at 12:21 PM

## 2019-04-17 NOTE — PROGRESS NOTES
Physical Therapy Rehab Treatment Note  [facility]       NAME: Sonia Muniz  : 1950 (47 y.o.)  MRN: [id number]  CODE STATUS: [id number]-CCA    Date of Service: 2019  Chart Reviewed: Yes  Family / Caregiver Present: Yes()    Restrictions:  Restrictions/Precautions: Fall Risk     SUBJECTIVE: Subjective: Pt states she took pain meds already. States she had alot of pain during the night. Pain Screening  Patient Currently in Pain: Yes  Pre Treatment Pain Screening  Pain at present: 8  Intervention List: Patient able to continue with treatment;Patient declined any intervention    Post Treatment Pain Screening:  Pain Assessment  Pain Assessment: 0-10  Pain Level: 8  Pain Location: Arm  Pain Orientation: Upper  Pain Descriptors: Sore    OBJECTIVE:  voiced concerns about pt returning to home. Educated pt and  on recommendation for WC at home. Spoke with PT and forwarded concerns to . Bed mobility  Rolling to Left: Stand by assistance  Rolling to Right: Stand by assistance  Supine to Sit: Stand by assistance  Sit to Supine: Stand by assistance    Transfers  Sit to Stand: Stand by assistance  Stand to sit: Stand by assistance  Bed to Chair: Contact guard assistance(with [employer])  Comment: Sit to stands x5    Ambulation 1  Surface: carpet  Device: Rolling Walker  Other Apparatus: Wheelchair follow  Assistance: Contact guard assistance  Quality of Gait: decreased step length, heavy UE support on [employer], fatigued with increased distance  Distance: 15ftx4  Comments: multiple trilas focusing on posture and stability of R knee. VCs for R quad stability. Wheelchair Activities  Propulsion: Yes  Propulsion 1  Propulsion: Manual  Level: Level Tile  Level of Assistance: Modified independent  Description/ Details: SBA with parts management with VCs. Focused on improving set up for transfer. Distance: 100ft  Stairs  Comment: NT d/t safety.   2 in step tap standing at [employer]. Exercises  Hip Flexion: x20  Knee Long Arc Quad: x20  Ankle Pumps: x 20  Other exercises  Other exercises 2: L posteior shld stretch 20 sec x3 to decrease muscle tightness. Other exercises 4: squats x10  Other exercises 6: standing TKE x10       ASSESSMENT/COMMENTS:  Body structures, Functions, Activity limitations: Decreased functional mobility ; Decreased endurance;Decreased balance; Increased Pain;Decreased strength    PLAN OF CARE/Safety:   Plan Comment: Cont per POC.       Therapy Time:   Individual   Time In 1430   Time Out 1530   Minutes 60     Minutes:        Transfer/Bed mobility training:10      Gait trainin      Neuro re education:0     Therapeutic ex:20    Irineo Owusu PTA, 19 at 4:44 PM

## 2019-04-17 NOTE — PROGRESS NOTES
Subjective: The patient complains of severe  acute  on chronic right shoulder pain and bilateral lower extremity weakness partially relieved by  rest, PT, OT and exacerbated by activity and ambulation. I am concerned about patients learned helplessness behaviors and poor motivation with her own care at times as reported by the nurses. She continues to need a lot of reinforcement ago regarding her functional techniques as well as her diabetic control and sleep apnea. Her short-term memory is fairly bad that seems to be improving with better blood sugar control and oxygenation of her cerebrum. ROS x10: The patient also complains of severely impaired mobility and activities of daily living. Otherwise no new problems with vision, hearing, nose, mouth, throat, dermal, cardiovascular, GI, , pulmonary, musculoskeletal, psychiatric or neurological. See Rehab H&P on Rehab chart dated . Vital signs:  BP (!) 149/72   Pulse 85   Temp 97 °F (36.1 °C)   Resp 16   Ht 5' 6.5\" (1.689 m)   Wt 275 lb (124.7 kg)   LMP  (LMP Unknown)   SpO2 95%   BMI 43.72 kg/m²   I/O:   PO/Intake:  fair PO intake, no problems observed or reported. Bowel/Bladder:  continent, no problems noted. General:  Patient is well developed, adequately nourished, non-obese and     well kempt. HEENT:    PERRLA, hearing intact to loud voice, external inspection of ear     and nose benign. Inspection of lips, tongue and gums benign  Musculoskeletal: No significant change in strength or tone. All joints stable. Inspection and palpation of digits and nails show no clubbing,       cyanosis or inflammatory conditions. Neuro/Psychiatric: Affect: flat but pleasant. Alert and oriented to person, place and     situation. No significant change in deep tendon reflexes or     sensation  Lungs:  Diminished, CTA-B. Respiration effort is normal at rest.     Heart:   S1 = S2, RRR. No loud murmurs.     Abdomen:  Obese, Soft, non-tender, no enlargement of liver or spleen. Extremities:  No significant lower extremity edema diffusely superficial tenderness. Decreased shoulder    range of motion especially on the right  Skin:   Intact to general survey, no visualized or palpated problems. Rehabilitation:  Physical therapy: FIMS:  Bed Mobility: Scooting: Stand by assistance    Transfers: Sit to Stand: Contact guard assistance, Stand by assistance  Stand to sit: Contact guard assistance, Stand by assistance  Bed to Chair: Contact guard assistance(with [employer]), Ambulation 1  Surface: carpet  Device: Rolling Walker  Other Apparatus: Wheelchair follow  Assistance: Contact guard assistance  Quality of Gait: Pt with reports of increased neck and arm pain, fatigues quickly, decreased step length, VCs for breathing technique    Distance: 10ft  Comments: 1 instance of R knee buckling with ambulation, Stairs  Comment: NT d/t safety    FIMS: Bed, Chair, Wheel Chair: 3 - Requires 25-49% assistance to transfer  Walk: 1 - Total Assistance Walks < 50 feet OR requires two or more people OR patient performs < 25% of locomotion effort  Distance Walked: 15ft  Wheel Chair: 2 - Maximal Assistance Requires up to [address] requires assistance of one person to operate wheelchair between  feet  Distance Traveled in Wheel Chair: 50ft  Stairs: 0 - Activity Does not Occur ( 0 only for the admission assessment), PT Equipment Recommendations  Other: TBD, Assessment: VCs to decrease UE support through [employer] throughout. Pt cont to fatigue quickly in standing.       Occupational therapy: FIMS:  Eatin - Patient feeds self  Grooming: 3 - Able to perform 2-3 tasks (mod assist)  Bathin - Able to bathe 3-4 areas  Dressing-Upper: 2 - Requires assist with 3 tasks  Dressing-Lower: 2 - Requires assist with 4-5 parts of dressing  Toiletin - Total assist  Toilet Transfer: 3 - Requires 25-49% assist getting off toilet  Tub Transfer: 0 - Activity does not occur  Shower Transfer: 1 - Total assistance, pt. expends less than 25% effort,  , Assessment: Pt is 76 y.o female with above mentioned deficits impacting ability to complete ADL's IND'ly and safely. Pt would benefit from skilled OT to increase level of functioning. Speech therapy: FIMS: Comprehension: 6 - Complex ideas 90% or device (hearing aid/glasses)  Expression: 7 - Patient expresses complex ideas/needs  Social Interaction: 6 - Patient requires medication for mood and/or effect  Problem Solvin - Patient able to solve simple/routine tasks  Memory: 5 - Patient requires prompting with stress/unfamiliar situations      Lab/X-ray studies reviewed, analyzed and discussed with patient and staff:   Recent Results (from the past 24 hour(s))   POCT Glucose    Collection Time: 19 11:23 AM   Result Value Ref Range    POC Glucose 301 (H) 60 - 115 mg/dl    Performed on ACCU-CHEK    POCT Glucose    Collection Time: 19  4:07 PM   Result Value Ref Range    POC Glucose 194 (H) 60 - 115 mg/dl    Performed on ACCU-CHEK    POCT Glucose    Collection Time: 19  8:10 PM   Result Value Ref Range    POC Glucose 247 (H) 60 - 115 mg/dl    Performed on ACCU-CHEK    POCT Glucose    Collection Time: 19  6:04 AM   Result Value Ref Range    POC Glucose 217 (H) 60 - 115 mg/dl    Performed on ACCU-CHEK        Ct Head Wo   2019  Impression: Mild cerebral atrophy. No acute findings. Ct Lumbar Spine Wo   2019   NO EVIDENCE OF FRACTURE. MINIMAL ANTEROLISTHESIS L2-3. DEGENERATIVE CHANGES, SEE INDIVIDUAL LEVELS ABOVE. LUMBAR CANAL STENOSIS L3-4. CONSIDER CORRELATION WITH MRI.       1 hour 40 minutes less than 89% on nocturnal pulse oximetry study. Study cut short because of severe hypoxia patient placed on O2    Previous extensive, complex labs, notes and diagnostics reviewed and analyzed.      ALLERGIES:    Allergies as of 2019 - Review Complete 2019   Allergen Reaction Noted    Sulfa antibiotics Rash 09/22/2017      (please also verify by checking MAR)     Repeat diabetic dietary and sleep apnea education daily. Complex Physical Medicine & Rehab Issues Assess & Plan:   1. Severe abnormality of gait and mobility and impaired self-care and ADL's secondary to progressive  flareup of osteoarthritis . Functional and medical status reassessed regarding patients ability to participate in therapies and patient found to be able to participate in acute intensive comprehensive inpatient rehabilitation program including PT/OT to improve balance, ambulation, ADLs, and to improve the P/AROM. Therapeutic modifications regarding activities in therapies, place, amount of time per day and intensity of therapy made daily. In bed therapies or bedside therapies prn.   2. Bowel opiate-related constipation and Bladder dysfunction:  frequent toileting, ambulate to bathroom with assistance, check post void residuals. Check for C.difficile x1 if >2 loose stools in 24 hours, continue bowel & bladder program.  Monitor bowel and bladder function. Lactinex 2 PO every AC. MOM prn, Brown Bomb prn, Glycerin suppository prn, enema prn. Patient's work on independence with bathing hygiene and toileting. She is not having her  wipe her bottom and we're fearful that that type of dependency will create a unnecessary burden on family. We would like her to become more independent in that I will offer assistive devices and training. 3. Severe lateral shoulder pain neuropathic and eneralized OA pain: reassess pain every shift and prior to and after each therapy session, give prn Tylenol and  titrate opiates I was using lowest effective dose, modalities prn in therapy, Lidoderm, K-pad prn. Offer injection into the lateral shoulders. Patient is to wean off of her benzodiazepines. 4. Skin healing and breakdown risk:  continue pressure relief program.  Daily skin exams and reports from nursing.   5. Severe Fatigue due to nutritional and hydration deficiency:  continue to monitor I&Os, calorie counts prn, dietary consult prn. And vitamin B12 vitamin D and CoQ10  Part of patient's rehabilitation program I am encouraging them to attend game day today under recreational therapy to improve their dexterity, cognition, socialization and endurance. 6. Acute episodic insomnia with situational adjustment disorder: Resume Slow titration off of Xanax. prn Ambien, monitor for day time sedation. Status post psychiatry consult try to get her off these medications not very helpful. Did not offer any examples of other medications because she refused any other medications. 7. Falls risk elevated:  patient to use call light to get nursing assistance to get up, bed and chair alarm. 8. Elevated DVT risk: progressive activities in PT, continue prophylaxis SANTIAGO hose, elevation  . 9. Complex discharge planning:  She will need a hospital bed at discharge in order to elevate the head of bed greater than 30 degrees due to severe CHF, she requires positioning of the body in ways not feasible with an ordinary bed in order to alleviate her low back pain, she requires different bed height to permit transfers to standing position and she also requires frequent changes in body position due to her pain and breathing issues. Attempt to transition to an independent living room prior to discharge this weekend. Discharge April 21, 2019 home with her  did help from her daughters that live far away. He will also have home health care. SP weekly team meeting  Monday to assess progress towards goals, discuss and address social, psychological and medical comorbidities and to address difficulties they may be having progressing in therapy. Patient and family education is in progress. The patient is to follow-up with their family physician after discharge. Complex Active General Medical Issues that complicate care Assess & Plan:    1.    Peripheral angiopathy,   Hx of Chronic ulcer of left foot-0 pressure to her heels bilaterally, at E T-max, add podiatry consult  2. Diabetic neuropathy associated with diabetes mellitus due to underlying condition -low carb diet, add Glucotrol, Lantus, titrate Humalog low carbohydrate diet add diabetic education  3. Ataxia-focus on balance in physical therapy  4. Hypothyroid-monitor symptoms, titrate Synthroid  5. Morbid obesity due to excess calories -add diabetic and obesity related dietary counseling, restrict carbohydrates and diet  6. Essential hypertension, veer peripheral artery disease,  HLD (hyperlipidemia)-vital signs every shift, dose and titrate cardiac medications accordingly to include Toprol, recheck CBC BMP and consult hospitalist for backup medical  7. Severe active chronic Anxiety and depression with learned helplessness behaviors-emotional support daily, add rec therapy  rehabilitation psychology and trazodone consider SSRI-wean off of Xanax, consult rehabilitation psychology and health psychiatry regarding alternative measures for depression and anxiety, add vitamin B12 shot, gradual tapering off (for long term use of benzodiazepines, the recommendations are 20% of the dose/week). No other medications are recommended at this time, since the patient declines any. 8.   Bronchitis-pulse ox checks every shift, dose and titrate oxygen and aerosol treatments  9.   OA (osteoarthritis) with severe osteoarthritic and severe neuropathic pain-titrate Tylenol versus Norco, always use the lowest effective dose of medication, add Desyrel at night for neuropathic pain, add topical agents, check [facility] prescription monitoring database to review for overuse or abuse  10. Acute on chronic renal failure-recheck CBC BMP and limit toxic medications discontinue milk of magnesia  11.  Acute UTI-check postvoid residuals, check urinary CNS, titrate Macrobid, monitor for urinary retention, improved peroneal care  12. Severe Yeast rash in her abdominal folds and skin folds-add Mycostatin powder  13. Significant nocturnal hypoxemia as well as High risk opiate use-with active chronic benzodiazepine use check [facility] physician monitoring database, use nonnarcotic measures for pain when appropriate. He is lowest effective dose with informed consent-gradual tapering off (for long term use of benzodiazepines, the recommendations are 20% of the dose/week). No other medications are recommended at this time, since the patient declines any. Consult pulmonology for sleep apnea to treat as an outpatient and further diagnostics in the meantime limit doses of opiates transition to non-benzodiazepine use and elevate head of bed with nasal cannula O2.  14. Lowered to the ground without injury on the evening of April 11, 2019. Patient does not want any x-rays done.        Wilman Rhodes D.O., PM&R     Attending    [facility] Sanjuana Li

## 2019-04-17 NOTE — PROGRESS NOTES
B socks from floor with reacher with 0 difficulty. Pt able to thread B socks onto sock aid with 0 difficulty req'ing 0 v/c's. Pt able to don B socks with sock aid with 0 difficulty req'ing 0 v/c's. Pt able to don B shoes with reacher with MIN difficulty MIN v/c's.) Therapist recommended hip kit, grab bars and toileting aid. Balance  Standing Balance: Contact guard assistance  Standing Balance  Time: able to stand up to 2 mi n45 sec with 1-2 UE support, abdomen resting against table edge X multiple trials, 0 LOB  Activity: placing and removing graded clips from vertical dowel misael  Sit to stand: Contact guard assistance  Stand to sit: Contact guard assistance  Comment: v/c's for reaching back when completing stand -> sit                  Plan   Plan  Times per week: 5-7x/ wk   Plan weeks: 2-3  Current Treatment Recommendations: Strengthening, Balance Training, Functional Mobility Training, Endurance Training, Wheelchair Mobility Training, Safety Education & Training, Pain Management, Patient/Caregiver Education & Training, Equipment Evaluation, Education, & procurement, Self-Care / ADL, Home Management Training  Plan Comment: Con't OT POC for d/c on 4-21-19  G-Code     OutComes Score                                                  AM-PAC Score             Goals  Patient Goals   Patient goals : \"To go home. \"        Therapy Time   Individual Concurrent Group Co-treatment   Time In 1400         Time Out 1430         Minutes 30               Electronically signed by Douglas Kinney on 4/17/19 at 3:58 PM    BOB Larson

## 2019-04-18 LAB
GLUCOSE BLD-MCNC: 114 MG/DL (ref 60–115)
GLUCOSE BLD-MCNC: 186 MG/DL (ref 60–115)
GLUCOSE BLD-MCNC: 278 MG/DL (ref 60–115)
GLUCOSE BLD-MCNC: 284 MG/DL (ref 60–115)
PERFORMED ON: ABNORMAL
PERFORMED ON: NORMAL

## 2019-04-18 PROCEDURE — 6370000000 HC RX 637 (ALT 250 FOR IP): Performed by: NURSE PRACTITIONER

## 2019-04-18 PROCEDURE — 6370000000 HC RX 637 (ALT 250 FOR IP): Performed by: PHYSICIAN ASSISTANT

## 2019-04-18 PROCEDURE — 6370000000 HC RX 637 (ALT 250 FOR IP): Performed by: INTERNAL MEDICINE

## 2019-04-18 PROCEDURE — 99232 SBSQ HOSP IP/OBS MODERATE 35: CPT | Performed by: PHYSICAL MEDICINE & REHABILITATION

## 2019-04-18 PROCEDURE — 97150 GROUP THERAPEUTIC PROCEDURES: CPT

## 2019-04-18 PROCEDURE — 1180000000 HC REHAB R&B

## 2019-04-18 PROCEDURE — 6370000000 HC RX 637 (ALT 250 FOR IP): Performed by: PHYSICAL MEDICINE & REHABILITATION

## 2019-04-18 PROCEDURE — 2700000000 HC OXYGEN THERAPY PER DAY

## 2019-04-18 PROCEDURE — 97110 THERAPEUTIC EXERCISES: CPT

## 2019-04-18 PROCEDURE — 97530 THERAPEUTIC ACTIVITIES: CPT

## 2019-04-18 PROCEDURE — 97116 GAIT TRAINING THERAPY: CPT

## 2019-04-18 PROCEDURE — 97542 WHEELCHAIR MNGMENT TRAINING: CPT

## 2019-04-18 RX ORDER — OXYCODONE HYDROCHLORIDE 5 MG/1
5 TABLET ORAL EVERY 4 HOURS PRN
Status: DISCONTINUED | OUTPATIENT
Start: 2019-04-18 | End: 2019-04-20 | Stop reason: HOSPADM

## 2019-04-18 RX ORDER — CLONAZEPAM 0.5 MG/1
0.25 TABLET ORAL NIGHTLY
Status: DISCONTINUED | OUTPATIENT
Start: 2019-04-18 | End: 2019-04-20 | Stop reason: HOSPADM

## 2019-04-18 RX ADMIN — ALPRAZOLAM 0.25 MG: 0.25 TABLET ORAL at 16:35

## 2019-04-18 RX ADMIN — MICONAZOLE NITRATE: 20 POWDER TOPICAL at 21:36

## 2019-04-18 RX ADMIN — LISINOPRIL 20 MG: 20 TABLET ORAL at 09:23

## 2019-04-18 RX ADMIN — MICONAZOLE NITRATE: 20 POWDER TOPICAL at 11:48

## 2019-04-18 RX ADMIN — DOCUSATE SODIUM 100 MG: 100 CAPSULE, LIQUID FILLED ORAL at 09:22

## 2019-04-18 RX ADMIN — ANALGESIC BALM: 1.74; 4.06 OINTMENT TOPICAL at 09:22

## 2019-04-18 RX ADMIN — ALPRAZOLAM 0.25 MG: 0.25 TABLET ORAL at 06:10

## 2019-04-18 RX ADMIN — HYDROCODONE BITARTRATE AND ACETAMINOPHEN 1 TABLET: 5; 325 TABLET ORAL at 02:36

## 2019-04-18 RX ADMIN — CLONAZEPAM 0.25 MG: 0.5 TABLET ORAL at 21:27

## 2019-04-18 RX ADMIN — OXYCODONE HYDROCHLORIDE 5 MG: 5 TABLET ORAL at 23:58

## 2019-04-18 RX ADMIN — HYDROCODONE BITARTRATE AND ACETAMINOPHEN 1 TABLET: 5; 325 TABLET ORAL at 11:45

## 2019-04-18 RX ADMIN — DOCUSATE SODIUM 100 MG: 100 CAPSULE, LIQUID FILLED ORAL at 21:27

## 2019-04-18 RX ADMIN — ANALGESIC BALM: 1.74; 4.06 OINTMENT TOPICAL at 13:52

## 2019-04-18 RX ADMIN — Medication 100 MG: at 11:45

## 2019-04-18 RX ADMIN — ACETAMINOPHEN 650 MG: 325 TABLET ORAL at 00:01

## 2019-04-18 RX ADMIN — ACETAMINOPHEN 650 MG: 325 TABLET ORAL at 05:02

## 2019-04-18 RX ADMIN — ANALGESIC BALM: 1.74; 4.06 OINTMENT TOPICAL at 21:36

## 2019-04-18 RX ADMIN — INSULIN GLARGINE 18 UNITS: 100 INJECTION, SOLUTION SUBCUTANEOUS at 21:27

## 2019-04-18 RX ADMIN — LEVOTHYROXINE SODIUM 125 MCG: 125 TABLET ORAL at 06:10

## 2019-04-18 RX ADMIN — Medication 100 MG: at 09:31

## 2019-04-18 RX ADMIN — HYDROCODONE BITARTRATE AND ACETAMINOPHEN 1 TABLET: 5; 325 TABLET ORAL at 06:51

## 2019-04-18 RX ADMIN — ACETAMINOPHEN 650 MG: 325 TABLET ORAL at 16:35

## 2019-04-18 RX ADMIN — LACTOBACILLUS TAB 1 TABLET: TAB at 09:22

## 2019-04-18 RX ADMIN — METOPROLOL SUCCINATE 100 MG: 100 TABLET, EXTENDED RELEASE ORAL at 09:24

## 2019-04-18 RX ADMIN — OXYCODONE HYDROCHLORIDE 5 MG: 5 TABLET ORAL at 19:47

## 2019-04-18 ASSESSMENT — PAIN DESCRIPTION - ORIENTATION
ORIENTATION: UPPER
ORIENTATION: LEFT;RIGHT
ORIENTATION: RIGHT;LEFT

## 2019-04-18 ASSESSMENT — PAIN DESCRIPTION - LOCATION
LOCATION: ARM
LOCATION: ARM;SHOULDER
LOCATION: ARM
LOCATION: ARM;SHOULDER

## 2019-04-18 ASSESSMENT — PAIN SCALES - GENERAL
PAINLEVEL_OUTOF10: 10
PAINLEVEL_OUTOF10: 9
PAINLEVEL_OUTOF10: 7
PAINLEVEL_OUTOF10: 7
PAINLEVEL_OUTOF10: 6
PAINLEVEL_OUTOF10: 7
PAINLEVEL_OUTOF10: 6
PAINLEVEL_OUTOF10: 0
PAINLEVEL_OUTOF10: 6
PAINLEVEL_OUTOF10: 10
PAINLEVEL_OUTOF10: 7

## 2019-04-18 ASSESSMENT — PAIN DESCRIPTION - DESCRIPTORS
DESCRIPTORS: ACHING;SORE

## 2019-04-18 ASSESSMENT — PAIN DESCRIPTION - FREQUENCY: FREQUENCY: CONTINUOUS

## 2019-04-18 NOTE — PROGRESS NOTES
Occupational Therapy  Facility/Department: [facility]  Daily Treatment Note  NAME: Sylvester Bush  : 1950  MRN: [id number]    Date of Service: 2019    Discharge Recommendations:  Continue to assess pending progress       Assessment      Activity Tolerance  Activity Tolerance: Patient Tolerated treatment well  Safety Devices  Safety Devices in place: Yes  Type of devices: All fall risk precautions in place  Restraints  Initially in place: No         Patient Diagnosis(es): There were no encounter diagnoses. has a past medical history of Anxiety, Arthritis, Bronchitis, Diabetes mellitus ([facility] [address] Diabetic neuropathy associated with diabetes mellitus due to underlying condition [facility]), Essential hypertension, Hypercholesterolemia, Hypertension, Morbid obesity due to excess calories ([facility] [address] Thyroid disease, and Wound infection. has a past surgical history that includes orthopedic surgery (Left, 2017); hernia repair; Appendectomy; and Tonsillectomy (N/A). Restrictions  Restrictions/Precautions  Restrictions/Precautions: Fall Risk  Subjective \"Ican feel the knee giving out. \"  General  Chart Reviewed: Yes  Patient assessed for rehabilitation services?: Yes  Response to previous treatment: Patient with no complaints from previous session  Family / Caregiver Present: No  Referring Practitioner: Dr. Andrwe Nevarez   Diagnosis: Impaired mobility 2° to flare up of O. A.       Orientation     Objective  Pt completed acts in standing and seated positions to increase stand tolerance and function for task completion. Pt initiated task in standing to place large pegs in pegboard while standing and was able to complete approx 60 pegs prior to required sit to finish. Pt did required two seated rest breaks to place the 60 pegs. Pt completed task in seated and removed pegs via color to increase UE function. Pt completed restorator x 10 mins seated to increase UE function for task.   Pt reports pain in session initiated at 8/10 and completed 7/10. Plan   Plan  Times per week: 5-7x/ wk   Plan weeks: 2-3  Current Treatment Recommendations: Strengthening, Balance Training, Functional Mobility Training, Endurance Training, Wheelchair Mobility Training, Safety Education & Training, Pain Management, Patient/Caregiver Education & Training, Equipment Evaluation, Education, & procurement, Self-Care / ADL, Home Management Training  Plan Comment: Con't OT POC for d/c on 4-21-19  G-Code     OutComes Score                                                  AM-PAC Score             Goals  Patient Goals   Patient goals : \"To go home. \"        Therapy Time   Individual Concurrent Group Co-treatment   Time In 2952        Time Out 1600        Minutes 27                Kelvin Mcwilliams OTR/L Electronically signed by NIELS Mazariegos/L on 5/24/97 at 4:18 PM

## 2019-04-18 NOTE — PROGRESS NOTES
Time Out 1530   Minutes 30     Minutes:30      Transfer/Bed mobility trainin      Gait training:10      Neuro re education:5     Therapeutic ex:15      Ace JACQUIE Oliva, 19 at 3:32 PM

## 2019-04-18 NOTE — PROGRESS NOTES
Time In 1030   Time Out 1100   Minutes 30     Minutes:30      Transfer/Bed mobility training:10      Gait training:10      Neuro re education:0     Therapeutic ex:0      WC: 10      Jaspal Sanchez PTA, 04/18/19 at 10:59 AM

## 2019-04-18 NOTE — PROGRESS NOTES
Occupational Therapy  Facility/Department: [facility]  Daily Treatment Note  NAME: Sylvester Bush  : 1950  MRN: [id number]    Date of Service: 2019    Discharge Recommendations:  Continue to assess pending progress       Assessment      Activity Tolerance  Activity Tolerance: Patient Tolerated treatment well  Safety Devices  Safety Devices in place: Yes  Type of devices: All fall risk precautions in place         Patient Diagnosis(es): There were no encounter diagnoses. has a past medical history of Anxiety, Arthritis, Bronchitis, Diabetes mellitus ([facility] [address] Diabetic neuropathy associated with diabetes mellitus due to underlying condition [facility]), Essential hypertension, Hypercholesterolemia, Hypertension, Morbid obesity due to excess calories ([facility] [address] Thyroid disease, and Wound infection. has a past surgical history that includes orthopedic surgery (Left, 2017); hernia repair; Appendectomy; and Tonsillectomy (N/A). Restrictions  Restrictions/Precautions  Restrictions/Precautions: Fall Risk  Subjective   General  Chart Reviewed: Yes  Patient assessed for rehabilitation services?: Yes  Response to previous treatment: Patient with no complaints from previous session  Family / Caregiver Present: No  Referring Practitioner: Dr. Andrew Nevarez   Diagnosis: Impaired mobility 2° to flare up of O. A. Pain Assessment  Pain Level: 7  Pain Location: Arm  Pain Orientation: Left;Right  Pain Descriptors: Aching; Sore   Orientation     Objective                                                             Upper Extremity Function  UE Strengthing:   Pt engaged in B UE strengthening activity to increase I with ADL's, IADL's and transfers. Pt donned B 1 # wrist wts while completing assembly of [place] puzzle. Pt completed [place] puzzle with puzzle board placed at midline and pcs positioned on the R side of puzzle board. Pt noted to have 0 difficulty with B FM.   Pt with MIN difficulty with B in hand manipulation and req'ed MIN v/c's to turn pcs into proper position for placement. Pt noted to have 0 difficulty with B UE reaching. Pt req'ed MIN v/c's and increased time to correctly assemble puzzle. Plan   Plan  Times per week: 5-7x/ wk   Plan weeks: 2-3  Current Treatment Recommendations: Strengthening, Balance Training, Functional Mobility Training, Endurance Training, Wheelchair Mobility Training, Safety Education & Training, Pain Management, Patient/Caregiver Education & Training, Equipment Evaluation, Education, & procurement, Self-Care / ADL, Home Management Training  Plan Comment: Con't OT POC for d/c on 4-21-19  G-Code     OutComes Score                                                  AM-PAC Score             Goals  Patient Goals   Patient goals : \"To go home. \"        Therapy Time   Individual Concurrent Group Co-treatment   Time In 1410         Time Out 1430         Minutes 20               Electronically signed by Dorene Trinidad on 4/18/19 at 3:44 PM    BOB Au

## 2019-04-18 NOTE — PROGRESS NOTES
Occupational Therapy  Facility/Department: [facility]  Daily Treatment Note  NAME: Sylvester Bush  : 1950  MRN: [id number]    Date of Service: 2019    Discharge Recommendations:  Continue to assess pending progress       Assessment      Activity Tolerance  Activity Tolerance: Patient Tolerated treatment well  Safety Devices  Safety Devices in place: Yes  Type of devices: All fall risk precautions in place  Restraints  Initially in place: No         Patient Diagnosis(es): There were no encounter diagnoses. has a past medical history of Anxiety, Arthritis, Bronchitis, Diabetes mellitus ([facility] [address] Diabetic neuropathy associated with diabetes mellitus due to underlying condition [facility]), Essential hypertension, Hypercholesterolemia, Hypertension, Morbid obesity due to excess calories ([facility] [address] Thyroid disease, and Wound infection. has a past surgical history that includes orthopedic surgery (Left, 2017); hernia repair; Appendectomy; and Tonsillectomy (N/A). Restrictions  Restrictions/Precautions  Restrictions/Precautions: Fall Risk  Subjective \"I have to get my cataracts done. That's where I was headed before I came here. \"  General  Chart Reviewed: Yes  Patient assessed for rehabilitation services?: Yes  Response to previous treatment: Patient with no complaints from previous session  Family / Caregiver Present: No  Referring Practitioner: Dr. Andrew Nevarez   Diagnosis: Impaired mobility 2° to flare up of O. A.       Orientation     Objective  Pt completed acts in group setting to increase hand function. Pt participated in [place] puzzle completion while seated at table. Pt reports pain 8/10 during session.                Plan   Plan  Times per week: 5-7x/ wk   Plan weeks: 2-3  Current Treatment Recommendations: Strengthening, Balance Training, Functional Mobility Training, Endurance Training, Wheelchair Mobility Training, Safety Education & Training, Pain Management, Patient/Caregiver Education & Training, Equipment

## 2019-04-18 NOTE — PROGRESS NOTES
Occupational Therapy  Facility/Department: [facility]  Daily Treatment Note  NAME: Shelli Torres  : 1950  MRN: [id number]    Date of Service: 2019    Discharge Recommendations:  Continue to assess pending progress       Assessment Pt limited by pain in B knees during standing activities. Pt reported having \"a good stretch\" during dynamic sitting activity. Patient Diagnosis(es): There were no encounter diagnoses. has a past medical history of Anxiety, Arthritis, Bronchitis, Diabetes mellitus ([facility] [address] Diabetic neuropathy associated with diabetes mellitus due to underlying condition [facility]), Essential hypertension, Hypercholesterolemia, Hypertension, Morbid obesity due to excess calories ([facility] [address] Thyroid disease, and Wound infection. has a past surgical history that includes orthopedic surgery (Left, 2017); hernia repair; Appendectomy; and Tonsillectomy (N/A). Restrictions  Restrictions/Precautions  Restrictions/Precautions: Fall Risk     Subjective   General  Chart Reviewed: Yes  Patient assessed for rehabilitation services?: Yes  Response to previous treatment: Patient with no complaints from previous session  Family / Caregiver Present: No  Referring Practitioner: Dr. Leonardo Willoughby   Diagnosis: Impaired mobility 2° to flare up of O. A. Pt reported pain 5/10 in knees prior and after OT treatment. Objective    Pt in w/c for core strengthening and dynamic seated balance activity. Pt used reacher to cross midline,  rings x 20 (each UE), and place on yard stick. Pt with Min difficulty with LUE with 2 rest breaks. Pt with verbal cues to move arms off arm rest. Pt with Min A to stand from w/c for activity. Pt with difficulty maintaining balance d/t pain and knee. Pt seated and attempted 2nd trial. Pt stood for 2 min 10 seconds before rest break. Pt required adjustment to R knee brace to be higher. Brace adjusted for proper fit.            Plan   Plan  Times per week: 5-7x/ wk   Plan weeks: 2-3  Current Treatment Recommendations: Strengthening, Balance Training, Functional Mobility Training, Endurance Training, Wheelchair Mobility Training, Safety Education & Training, Pain Management, Patient/Caregiver Education & Training, Equipment Evaluation, Education, & procurement, Self-Care / ADL, Home Management Training  Plan Comment: Con't OT POC for d/c on 4-21-19    Goals  Patient Goals   Patient goals : \"To go home. \"        Therapy Time   Individual Concurrent Group Co-treatment   Time In 0930         Time Out 1000         Minutes 30         Electronically signed by NIELS Diaz/L on 4/18/2019 at 12:21 PM  Bharath Loza OTR/WILIAN

## 2019-04-18 NOTE — PROGRESS NOTES
Physical Therapy Rehab Treatment Note  [facility]  [facility]       NAME: Negrita Hamlin  : 1950 (76 y.o.)  MRN: [id number]  CODE STATUS: DNR-CCA    Date of Service: 2019  Chart Reviewed: Yes  Family / Caregiver Present: No  General Comment  Comments: \"i can manage, we did it before\"    Restrictions:  Restrictions/Precautions: Fall Risk       SUBJECTIVE: Subjective: im doing well  Pain Screening  Patient Currently in Pain: Yes       Post Treatment Pain Screenin/10  Pain Assessment  Pain Assessment: 0-10  Pain Level: 7  Pain Location: Arm; Shoulder  Pain Descriptors: Aching; Sore    OBJECTIVE:   Overall Orientation Status: Within Functional Limits                       Transfers  Sit to Stand: Stand by assistance  Stand to sit: Stand by assistance  Comment: Pt used momentum to complete sit-stand. Ambulation  Ambulation?: Yes  Ambulation 1  Surface: carpet  Device: Rolling Walker  Other Apparatus: Wheelchair follow  Assistance: Contact guard assistance  Quality of Gait: Slow reciprocal gait pattern with decreased stride length and Right knee buckling x3 in 15 feet  Distance: 15ft Wheelchair Activities  Wheelchair Size: 19.5 inches Wide, 22 inches deep, 18 3/4 heigth       Activity Tolerance  Activity Tolerance: Patient Tolerated treatment well    Exercises  Hamstring Sets: x20 GTB  Hip Flexion: x20  Knee Long Arc Quad: x20  Ankle Pumps: x 20  Other exercises  Other exercises 7: Seated Hip ABD/ADD with Green TB / Ball x 20      ASSESSMENT/COMMENTS:  Body structures, Functions, Activity limitations: Decreased functional mobility ; Decreased endurance;Decreased balance; Increased Pain;Decreased strength  Assessment: Pt with general fatigue from seated exercises. Pt ambulated short distance with focus on quality of gait with R LE buckling x 3 pt able to maintain standing balance with no LOB. PLAN OF CARE/Safety:   Safety Devices  Type of devices:  All fall risk precautions in place      Therapy Time:   Individual   Time In 1430   Time Out 1500   Minutes 30     Minutes:30      Transfer/Bed mobility training:      Gait training:10      Neuro re education:     Therapeutic ex:20      Pilar Palacio  04/18/19 at 3:09 PM

## 2019-04-18 NOTE — DISCHARGE INSTR - COC
Continuity of Care Form    Patient Name: Chika Wallace   :  1950  MRN:  [id number]    Admit date:  2019  Discharge date: 2019    Code Status Order: DNR-CCA   Advance Directives:   [address] Directive Type of Healthcare Directive Copy in [address] Agent's Name Healthcare Agent's Phone Number    19 [phone]  Yes, patient has an advance directive for healthcare treatment  Health care treatment directive DNR-CCA  Yes, copy in chart -- -- --    19 [phone]  No, patient does not have an advance directive for healthcare treatment  --  -- -- -- --          Admitting Physician:  Marquis Bullock DO  PCP: Christian Chamorro MD    Discharging Nurse: [facility] Unit/Room#: Q871/I172-85  Discharging Unit Phone Number:[phone]  Emergency Contact:   Extended Emergency Contact Information  Primary Emergency Contact: Enrique Blas  Address: [address] Phone: [phone]  Work Phone: [phone]  Mobile Phone: [phone]  Relation: Spouse    Past Surgical History:  Past Surgical History:   Procedure Laterality Date    APPENDECTOMY      HERNIA REPAIR      ORTHOPEDIC SURGERY Left 2017    broken left foot. surgeries x3    TONSILLECTOMY N/A        Immunization History: There is no immunization history on file for this patient.     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 without evidence of necrosis (CODE) L97.425    Diabetic neuropathy associated with diabetes mellitus due to underlying condition ([facility] [address] E08.40    Abscess of foot including toes, left L02.612    Unable to ambulate R26.2    Ataxia R27.0    Acute renal failure (ARF) ([facility]) N17.9    Hypothyroid E03.9    Morbid obesity due to excess calories ([facility]) E66.01    Essential hypertension I10    Benzodiazepine dependence ([facility]) F13.20    Anxiety F41.9    Bronchitis J40    OA (osteoarthritis) M19.90    Acute on chronic renal failure ([facility]) N17.9, N18.9    Hx of Chronic ulcer of left foot ([facility]) L97.529    HLD (hyperlipidemia) E78.5    Fracture of right tibia and fibula S82.201A, S82.401A    Abnormality of gait and mobility due to Impaired Mobility secondary to Flare up of OA. [facility] Rehab admit 4/04/19. R26.9    Uncontrolled type 2 diabetes mellitus with hyperglycemia ([facility]) E11.65    Nocturnal hypoxemia due to obesity E66.9, G47.36    Nocturnal hypoxia G47.34    Sleep apnea G47.30       Isolation/Infection:   Isolation          No Isolation            Nurse Assessment:  Last Vital Signs: BP (!) 123/56   Pulse 76   Temp 98 °F (36.7 °C) (Oral)   Resp 17   Ht 5' 6.5\" (1.689 m)   Wt 275 lb (124.7 kg)   LMP  (LMP Unknown)   SpO2 93%   BMI 43.72 kg/m²     Last documented pain score (0-10 scale): Pain Level: 7  Last Weight:   Wt Readings from Last 1 Encounters:   04/04/19 275 lb (124.7 kg)     Mental Status:  oriented and alert    IV Access:  - None    Nursing Mobility/ADLs:  Walking   Assisted  Transfer  Assisted  Bathing  Assisted  Dressing  Assisted  Toileting  Assisted  Feeding  Independent  Med Admin  Assisted  Med Delivery   none    Wound Care Documentation and Therapy:        Elimination:  Continence:   · Bowel: Yes  · Bladder: Yes  Urinary Catheter: None   Colostomy/Ileostomy/Ileal Conduit: No       Date of Last BM: 4/19/2019  No intake or output data in the 24 hours ending 04/18/19 1437  No intake/output data recorded.     Safety Concerns:     History of Falls (last 30 days) and At Risk for Falls    Impairments/Disabilities:      None    Nutrition Therapy:  Current Nutrition Therapy:   - Oral Diet:  Carb Control 3 carbs/meal (1500kcals/day)    Routes of Feeding: Oral  Liquids:  Thin Liquids  Daily Fluid Restriction: no  Last Modified Barium Swallow with Video (Video Swallowing Test): not done    Treatments at the Time of Hospital Discharge:   Respiratory Treatments: ***  Oxygen Therapy:  Oxygen at 2 L at night  Ventilator:    - No ventilator support    Rehab Therapies: Physical Therapy and Occupational Therapy  Weight Bearing Status/Restrictions: No weight bearing restirctions  Other Medical Equipment (for information only, NOT a DME order):  wheelchair  Other Treatments: ***    Patient's personal belongings (please select all that are sent with patient):  {[facility] DME Belongings:[id number]}    RN SIGNATURE:  Electronically signed by Kiah Francisco RN on 4/20/19 at 11:21 AM    CASE MANAGEMENT/SOCIAL WORK SECTION    Inpatient Status Date: Patient admitted on 4/4/19 to acute inpatient rehab    Readmission Risk Assessment Score:  Readmission Risk              Risk of Unplanned Readmission:        13           Discharging to Facility/ Agency   · Name: [facility]  · [address] [place] [address]  · Phone:[phone]  · Fax:    Dialysis Facility (if applicable)   · Name:  · Address:  · Dialysis Schedule:  · Phone:  · Fax:    / signature: Electronically signed by Crissy Bledsoe RN on 4/18/19 at 2:37 PM    PHYSICIAN SECTION    Prognosis: Good    Condition at Discharge: Stable    Rehab Potential (if transferring to Rehab): Good    Recommended Labs or Other Treatments After Discharge:     Physician Certification: I certify the above information and transfer of Raven Santiago  is necessary for the continuing treatment of the diagnosis listed and that she requires Home Care     Update Admission H&P: No change in H&P    PHYSICIAN SIGNATURE: Dr Amy Phillips   Electronically signed by Crissy Bledsoe RN on 4/18/19 at 2:38 PM

## 2019-04-18 NOTE — CARE COORDINATION
Pt's secondary insurance is [employer].  # [id number] or [phone].  Electronically signed by Jose Hamilton RN on 4/18/19 at 3:41 PM

## 2019-04-18 NOTE — PROGRESS NOTES
Subjective: The patient complains of severe  acute  on chronic right shoulder pain and bilateral lower extremity weakness partially relieved by  rest, PT, OT and exacerbated by activity and ambulation. I am concerned about patients learned helplessness behaviors and poor motivation with her own care at times as reported by the nurses. She continues to need a lot of health regarding her perineal care. We have taught her house had use assistive devices but she still once her liner  to wipe her bottom at home we do not facilitate to healthy long-term plan. Also she is wanting to stay on her high-dose Xanax I had discussed with her again that she is to transition to something longer acting like Klonopin and slowly wean off. This is a perfect opportunity to do so. She is as always resistant to that idea. She has severe sleep apnea and combined opiates with benzodiazepines therefore I cannot condone her staying on his medications. ROS x10: The patient also complains of severely impaired mobility and activities of daily living. Otherwise no new problems with vision, hearing, nose, mouth, throat, dermal, cardiovascular, GI, , pulmonary, musculoskeletal, psychiatric or neurological. See Rehab H&P on Rehab chart dated . Vital signs:  BP (!) 171/73   Pulse 67   Temp 98 °F (36.7 °C) (Oral)   Resp 17   Ht 5' 6.5\" (1.689 m)   Wt 275 lb (124.7 kg)   LMP  (LMP Unknown)   SpO2 93%   BMI 43.72 kg/m²   I/O:   PO/Intake:  fair PO intake, no problems observed or reported. Bowel/Bladder:  continent, no problems noted. General:  Patient is well developed, adequately nourished, non-obese and     well kempt. HEENT:    PERRLA, hearing intact to loud voice, external inspection of ear     and nose benign. Inspection of lips, tongue and gums benign  Musculoskeletal: No significant change in strength or tone. All joints stable.       Inspection and palpation of digits and nails show no clubbing,       cyanosis or inflammatory conditions. Neuro/Psychiatric: Affect: flat but pleasant. Alert and oriented to person, place and     situation. No significant change in deep tendon reflexes or     sensation  Lungs:  Diminished, CTA-B. Respiration effort is normal at rest.     Heart:   S1 = S2, RRR. No loud murmurs. Abdomen:  Obese, Soft, non-tender, no enlargement of liver or spleen. Extremities:  No significant lower extremity edema diffusely superficial tenderness. Decreased shoulder    range of motion especially on the right  Skin:   Intact to general survey, no visualized or palpated problems. Rehabilitation:  Physical therapy: FIMS:  Bed Mobility: Scooting: Stand by assistance    Transfers: Sit to Stand: Stand by assistance  Stand to sit: Stand by assistance  Bed to Chair: Contact guard assistance(with [employer]), Ambulation 1  Surface: carpet  Device: Rolling Walker  Other Apparatus: Wheelchair follow  Assistance: Contact guard assistance  Quality of Gait: decreased step length, heavy UE support on [employer], fatigued with increased distance  Distance: 15ftx4  Comments: multiple trilas focusing on posture and stability of R knee. VCs for R quad stability. , Stairs  Comment: NT d/t safety. 2 in step tap standing at [employer].       FIMS: Bed, Chair, Wheel Chair: 4 - Requires steadying assistance only <25% assist  and/or requires assist with one leg only  Walk: 1 - Total Assistance Walks < 50 feet OR requires two or more people OR patient performs < 25% of locomotion effort  Distance Walked: 15ft  Wheel Chair: 2 - Maximal Assistance Requires up to [address] requires assistance of one person to operate wheelchair between [address] in Wheel Chair: 100ft  Stairs: 0 - Activity Does not Occur ( 0 only for the admission assessment), PT Equipment Recommendations  Other: TBD, Assessment: Increased gait distance this AM.      Occupational therapy: FIMS:  Eatin - Feeds self with adaptive equipment/dentures and/or feeds self with modified diet and/or performs own tube feeding  Groomin - Independent with all tasks using assistive device  Bathin - Able to bathe 8-9 areas  Dressing-Upper: 5 - Requires setup/supervision/cues and/or requires assist with presthesis/brace only  Dressing-Lower: 5 - Requires setup/supervision/cues and/or staff applies TEDS/prosthesis/brace only  Toiletin - Able to perform 1 task only (e.g. hygiene)  Toilet Transfer: 4 - Requires steadying assistance only < 25% assist  Tub Transfer: 0 - Activity does not occur  Shower Transfer: 4 - Minimal contact assistance, pt. expends 75% or more effort,  , Assessment: Pt is 76 y.o female with above mentioned deficits impacting ability to complete ADL's IND'ly and safely. Pt would benefit from skilled OT to increase level of functioning.      Speech therapy: FIMS: Comprehension: 6 - Complex ideas 90% or device (hearing aid/glasses)  Expression: 7 - Patient expresses complex ideas/needs  Social Interaction: 6 - Patient requires medication for mood and/or effect  Problem Solvin - Patient able to solve simple/routine tasks  Memory: 5 - Patient requires prompting with stress/unfamiliar situations      Lab/X-ray studies reviewed, analyzed and discussed with patient and staff:   Recent Results (from the past 24 hour(s))   POCT Glucose    Collection Time: 19 11:10 AM   Result Value Ref Range    POC Glucose 237 (H) 60 - 115 mg/dl    Performed on ACCU-CHEK    POCT Glucose    Collection Time: 19  4:15 PM   Result Value Ref Range    POC Glucose 215 (H) 60 - 115 mg/dl    Performed on ACCU-CHEK    POCT Glucose    Collection Time: 19  9:23 PM   Result Value Ref Range    POC Glucose 167 (H) 60 - 115 mg/dl    Performed on ACCU-CHEK    POCT Glucose    Collection Time: 19  6:28 AM   Result Value Ref Range    POC Glucose 186 (H) 60 - 115 mg/dl    Performed on ACCU-CHEK        Ct Head Wo   2019  Impression: her to become more independent in that I will offer assistive devices and training. 3. Severe lateral shoulder pain neuropathic and eneralized OA pain: reassess pain every shift and prior to and after each therapy session, give prn Tylenol and  titrate opiates I was using lowest effective dose, modalities prn in therapy, Lidoderm, K-pad prn. Offer injection into the lateral shoulders. Patient is to wean off of her benzodiazepines. 4. Skin healing and breakdown risk:  continue pressure relief program.  Daily skin exams and reports from nursing. 5. Severe Fatigue due to nutritional and hydration deficiency:  continue to monitor I&Os, calorie counts prn, dietary consult prn. And vitamin B12 vitamin D and CoQ10  Part of patient's rehabilitation program I am encouraging them to attend game day today under recreational therapy to improve their dexterity, cognition, socialization and endurance. 6. Acute episodic insomnia with situational adjustment disorder: Resume Slow titration off of Xanax. prn Ambien, monitor for day time sedation. Status post psychiatry consult try to get her off these medications not very helpful. Did not offer any examples of other medications because she refused any other medications. 7. Falls risk elevated:  patient to use call light to get nursing assistance to get up, bed and chair alarm. 8. Elevated DVT risk: progressive activities in PT, continue prophylaxis SANTIAGO hose, elevation  . 9. Complex discharge planning:  She will need a hospital bed at discharge in order to elevate the head of bed greater than 30 degrees due to severe CHF, she requires positioning of the body in ways not feasible with an ordinary bed in order to alleviate her low back pain, she requires different bed height to permit transfers to standing position and she also requires frequent changes in body position due to her pain and breathing issues.   Attempt to transition to an independent living room prior to discharge this weekend. The patient will have a wheelchair at discharge due to mobility limitations that significantly impair her ability to participate in ADL's including dressing, her mobility limitations cannot be sufficiently and safely resolved by the use of a cane or walker but can be sufficiently resolved by the use of a manual wheelchair; the patient or caregiver is able to safely use a manual wheelchair; the wheelchair will be used regularly in the home. Discharge April 21, 2019 home with her  did help from her daughters that live far away. She will also have home health care. SP weekly team meeting  Monday to assess progress towards goals, discuss and address social, psychological and medical comorbidities and to address difficulties they may be having progressing in therapy. Patient and family education is in progress. The patient is to follow-up with their family physician after discharge. Complex Active General Medical Issues that complicate care Assess & Plan:    1. Peripheral angiopathy,   Hx of Chronic ulcer of left foot-0 pressure to her heels bilaterally, at E T-max, add podiatry consult  2. Diabetic neuropathy associated with diabetes mellitus due to underlying condition -low carb diet, add Glucotrol, Lantus, titrate Humalog low carbohydrate diet add diabetic education  3. Ataxia-focus on balance in physical therapy  4. Hypothyroid-monitor symptoms, titrate Synthroid  5. Morbid obesity due to excess calories -add diabetic and obesity related dietary counseling, restrict carbohydrates and diet  6. Essential hypertension, veer peripheral artery disease,  HLD (hyperlipidemia)-vital signs every shift, dose and titrate cardiac medications accordingly to include Toprol, recheck CBC BMP and consult hospitalist for backup medical  7.  Severe active chronic Anxiety and depression with learned helplessness behaviors-emotional support daily, add rec therapy  rehabilitation psychology and trazodone consider SSRI-wean off of Xanax, consult rehabilitation psychology and health psychiatry regarding alternative measures for depression and anxiety, add vitamin B12 shot, gradual tapering off (for long term use of benzodiazepines, the recommendations are 20% of the dose/week). No other medications are recommended at this time, since the patient declines any. 8.   Bronchitis-pulse ox checks every shift, dose and titrate oxygen and aerosol treatments  9.   OA (osteoarthritis) with severe osteoarthritic and severe neuropathic pain-titrate Tylenol versus Norco, always use the lowest effective dose of medication, add Desyrel at night for neuropathic pain, add topical agents, check [facility] prescription monitoring database to review for overuse or abuse  10. Acute on chronic renal failure-recheck CBC BMP and limit toxic medications discontinue milk of magnesia  11. Acute UTI-check postvoid residuals, check urinary CNS, titrate Macrobid, monitor for urinary retention, improved peroneal care  12. Severe Yeast rash in her abdominal folds and skin folds-add Mycostatin powder  13. Significant nocturnal hypoxemia as well as High risk opiate use-with active chronic benzodiazepine use check [facility] physician monitoring database, use nonnarcotic measures for pain when appropriate. He is lowest effective dose with informed consent-gradual tapering off (for long term use of benzodiazepines, the recommendations are 20% of the dose/week). No other medications are recommended at this time, since the patient declines any. Consult pulmonology for sleep apnea to treat as an outpatient and further diagnostics in the meantime limit doses of opiates transition to non-benzodiazepine use and elevate head of bed with nasal cannula O2.  14. Lowered to the ground without injury on the evening of April 11, 2019. Patient does not want any x-rays done.        Shameka Ingram D.O., PM&R     Attending    [phone]   [facility] [address] [place] [address]

## 2019-04-19 LAB
GLUCOSE BLD-MCNC: 155 MG/DL (ref 60–115)
GLUCOSE BLD-MCNC: 177 MG/DL (ref 60–115)
GLUCOSE BLD-MCNC: 237 MG/DL (ref 60–115)
GLUCOSE BLD-MCNC: 272 MG/DL (ref 60–115)
PERFORMED ON: ABNORMAL

## 2019-04-19 PROCEDURE — 6370000000 HC RX 637 (ALT 250 FOR IP): Performed by: PHYSICAL MEDICINE & REHABILITATION

## 2019-04-19 PROCEDURE — 97535 SELF CARE MNGMENT TRAINING: CPT

## 2019-04-19 PROCEDURE — 97110 THERAPEUTIC EXERCISES: CPT

## 2019-04-19 PROCEDURE — 6370000000 HC RX 637 (ALT 250 FOR IP): Performed by: INTERNAL MEDICINE

## 2019-04-19 PROCEDURE — 6370000000 HC RX 637 (ALT 250 FOR IP): Performed by: PHYSICIAN ASSISTANT

## 2019-04-19 PROCEDURE — 99232 SBSQ HOSP IP/OBS MODERATE 35: CPT | Performed by: PHYSICIAN ASSISTANT

## 2019-04-19 PROCEDURE — 6360000002 HC RX W HCPCS: Performed by: PHYSICAL MEDICINE & REHABILITATION

## 2019-04-19 PROCEDURE — 2700000000 HC OXYGEN THERAPY PER DAY

## 2019-04-19 PROCEDURE — 97116 GAIT TRAINING THERAPY: CPT

## 2019-04-19 PROCEDURE — 1180000000 HC REHAB R&B

## 2019-04-19 PROCEDURE — 94762 N-INVAS EAR/PLS OXIMTRY CONT: CPT

## 2019-04-19 PROCEDURE — 99232 SBSQ HOSP IP/OBS MODERATE 35: CPT | Performed by: PHYSICAL MEDICINE & REHABILITATION

## 2019-04-19 PROCEDURE — 97530 THERAPEUTIC ACTIVITIES: CPT

## 2019-04-19 PROCEDURE — 6370000000 HC RX 637 (ALT 250 FOR IP): Performed by: NURSE PRACTITIONER

## 2019-04-19 RX ORDER — INSULIN GLARGINE 100 [IU]/ML
20 INJECTION, SOLUTION SUBCUTANEOUS NIGHTLY
Status: DISCONTINUED | OUTPATIENT
Start: 2019-04-19 | End: 2019-04-20 | Stop reason: HOSPADM

## 2019-04-19 RX ADMIN — CLONAZEPAM 0.25 MG: 0.5 TABLET ORAL at 21:52

## 2019-04-19 RX ADMIN — ANALGESIC BALM: 1.74; 4.06 OINTMENT TOPICAL at 08:19

## 2019-04-19 RX ADMIN — ACETAMINOPHEN 650 MG: 325 TABLET ORAL at 10:22

## 2019-04-19 RX ADMIN — LEVOTHYROXINE SODIUM 125 MCG: 125 TABLET ORAL at 06:10

## 2019-04-19 RX ADMIN — ALPRAZOLAM 0.25 MG: 0.25 TABLET ORAL at 06:10

## 2019-04-19 RX ADMIN — DOCUSATE SODIUM 100 MG: 100 CAPSULE, LIQUID FILLED ORAL at 21:52

## 2019-04-19 RX ADMIN — ACETAMINOPHEN 650 MG: 325 TABLET ORAL at 16:19

## 2019-04-19 RX ADMIN — CYANOCOBALAMIN 1000 MCG: 1000 INJECTION, SOLUTION INTRAMUSCULAR; SUBCUTANEOUS at 08:17

## 2019-04-19 RX ADMIN — LISINOPRIL 20 MG: 20 TABLET ORAL at 08:17

## 2019-04-19 RX ADMIN — METOPROLOL SUCCINATE 100 MG: 100 TABLET, EXTENDED RELEASE ORAL at 08:17

## 2019-04-19 RX ADMIN — DOCUSATE SODIUM 100 MG: 100 CAPSULE, LIQUID FILLED ORAL at 08:17

## 2019-04-19 RX ADMIN — HYDROCODONE BITARTRATE AND ACETAMINOPHEN 1 TABLET: 5; 325 TABLET ORAL at 20:00

## 2019-04-19 RX ADMIN — INSULIN GLARGINE 20 UNITS: 100 INJECTION, SOLUTION SUBCUTANEOUS at 22:01

## 2019-04-19 RX ADMIN — MICONAZOLE NITRATE: 20 POWDER TOPICAL at 22:11

## 2019-04-19 RX ADMIN — MICONAZOLE NITRATE: 20 POWDER TOPICAL at 08:18

## 2019-04-19 RX ADMIN — LACTOBACILLUS TAB 1 TABLET: TAB at 08:17

## 2019-04-19 RX ADMIN — HYDROCODONE BITARTRATE AND ACETAMINOPHEN 1 TABLET: 5; 325 TABLET ORAL at 02:11

## 2019-04-19 RX ADMIN — OXYCODONE HYDROCHLORIDE 5 MG: 5 TABLET ORAL at 12:47

## 2019-04-19 RX ADMIN — Medication 100 MG: at 12:47

## 2019-04-19 RX ADMIN — Medication 100 MG: at 08:17

## 2019-04-19 RX ADMIN — OXYCODONE HYDROCHLORIDE 5 MG: 5 TABLET ORAL at 08:17

## 2019-04-19 RX ADMIN — OXYCODONE HYDROCHLORIDE 5 MG: 5 TABLET ORAL at 22:09

## 2019-04-19 RX ADMIN — ALPRAZOLAM 0.25 MG: 0.25 TABLET ORAL at 16:19

## 2019-04-19 RX ADMIN — ANALGESIC BALM: 1.74; 4.06 OINTMENT TOPICAL at 21:54

## 2019-04-19 RX ADMIN — ANALGESIC BALM: 1.74; 4.06 OINTMENT TOPICAL at 16:20

## 2019-04-19 ASSESSMENT — PAIN SCALES - GENERAL
PAINLEVEL_OUTOF10: 5
PAINLEVEL_OUTOF10: 9
PAINLEVEL_OUTOF10: 7
PAINLEVEL_OUTOF10: 8
PAINLEVEL_OUTOF10: 7
PAINLEVEL_OUTOF10: 10
PAINLEVEL_OUTOF10: 6
PAINLEVEL_OUTOF10: 6

## 2019-04-19 ASSESSMENT — PAIN DESCRIPTION - PAIN TYPE: TYPE: ACUTE PAIN

## 2019-04-19 ASSESSMENT — PAIN DESCRIPTION - DESCRIPTORS
DESCRIPTORS: ACHING;SORE

## 2019-04-19 ASSESSMENT — PAIN DESCRIPTION - FREQUENCY: FREQUENCY: CONTINUOUS

## 2019-04-19 ASSESSMENT — PAIN DESCRIPTION - ORIENTATION
ORIENTATION: LEFT
ORIENTATION: UPPER
ORIENTATION: LEFT

## 2019-04-19 ASSESSMENT — PAIN DESCRIPTION - LOCATION
LOCATION: ARM;SHOULDER
LOCATION: ARM;SHOULDER
LOCATION: SHOULDER

## 2019-04-19 ASSESSMENT — PAIN DESCRIPTION - ONSET: ONSET: ON-GOING

## 2019-04-19 ASSESSMENT — PAIN DESCRIPTION - PROGRESSION: CLINICAL_PROGRESSION: NOT CHANGED

## 2019-04-19 NOTE — PROGRESS NOTES
Transfer tub bench  Shower - Technique: Ambulating  Shower Transfers: Contact Guard;Verbal cues  Shower Transfers Comments: ww -> tub transfer bench -> w/c               Plan   Plan  Times per week: 5-7x/ wk   Plan weeks: 2-3  Current Treatment Recommendations: Strengthening, Balance Training, Functional Mobility Training, Endurance Training, Wheelchair Mobility Training, Safety Education & Training, Pain Management, Patient/Caregiver Education & Training, Equipment Evaluation, Education, & procurement, Self-Care / ADL, Home Management Training  Plan Comment: Con't OT POC for d/c on 4-21-19  G-Code     OutComes Score                                                  AM-PAC Score             Goals  Patient Goals   Patient goals : \"To go home. \"        Therapy Time   Individual Concurrent Group Co-treatment   Time In 0830         Time Out 0930         Minutes 60               Electronically signed by BOB Lindsey on 4/19/19 at 11:32 AM    BOB Lindsey

## 2019-04-19 NOTE — PROGRESS NOTES
Physical Therapy Rehab Treatment Note  Facility/Department: [facility]  Room: R262/R262-01       NAME: Jose Rafael Church  : 1950 (30 y.o.)  MRN: [id number]  CODE STATUS: DNR-CCA    Date of Service: 2019  Chart Reviewed: Yes  Family / Caregiver Present: No    Restrictions:  Restrictions/Precautions: Fall Risk    SUBJECTIVE: Subjective: Pt states she got her WC for home. States she has been busy all day. Pain Screening  Patient Currently in Pain: Yes  Pre Treatment Pain Screening  Pain at present: 5  Intervention List: Patient able to continue with treatment;Patient declined any intervention    Post Treatment Pain Screening:  Pain Assessment  Pain Assessment: 0-10  Pain Level: 5  Pain Location: Arm; Shoulder  Pain Orientation: Upper  Pain Descriptors: Aching; Sore  Non-Pharmaceutical Pain Intervention(s): Repositioned    OBJECTIVE:              Transfers  Sit to Stand: Stand by assistance  Stand to sit: Stand by assistance  Bed to Chair: Stand by assistance(with [employer])    Ambulation  Ambulation?: Yes  Ambulation 1  Surface: carpet  Device: Rolling Walker  Other Apparatus: Wheelchair follow  Assistance: Contact guard assistance  Quality of Gait: Slow pace, VCs for gait sequence, fatigues quickly, R knee buckeling x2 once fatigued  Distance: 15ft  Propulsion 1  Distance: 150ft  Focused on WC parts management. Supervision with occasional Vcs. Handout given on instruction of WC parts management and propelling. Exercises  Quad Sets: x 20  Gluteal Sets: x10  Hip Flexion: x20  Hip Abduction: seated side kicks x20;add with ball x20  Knee Long Arc Quad: x20  Ankle Pumps: x 20  Core Strengthening: supine TA isos [id number]  Neurodevelopmental Techniques: Standing beach ball tap x45sec with 1 UE support. Standing wt shifts. Comments: Reviewed and issued HEP for seated LE therex and abdominal bracing ex. Pt demo'd indep.        ASSESSMENT/COMMENTS:  Assessment: Pt fatigues quickly with gait with R knee buckling requiring WC follow. WC needed for safest mode of locomotion. PLAN OF CARE/Safety:   Plan Comment: Cont per POC.       Therapy Time:   Individual   Time In 1430   Time Out 1530   Minutes 60     Minutes:      Transfer/Bed mobility training:15      Gait training:10      Neuro re education:10     Therapeutic ex:25    Goble Cockayne, PTA, 04/19/19 at 3:44 PM

## 2019-04-19 NOTE — PROGRESS NOTES
Occupational Therapy  Facility/Department: [facility]  Daily Treatment Note  NAME: Ximena Dean  : 1950  MRN: [id number]    Date of Service: 2019    Discharge Recommendations:  Continue to assess pending progress    Assessment  Activity Tolerance  Activity Tolerance: Patient Tolerated treatment well  Safety Devices  Safety Devices in place: Yes  Type of devices: All fall risk precautions in place         Patient Diagnosis(es): There were no encounter diagnoses. has a past medical history of Anxiety, Arthritis, Bronchitis, Diabetes mellitus ([facility] [address] Diabetic neuropathy associated with diabetes mellitus due to underlying condition [facility]), Essential hypertension, Hypercholesterolemia, Hypertension, Morbid obesity due to excess calories ([facility] [address] Thyroid disease, and Wound infection. has a past surgical history that includes orthopedic surgery (Left, 2017); hernia repair; Appendectomy; and Tonsillectomy (N/A). Restrictions  Restrictions/Precautions  Restrictions/Precautions: Fall Risk     Subjective   General  Chart Reviewed: Yes  Patient assessed for rehabilitation services?: Yes  Response to previous treatment: Patient with no complaints from previous session  Family / Caregiver Present: No  Referring Practitioner: Dr. Marylee Showman   Diagnosis: Impaired mobility 2° to flare up of O. A. Pain Assessment  Pain Assessment: 0-10  Pain Level: 5  Pain Type: Acute pain  Pain Location: Shoulder  Pain Orientation: Left  Pain Descriptors: Aching; Sore  Pain Frequency: Continuous  Pain Onset: On-going  Clinical Progression: Not changed  Non-Pharmaceutical Pain Intervention(s): Declines  Response to Pain Intervention: Patient Satisfied  Vital Signs  Patient Currently in Pain: Yes     Objective  Upper Extremity Function  UE Strengthing: BUE HEP (can exercises): 1# dumbbell, 2x10 reps, various planes, rest breaks prn. To increase BUE strength and endurance for improved transfers.  Educated pt in exercise technique and provided cues to maintain. Plan   Plan  Times per week: 5-7x/ wk   Plan weeks: 2-3  Current Treatment Recommendations: Strengthening, Balance Training, Functional Mobility Training, Endurance Training, Wheelchair Mobility Training, Safety Education & Training, Pain Management, Patient/Caregiver Education & Training, Equipment Evaluation, Education, & procurement, Self-Care / ADL, Home Management Training  Plan Comment: Con't OT POC for d/c on 4-21-19    Goals  Patient Goals   Patient goals : \"To go home. \"        Therapy Time   Individual Concurrent Group Co-treatment   Time In 1300         Time Out 1330         Minutes 30             Electronically signed by BOB Schaeffer on 4/19/2019 at 1:32 PM  BOB Schaeffer

## 2019-04-19 NOTE — CARE COORDINATION
Received phone call from spouse Al in regards to discharge Sunday. Spouse upset that patient is not walking. Reminded spouse that we did speak on more that one occasion regarding patient being at a w/c level upon discharge and that a ramp would be needed to get into the house. Educated spouse on locations that have rental ramps. Spouse stated that he was going to take patient home but was not happy and was only doing so because the patient wanted to go home. Measurements were received from spouse on door ways to ensure wheelchair fitting in the house. At the end of the conversation, spouse apologized for yelling at RN and stated that he would be willing to try. Educated spouse that patient will continue to get therapy via [address]  and can change to OP once they feel stable. Spouse stated understanding.  Electronically signed by Anais Yusuf RN on 4/19/2019 at 11:51 AM

## 2019-04-19 NOTE — PROGRESS NOTES
Endocrinology Progress Note    Assessment and Plan:   Assessment-  1. Type 2 insulin-dependent diabetes  2. Weakness, falls and deconditioning  3. Hypothyroidism  4. Hyperglycemia, insulin dosing adjustments will be made    Plan-  1. Increase Lantus 25 units at bedtime  2. Start Humalog 10 units 3 times a day with meals  3. Continue Medium dose sliding scale coverage  4. Monitor glycemic control closely  5. Target glucose 100-180  6. Continue Synthroid 125 µg by mouth daily  7. Patient may be discharged home from endocrinology standpoint    POC Glucose:   Recent Labs     04/18/19  1110 04/18/19  1607 04/18/19  2107 04/19/19  0623 04/19/19  1133   POCGLU 284* 114 278* 237* 272*     HGBA1C:Results for Terrance Carlin (MRN [id number]) as of 4/15/2019 16:49   Ref. Range 4/9/2019 18:19   Hemoglobin A1C Latest Ref Range: 4.8 - 5.9 % 7.0 (H)     CBC:   No results for input(s): WBC, HGB, PLT in the last 72 hours. CMP:  No results for input(s): NA, K, CL, CO2, BUN, CREATININE, GLUCOSE, CALCIUM, LABGLOM in the last 72 hours. CC: No chief complaint on file. Subjective: Interval History: Patient is a 68-year-old type II diabetic female admitted to our rehabilitation unit for strengthening and conditioning following an admission for gait abnormalities, weakness and fall. Glycemic control is improving on current insulin dosing regiment. Postprandial hyperglycemia continues  30 minutes patient education done this week including pathophysiology of diabetes, worsening hyperglycemia and the reasons for it, including poor dietary choices, sedentary lifestyle, pain and stress of being in the hospital and rehabilitation. the necessity for insulin and why we have discontinued her metformin and sulfonylureas due to her renal failure. We'll order a dietitian consult to discuss diabetic diet and other food choices as well as carbohydrate counting and the importance of all of the above.   Patient and  verbalized necrosis (CODE)     Diabetic neuropathy associated with diabetes mellitus due to underlying condition (HCC)     Abscess of foot including toes, left     Unable to ambulate     Ataxia     Acute renal failure (ARF) (HCC)     Hypothyroid     Morbid obesity due to excess calories (HCC)     Essential hypertension     Benzodiazepine dependence (HCC)     Anxiety     Bronchitis     OA (osteoarthritis)     Acute on chronic renal failure (HCC)     Hx of Chronic ulcer of left foot (HCC)     HLD (hyperlipidemia)     Fracture of right tibia and fibula     Abnormality of gait and mobility due to Impaired Mobility secondary to Flare up of OA. [facility] admit 4/04/19.      Uncontrolled type 2 diabetes mellitus with hyperglycemia ([facility] [address]     Nocturnal hypoxemia due to obesity     Nocturnal hypoxia     Sleep apnea            Electronically signed by NAVIN Avelar on 4/19/2019 at 2:59 PM

## 2019-04-19 NOTE — PROGRESS NOTES
Subjective: The patient complains of severe  acute  on chronic right shoulder pain and bilateral lower extremity weakness partially relieved by  rest, PT, OT and exacerbated by activity and ambulation. I am concerned about patients learned helplessness behaviors and poor motivation with her own care at times as reported by the nurses. Part of patient's rehabilitation program I am encouraging them to attend game day today under recreational therapy to improve their dexterity, cognition, socialization and endurance. She is tolerating the transition to Klonopin pretty well. She states she slept well with the less sedation during the day. I offered to have her see Dr. Tatyana Rivera pain management she wants to see her family doctor in the future regarding pain meds I again warned her that she cannot take benzodiazepines with pain medications as an outpatient. She should use this opportunity to slowly get herself off of these medications however she is extremely resistant to these suggestions. ROS x10: The patient also complains of severely impaired mobility and activities of daily living. Otherwise no new problems with vision, hearing, nose, mouth, throat, dermal, cardiovascular, GI, , pulmonary, musculoskeletal, psychiatric or neurological. See Rehab H&P on Rehab chart dated . Vital signs:  BP (!) 194/74   Pulse 67   Temp 97 °F (36.1 °C)   Resp 18   Ht 5' 6.5\" (1.689 m)   Wt 275 lb (124.7 kg)   LMP  (LMP Unknown)   SpO2 93%   BMI 43.72 kg/m²   I/O:   PO/Intake:  fair PO intake, no problems observed or reported. Bowel/Bladder:  continent, no problems noted. General:  Patient is well developed, adequately nourished, non-obese and     well kempt. HEENT:    PERRLA, hearing intact to loud voice, external inspection of ear     and nose benign. Inspection of lips, tongue and gums benign  Musculoskeletal: No significant change in strength or tone. All joints stable.       Inspection and palpation of digits and nails show no clubbing,       cyanosis or inflammatory conditions. Neuro/Psychiatric: Affect: flat but pleasant. Alert and oriented to person, place and     situation. No significant change in deep tendon reflexes or     sensation  Lungs:  Diminished, CTA-B. Respiration effort is normal at rest.     Heart:   S1 = S2, RRR. No loud murmurs. Abdomen:  Obese, Soft, non-tender, no enlargement of liver or spleen. Extremities:  No significant lower extremity edema diffusely superficial tenderness. Decreased    shoulder range of motion especially on the right  Skin:   Intact to general survey, no visualized or palpated problems. Rehabilitation:  Physical therapy: FIMS:  Bed Mobility: Scooting: Stand by assistance    Transfers: Sit to Stand: Stand by assistance, Contact guard assistance(from red mat)  Stand to sit: Stand by assistance, Contact guard assistance(from red mat)  Bed to Chair: Contact guard assistance(with [facility]), Ambulation 1  Surface: carpet  Device: Rolling Walker  Other Apparatus: Wheelchair follow  Assistance: Contact guard assistance  Quality of Gait: Slow reciprocal gait pattern with decreased stride length and Right knee buckling x3 in 10 feet  Distance: 10'  Comments: multiple trilas focusing on posture and stability of R knee. VCs for R quad stability.   , Stairs  Comment: NT due to safety    FIMS: Bed, Chair, Wheel Chair: 3 - Requires 25-49% assistance to transfer  Walk: 1 - Total Assistance Walks < 50 feet OR requires two or more people OR patient performs < 25% of locomotion effort  Distance Walked: 15ft  Wheel Chair: 2 - Maximal Assistance Requires up to [address] requires assistance of one person to operate wheelchair between [address] in Wheel Chair: 100ft  Stairs: 0 - Activity Does not Occur ( 0 only for the admission assessment), PT Equipment Recommendations  Other: TBD, Assessment: Pt with general fatigue from seated exercises. Pt ambulated short distance with focus on quality of gait with R LE buckling x 3 pt able to maintain standing balance with no LOB. Occupational therapy: FIMS:  Eatin - Feeds self with adaptive equipment/dentures and/or feeds self with modified diet and/or performs own tube feeding  Groomin - Independent with all tasks using assistive device  Bathin - Able to bathe 8-9 areas  Dressing-Upper: 5 - Requires setup/supervision/cues and/or requires assist with presthesis/brace only  Dressing-Lower: 5 - Requires setup/supervision/cues and/or staff applies TEDS/prosthesis/brace only  Toiletin - Total assist  Toilet Transfer: 4 - Requires steadying assistance only < 25% assist  Tub Transfer: 0 - Activity does not occur  Shower Transfer: 4 - Minimal contact assistance, pt. expends 75% or more effort,  , Assessment: Pt is 76 y.o female with above mentioned deficits impacting ability to complete ADL's IND'ly and safely. Pt would benefit from skilled OT to increase level of functioning.      Speech therapy: FIMS: Comprehension: 6 - Complex ideas 90% or device (hearing aid/glasses)  Expression: 7 - Patient expresses complex ideas/needs  Social Interaction: 6 - Patient requires medication for mood and/or effect  Problem Solvin - Patient able to solve simple/routine tasks  Memory: 5 - Patient requires prompting with stress/unfamiliar situations      Lab/X-ray studies reviewed, analyzed and discussed with patient and staff:   Recent Results (from the past 24 hour(s))   POCT Glucose    Collection Time: 19 11:10 AM   Result Value Ref Range    POC Glucose 284 (H) 60 - 115 mg/dl    Performed on ACCU-CHEK    POCT Glucose    Collection Time: 19  4:07 PM   Result Value Ref Range    POC Glucose 114 60 - 115 mg/dl    Performed on ACCU-CHEK    POCT Glucose    Collection Time: 19  9:07 PM   Result Value Ref Range    POC Glucose 278 (H) 60 - 115 mg/dl    Performed on ACCU-CHEK    POCT Glucose Collection Time: 04/19/19  6:23 AM   Result Value Ref Range    POC Glucose 237 (H) 60 - 115 mg/dl    Performed on ACCU-CHEK        Ct Head Wo   4/4/2019  Impression: Mild cerebral atrophy. No acute findings. Ct Lumbar Spine Wo   4/4/2019   NO EVIDENCE OF FRACTURE. MINIMAL ANTEROLISTHESIS L2-3. DEGENERATIVE CHANGES, SEE INDIVIDUAL LEVELS ABOVE. LUMBAR CANAL STENOSIS L3-4. CONSIDER CORRELATION WITH MRI.       1 hour 40 minutes less than 89% on nocturnal pulse oximetry study. Study cut short because of severe hypoxia patient placed on O2    Previous extensive, complex labs, notes and diagnostics reviewed and analyzed. ALLERGIES:    Allergies as of 04/04/2019 - Review Complete 04/04/2019   Allergen Reaction Noted    Sulfa antibiotics Rash 09/22/2017      (please also verify by checking MAR)     Repeat diabetic dietary and sleep apnea education daily. Complex Physical Medicine & Rehab Issues Assess & Plan:   1. Severe abnormality of gait and mobility and impaired self-care and ADL's secondary to progressive  flareup of osteoarthritis . Functional and medical status reassessed regarding patients ability to participate in therapies and patient found to be able to participate in acute intensive comprehensive inpatient rehabilitation program including PT/OT to improve balance, ambulation, ADLs, and to improve the P/AROM. Therapeutic modifications regarding activities in therapies, place, amount of time per day and intensity of therapy made daily. In bed therapies or bedside therapies prn.   2. Bowel opiate-related constipation and Bladder dysfunction:  frequent toileting, ambulate to bathroom with assistance, check post void residuals. Check for C.difficile x1 if >2 loose stools in 24 hours, continue bowel & bladder program.  Monitor bowel and bladder function. Lactinex 2 PO every AC. MOM prn, Brown Bomb prn, Glycerin suppository prn, enema prn.   Patient's work on independence with bathing hygiene and toileting. She is not having her  wipe her bottom and we're fearful that that type of dependency will create a unnecessary burden on family. We would like her to become more independent in that I will offer assistive devices and training. 3. Severe lateral shoulder pain neuropathic and eneralized OA pain: reassess pain every shift and prior to and after each therapy session, give prn Tylenol and  titrate opiates I was using lowest effective dose, modalities prn in therapy, Lidoderm, K-pad prn. Offer injection into the lateral shoulders. Patient is to wean off of her benzodiazepines. Switching first to longer acting medication then  decreasing the dose. 4. Skin healing and breakdown risk:  continue pressure relief program.  Daily skin exams and reports from nursing. 5. Severe Fatigue due to nutritional and hydration deficiency:  continue to monitor I&Os, calorie counts prn, dietary consult prn. And vitamin B12 vitamin D and CoQ10  Part of patient's rehabilitation program I am encouraging them to attend game day today under recreational therapy to improve their dexterity, cognition, socialization and endurance. Slowly transition off of Xanax and onto Klonopin slowly titrate the dose of Klonopin to disease of no benzodiazepines. 6. Acute episodic insomnia with situational adjustment disorder: Resume Slow titration off of Xanax. prn Ambien, monitor for day time sedation. Status post psychiatry consult try to get her off these medications not very helpful. Did not offer any examples of other medications because she refused any other medications. 7. Falls risk elevated:  patient to use call light to get nursing assistance to get up, bed and chair alarm. 8. Elevated DVT risk: progressive activities in PT, continue prophylaxis SANTIAGO hose, elevation  . 9.  Complex discharge planning:  She will need a hospital bed at discharge in order to elevate the head of bed greater than 30 degrees due to severe CHF, she requires positioning of the body in ways not feasible with an ordinary bed in order to alleviate her low back pain, she requires different bed height to permit transfers to standing position and she also requires frequent changes in body position due to her pain and breathing issues. Attempt to transition to an independent living room prior to discharge this weekend. The patient will have a wheelchair at discharge due to mobility limitations that significantly impair her ability to participate in ADL's including dressing, her mobility limitations cannot be sufficiently and safely resolved by the use of a cane or walker but can be sufficiently resolved by the use of a manual wheelchair; the patient or caregiver is able to safely use a manual wheelchair; the wheelchair will be used regularly in the home. Discharge April 21, 2019 home with her  did help from her daughters that live far away. She will also have home health care. SP weekly team meeting  Monday to assess progress towards goals, discuss and address social, psychological and medical comorbidities and to address difficulties they may be having progressing in therapy. Patient and family education is in progress. The patient is to follow-up with their family physician after discharge. Complex Active General Medical Issues that complicate care Assess & Plan:    1. Peripheral angiopathy,   Hx of Chronic ulcer of left foot-0 pressure to her heels bilaterally, at E T-max, add podiatry consult  2. Diabetic neuropathy associated with diabetes mellitus due to underlying condition -low carb diet, add Glucotrol, Lantus, titrate Humalog low carbohydrate diet add diabetic education  3. Ataxia-focus on balance in physical therapy  4. Hypothyroid-monitor symptoms, titrate Synthroid  5. Morbid obesity due to excess calories -add diabetic and obesity related dietary counseling, restrict carbohydrates and diet  6.    Essential treat as an outpatient and further diagnostics in the meantime limit doses of opiates transition to non-benzodiazepine use and elevate head of bed with nasal cannula O2.  14. Lowered to the ground without injury on the evening of April 11, 2019. Patient does not want any x-rays done.        Wilner Rogers D.O., PM&R     Attending    [address]

## 2019-04-19 NOTE — PROGRESS NOTES
Physical Therapy Rehab Treatment Note  Facility/Department: [facility]  Room: R262/R262-01       NAME: Gil Piper  : 1950 (76 y.o.)  MRN: [id number]  CODE STATUS: DNR-CCA    Date of Service: 2019  Chart Reviewed: Yes  Family / Caregiver Present: No  Restrictions:  Restrictions/Precautions: Fall Risk    SUBJECTIVE:    Pain Screening  Patient Currently in Pain: Yes  Pre Treatment Pain Screening  Pain at present: 8  Intervention List: Patient able to continue with treatment;Nurse called to administer meds  Post Treatment Pain Screening:  Pain Assessment  Pain Assessment: 0-10  Pain Level: 8  Pain Location: Arm; Shoulder  Pain Orientation: Upper  Pain Descriptors: Aching; Sore  Non-Pharmaceutical Pain Intervention(s): Repositioned    OBJECTIVE:              Bed mobility  Rolling to Left: Stand by assistance  Rolling to Right: Stand by assistance  Supine to Sit: Stand by assistance  Sit to Supine: Stand by assistance  Comment: Bed flat without rails. Increased time and effort to complete. VCs for technique with sup to sit. Transfers  Sit to Stand: Stand by assistance  Stand to sit: Stand by assistance  Bed to Chair: Stand by assistance(with [employer])    Ambulation  Ambulation?: Yes  Ambulation 1  Surface: carpet  Device: Rolling Walker;B knee braces  Other Apparatus: Wheelchair follow  Assistance: Contact guard assistance  Quality of Gait: Slow pace, VCs for gait sequence, fatigues quickly, R knee buckeling x2 once fatigued  Distance: 15ft  Propulsion 1  Distance: 150ft with B UEs and LEs  Mod I for WC mobiity, VCs for positioning and set up for transfer        Exercises  Quad Sets: x 20  Gluteal Sets: x10  Ankle Pumps: x 20  Core Strengthening: supine TA isos [id number]   ASSESSMENT/COMMENTS:  Assessment: Pt fatigues quickly with gait with R knee buckling requiring WC follow. WC needed for safest mode of locomotion. Plan OF CARE/Safety:   Plan Comment: Cont per POC.       Therapy Time:   Individual   Time In

## 2019-04-19 NOTE — PROGRESS NOTES
Talked with Bryson Lacey pharamist about pain medication. She said it was ok to give norco but pain meds should be clarified according to the pain scale. When to give norco vs oxyir.

## 2019-04-20 VITALS
HEIGHT: 67 IN | BODY MASS INDEX: 43.16 KG/M2 | SYSTOLIC BLOOD PRESSURE: 176 MMHG | RESPIRATION RATE: 18 BRPM | WEIGHT: 275 LBS | HEART RATE: 78 BPM | TEMPERATURE: 98 F | OXYGEN SATURATION: 95 % | DIASTOLIC BLOOD PRESSURE: 80 MMHG

## 2019-04-20 LAB
GLUCOSE BLD-MCNC: 201 MG/DL (ref 60–115)
GLUCOSE BLD-MCNC: 229 MG/DL (ref 60–115)
PERFORMED ON: ABNORMAL
PERFORMED ON: ABNORMAL

## 2019-04-20 PROCEDURE — 6370000000 HC RX 637 (ALT 250 FOR IP): Performed by: NURSE PRACTITIONER

## 2019-04-20 PROCEDURE — 99232 SBSQ HOSP IP/OBS MODERATE 35: CPT | Performed by: INTERNAL MEDICINE

## 2019-04-20 PROCEDURE — 97542 WHEELCHAIR MNGMENT TRAINING: CPT

## 2019-04-20 PROCEDURE — 97535 SELF CARE MNGMENT TRAINING: CPT

## 2019-04-20 PROCEDURE — 6370000000 HC RX 637 (ALT 250 FOR IP): Performed by: PHYSICAL MEDICINE & REHABILITATION

## 2019-04-20 PROCEDURE — 97530 THERAPEUTIC ACTIVITIES: CPT

## 2019-04-20 PROCEDURE — 6370000000 HC RX 637 (ALT 250 FOR IP): Performed by: INTERNAL MEDICINE

## 2019-04-20 PROCEDURE — 97112 NEUROMUSCULAR REEDUCATION: CPT

## 2019-04-20 PROCEDURE — 99239 HOSP IP/OBS DSCHRG MGMT >30: CPT | Performed by: PHYSICAL MEDICINE & REHABILITATION

## 2019-04-20 RX ORDER — UBIDECARENONE 100 MG
100 CAPSULE ORAL
Qty: 60 CAPSULE | Refills: 1 | Status: SHIPPED | OUTPATIENT
Start: 2019-04-20

## 2019-04-20 RX ORDER — OXYCODONE HYDROCHLORIDE 5 MG/1
5 TABLET ORAL EVERY 4 HOURS PRN
Qty: 20 TABLET | Refills: 0 | Status: SHIPPED | OUTPATIENT
Start: 2019-04-20 | End: 2019-04-27

## 2019-04-20 RX ORDER — ALPRAZOLAM 0.25 MG/1
TABLET ORAL
Qty: 20 TABLET | Refills: 0 | Status: SHIPPED | OUTPATIENT
Start: 2019-04-20 | End: 2019-04-30

## 2019-04-20 RX ADMIN — ACETAMINOPHEN 650 MG: 325 TABLET ORAL at 06:40

## 2019-04-20 RX ADMIN — OXYCODONE HYDROCHLORIDE 5 MG: 5 TABLET ORAL at 03:12

## 2019-04-20 RX ADMIN — OXYCODONE HYDROCHLORIDE 5 MG: 5 TABLET ORAL at 08:52

## 2019-04-20 RX ADMIN — METOPROLOL SUCCINATE 100 MG: 100 TABLET, EXTENDED RELEASE ORAL at 08:09

## 2019-04-20 RX ADMIN — MICONAZOLE NITRATE: 20 POWDER TOPICAL at 10:18

## 2019-04-20 RX ADMIN — DOCUSATE SODIUM 100 MG: 100 CAPSULE, LIQUID FILLED ORAL at 08:09

## 2019-04-20 RX ADMIN — ALPRAZOLAM 0.25 MG: 0.25 TABLET ORAL at 06:40

## 2019-04-20 RX ADMIN — LACTOBACILLUS TAB 1 TABLET: TAB at 08:09

## 2019-04-20 RX ADMIN — ACETAMINOPHEN 650 MG: 325 TABLET ORAL at 12:21

## 2019-04-20 RX ADMIN — Medication 100 MG: at 08:09

## 2019-04-20 RX ADMIN — Medication 100 MG: at 12:19

## 2019-04-20 RX ADMIN — ANALGESIC BALM: 1.74; 4.06 OINTMENT TOPICAL at 08:16

## 2019-04-20 RX ADMIN — LEVOTHYROXINE SODIUM 125 MCG: 125 TABLET ORAL at 06:40

## 2019-04-20 RX ADMIN — ACETAMINOPHEN 650 MG: 325 TABLET ORAL at 00:25

## 2019-04-20 RX ADMIN — LISINOPRIL 20 MG: 20 TABLET ORAL at 08:09

## 2019-04-20 ASSESSMENT — PAIN SCALES - GENERAL
PAINLEVEL_OUTOF10: 4
PAINLEVEL_OUTOF10: 5
PAINLEVEL_OUTOF10: 4
PAINLEVEL_OUTOF10: 9
PAINLEVEL_OUTOF10: 7

## 2019-04-20 ASSESSMENT — PAIN DESCRIPTION - FREQUENCY
FREQUENCY: CONTINUOUS
FREQUENCY: CONTINUOUS

## 2019-04-20 ASSESSMENT — PAIN DESCRIPTION - PAIN TYPE
TYPE: ACUTE PAIN
TYPE: ACUTE PAIN

## 2019-04-20 ASSESSMENT — PAIN DESCRIPTION - ORIENTATION
ORIENTATION: LEFT
ORIENTATION: LEFT

## 2019-04-20 ASSESSMENT — PAIN DESCRIPTION - DESCRIPTORS
DESCRIPTORS: ACHING;SORE
DESCRIPTORS: ACHING;SORE

## 2019-04-20 ASSESSMENT — PAIN DESCRIPTION - LOCATION
LOCATION: SHOULDER
LOCATION: SHOULDER

## 2019-04-20 NOTE — DISCHARGE SUMMARY
Subjective: The patient complains of severe  acute  on chronic right shoulder pain and bilateral lower extremity weakness partially relieved by  rest, PT, OT and exacerbated by activity and ambulation. I am concerned about patients learned helplessness behaviors and poor motivation with her own care at times as reported by the nurses. She is requesting discharge today to make it easier and her family to get her home. She will leave after therapy. [address] Course: The patient was admitted to the Rehabilitation Unit to address ADL and mobility deficits. The patient was enrolled in acute PT, OT program.  Weekly team meetings were held to assess functional progress toward their goals. The patient's medical issues were addressed. The patient progressed in the rehab program and is now ready for discharge. Refer to FIM scores summary report for detailed functional status. Greater than 35 minutes was spent on coordinating patients discharge including follow-up care, medications and patient/family education. Extra time needed because of the devising patient to come off of her opiate medications. Reviewing her excessive use of benzodiazepines and making sure she transitions off of them. ROS x10: The patient also complains of severely impaired mobility and activities of daily living. Otherwise no new problems with vision, hearing, nose, mouth, throat, dermal, cardiovascular, GI, , pulmonary, musculoskeletal, psychiatric or neurological. See Rehab H&P on Rehab chart dated . Vital signs:  BP (!) 176/80 Comment: manual  Pulse 78   Temp 98 °F (36.7 °C) (Oral)   Resp 18   Ht 5' 6.5\" (1.689 m)   Wt 275 lb (124.7 kg)   LMP  (LMP Unknown)   SpO2 95%   BMI 43.72 kg/m²   I/O:   PO/Intake:  fair PO intake, no problems observed or reported. Bowel/Bladder:  continent, no problems noted.   General:  Patient is well developed, adequately nourished, non-obese and     well nikhil.     HEENT:    PERRLA, hearing intact to loud voice, external inspection of ear     and nose benign. Inspection of lips, tongue and gums benign  Musculoskeletal: No significant change in strength or tone. All joints stable. Inspection and palpation of digits and nails show no clubbing,       cyanosis or inflammatory conditions. Neuro/Psychiatric: Affect: flat but pleasant. Alert and oriented to person, place and     situation. No significant change in deep tendon reflexes or     sensation  Lungs:  Diminished, CTA-B. Respiration effort is normal at rest.     Heart:   S1 = S2, RRR. No loud murmurs. Abdomen:  Obese, Soft, non-tender, no enlargement of liver or spleen. Extremities:  No significant lower extremity edema diffusely superficial tenderness. Decreased    shoulder range of motion especially on the right  Skin:   Intact to general survey, no visualized or palpated problems. Rehabilitation:  Physical therapy: FIMS:  Bed Mobility: Scooting: Stand by assistance    Transfers: Sit to Stand: Stand by assistance  Stand to sit: Stand by assistance  Bed to Chair: Stand by assistance(with [employer]), Ambulation 1  Surface: carpet  Device: Rolling Walker  Other Apparatus: Wheelchair follow  Assistance: Contact guard assistance  Quality of Gait: Slow pace, VCs for gait sequence, fatigues quickly, R knee buckeling x2 once fatigued  Distance: 15ft  Comments: multiple trilas focusing on posture and stability of R knee. VCs for R quad stability.   , Stairs  Comment: NT due to safety    FIMS: Bed, Chair, Wheel Chair: 5 - Requires setup/supervision/cues  Walk: 1 - Total Assistance Walks < 50 feet OR requires two or more people OR patient performs < 25% of locomotion effort  Distance Walked: 15ft  Wheel Chair: 6 - Modified [facility] wheelchair at least 150 feet with an ambulatory device, orthosis or prosthesis OR requires extra amount of time OR there is concern for safety  Distance Traveled in 6:40 AM   Result Value Ref Range    POC Glucose 201 (H) 60 - 115 mg/dl    Performed on ACCU-CHEK        Ct Head Wo   4/4/2019  Impression: Mild cerebral atrophy. No acute findings. Ct Lumbar Spine Wo   4/4/2019   NO EVIDENCE OF FRACTURE. MINIMAL ANTEROLISTHESIS L2-3. DEGENERATIVE CHANGES, SEE INDIVIDUAL LEVELS ABOVE. LUMBAR CANAL STENOSIS L3-4. CONSIDER CORRELATION WITH MRI.       1 hour 40 minutes less than 89% on nocturnal pulse oximetry study. Study cut short because of severe hypoxia patient placed on O2    Previous extensive, complex labs, notes and diagnostics reviewed and analyzed. ALLERGIES:    Allergies as of 04/04/2019 - Review Complete 04/04/2019   Allergen Reaction Noted    Sulfa antibiotics Rash 09/22/2017      (please also verify by checking [facility])         Complex Physical Medicine & Rehab Issues Assess & Plan:   1. Severe abnormality of gait and mobility and impaired self-care and ADL's secondary to progressive  flareup of osteoarthritis . Functional and medical status has stabilized status post acute rehab at [facility] therefore patient is scheduled for Discharge April 20, 2019 home with her  did help from her daughters that live far away. She will also have home health care. SP weekly team meeting  Monday to assess progress towards goals, discuss and address social, psychological and medical comorbidities and to address difficulties they may be having progressing in therapy. Patient and family education is in progress. The patient is to follow-up with their family physician after discharge. Complex Active General Medical Issues that complicated care Assess & Plan:    1. Peripheral angiopathy,   Hx of Chronic ulcer of left foot-0 pressure to her heels bilaterally, at E T-max, add podiatry consult  2.    Diabetic neuropathy associated with diabetes mellitus due to underlying condition -low carb diet, add Glucotrol, Lantus, titrate Humalog low carbohydrate diet add peroneal care  12. Severe Yeast rash in her abdominal folds and skin folds-add Mycostatin powder  13. Significant nocturnal hypoxemia as well as High risk opiate use-with active chronic benzodiazepine use check [facility] physician monitoring database, use nonnarcotic measures for pain when appropriate. He is lowest effective dose with informed consent-gradual tapering off (for long term use of benzodiazepines, the recommendations are 20% of the dose/week). No other medications are recommended at this time, since the patient declines any. Consult pulmonology for sleep apnea to treat as an outpatient and further diagnostics in the meantime limit doses of opiates transition to non-benzodiazepine use and elevate head of bed with nasal cannula O2.  14. Lowered to the ground without injury on the evening of April 11, 2019. Patient does not want any x-rays done.        Elizabeth Miles D.O., PM&R     Attending    [facility]

## 2019-04-20 NOTE — FLOWSHEET NOTE
Patient and  given discharge instructions and verbalized an understanding. Scripts given to patient. Waiting for transport.

## 2019-04-20 NOTE — PROGRESS NOTES
Physical Therapy Rehab Treatment Note  Facility/Department: [facility]  Room: R262/R262-01       NAME: Hernando Collins  : 1950 (28 y.o.)  MRN: [id number]  CODE STATUS: DNR-CCA    Date of Service: 2019  Chart Reviewed: Yes    Restrictions:  Restrictions/Precautions: Fall Risk    SUBJECTIVE: Subjective: Pt reports she is leaving today, not tomorrow. Pain Screening  Patient Currently in Pain: Denies  Pre Treatment Pain Screening  Pain at present: 0  Scale Used: Numeric Score  Intervention List: Patient able to continue with treatment    Post Treatment Pain Screening: NA       OBJECTIVE:   Bed mobility  Rolling to Left: Stand by assistance  Rolling to Right: Stand by assistance  Supine to Sit: Stand by assistance  Sit to Supine: Stand by assistance  Scooting: Stand by assistance  Comment: bed flat without rails. increased time and effort to complete. Good log roll technique today. Pt states she will be sleeping in a recliner at home. Transfers  Sit to Stand: Stand by assistance  Stand to sit: Stand by assistance  Bed to Chair: Stand by assistance(w/ WW. Pt able to align W/C to bed.)  Comment: Pt uses momentum for STS, poor-fair eccentric control w/ stand to sit. Ambulation  Ambulation?: Yes  Ambulation 1  Surface: carpet  Device: Rolling Walker  Other Apparatus: Wheelchair follow  Assistance: Contact guard assistance  Quality of Gait: Slow, reciprocal pattern w/ b/l knee buckling at 10 ft, though pt able to catch self, then sit in W/C  Distance: 10ftPropulsion 1  Propulsion: Manual  Level: Carpet  Level of Assistance: Stand by assistance  Distance: 50ft weaving in and out of bolsters to practice W/C mobility in close cooridors  Stairs  Comment: NT d/t safety concerns    Activity Tolerance  Activity Tolerance: Patient Tolerated treatment well    ASSESSMENT/COMMENTS:  Assessment: Pt cont to fatigue very quickly. W/C cont to be safest mode of locomotion d/t R knee buckling primarily during gait.      PLAN OF CARE/Safety:   Safety Devices  Type of devices:  All fall risk precautions in place      Therapy Time:   Individual   Time In 1130   Time Out 1200   Minutes 30     Minutes:30      Transfer/Bed mobility training:15      Gait training/WC mob:Emiliano Ch PTA, 04/20/19 at 12:26 PM

## 2019-04-20 NOTE — PROGRESS NOTES
Occupational Therapy  Facility/Department: [facility]  Daily Treatment Note  NAME: Laney Coulter  : 1950  MRN: [id number]    Date of Service: 2019    Discharge Recommendations:  Continue to assess pending progress       Assessment Post tx pain 0 per report  Pt was very excited about returning home and to [facility] . Pt was cooperative , pleasant in the education and exercises offered during session. She states she already hasher HEP for her arm exercises and that she's \"got it\" With encouragement , Pt was able to complete all requested activity. Patient Diagnosis(es): The primary encounter diagnosis was Primary osteoarthritis involving multiple joints. A diagnosis of Anxiety was also pertinent to this visit. has a past medical history of Anxiety, Arthritis, Bronchitis, Diabetes mellitus ([facility] [address] Diabetic neuropathy associated with diabetes mellitus due to underlying condition [facility]), Essential hypertension, Hypercholesterolemia, Hypertension, Morbid obesity due to excess calories ([facility] [address] Thyroid disease, and Wound infection. has a past surgical history that includes orthopedic surgery (Left, 2017); hernia repair; Appendectomy; and Tonsillectomy (N/A). Restrictions  Restrictions/Precautions  Restrictions/Precautions: Fall Risk  Subjective   General  Chart Reviewed: Yes  Patient assessed for rehabilitation services?: Yes  Response to previous treatment: Patient with no complaints from previous session  Family / Caregiver Present: No  Referring Practitioner: Dr. Thomas Tolbert   Diagnosis: Impaired mobility 2° to flare up of O. A.       Orientation WNL     Objective  Pt arrived and was eager to get started, stating \"I'm leaving later today\" Pt reported she had no questions regarding D/C from OT. Pt did participate in AROM and PRE using 1 lb weight for primarily her shoulders.  Pt did well with perceived exertion and activity, and said \" usually I push myself and then I hurt, This will work

## 2019-04-20 NOTE — PROGRESS NOTES
Patient had continuous pulse-oximeter place at  to determine if the patient would require O2 at home. This nurse heard the machine alarming and went in to assess the patient. I watched the patient for several minute and noted periods of apnea and desaturation. At 0245 O2 @ 2L was applied d/t patient de-sating down into the 50's on room air. RT Suman Fontanez made aware.

## 2019-04-20 NOTE — FLOWSHEET NOTE
Patient said she cannot afford oxygen for home. She would follow up with her doctor in [facility]. Dr Dayaan Saavedra is aware.

## 2019-04-20 NOTE — PROGRESS NOTES
Spoke with patient about medicare denying payment for oxygen due to lack of COPD or other lung disease diagnosis. I told her about self pay through medical services and she said she couldn't afford. Refusing noct home oxygen at this time.

## 2019-04-20 NOTE — PROGRESS NOTES
INPATIENT PROGRESS NOTES    PATIENT NAME: Sonia Muniz  MRN: [id number]  SERVICE DATE:  April 20, 2019   SERVICE TIME:  1:05 PM      PRIMARY SERVICE: Pulmonary Disease    CHIEF COMPLAIN:hypoxia during sleep       INTERVAL HPI: Patient seen and examined at bedside, Interval Notes, orders reviewed. Nursing notes noted  She had overnight pulse oxymetry and low O2  More than 1 hour , she is going home today. She need 3 lit O2 with sleep . she need to have sleep study as out patient . She said she is not sure she will follow here , mostly  her physician are in [facility] and want to follow there and have sleep study there. OBJECTIVE    Body mass index is 43.72 kg/m². PHYSICAL EXAM:  Vitals:  BP (!) 176/80 Comment: manual  Pulse 78   Temp 98 °F (36.7 °C) (Oral)   Resp 18   Ht 5' 6.5\" (1.689 m)   Wt 275 lb (124.7 kg)   LMP  (LMP Unknown)   SpO2 95%   BMI 43.72 kg/m²    General: , Obese Alert, awake, Oriented x3  .comfortable in bed, No distress. Head: Atraumatic , Normocephalic   Eyes: PERRL. No sclera icterus. No conjunctival injection. No discharge   ENT: No nasal  discharge. Pharynx clear. Neck:  Trachea midline. No thyromegaly, no JVD, No cervical adenopathy. Chest : Bilaterally symmetrical ,Normal effort,  No accessory muscle use  Lung : Diminished BS bilateraly. No Rales. No wheezing. No rhonchi. Heart[de-identified] Normal  rate. Regular rhythm. No mumur ,  Rub or gallop  ABD: Non-tender. Non-distended. No masses. No organmegaly. Normal bowel sounds. No hernia. Extremities: Trace pitting in both lower leg , No Cyanosis ,No clubbing  Neuro: no cranial nerve abnormality, normal reflex and sensation, no focal weakness   Skin: Warm and dry. No erythema rash on exposed extremities. DATA:   No results for input(s): WBC, HGB, HCT, MCV, PLT in the last 72 hours.   No results for input(s): NA, K, CL, CO2, BUN, CREATININE, GLUCOSE, CALCIUM, PROT, LABALBU, BILITOT, ALKPHOS, AST, ALT, LABGLOM, GFRAA, AGRATIO, GLOB in the last 72 hours. MV Settings:          No results for input(s): PHART, RGE6ALQ, PO2ART, DFF7EIP, BEART, N4MOMGDY in the last 72 hours. O2 Device: None (Room air)  O2 Flow Rate (L/min): 2 L/min    DIET GENERAL; Carb Control: 4 carb choices (60 gms)/meal; Low Sodium (2 GM)  Dietary Nutrition Supplements: Snack (see comment)  Dietary Nutrition Supplements: Protein Modular     MEDICATIONS during current hospitalization:    Continuous Infusions:   dextrose         Scheduled Meds:   insulin glargine  20 Units Subcutaneous Nightly    clonazePAM  0.25 mg Oral Nightly    insulin lispro  10 Units Subcutaneous TID WC    lisinopril  20 mg Oral Daily    insulin lispro  0-12 Units Subcutaneous TID WC    insulin lispro  0-6 Units Subcutaneous Nightly    docusate sodium  100 mg Oral BID    ALPRAZolam  0.25 mg Oral BID AC    cyanocobalamin  1,000 mcg Intramuscular Weekly    coenzyme Q10  100 mg Oral BID AC    lactobacillus acidophilus  1 tablet Oral Daily    levothyroxine  125 mcg Oral Daily    metoprolol succinate  100 mg Oral Daily    lidocaine  3 patch Transdermal Daily    analgesic ointment   Topical TID    miconazole   Topical BID       PRN Meds:oxyCODONE, glucose, dextrose, glucagon (rDNA), dextrose, lactulose, sodium chloride, hydrALAZINE, acetaminophen, HYDROcodone 5 mg - acetaminophen, ammonium lactate    Radiology  Ct Head Wo Contrast    Result Date: 4/4/2019  CT Brain Contrast medium:  Not utilized. History:  Inability to ambulate. Comparison:  None Findings: Extra-axial spaces:  Normal. Intracranial hemorrhage:  None. Ventricular system:  Ventricles are mildly enlarged. Sulci mildly prominent. . Basal Cisterns:  Normal. Cerebral Parenchyma:  Normal. Midline Shift:  None. Cerebellum:  Normal. Paranasal sinuses and mastoid air cells:  Normal. Visualized Orbits:  Normal.     Impression: Mild cerebral atrophy. No acute findings. . All CT scans at this facility use dose modulation, iterative in [facility].  D/w RN, ok to d/c pulmonary wise    Electronically signed by Eugenia Ulloa MD, FCCP on 4/20/2019 at 1:05 PM

## 2019-04-22 NOTE — PROGRESS NOTES
occasional verbal cues. Pt's  to install ramp. Discharge Plan: DC home at recommended [facility] level of care and assist from spouse for mobility.     Electronically signed by Milvia Hull PT on 4/22/2019 at 8:44 AM

## 2020-05-09 ENCOUNTER — OFFICE VISIT (OUTPATIENT)
Dept: PRIMARY CARE CLINIC | Age: 70
End: 2020-05-09
Payer: COMMERCIAL

## 2020-05-09 ENCOUNTER — APPOINTMENT (OUTPATIENT)
Dept: GENERAL RADIOLOGY | Age: 70
End: 2020-05-09
Payer: COMMERCIAL

## 2020-05-09 ENCOUNTER — HOSPITAL ENCOUNTER (EMERGENCY)
Age: 70
Discharge: HOME | End: 2020-05-09
Attending: EMERGENCY MEDICINE
Payer: COMMERCIAL

## 2020-05-09 ENCOUNTER — APPOINTMENT (OUTPATIENT)
Dept: NUCLEAR MEDICINE | Age: 70
End: 2020-05-09
Payer: COMMERCIAL

## 2020-05-09 VITALS
WEIGHT: 236 LBS | HEART RATE: 72 BPM | RESPIRATION RATE: 18 BRPM | TEMPERATURE: 98 F | SYSTOLIC BLOOD PRESSURE: 181 MMHG | DIASTOLIC BLOOD PRESSURE: 79 MMHG | HEIGHT: 66 IN | OXYGEN SATURATION: 97 % | BODY MASS INDEX: 37.93 KG/M2

## 2020-05-09 VITALS
TEMPERATURE: 97.3 F | OXYGEN SATURATION: 99 % | SYSTOLIC BLOOD PRESSURE: 195 MMHG | RESPIRATION RATE: 18 BRPM | DIASTOLIC BLOOD PRESSURE: 90 MMHG | HEART RATE: 68 BPM

## 2020-05-09 DIAGNOSIS — R06.02 SOB (SHORTNESS OF BREATH): ICD-10-CM

## 2020-05-09 DIAGNOSIS — M79.10 MUSCLE PAIN: ICD-10-CM

## 2020-05-09 LAB
ALBUMIN SERPL-MCNC: 4.1 G/DL (ref 3.5–4.6)
ALP BLD-CCNC: 92 U/L (ref 40–130)
ALT SERPL-CCNC: 10 U/L (ref 0–33)
ANION GAP SERPL CALCULATED.3IONS-SCNC: 11 MEQ/L (ref 9–15)
AST SERPL-CCNC: 10 U/L (ref 0–35)
BASOPHILS ABSOLUTE: 0.1 K/UL (ref 0–0.2)
BASOPHILS RELATIVE PERCENT: 0.7 %
BILIRUB SERPL-MCNC: 0.4 MG/DL (ref 0.2–0.7)
BUN BLDV-MCNC: 28 MG/DL (ref 8–23)
CALCIUM SERPL-MCNC: 9.7 MG/DL (ref 8.5–9.9)
CHLORIDE BLD-SCNC: 101 MEQ/L (ref 95–107)
CO2: 26 MEQ/L (ref 20–31)
CREAT SERPL-MCNC: 1.99 MG/DL (ref 0.5–0.9)
D DIMER: 0.96 MG/L FEU (ref 0–0.5)
EKG ATRIAL RATE: 60 BPM
EKG P AXIS: -29 DEGREES
EKG P-R INTERVAL: 212 MS
EKG Q-T INTERVAL: 386 MS
EKG QRS DURATION: 88 MS
EKG QTC CALCULATION (BAZETT): 386 MS
EKG R AXIS: -30 DEGREES
EKG T AXIS: 38 DEGREES
EKG VENTRICULAR RATE: 60 BPM
EOSINOPHILS ABSOLUTE: 0.3 K/UL (ref 0–0.7)
EOSINOPHILS RELATIVE PERCENT: 2.2 %
GFR AFRICAN AMERICAN: 30
GFR NON-AFRICAN AMERICAN: 24.8
GLOBULIN: 2.8 G/DL (ref 2.3–3.5)
GLUCOSE BLD-MCNC: 173 MG/DL (ref 70–99)
HCT VFR BLD CALC: 38.2 % (ref 37–47)
HEMOGLOBIN: 12.1 G/DL (ref 12–16)
LYMPHOCYTES ABSOLUTE: 3.3 K/UL (ref 1–4.8)
LYMPHOCYTES RELATIVE PERCENT: 28 %
MCH RBC QN AUTO: 29.2 PG (ref 27–31.3)
MCHC RBC AUTO-ENTMCNC: 31.6 % (ref 33–37)
MCV RBC AUTO: 92.3 FL (ref 82–100)
MONOCYTES ABSOLUTE: 1 K/UL (ref 0.2–0.8)
MONOCYTES RELATIVE PERCENT: 8.2 %
NEUTROPHILS ABSOLUTE: 7.2 K/UL (ref 1.4–6.5)
NEUTROPHILS RELATIVE PERCENT: 60.9 %
PDW BLD-RTO: 14.8 % (ref 11.5–14.5)
PLATELET # BLD: 307 K/UL (ref 130–400)
POTASSIUM SERPL-SCNC: 4.3 MEQ/L (ref 3.4–4.9)
PRO-BNP: 1429 PG/ML
RBC # BLD: 4.13 M/UL (ref 4.2–5.4)
SODIUM BLD-SCNC: 138 MEQ/L (ref 135–144)
TOTAL PROTEIN: 6.9 G/DL (ref 6.3–8)
TROPONIN: <0.01 NG/ML (ref 0–0.01)
WBC # BLD: 11.9 K/UL (ref 4.8–10.8)

## 2020-05-09 PROCEDURE — 2580000003 HC RX 258: Performed by: EMERGENCY MEDICINE

## 2020-05-09 PROCEDURE — 93005 ELECTROCARDIOGRAM TRACING: CPT

## 2020-05-09 PROCEDURE — 96376 TX/PRO/DX INJ SAME DRUG ADON: CPT

## 2020-05-09 PROCEDURE — 3430000000 HC RX DIAGNOSTIC RADIOPHARMACEUTICAL: Performed by: EMERGENCY MEDICINE

## 2020-05-09 PROCEDURE — 85025 COMPLETE CBC W/AUTO DIFF WBC: CPT

## 2020-05-09 PROCEDURE — 6360000002 HC RX W HCPCS: Performed by: EMERGENCY MEDICINE

## 2020-05-09 PROCEDURE — 93010 ELECTROCARDIOGRAM REPORT: CPT | Performed by: INTERNAL MEDICINE

## 2020-05-09 PROCEDURE — 99284 EMERGENCY DEPT VISIT MOD MDM: CPT

## 2020-05-09 PROCEDURE — 96375 TX/PRO/DX INJ NEW DRUG ADDON: CPT

## 2020-05-09 PROCEDURE — 36415 COLL VENOUS BLD VENIPUNCTURE: CPT

## 2020-05-09 PROCEDURE — 84484 ASSAY OF TROPONIN QUANT: CPT

## 2020-05-09 PROCEDURE — 6370000000 HC RX 637 (ALT 250 FOR IP): Performed by: EMERGENCY MEDICINE

## 2020-05-09 PROCEDURE — 71045 X-RAY EXAM CHEST 1 VIEW: CPT

## 2020-05-09 PROCEDURE — 83880 ASSAY OF NATRIURETIC PEPTIDE: CPT

## 2020-05-09 PROCEDURE — 85379 FIBRIN DEGRADATION QUANT: CPT

## 2020-05-09 PROCEDURE — 99215 OFFICE O/P EST HI 40 MIN: CPT | Performed by: NURSE PRACTITIONER

## 2020-05-09 PROCEDURE — A9539 TC99M PENTETATE: HCPCS | Performed by: EMERGENCY MEDICINE

## 2020-05-09 PROCEDURE — 80053 COMPREHEN METABOLIC PANEL: CPT

## 2020-05-09 PROCEDURE — 96374 THER/PROPH/DIAG INJ IV PUSH: CPT

## 2020-05-09 PROCEDURE — 78582 LUNG VENTILAT&PERFUS IMAGING: CPT

## 2020-05-09 PROCEDURE — A9540 TC99M MAA: HCPCS | Performed by: EMERGENCY MEDICINE

## 2020-05-09 RX ORDER — HYDRALAZINE HYDROCHLORIDE 20 MG/ML
10 INJECTION INTRAMUSCULAR; INTRAVENOUS ONCE
Status: COMPLETED | OUTPATIENT
Start: 2020-05-09 | End: 2020-05-09

## 2020-05-09 RX ORDER — 0.9 % SODIUM CHLORIDE 0.9 %
1000 INTRAVENOUS SOLUTION INTRAVENOUS ONCE
Status: DISCONTINUED | OUTPATIENT
Start: 2020-05-09 | End: 2020-05-09 | Stop reason: HOSPADM

## 2020-05-09 RX ORDER — SODIUM CHLORIDE 0.9 % (FLUSH) 0.9 %
10 SYRINGE (ML) INJECTION PRN
Status: DISCONTINUED | OUTPATIENT
Start: 2020-05-09 | End: 2020-05-09 | Stop reason: HOSPADM

## 2020-05-09 RX ORDER — KIT FOR THE PREPARATION OF TECHNETIUM TC 99M PENTETATE 20 MG/1
1 INJECTION, POWDER, LYOPHILIZED, FOR SOLUTION INTRAVENOUS; RESPIRATORY (INHALATION)
Status: COMPLETED | OUTPATIENT
Start: 2020-05-09 | End: 2020-05-09

## 2020-05-09 RX ORDER — HYDRALAZINE HYDROCHLORIDE 20 MG/ML
20 INJECTION INTRAMUSCULAR; INTRAVENOUS ONCE
Status: COMPLETED | OUTPATIENT
Start: 2020-05-09 | End: 2020-05-09

## 2020-05-09 RX ORDER — OXYCODONE HYDROCHLORIDE AND ACETAMINOPHEN 5; 325 MG/1; MG/1
2 TABLET ORAL ONCE
Status: COMPLETED | OUTPATIENT
Start: 2020-05-09 | End: 2020-05-09

## 2020-05-09 RX ORDER — ONDANSETRON 2 MG/ML
4 INJECTION INTRAMUSCULAR; INTRAVENOUS ONCE
Status: COMPLETED | OUTPATIENT
Start: 2020-05-09 | End: 2020-05-09

## 2020-05-09 RX ADMIN — Medication 6 MILLICURIE: at 19:00

## 2020-05-09 RX ADMIN — OXYCODONE HYDROCHLORIDE AND ACETAMINOPHEN 2 TABLET: 5; 325 TABLET ORAL at 15:43

## 2020-05-09 RX ADMIN — Medication 10 ML: at 19:00

## 2020-05-09 RX ADMIN — HYDROMORPHONE HYDROCHLORIDE 1 MG: 1 INJECTION, SOLUTION INTRAMUSCULAR; INTRAVENOUS; SUBCUTANEOUS at 20:06

## 2020-05-09 RX ADMIN — ONDANSETRON HYDROCHLORIDE 4 MG: 2 SOLUTION INTRAMUSCULAR; INTRAVENOUS at 20:06

## 2020-05-09 RX ADMIN — HYDRALAZINE HYDROCHLORIDE 20 MG: 20 INJECTION INTRAMUSCULAR; INTRAVENOUS at 20:06

## 2020-05-09 RX ADMIN — HYDRALAZINE HYDROCHLORIDE 10 MG: 20 INJECTION INTRAMUSCULAR; INTRAVENOUS at 15:47

## 2020-05-09 RX ADMIN — KIT FOR THE PREPARATION OF TECHNETIUM TC 99M PENTETATE 1 MILLICURIE: 20 INJECTION, POWDER, LYOPHILIZED, FOR SOLUTION INTRAVENOUS; RESPIRATORY (INHALATION) at 18:35

## 2020-05-09 ASSESSMENT — ENCOUNTER SYMPTOMS
EYE PAIN: 0
EYE PAIN: 0
SORE THROAT: 0
SINUS PAIN: 0
EYE REDNESS: 0
WHEEZING: 0
SHORTNESS OF BREATH: 1
NAUSEA: 0
SINUS PRESSURE: 0
VOMITING: 0
SHORTNESS OF BREATH: 1
PHOTOPHOBIA: 0
COUGH: 0
COUGH: 0
RHINORRHEA: 0
COLOR CHANGE: 0
RHINORRHEA: 0
DIARRHEA: 0
SINUS PRESSURE: 0
CONSTIPATION: 0
BACK PAIN: 0
ABDOMINAL PAIN: 0
APNEA: 0
VOMITING: 0
ABDOMINAL PAIN: 0
TROUBLE SWALLOWING: 0
DIARRHEA: 0
ABDOMINAL DISTENTION: 0
NAUSEA: 0
SORE THROAT: 0

## 2020-05-09 ASSESSMENT — PATIENT HEALTH QUESTIONNAIRE - PHQ9
2. FEELING DOWN, DEPRESSED OR HOPELESS: 0
SUM OF ALL RESPONSES TO PHQ QUESTIONS 1-9: 0
SUM OF ALL RESPONSES TO PHQ QUESTIONS 1-9: 0
SUM OF ALL RESPONSES TO PHQ9 QUESTIONS 1 & 2: 0
1. LITTLE INTEREST OR PLEASURE IN DOING THINGS: 0

## 2020-05-09 ASSESSMENT — PAIN SCALES - GENERAL
PAINLEVEL_OUTOF10: 10
PAINLEVEL_OUTOF10: 10

## 2020-05-09 NOTE — PROGRESS NOTES
were changed when she was in rehab facility. Doesn't have a home BP monitor. Discussed treatment options w/ Mary and explained her BP is severely high. Given repeated high readings, presenting sx including dyspnea/ HA, and uncertainty re: home anti-HTN regimen + inability to check BP at home, it is my opinion pt requires additional evaluation, w/o delay, which which is not available through the flu clinic. Pt is concerned about going to ER, says would rather seek urgent care. We discussed at length her concerns about going to ER as well as risks of uncontrolled HTN. Pt did eventually agree to ER evaluation and was transported there by wheelchair w/  and clinic staff. Pt was stable when departing flu clinic. 1. SOB (shortness of breath)  - COVID-19 Ambulatory; Future  - Self-quarantine, frequent handwashing, maintain adequate hydration  - OTC acetaminophen prn fever/ myalgias  - Discussed cough/ deep breathing, cluster activities   - Follow-up w/ PCP within one week    2. Severe hypertension  - Sent to ED    3. Myalgia  - COVID-19 Ambulatory; Future  - OTC acetaminophen prn fever/ myalgias    MARCO Boone - CNP  5/9/20     This visit was provided as a focused evaluation during the COVID -19 pandemic/national emergency. A comprehensive review of all previous patient history and testing was not conducted. Pertinent findings were elicited during the visit.

## 2020-05-09 NOTE — ED PROVIDER NOTES
constipation, diarrhea, nausea and vomiting. Endocrine: Negative for cold intolerance, heat intolerance and polyuria. Genitourinary: Negative for dysuria, flank pain, frequency and urgency. Musculoskeletal: Positive for arthralgias. Negative for back pain, gait problem, myalgias and neck stiffness. Skin: Negative for color change, pallor and rash. Allergic/Immunologic: Negative for food allergies and immunocompromised state. Neurological: Negative for dizziness, tremors, syncope, weakness, light-headedness and headaches. Psychiatric/Behavioral: Negative for agitation, confusion and hallucinations. All other systems reviewed and are negative. Except as noted above the remainder of the review of systems was reviewed and negative. PAST MEDICAL HISTORY     Past Medical History:   Diagnosis Date    Anxiety 4/4/2019    Arthritis     Bronchitis     Diabetes mellitus ([facility] [address]     Diabetic neuropathy associated with diabetes mellitus due to underlying condition ([facility] [address] 11/3/2017    Essential hypertension 4/4/2019    Hypercholesterolemia     Hypertension     Morbid obesity due to excess calories ([facility] [address] 4/4/2019    Thyroid disease     Wound infection 2018    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 without evidence of necrosis          SURGICAL HISTORY       Past Surgical History:   Procedure Laterality Date    APPENDECTOMY      HERNIA REPAIR      ORTHOPEDIC SURGERY Left 2017    broken left foot.  surgeries x3    TONSILLECTOMY N/A          CURRENT MEDICATIONS       Discharge Medication List as of 5/9/2020  8:11 PM      CONTINUE these medications which have NOT CHANGED    Details   insulin aspart (NOVOLOG FLEXPEN) 100 UNIT/ML injection pen Inject 10 Units into the skin 3 times daily (before meals), Disp-5 pen, R-3Normal      coenzyme Q10 100 MG CAPS capsule Take 1 capsule by mouth 2 times daily (before meals), Disp-60 capsule, R-1Normal      insulin glargine (TOUJEO SOLOSTAR) 300 UNIT/ML injection pen Inject 25 Units into the skin nightly, Disp-3 pen, R-3Normal      insulin lispro (HUMALOG KWIKPEN) 100 UNIT/ML pen Inject 10 Units into the skin 3 times daily (before meals), Disp-5 pen, R-3Normal      Insulin Pen Needle (NOVOFINE) 32G X 6 MM MISC 4 TIMES DAILY BEFORE MEALS & NIGHTLY Starting Fri 4/19/2019, Disp-300 each, R-3, Normal      valsartan-hydrochlorothiazide (DIOVAN-HCT) 320-25 MG per tablet Take 1 tablet by mouth dailyHistorical Med      Lactobacillus (PROBIOTIC ACIDOPHILUS) TABS Take 1 tablet by mouth daily, Disp-60 tablet, R-0Normal      metoprolol succinate (TOPROL XL) 100 MG extended release tablet Take 100 mg by mouth dailyHistorical Med      Multiple Vitamins-Minerals (CENTRUM SILVER PO) Take 1 tablet by mouth dailyHistorical Med      levothyroxine (SYNTHROID) 125 MCG tablet Take 125 mcg by mouth DailyHistorical Med      metFORMIN (GLUCOPHAGE) 1000 MG tablet Take 500 mg by mouth 2 times daily (with meals) Historical Med             ALLERGIES     Nitrofurantoin; Tizanidine; and Sulfa antibiotics    FAMILY HISTORY       Family History   Problem Relation Age of Onset    Cancer Father           SOCIAL HISTORY       Social History     Socioeconomic History    Marital status:      Spouse name: geo amador    Number of children: 2    Years of education: 12    Highest education level: None   Occupational History    Occupation: retired   Social Needs    Financial resource strain: None    Food insecurity     Worry: None     Inability: None    Transportation needs     Medical: None     Non-medical: None   Tobacco Use    Smoking status: Never Smoker    Smokeless tobacco: Never Used   Substance and Sexual Activity    Alcohol use: No    Drug use: No    Sexual activity: Not Currently   Lifestyle    Physical activity     Days per week: None     Minutes per session: None    Stress: None   Relationships    Social connections     Talks on phone: None Physician          Chiquis Solares MD  05/09/20 [id number]

## 2020-05-09 NOTE — ED NOTES
Dr. Lindsey Engle in to discuss results with patient encouraging her to stay in hospital she is contemplating leaving AMA. Dr. Lindsey Engle discussed risks and benefits with patient.       Cirilo Anaya RN  05/09/20 1705

## 2020-05-10 ASSESSMENT — ENCOUNTER SYMPTOMS
CHEST TIGHTNESS: 0
BACK PAIN: 1
WHEEZING: 0

## 2020-05-11 ENCOUNTER — CARE COORDINATION (OUTPATIENT)
Dept: CARE COORDINATION | Age: 70
End: 2020-05-11

## 2020-05-11 NOTE — CARE COORDINATION
COVID-19 ED/Flu Clinic Initial Outreach Note    Patient contacted regarding recent visit for viral symptoms. This Shala Nuñez contacted the patient by telephone to perform post discharge call. Verified name and  with patient as identifiers. Provided introduction to self, and reason for call due to viral symptoms of infection and/or exposure to COVID-19. Patient presented to emergency department/flu clinic with complaints of viral symptoms/exposure to COVID. Patient reports symptoms are improving. Due to no new or worsening symptoms the RN CTN/ESME was not notified for escalation. Discussed exposure protocols and quarantine with CDC Guidelines What To Do If You Are Sick    Patient was given an opportunity for questions and concerns. Stay home except to get medical care  People who are mildly ill with COVID-19 are able to isolate at home during their illness. You should restrict activities outside your home, except for getting medical care. Do not go to work, school, or public areas. Avoid using public transportation, ride-sharing, or taxis. Separate yourself from other people and animals in your home  People: As much as possible, you should stay in a specific room and away from other people in your home. Also, you should use a separate bathroom, if available. Animals: You should restrict contact with pets and other animals while you are sick with COVID-19, just like you would around other people. Although there have not been reports of pets or other animals becoming sick with COVID-19, it is still recommended that people sick with COVID-19 limit contact with animals until more information is known about the virus. When possible, have another member of your household care for your animals while you are sick. If you are sick with COVID-19, avoid contact with your pet, including petting, snuggling, being kissed or licked, and sharing food.  If you must care for your pet or be around animals while you are

## 2020-05-12 LAB
SARS-COV-2: NOT DETECTED
SOURCE: NORMAL

## 2020-05-18 ENCOUNTER — CARE COORDINATION (OUTPATIENT)
Dept: CARE COORDINATION | Age: 70
End: 2020-05-18

## 2020-05-26 ENCOUNTER — CARE COORDINATION (OUTPATIENT)
Dept: CARE COORDINATION | Age: 70
End: 2020-05-26

## 2020-09-24 ENCOUNTER — HOSPITAL ENCOUNTER (OUTPATIENT)
Dept: WOUND CARE | Age: 70
Discharge: HOME | End: 2020-09-24
Payer: COMMERCIAL

## 2020-09-24 VITALS
RESPIRATION RATE: 18 BRPM | HEIGHT: 65 IN | DIASTOLIC BLOOD PRESSURE: 60 MMHG | WEIGHT: 240 LBS | SYSTOLIC BLOOD PRESSURE: 132 MMHG | TEMPERATURE: 97.9 F | BODY MASS INDEX: 39.99 KG/M2 | HEART RATE: 72 BPM

## 2020-09-24 PROBLEM — S31.819A OPEN WOUND OF RIGHT BUTTOCK: Status: ACTIVE | Noted: 2020-09-24

## 2020-09-24 PROCEDURE — 99213 OFFICE O/P EST LOW 20 MIN: CPT

## 2020-09-24 PROCEDURE — 99202 OFFICE O/P NEW SF 15 MIN: CPT | Performed by: NURSE PRACTITIONER

## 2020-09-24 ASSESSMENT — PAIN DESCRIPTION - PAIN TYPE: TYPE: CHRONIC PAIN

## 2020-09-24 ASSESSMENT — PAIN DESCRIPTION - ORIENTATION: ORIENTATION: LEFT

## 2020-09-24 ASSESSMENT — PAIN SCALES - GENERAL: PAINLEVEL_OUTOF10: 4

## 2020-09-24 ASSESSMENT — PAIN DESCRIPTION - LOCATION: LOCATION: BACK

## 2020-09-24 NOTE — DISCHARGE INSTR - COC
Continuity of Care Form    Patient Name: Danuta Ross   :  1950  MRN:  [id number]    Admit date:  2020  Discharge date:  ***    Code Status Order: Prior   Advance Directives:     Admitting Physician:  No admitting provider for patient encounter. PCP: Ketan Lee MD    Discharging Nurse: [facility] Unit/Room#: No information available for this encounter. Discharging Unit Phone Number: ***    Emergency Contact:   Extended Emergency Contact Information  Primary Emergency Contact: Enrique Blas  Address: Jake Caruso [address] [place] [address] Phone: [phone]  Relation: Spouse    Past Surgical History:  Past Surgical History:   Procedure Laterality Date    APPENDECTOMY      HERNIA REPAIR      ORTHOPEDIC SURGERY Left 2017    broken left foot. surgeries x3    TONSILLECTOMY N/A        Immunization History: There is no immunization history on file for this patient.     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 without evidence of necrosis (CODE) L97.425    Diabetic neuropathy associated with diabetes mellitus due to underlying condition ([facility] E08.40    Abscess of foot including toes, left L02.612    Unable to ambulate R26.2    Ataxia R27.0    Acute renal failure (ARF) ([facility]) N17.9    Hypothyroid E03.9    Morbid obesity due to excess calories ([facility]) E66.01    Essential hypertension I10    Benzodiazepine dependence ([facility]) F13.20    Anxiety F41.9    Bronchitis J40    OA (osteoarthritis) M19.90    Acute on chronic renal failure (HCC) N17.9, N18.9    Hx of Chronic ulcer of left foot ([facility]) L97.529    HLD (hyperlipidemia) E78.5    Fracture of right tibia and fibula S82.201A, S82.401A    Abnormality of gait and mobility due to Impaired Mobility secondary to Flare up of OA. [facility] admit 4/04/19. R26.9    Uncontrolled type 2 diabetes mellitus with hyperglycemia (HCC) E11.65    Nocturnal hypoxemia due to obesity E66.9, G47.36    Nocturnal hypoxia G47.34    Sleep apnea G47.30       Isolation/Infection:   Isolation          No Isolation        Patient Infection Status     Infection Onset Added Last Indicated Last Indicated By Review Planned Expiration Resolved Resolved By    None active    Resolved    COVID-19 Rule Out 05/09/20 05/09/20 05/09/20 COVID-19 Ambulatory (Ordered)   05/12/20 Rule-Out Test Resulted          Nurse Assessment:  Last Vital Signs: LMP  (LMP Unknown)     Last documented pain score (0-10 scale):    Last Weight:   Wt Readings from Last 1 Encounters:   05/09/20 236 lb (107 kg)     Mental Status:  {IP PT MENTAL STATUS:07797}    IV Access:  { NIESHA IV ACCESS:392300988}    Nursing Mobility/ADLs:  Walking   {CHP DME LHUQ:940447573}  Transfer  {CHP DME WJMS:782675069}  Bathing  {CHP DME TTVF:703696251}  Dressing  {CHP DME PRGX:486479476}  Toileting  {CHP DME RFOP:084605759}  Feeding  {CHP DME DCXW:628557592}  Med Admin  {CHP DME ZBNS:877692514}  Med Delivery   { NIESHA MED Delivery:155087663}    Wound Care Documentation and Therapy:        Elimination:  Continence:   · Bowel: {YES / DW:05322}  · Bladder: {YES / DC:54921}  Urinary Catheter: {Urinary Catheter:520095655}   Colostomy/Ileostomy/Ileal Conduit: {YES / UT:03469}       Date of Last BM: ***  No intake or output data in the 24 hours ending 09/24/20 1056  No intake/output data recorded.     Safety Concerns:     [address] Safety Concerns:505027185}    Impairments/Disabilities:      [address] Impairments/Disabilities:941333596}    Nutrition Therapy:  Current Nutrition Therapy:   [address] Diet List:032356612}    Routes of Feeding: {CHP DME Other Feedings:590640865}  Liquids: {Slp liquid thickness:48071}  Daily Fluid Restriction: {CHP DME Yes amt example:517784174}  Last Modified Barium Swallow with Video (Video Swallowing Test): {Done Not Done AAPY:342114175}    Treatments at the Time of Hospital Discharge:   Respiratory Treatments: ***  Oxygen Therapy:  {Therapy; copd oxygen:68850}  Ventilator:    { CC Vent VRZP:590430684}    Rehab Therapies: {THERAPEUTIC INTERVENTION:3162351776}  Weight Bearing Status/Restrictions: { CC Weight Bearin}  Other Medical Equipment (for information only, NOT a DME order):  {EQUIPMENT:808381379}  Other Treatments: ***    Patient's personal belongings (please select all that are sent with patient):  {CHP DME Belongings:954251766}    RN SIGNATURE:  {Esignature:440048812}    CASE MANAGEMENT/SOCIAL WORK SECTION    Inpatient Status Date: ***    Readmission Risk Assessment Score:  Readmission Risk              Risk of Unplanned Readmission:        0           Discharging to Facility/ Agency   · Name:   · Address:  · Phone:  · Fax:    Dialysis Facility (if applicable)   · Name:  · Address:  · Dialysis Schedule:  · Phone:  · Fax:    / signature: {Esignature:233778819}    PHYSICIAN SECTION    Prognosis: {Prognosis:6509479651}    Condition at Discharge: [facility] Patient Condition:446105490}    Rehab Potential (if transferring to Rehab): {Prognosis:2081838711}    Recommended Labs or Other Treatments After Discharge: ***    Physician Certification: I certify the above information and transfer of Ac Harding  is necessary for the continuing treatment of the diagnosis listed and that she requires {Admit to Appropriate Level of Care:16781} for {GREATER/LESS:187441699} 30 days.      Update Admission H&P: {CHP DME Changes in [facility]:131222111}    PHYSICIAN SIGNATURE:  {Esignature:667350136}

## 2020-09-24 NOTE — PROGRESS NOTES
[address]   Progress Note and Procedure Note      Deerk Sanchez  MEDICAL RECORD NUMBER:  [id number]  AGE: 79 y.o. GENDER: female  : 1950  EPISODE DATE:  2020    Subjective:     Chief Complaint   Patient presents with    Wound Check     COCCYX WOUND         HISTORY of PRESENT ILLNESS HPI     Derek Sanchez is a 79 y.o. female who presents today for wound/ulcer evaluation. History of Wound Context: had lengthy course of rehabilitation at local SNF following back surgery earlier in year. Has hospital bed with regular mattress, w/c with offloading pressure relieving cushion. Has developed excoriation of buttocks from shear related to \"not being able to lift self off of mattress/chair\" to reposition self due to continue weakness/pain.    Wound/Ulcer Pain Timing/Severity: intermittent  Quality of pain: burning, tender  Severity:  3 / 10   Modifying Factors: Pain worsens with movement or skin care   Associated Signs/Symptoms: pain and localized, superficial excoriation of skin     Ulcer Identification:  Ulcer Type: traumatic and wound is partial thickness, superficial related to shear/friction  Contributing Factors: diabetes, poor glucose control, chronic pressure, decreased mobility, shear force and obesity    Wound: N/A        PAST MEDICAL HISTORY        Diagnosis Date    Anxiety 2019    Arthritis     Bronchitis     Diabetes mellitus ([address]     Diabetic neuropathy associated with diabetes mellitus due to underlying condition ([address] 11/3/2017    Essential hypertension 2019    Hypercholesterolemia     Hypertension     Morbid obesity due to excess calories ([address] 2019    Thyroid disease     Wound infection 2018    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 surgeries x3    TONSILLECTOMY N/A        FAMILY HISTORY    Family History   Problem Relation Age of Onset    Cancer Father        SOCIAL HISTORY    Social History     Tobacco Use    Smoking status: Never Smoker    Smokeless tobacco: Never Used   Substance Use Topics    Alcohol use: No    Drug use: No       ALLERGIES    Allergies   Allergen Reactions    Nitrofurantoin Other (See Comments)     Leg swelling     Tizanidine      Other reaction(s): Mental Status Change    Sulfa Antibiotics Rash       MEDICATIONS    Current Outpatient Medications on File Prior to Encounter   Medication Sig Dispense Refill    oxyCODONE HCl (OXY-IR) 10 MG immediate release tablet Take 10 mg by mouth every 6 hours as needed for Pain.  furosemide (LASIX) 40 MG tablet Take 40 mg by mouth 2 times daily 1.5 tablet by mouth as directed; 1 tablet in AM and 1/2 tablet at NOON      traZODone (DESYREL) 150 MG tablet Take 150 mg by mouth nightly      atorvastatin (LIPITOR) 10 MG tablet Take 10 mg by mouth daily      insulin glargine (TOUJEO SOLOSTAR) 300 UNIT/ML injection pen Inject 25 Units into the skin nightly 3 pen 3    metoprolol succinate (TOPROL XL) 100 MG extended release tablet Take 100 mg by mouth daily      levothyroxine (SYNTHROID) 125 MCG tablet Take 125 mcg by mouth Daily      metFORMIN (GLUCOPHAGE) 1000 MG tablet Take 500 mg by mouth 2 times daily (with meals)       Capsaicin (ZOSTRIX) 0.035 % CREA cream Apply topically 3 times daily      silver sulfADIAZINE (SILVADENE) 1 % cream Apply topically 2 times daily 1 application apply on the skin twice a day      oxybutynin (DITROPAN-XL) 5 MG extended release tablet Take 5 mg by mouth daily      ALPRAZolam (XANAX) 0.25 MG tablet Take 0.25 mg by mouth 2 times daily as needed for Sleep.       metoprolol (LOPRESSOR) 100 MG tablet Take 100 mg by mouth daily      DULoxetine (CYMBALTA) 60 MG extended release capsule Take 60 mg by mouth daily      polyethylene glycol (GLYCOLAX) 17 g packet Take 17 g by mouth every other day      Cholecalciferol (VITAMIN D3) 50 MCG (2000 UT) CAPS Take by mouth daily      Menthol-Methyl Salicylate (MUSCLE RUB) 10-15 % CREA cream Apply topically 2 times daily 1 application apply on the skin twice a day; arms and shoulders      insulin aspart (NOVOLOG FLEXPEN) 100 UNIT/ML injection pen Inject 10 Units into the skin 3 times daily (before meals) 5 pen 3    coenzyme Q10 100 MG CAPS capsule Take 1 capsule by mouth 2 times daily (before meals) 60 capsule 1    insulin lispro (HUMALOG KWIKPEN) 100 UNIT/ML pen Inject 10 Units into the skin 3 times daily (before meals) 5 pen 3    Insulin Pen Needle (NOVOFINE) 32G X 6 MM MISC 1 Device by Does not apply route 4 times daily (before meals and nightly) 300 each 3    valsartan-hydrochlorothiazide (DIOVAN-HCT) 320-25 MG per tablet Take 1 tablet by mouth daily      Lactobacillus (PROBIOTIC ACIDOPHILUS) TABS Take 1 tablet by mouth daily 60 tablet 0    Multiple Vitamins-Minerals (CENTRUM SILVER PO) Take 1 tablet by mouth daily       No current facility-administered medications on file prior to encounter. REVIEW OF SYSTEMS    Pertinent items are noted in HPI. Objective:      /60   Pulse 72   Temp 97.9 °F (36.6 °C) (Temporal)   Resp 18   Ht 5' 5\" (1.651 m)   Wt 240 lb (108.9 kg)   LMP  (LMP Unknown)   BMI 39.94 kg/m²     Wt Readings from Last 3 Encounters:   09/24/20 240 lb (108.9 kg)   05/09/20 236 lb (107 kg)   04/04/19 275 lb (124.7 kg)       PHYSICAL EXAM    Constitutional:   Well nourished and well developed. Appears neat and clean. Patient is alert, oriented x3, and in no apparent distress. Respiratory:  Respiratory effort is easy and symmetric bilaterally. Rate is normal at rest and on room air. Vascular: Extremities negative for pitting edema. Neurological:  Gross and Light touch intact. Dermatological:  Wound description noted in wound assessment.   Upper buttocks excoriation, superficial from friction/shear. Surrounding skin is dry, flaking. Psychiatric:  Judgement and insight intact. Short and long term memory intact. No evidence of depression, anxiety, or agitation. Patient is calm, cooperative, and communicative. Appropriate interactions and affect. Assessment:      Problem List Items Addressed This Visit     None           Procedure Note  Indications:  Based on my examination of this patient's wound(s)/ulcer(s) today, debridement is not required to promote healing and evaluate the wound base. Wound 09/24/20 Buttocks Right #1  (Active)   Wound Image   09/24/20 1122   Wound Pressure Stage  2 09/24/20 1122   Wound Cleansed Rinsed/Irrigated with saline 09/24/20 1122   Wound Length (cm) 0.3 cm 09/24/20 1122   Wound Width (cm) 0.2 cm 09/24/20 1122   Wound Depth (cm) 0.1 cm 09/24/20 1122   Wound Surface Area (cm^2) 0.06 cm^2 09/24/20 1122   Wound Volume (cm^3) 0.01 cm^3 09/24/20 1122   Wound Assessment Red 09/24/20 1122   Drainage Amount Scant 09/24/20 1122   Drainage Description Serosanguinous 09/24/20 1122   Odor None 09/24/20 1122   Margins Defined edges 09/24/20 1122   Debby-wound Assessment Fragile 09/24/20 1122   Non-staged Wound Description Full thickness 09/24/20 1122   Number of days: 0                  Plan:   Wound care as below. Use mepilex bordered dressings to prevent further shear/friction from continued inability to lift self using arms without shear against surfaces. If area not completely healed in 2 weeks call for another appointment, otherwise make appointments as needed only. Treatment Note please see attached Discharge Instructions    Written patient dismissal instructions given to patient and signed by patient or POA.          Discharge [address] and [facility]   Physician Orders and Discharge [address] [address]  Telephone: [phone]      NAME:  Leatha Ventura  YOB: 1950  MEDICAL RECORD NUMBER:  [id number]    Home Care/Facility: [address] [place] [address]    Wound Location:  Right Buttock    Dressing orders:      Wash with mild soap and warm water. Rinse and dry. Apply Calmoseptine as needed. Mepilex sacral border. Change every 2-3 days and as needed    Compression:    Offloading Device:  Avoid sitting for long periods; Avoid shear. Other Instructions:     Keep all dressings clean, dry and intact. Keep pressure off the wound(s) at all times. Follow up visit   2 Weeks   October 8, 2020 @    Please give 24 hour notice if unable to keep appointment. [phone]    If you experience any of the following, please call the Wound Care Service at  [phone] or go to the nearest emergency room. *Increase in pain *Temperature over 101 *Increase in drainage from your wound or a foul odor  *Uncontrolled swelling *Need for compression bandage changes due to slippage, breakthrough drainage       PLEASE NOTE: IF YOU ARE UNABLE TO OBTAIN WOUND SUPPLIES, CONTINUE TO USE THE SUPPLIES YOU HAVE AVAILABLE UNTIL YOU ARE ABLE TO REACH US.  IT IS MOST IMPORTANT TO KEEP THE WOUND COVERED AT ALL TIMES  Electronically signed by MARCO Liang NP on 9/24/2020 at 11:46 AM                    Electronically signed by MARCO Liang NP on 9/24/2020 at 11:47 AM

## 2020-09-24 NOTE — PLAN OF CARE
Problem: Pressure Ulcer:  Goal: Signs of wound healing will improve  Description: Signs of wound healing will improve  Outcome: Ongoing  Goal: Absence of new pressure ulcer  Description: Absence of new pressure ulcer  Outcome: Ongoing  Goal: Will show no infection signs and symptoms  Description: Will show no infection signs and symptoms  Outcome: Ongoing     Problem: Pain:  Goal: Pain level will decrease  Description: Pain level will decrease  Outcome: Ongoing  Goal: Control of acute pain  Description: Control of acute pain  Outcome: Ongoing  Goal: Control of chronic pain  Description: Control of chronic pain  Outcome: Ongoing

## 2022-02-24 ENCOUNTER — HOSPITAL ENCOUNTER (OUTPATIENT)
Dept: WOUND CARE | Age: 72
Discharge: HOME | End: 2022-02-24
Payer: COMMERCIAL

## 2022-02-24 VITALS
HEART RATE: 60 BPM | RESPIRATION RATE: 18 BRPM | TEMPERATURE: 97.5 F | SYSTOLIC BLOOD PRESSURE: 134 MMHG | DIASTOLIC BLOOD PRESSURE: 60 MMHG

## 2022-02-24 DIAGNOSIS — S31.819A OPEN WOUND OF RIGHT BUTTOCK, INITIAL ENCOUNTER: ICD-10-CM

## 2022-02-24 DIAGNOSIS — S31.819A WOUND OF RIGHT BUTTOCK, INITIAL ENCOUNTER: Primary | ICD-10-CM

## 2022-02-24 PROBLEM — S31.809A BUTTOCK WOUND: Status: ACTIVE | Noted: 2022-02-24

## 2022-02-24 PROCEDURE — 99203 OFFICE O/P NEW LOW 30 MIN: CPT | Performed by: NURSE PRACTITIONER

## 2022-02-24 PROCEDURE — 99213 OFFICE O/P EST LOW 20 MIN: CPT

## 2022-02-24 PROCEDURE — 11042 DBRDMT SUBQ TIS 1ST 20SQCM/<: CPT

## 2022-02-24 PROCEDURE — 11042 DBRDMT SUBQ TIS 1ST 20SQCM/<: CPT | Performed by: NURSE PRACTITIONER

## 2022-02-24 RX ORDER — GINSENG 100 MG
CAPSULE ORAL ONCE
Status: CANCELLED | OUTPATIENT
Start: 2022-02-24 | End: 2022-02-24

## 2022-02-24 RX ORDER — GENTAMICIN SULFATE 1 MG/G
OINTMENT TOPICAL ONCE
Status: CANCELLED | OUTPATIENT
Start: 2022-02-24 | End: 2022-02-24

## 2022-02-24 RX ORDER — LIDOCAINE HYDROCHLORIDE 20 MG/ML
JELLY TOPICAL ONCE
Status: CANCELLED | OUTPATIENT
Start: 2022-02-24 | End: 2022-02-24

## 2022-02-24 RX ORDER — CLOBETASOL PROPIONATE 0.5 MG/G
OINTMENT TOPICAL ONCE
Status: CANCELLED | OUTPATIENT
Start: 2022-02-24 | End: 2022-02-24

## 2022-02-24 RX ORDER — LIDOCAINE HYDROCHLORIDE 40 MG/ML
SOLUTION TOPICAL ONCE
Status: CANCELLED | OUTPATIENT
Start: 2022-02-24 | End: 2022-02-24

## 2022-02-24 RX ORDER — BACITRACIN, NEOMYCIN, POLYMYXIN B 400; 3.5; 5 [USP'U]/G; MG/G; [USP'U]/G
OINTMENT TOPICAL ONCE
Status: CANCELLED | OUTPATIENT
Start: 2022-02-24 | End: 2022-02-24

## 2022-02-24 RX ORDER — BACITRACIN ZINC AND POLYMYXIN B SULFATE 500; 1000 [USP'U]/G; [USP'U]/G
OINTMENT TOPICAL ONCE
Status: CANCELLED | OUTPATIENT
Start: 2022-02-24 | End: 2022-02-24

## 2022-02-24 RX ORDER — BETAMETHASONE DIPROPIONATE 0.05 %
OINTMENT (GRAM) TOPICAL ONCE
Status: CANCELLED | OUTPATIENT
Start: 2022-02-24 | End: 2022-02-24

## 2022-02-24 RX ORDER — LIDOCAINE 50 MG/G
OINTMENT TOPICAL ONCE
Status: CANCELLED | OUTPATIENT
Start: 2022-02-24 | End: 2022-02-24

## 2022-02-24 RX ORDER — LIDOCAINE 40 MG/G
CREAM TOPICAL ONCE
Status: CANCELLED | OUTPATIENT
Start: 2022-02-24 | End: 2022-02-24

## 2022-02-24 ASSESSMENT — PAIN SCALES - GENERAL: PAINLEVEL_OUTOF10: 9

## 2022-02-24 NOTE — FLOWSHEET NOTE
3340   Margins Defined edges 02/24/22 0907   Number of days: 0          Supplies Requested :      WOUND #: 1   PRIMARY DRESSING:  Honey gel   Mepilex border     FREQUENCY OF DRESSING CHANGES:  Daily         ADDITIONAL ITEMS:  [] Gloves Small  [x] Gloves Medium [] Gloves Large [] Gloves XLarge  [] Tape 1\" [x] Tape 2\" [] Tape 3\"  [] Medipore Tape  [x] Saline  [] Skin Prep   [] Adhesive Remover   [] Cotton Tip Applicators   [] Other:    Patient Wound(s) Debrided: [x] Yes   [] No    Debribement Type: subcutaneous tissue    Debridement Date: **2/24/22*    Patient currently being seen by Home Health: [] Yes   [x] No    Duration for needed supplies:  []15  [x]30  []60  []90 Days    Provider Information:      PROVIDER'S MARCO Noonan, CNP  NPI:  [id number]

## 2022-02-24 NOTE — PROGRESS NOTES
[address]                                                   Progress Note and Procedure Note      Fady Garcias  MEDICAL RECORD NUMBER:  [id number]  AGE: 70 y.o. GENDER: female  : 1950  EPISODE DATE:  2022    Subjective:     Chief Complaint   Patient presents with    Wound Check     right buttock wound         HISTORY of PRESENT ILLNESS HPI     Fady Garcias is a 70 y.o. female who presents today for wound/ulcer evaluation. History of Wound Context: Patient recently underwent a spinal fusion and now requires physical therapy post op- PT wants to utilize the therapy pool for treatment, however, the patient has an new onset open wound to the right buttock so therapy will not allow her to use the pool until the wound is healed. Per patient, , patient has had limited movement since pre-op and developed an open wound to left buttock. Today it is slough covered, full thickness - will debride.    Wound/Ulcer Pain Timing/Severity: intermittent  Quality of pain: burning, tender  Severity:  3 / 10   Modifying Factors: Pain worsens with care and subsides with rest.   Associated Signs/Symptoms: drainage and pain    Ulcer Identification:  Ulcer Type: pressure and traumatic  Contributing Factors: diabetes, poor glucose control, decreased mobility, shear force and obesity    Wound: N/A        PAST MEDICAL HISTORY        Diagnosis Date    Anxiety 2019    Arthritis     Bronchitis     Diabetes mellitus ([address]     Diabetic neuropathy associated with diabetes mellitus due to underlying condition ([address] 11/3/2017    Essential hypertension 2019    Hypercholesterolemia     Hypertension     Morbid obesity due to excess calories ([address] 2019    Thyroid disease     Wound infection 2018    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 without evidence of necrosis (CODE)    Diabetic neuropathy associated with diabetes mellitus due to underlying condition ([address]    Abscess of foot including toes, left    Unable to ambulate    Ataxia    Acute renal failure (ARF) ([address]    Hypothyroid    Morbid obesity due to excess calories ([address]    Essential hypertension    Benzodiazepine dependence ([address]    Anxiety    Bronchitis    OA (osteoarthritis)    Acute on chronic renal failure (HCC)    Hx of Chronic ulcer of left foot (HCC)    HLD (hyperlipidemia)    Fracture of right tibia and fibula    Abnormality of gait and mobility due to Impaired Mobility secondary to Flare up of OA. [facility] admit 4/04/19.     Uncontrolled type 2 diabetes mellitus with hyperglycemia (HCC)    Nocturnal hypoxemia due to obesity    Nocturnal hypoxia    Sleep apnea    Open wound of right buttock    Buttock wound        FAMILY HISTORY    Family History   Problem Relation Age of Onset    Cancer Father        SOCIAL HISTORY    Social History     Tobacco Use    Smoking status: Never Smoker    Smokeless tobacco: Never Used   Vaping Use    Vaping Use: Never used   Substance Use Topics    Alcohol use: No    Drug use: No       ALLERGIES    Allergies   Allergen Reactions    Nitrofurantoin Other (See Comments)     Leg swelling     Tizanidine      Other reaction(s): Mental Status Change    Sulfa Antibiotics Rash       MEDICATIONS    Current Outpatient Medications on File Prior to Encounter   Medication Sig Dispense Refill    oxyCODONE HCl (OXY-IR) 10 MG immediate release tablet Take 10 mg by mouth every 6 hours as needed for Pain.       Capsaicin (ZOSTRIX) 0.035 % CREA cream Apply topically 3 times daily      furosemide (LASIX) 40 MG tablet Take 40 mg by mouth 2 times daily 1.5 tablet by mouth as directed; 1 tablet in AM and 1/2 tablet at NOON      oxybutynin (DITROPAN-XL) 5 MG extended release tablet Take 5 mg by mouth daily      traZODone (DESYREL) 150 MG tablet Take 150 mg by mouth nightly      metoprolol (LOPRESSOR) 100 MG tablet Take 100 mg by mouth daily      atorvastatin (LIPITOR) 10 MG tablet Take 10 mg by mouth daily      DULoxetine (CYMBALTA) 60 MG extended release capsule Take 60 mg by mouth daily      polyethylene glycol (GLYCOLAX) 17 g packet Take 17 g by mouth every other day      Cholecalciferol (VITAMIN D3) 50 MCG (2000 UT) CAPS Take by mouth daily      Menthol-Methyl Salicylate (MUSCLE RUB) 10-15 % CREA cream Apply topically 2 times daily 1 application apply on the skin twice a day; arms and shoulders      insulin aspart (NOVOLOG FLEXPEN) 100 UNIT/ML injection pen Inject 10 Units into the skin 3 times daily (before meals) 5 pen 3    coenzyme Q10 100 MG CAPS capsule Take 1 capsule by mouth 2 times daily (before meals) 60 capsule 1    insulin glargine (TOUJEO SOLOSTAR) 300 UNIT/ML injection pen Inject 25 Units into the skin nightly 3 pen 3    insulin lispro (HUMALOG KWIKPEN) 100 UNIT/ML pen Inject 10 Units into the skin 3 times daily (before meals) 5 pen 3    Insulin Pen Needle (NOVOFINE) 32G X 6 MM MISC 1 Device by Does not apply route 4 times daily (before meals and nightly) 300 each 3    valsartan-hydrochlorothiazide (DIOVAN-HCT) 320-25 MG per tablet Take 1 tablet by mouth daily      Lactobacillus (PROBIOTIC ACIDOPHILUS) TABS Take 1 tablet by mouth daily 60 tablet 0    metoprolol succinate (TOPROL XL) 100 MG extended release tablet Take 100 mg by mouth daily      Multiple Vitamins-Minerals (CENTRUM SILVER PO) Take 1 tablet by mouth daily      levothyroxine (SYNTHROID) 125 MCG tablet Take 125 mcg by mouth Daily      metFORMIN (GLUCOPHAGE) 1000 MG tablet Take 500 mg by mouth 2 times daily (with meals)        No current facility-administered medications on file prior to encounter. REVIEW OF SYSTEMS    Pertinent items are noted in HPI. Objective:      /60   Pulse 60   Temp 97.5 °F (36.4 °C) (Temporal)   Resp 18   LMP  (LMP Unknown)     Wt Readings from Last 3 Encounters:   09/24/20 240 lb (108.9 kg)   05/09/20 236 lb (107 kg)   04/04/19 275 lb (124.7 kg)       PHYSICAL EXAM    Constitutional:   Well nourished and well developed. Appears neat and clean. Patient is alert, oriented x3, and in no apparent distress. Respiratory:  Respiratory effort is easy and symmetric bilaterally. Rate is normal at rest and on room air. Vascular:  Capillary refill is <3 sec to digits bilateral.  Extremities negative for pitting edema. Neurological:   Sensation is present to lower extremities. Overall movement improved since when here in [facility] last year. Dermatological:  Wound description noted in wound assessment. The wound is a recurrence of a previously healed buttock wound - today has small amount of granulation tissue noted through yellow slough. Will debride and begin Medihoney. No purulence, no surrounding erythema. Psychiatric:  Judgement and insight intact. Short and long term memory intact. No evidence of depression, anxiety, or agitation. Patient is calm, cooperative, and communicative. Appropriate interactions and affect. Assessment:      Problem List Items Addressed This Visit     Buttock wound           Procedure Note  Indications:  Based on my examination of this patient's wound(s)/ulcer(s) today, debridement is required to promote healing and evaluate the wound base.     Performed by: MARCO Cason NP    Consent obtained:  Yes    Time out taken:  Yes    Pain Control:   lidocaine 4% topical       Debridement:Excisional Debridement    Using curette the wound(s)/ulcer(s) was/were sharply debrided down through and including the removal of epidermis, dermis and subcutaneous tissue. Devitalized Tissue Debrided:  fibrin, biofilm and slough    Pre Debridement Measurements:  Are located in the [place]  Documentation Flow Sheet    Wound/Ulcer #: 1    Post Debridement Measurements:  Wound/Ulcer Descriptions are Pre Debridement except measurements:    Wound 02/24/22 Buttocks Right #1 (Active)   Wound Image   02/24/22 0907   Wound Etiology Pressure Stage  2 02/24/22 0907   Wound Cleansed Cleansed with saline 02/24/22 0907   Wound Length (cm) 0.7 cm 02/24/22 0907   Wound Width (cm) 1 cm 02/24/22 0907   Wound Depth (cm) 0.1 cm 02/24/22 0907   Wound Surface Area (cm^2) 0.7 cm^2 02/24/22 0907   Wound Volume (cm^3) 0.07 cm^3 02/24/22 0907   Post-Procedure Length (cm) 0.7 cm 02/24/22 0944   Post-Procedure Width (cm) 1 cm 02/24/22 0944   Post-Procedure Depth (cm) 0.1 cm 02/24/22 0944   Post-Procedure Surface Area (cm^2) 0.7 cm^2 02/24/22 0944   Post-Procedure Volume (cm^3) 0.07 cm^3 02/24/22 0944   Wound Assessment [facility] 02/24/22 0907   Drainage Amount Small 02/24/22 0907   Drainage Description Yellow 02/24/22 0907   Odor None 02/24/22 0907   Debby-wound Assessment Intact;Fragile 02/24/22 0907   Margins Defined edges 02/24/22 0907   Number of days: 0            Percent of Wound/Ulcer Debrided: 100%    Total Surface Area Debrided:  0.7 sq cm     Diabetic/Pressure/Non Pressure Ulcers:  Ulcer: Pressure ulcer, unstageable      Bleeding:  Minimal    Hemostasis Achieved:  by pressure    Procedural Pain:  1  / 10     Post Procedural Pain:  0 / 10     Response to treatment:  Well tolerated by patient. Plan:     Cleanse daily and begin Medihoney as directed. We will order the medihoney. Recheck in 2 weeks.    Try to stay positioned off of the wound itself and continue use of sacral mepilex border protective dressings. Treatment Note please see attached Discharge Instructions    Written patient dismissal instructions given to patient and signed by patient or POA. Discharge [address] and [facility]   Physician Orders and Discharge Instructions  [facility]  Telephone: [phone]      NAME:  Fady Garcias          YOB: 1950  MEDICAL RECORD NUMBER:  [id number]    Your  is:  [address] Care/Facility: None    Wound Location: Right Buttock    Dressing orders:. 1. Cleanse wound(s) with normal saline. Until you receive the Medihoney gel - use the plurogel (supply given)   2. Apply Medihoney gel  to wound bed. 3. Cover with dry dressing  4. Change daily                Compression: None    Offloading Device:    Other Instructions:     Dressing Supplies:  Your dressing supplies will be ordered and delivered to your home. If you do not receive them in a timely manner please call the wound center. Keep all dressings clean, dry and intact. Keep pressure off the wound(s) at all times. Follow up visit  2  Weeks March 10, 2022 @     Please give 24 hour notice if unable to keep appointment. [phone]    If you experience any of the following, please call the Wound Care Service at  [phone] or go to the nearest emergency room. *Increase in pain *Temperature over 101 *Increase in drainage from your wound or a foul odor  *Uncontrolled swelling *Need for compression bandage changes due to slippage, breakthrough drainage       PLEASE NOTE: IF YOU ARE UNABLE TO OBTAIN WOUND SUPPLIES, CONTINUE TO USE THE SUPPLIES YOU HAVE AVAILABLE UNTIL YOU ARE ABLE TO REACH US.  IT IS MOST IMPORTANT TO KEEP THE WOUND COVERED AT ALL TIMES                    Electronically signed by MARCO Borja NP on 2/24/2022 at 10:04 AM

## 2022-02-24 NOTE — PLAN OF CARE
Problem: Pain:  Goal: Pain level will decrease  Description: Pain level will decrease  Outcome: Ongoing  Goal: Control of acute pain  Description: Control of acute pain  Outcome: Ongoing  Goal: Control of chronic pain  Description: Control of chronic pain  Outcome: Ongoing     Problem: Pressure Ulcer:  Goal: Signs of wound healing will improve  Description: Signs of wound healing will improve  Outcome: Ongoing  Goal: Absence of new pressure ulcer  Description: Absence of new pressure ulcer  Outcome: Ongoing  Goal: Will show no infection signs and symptoms  Description: Will show no infection signs and symptoms  Outcome: Ongoing     Problem: Wound:  Goal: Will show signs of wound healing; wound closure and no evidence of infection  Description: Will show signs of wound healing; wound closure and no evidence of infection  Outcome: Ongoing

## 2022-02-25 ENCOUNTER — TELEPHONE (OUTPATIENT)
Dept: WOUND CARE | Age: 72
End: 2022-02-25

## 2022-02-25 NOTE — TELEPHONE ENCOUNTER
Called patient and informed her we changed the wound orders to Medihoney hydrosheets and cover with a gauze border. Patient voiced understanding on how to change the dressing.

## 2022-03-10 ENCOUNTER — TELEPHONE (OUTPATIENT)
Dept: WOUND CARE | Age: 72
End: 2022-03-10

## 2022-03-10 NOTE — TELEPHONE ENCOUNTER
Elise Zapata called in and stated \"the medihoney is making my wound red and inflamed\". Informed her (per Gunjan King NP) to stop using the medihoney and to use Plurogel on the wound bed cover with mepilex border. Elise Zapata verbalized understanding.

## 2022-03-17 ENCOUNTER — HOSPITAL ENCOUNTER (OUTPATIENT)
Dept: WOUND CARE | Age: 72
Discharge: HOME | End: 2022-03-17
Payer: COMMERCIAL

## 2022-03-17 VITALS
HEART RATE: 56 BPM | TEMPERATURE: 96.2 F | DIASTOLIC BLOOD PRESSURE: 67 MMHG | RESPIRATION RATE: 18 BRPM | SYSTOLIC BLOOD PRESSURE: 145 MMHG

## 2022-03-17 DIAGNOSIS — S31.819A OPEN WOUND OF RIGHT BUTTOCK, INITIAL ENCOUNTER: Primary | ICD-10-CM

## 2022-03-17 PROCEDURE — 11042 DBRDMT SUBQ TIS 1ST 20SQCM/<: CPT | Performed by: NURSE PRACTITIONER

## 2022-03-17 PROCEDURE — 11042 DBRDMT SUBQ TIS 1ST 20SQCM/<: CPT

## 2022-03-17 PROCEDURE — 6370000000 HC RX 637 (ALT 250 FOR IP): Performed by: NURSE PRACTITIONER

## 2022-03-17 RX ORDER — LIDOCAINE 40 MG/G
CREAM TOPICAL ONCE
Status: COMPLETED | OUTPATIENT
Start: 2022-03-17 | End: 2022-03-17

## 2022-03-17 RX ORDER — LIDOCAINE HYDROCHLORIDE 40 MG/ML
SOLUTION TOPICAL ONCE
Status: CANCELLED | OUTPATIENT
Start: 2022-03-17 | End: 2022-03-17

## 2022-03-17 RX ORDER — BACITRACIN ZINC AND POLYMYXIN B SULFATE 500; 1000 [USP'U]/G; [USP'U]/G
OINTMENT TOPICAL ONCE
Status: CANCELLED | OUTPATIENT
Start: 2022-03-17 | End: 2022-03-17

## 2022-03-17 RX ORDER — LIDOCAINE HYDROCHLORIDE 20 MG/ML
JELLY TOPICAL ONCE
Status: CANCELLED | OUTPATIENT
Start: 2022-03-17 | End: 2022-03-17

## 2022-03-17 RX ORDER — LIDOCAINE 50 MG/G
OINTMENT TOPICAL ONCE
Status: CANCELLED | OUTPATIENT
Start: 2022-03-17 | End: 2022-03-17

## 2022-03-17 RX ORDER — LIDOCAINE 40 MG/G
CREAM TOPICAL ONCE
Status: CANCELLED | OUTPATIENT
Start: 2022-03-17 | End: 2022-03-17

## 2022-03-17 RX ORDER — BACITRACIN, NEOMYCIN, POLYMYXIN B 400; 3.5; 5 [USP'U]/G; MG/G; [USP'U]/G
OINTMENT TOPICAL ONCE
Status: CANCELLED | OUTPATIENT
Start: 2022-03-17 | End: 2022-03-17

## 2022-03-17 RX ORDER — CLOBETASOL PROPIONATE 0.5 MG/G
OINTMENT TOPICAL ONCE
Status: CANCELLED | OUTPATIENT
Start: 2022-03-17 | End: 2022-03-17

## 2022-03-17 RX ORDER — GENTAMICIN SULFATE 1 MG/G
OINTMENT TOPICAL ONCE
Status: CANCELLED | OUTPATIENT
Start: 2022-03-17 | End: 2022-03-17

## 2022-03-17 RX ORDER — BETAMETHASONE DIPROPIONATE 0.05 %
OINTMENT (GRAM) TOPICAL ONCE
Status: CANCELLED | OUTPATIENT
Start: 2022-03-17 | End: 2022-03-17

## 2022-03-17 RX ORDER — AMMONIUM LACTATE 12 G/100G
LOTION TOPICAL
Qty: 225 G | Refills: 1 | Status: SHIPPED | OUTPATIENT
Start: 2022-03-17

## 2022-03-17 RX ORDER — GINSENG 100 MG
CAPSULE ORAL ONCE
Status: CANCELLED | OUTPATIENT
Start: 2022-03-17 | End: 2022-03-17

## 2022-03-17 RX ADMIN — LIDOCAINE: 4 CREAM TOPICAL at 10:24

## 2022-03-17 NOTE — PROGRESS NOTES
[address]                                                   Progress Note and Procedure Note      Digna Zee  MEDICAL RECORD NUMBER:  [id number]  AGE: 70 y.o. GENDER: female  : 1950  EPISODE DATE:  3/17/2022    Subjective:     Chief Complaint   Patient presents with    Wound Check     R buttock wound recheck         HISTORY of PRESENT ILLNESS HPI  Digna Zee is a 70 y.o. female who presents today for wound/ulcer evaluation. History of Wound Context: Patient recently underwent a spinal fusion and now requires physical therapy post o. PT wants to utilize the therapy pool for treatment, however, the patient has an new onset open wound to the right buttock so therapy will not allow her to use the pool until the wound is healed. Per patient, , patient has had limited movement since pre-op and developed an open wound to left buttock. Today it is slough covered, full thickness - will debride. Reports had \"reaction to the medihoney\". Slight dryness and peeling of kinza wound skin - no excoriation. Discussed with  need to prevent skin breakdown by using additional products such as liquid skin prep and calmoseptine between buttocks.    Wound/Ulcer Pain Timing/Severity: intermittent  Quality of pain: burning, tender  Severity:  3 / 10   Modifying Factors: Pain worsens with care and subsides with rest.   Associated Signs/Symptoms: drainage and pain     Ulcer Identification:  Ulcer Type: pressure and traumatic  Contributing Factors: diabetes, poor glucose control, decreased mobility, shear force and obesity     Wound: N/A          PAST MEDICAL HISTORY        Diagnosis Date    Anxiety 2019    Arthritis     Bronchitis     Diabetes mellitus [facility]     Diabetic neuropathy associated with diabetes mellitus due to underlying condition ([facility] 11/3/2017    Essential hypertension 2019    Hypercholesterolemia     Hypertension     Morbid obesity due to excess calories ([facility] 2019  Thyroid disease     Wound infection 2018    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 without evidence of necrosis ([facility])    Diabetic neuropathy associated with diabetes mellitus due to underlying condition ([address]    Abscess of foot including toes, left    Unable to ambulate    Ataxia    Acute renal failure (ARF) ([address]    Hypothyroid    Morbid obesity due to excess calories ([address]    Essential hypertension    Benzodiazepine dependence ([address]    Anxiety    Bronchitis    OA (osteoarthritis)    Acute on chronic renal failure (HCC)    Hx of Chronic ulcer of left foot (HCC)    HLD (hyperlipidemia)    Fracture of right tibia and fibula    Abnormality of gait and mobility due to Impaired Mobility secondary to Flare up of OA. [facility] admit 4/04/19.     Uncontrolled type 2 diabetes mellitus with hyperglycemia (HCC)    Nocturnal hypoxemia due to obesity    Nocturnal hypoxia    Sleep apnea    Open wound of right buttock    Buttock wound        FAMILY HISTORY    Family History   Problem Relation Age of Onset    Cancer Father        SOCIAL HISTORY    Social History     Tobacco Use    Smoking status: Never Smoker    Smokeless tobacco: Never Used   Vaping Use    Vaping Use: Never used   Substance Use Topics    Alcohol use: No    Drug use: No       ALLERGIES    Allergies   Allergen Reactions    Nitrofurantoin Other (See Comments)     Leg swelling     Tizanidine      Other reaction(s): Mental Status Change    Sulfa Antibiotics Rash       MEDICATIONS    Current Outpatient Medications on File Prior to Encounter   Medication Sig Dispense Refill    oxyCODONE HCl (OXY-IR) 10 MG immediate release tablet Take 10 mg by mouth every 6 hours as needed for Pain.       Capsaicin (ZOSTRIX) 0.035 % CREA cream Apply topically 3 times daily      furosemide (LASIX) 40 MG tablet Take 40 mg by mouth 2 times daily 1.5 tablet by mouth as directed; 1 tablet in AM and 1/2 tablet at NOON      oxybutynin (DITROPAN-XL) 5 MG extended release tablet Take 5 mg by mouth daily      traZODone (DESYREL) 150 MG tablet Take 150 mg by mouth nightly      metoprolol (LOPRESSOR) 100 MG tablet Take 100 mg by mouth daily      atorvastatin (LIPITOR) 10 MG tablet Take 10 mg by mouth daily      DULoxetine (CYMBALTA) 60 MG extended release capsule Take 60 mg by mouth daily      polyethylene glycol (GLYCOLAX) 17 g packet Take 17 g by mouth every other day      Cholecalciferol (VITAMIN D3) 50 MCG (2000 UT) CAPS Take by mouth daily      Menthol-Methyl Salicylate (MUSCLE RUB) 10-15 % CREA cream Apply topically 2 times daily 1 application apply on the skin twice a day; arms and shoulders      insulin aspart (NOVOLOG FLEXPEN) 100 UNIT/ML injection pen Inject 10 Units into the skin 3 times daily (before meals) 5 pen 3    coenzyme Q10 100 MG CAPS capsule Take 1 capsule by mouth 2 times daily (before meals) 60 capsule 1    insulin glargine (TOUJEO SOLOSTAR) 300 UNIT/ML injection pen Inject 25 Units into the skin nightly 3 pen 3    insulin lispro (HUMALOG KWIKPEN) 100 UNIT/ML pen Inject 10 Units into the skin 3 times daily (before meals) 5 pen 3    Insulin Pen Needle (NOVOFINE) 32G X 6 MM MISC 1 Device by Does not apply route 4 times daily (before meals and nightly) 300 each 3    valsartan-hydrochlorothiazide (DIOVAN-HCT) 320-25 MG per tablet Take 1 tablet by mouth daily      Lactobacillus (PROBIOTIC ACIDOPHILUS) TABS Take 1 tablet by mouth daily 60 tablet 0    metoprolol succinate (TOPROL XL) 100 MG extended release tablet Take 100 mg by mouth daily      Multiple Vitamins-Minerals (CENTRUM SILVER PO) Take 1 tablet by mouth daily      levothyroxine (SYNTHROID) 125 MCG tablet Take 125 mcg by mouth Daily      metFORMIN (GLUCOPHAGE) 1000 MG tablet Take 500 mg by mouth 2 times daily (with meals)        No current facility-administered medications on file prior to encounter. REVIEW OF SYSTEMS    Pertinent items are noted in HPI. Objective:      BP (!) 145/67   Pulse 56   Temp 96.2 °F (35.7 °C) (Temporal)   Resp 18   LMP  (LMP Unknown)     Wt Readings from Last 3 Encounters:   09/24/20 240 lb (108.9 kg)   05/09/20 236 lb (107 kg)   04/04/19 275 lb (124.7 kg)       PHYSICAL EXAM    Constitutional:   Well nourished and well developed. Appears neat and clean. Patient is alert, oriented x3, and in no apparent distress. Respiratory:  Respiratory effort is easy and symmetric bilaterally. Rate is normal at rest and on room air. Vascular:  Capillary refill is <3 sec to digits bilateral.  Extremities negative for pitting edema. Neurological:   Sensation is intact to lower extremities. Dermatological:  Wound description noted in wound assessment. The wound to the right buttock is pale pink, slight covering of slough. Will debride. Debby wound skin dry, some peeling of epidermis, no excoriation of denuded areas. Skin between buttocks is slightly erythematous, excoriated, recommended to , to not place dressing over that area, and keep barrier cream applied to any areas of excoriation. Psychiatric:  Judgement and insight intact. Short and long term memory intact. No evidence of depression, anxiety, or agitation. Patient is calm, cooperative, and communicative. Appropriate interactions and affect.         Assessment: Problem List Items Addressed This Visit     Open wound of right buttock - Primary    Relevant Orders    Initiate Outpatient Wound Care Protocol           Procedure Note  Indications:  Based on my examination of this patient's wound(s)/ulcer(s) today, debridement is required to promote healing and evaluate the wound base. Performed by: MARCO Georges NP    Consent obtained:  Yes    Time out taken:  Yes    Pain Control:         Debridement:Excisional Debridement    Using curette the wound(s)/ulcer(s) was/were sharply debrided down through and including the removal of epidermis, dermis and subcutaneous tissue. Devitalized Tissue Debrided:  fibrin, biofilm and slough    Pre Debridement Measurements:  Are located in the [place]  Documentation Flow Sheet    Wound/Ulcer #: 1, right buttock     Post Debridement Measurements:  Wound/Ulcer Descriptions are Pre Debridement except measurements:    Wound 02/24/22 Buttocks Right #1 (Active)   Wound Image   03/17/22 1010   Wound Etiology Pressure Stage  2 03/17/22 1010   Wound Cleansed Cleansed with saline 03/17/22 1010   Dressing/Treatment Honey gel/honey paste;Silicone border 11/41/08 1017   Wound Length (cm) 0.5 cm 03/17/22 1010   Wound Width (cm) 0.5 cm 03/17/22 1010   Wound Depth (cm) 0.1 cm 03/17/22 1010   Wound Surface Area (cm^2) 0.25 cm^2 03/17/22 1010   Change in Wound Size % (l*w) 64.29 03/17/22 1010   Wound Volume (cm^3) 0.025 cm^3 03/17/22 1010   Wound Healing % 64 03/17/22 1010   Post-Procedure Length (cm) 0.5 cm 03/17/22 1042   Post-Procedure Width (cm) 0.5 cm 03/17/22 1042   Post-Procedure Depth (cm) 0.1 cm 03/17/22 1042   Post-Procedure Surface Area (cm^2) 0.25 cm^2 03/17/22 1042   Post-Procedure Volume (cm^3) 0.025 cm^3 03/17/22 1042   Wound Assessment [place]/red;Slough 03/17/22 1010   Drainage Amount Scant 03/17/22 1010   Drainage Description Yellow 03/17/22 1010   Odor None 03/17/22 1010   Debby-wound Assessment Fragile; Intact 03/17/22 1010 Margins Defined edges 03/17/22 1010   Wound Thickness Description not for Pressure Injury Full thickness 03/17/22 1010   Number of days: 21            Percent of Wound/Ulcer Debrided: 100%    Total Surface Area Debrided:  0.25 sq cm     Diabetic/Pressure/Non Pressure Ulcers:  Ulcer: N/A      Bleeding:  Minimal    Hemostasis Achieved:  not needed    Procedural Pain:  0  / 10     Post Procedural Pain:  0 / 10     Response to treatment:  Well tolerated by patient. Plan:     Use liquid skin prep to kinza wound, allow to thoroughly dry before applying dressing to wound. Use medipore tape and smaller dressing, do not apply tape between the buttocks. After dressing wound then apply calmoseptine to excoriated areas between bilateral buttocks-may apply prn. Treatment Note please see attached Discharge Instructions    Written patient dismissal instructions given to patient and signed by patient or POA. Discharge [address] and [facility]   Physician Orders and Discharge Instructions  [address]  Telephone: [phone]        NAME:  Waynetta Denver                                                                                       YOB: 1950  MEDICAL RECORD NUMBER:  [id number]     Your  is:  Elvin Louie     Home Care/Facility: None     Wound Location: Right Buttock     Dressing orders:. 1. Cleanse wound(s) with normal saline. Apply Skin Prep to skin surrounding wound              2. Apply Plurogel to wound Bed  3. Cover with dry dressing  4.  Change daily                                                                                                                               Compression: None     Offloading Device:     Other Instructions: Apply protective cream/ointment to buttocks crease ( Calmoseptine, Desitin) daily      Dressing Supplies:  Your dressing supplies will be ordered and delivered to your home. If you do not receive them in a timely manner please call the wound center.       Keep all dressings clean, dry and intact. Keep pressure off the wound(s) at all times.      Follow up visit  2  Weeks March 31, 2022 @      Please give 24 hour notice if unable to keep appointment. [phone]     If you experience any of the following, please call the Wound Care Service at  [phone] or go to the nearest emergency room. *Increase in pain         *Temperature over 101           *Increase in drainage from your wound or a foul odor  *Uncontrolled swelling            *Need for compression bandage changes due to slippage, breakthrough drainage       PLEASE NOTE: IF YOU ARE UNABLE TO OBTAIN WOUND SUPPLIES, CONTINUE TO USE THE SUPPLIES YOU HAVE AVAILABLE UNTIL YOU ARE ABLE TO REACH US.  IT IS MOST IMPORTANT TO KEEP THE WOUND COVERED AT ALL TIMES                       Electronically signed by MARCO Peguero NP on 3/17/2022 at 10:49 AM

## 2022-03-31 ENCOUNTER — HOSPITAL ENCOUNTER (OUTPATIENT)
Dept: WOUND CARE | Age: 72
Discharge: HOME | End: 2022-03-31
Payer: COMMERCIAL

## 2022-03-31 VITALS
DIASTOLIC BLOOD PRESSURE: 58 MMHG | TEMPERATURE: 96.3 F | SYSTOLIC BLOOD PRESSURE: 128 MMHG | RESPIRATION RATE: 18 BRPM | HEART RATE: 61 BPM

## 2022-03-31 DIAGNOSIS — S31.819A OPEN WOUND OF RIGHT BUTTOCK, INITIAL ENCOUNTER: Primary | ICD-10-CM

## 2022-03-31 PROCEDURE — 97597 DBRDMT OPN WND 1ST 20 CM/<: CPT

## 2022-03-31 PROCEDURE — 97597 DBRDMT OPN WND 1ST 20 CM/<: CPT | Performed by: NURSE PRACTITIONER

## 2022-03-31 RX ORDER — AMMONIUM LACTATE 12 G/100G
LOTION TOPICAL
Qty: 225 G | Refills: 1 | Status: SHIPPED | OUTPATIENT
Start: 2022-03-31

## 2022-03-31 RX ORDER — BETAMETHASONE DIPROPIONATE 0.05 %
OINTMENT (GRAM) TOPICAL ONCE
Status: CANCELLED | OUTPATIENT
Start: 2022-03-31 | End: 2022-03-31

## 2022-03-31 RX ORDER — LIDOCAINE 40 MG/G
CREAM TOPICAL ONCE
Status: CANCELLED | OUTPATIENT
Start: 2022-03-31 | End: 2022-03-31

## 2022-03-31 RX ORDER — CLOBETASOL PROPIONATE 0.5 MG/G
OINTMENT TOPICAL ONCE
Status: CANCELLED | OUTPATIENT
Start: 2022-03-31 | End: 2022-03-31

## 2022-03-31 RX ORDER — GINSENG 100 MG
CAPSULE ORAL ONCE
Status: CANCELLED | OUTPATIENT
Start: 2022-03-31 | End: 2022-03-31

## 2022-03-31 RX ORDER — BACITRACIN, NEOMYCIN, POLYMYXIN B 400; 3.5; 5 [USP'U]/G; MG/G; [USP'U]/G
OINTMENT TOPICAL ONCE
Status: CANCELLED | OUTPATIENT
Start: 2022-03-31 | End: 2022-03-31

## 2022-03-31 RX ORDER — LIDOCAINE HYDROCHLORIDE 20 MG/ML
JELLY TOPICAL ONCE
Status: CANCELLED | OUTPATIENT
Start: 2022-03-31 | End: 2022-03-31

## 2022-03-31 RX ORDER — GENTAMICIN SULFATE 1 MG/G
OINTMENT TOPICAL ONCE
Status: CANCELLED | OUTPATIENT
Start: 2022-03-31 | End: 2022-03-31

## 2022-03-31 RX ORDER — LIDOCAINE 50 MG/G
OINTMENT TOPICAL ONCE
Status: CANCELLED | OUTPATIENT
Start: 2022-03-31 | End: 2022-03-31

## 2022-03-31 RX ORDER — BACITRACIN ZINC AND POLYMYXIN B SULFATE 500; 1000 [USP'U]/G; [USP'U]/G
OINTMENT TOPICAL ONCE
Status: CANCELLED | OUTPATIENT
Start: 2022-03-31 | End: 2022-03-31

## 2022-03-31 RX ORDER — LIDOCAINE HYDROCHLORIDE 40 MG/ML
SOLUTION TOPICAL ONCE
Status: CANCELLED | OUTPATIENT
Start: 2022-03-31 | End: 2022-03-31

## 2022-03-31 ASSESSMENT — PAIN DESCRIPTION - LOCATION: LOCATION: BUTTOCKS

## 2022-03-31 ASSESSMENT — PAIN DESCRIPTION - ONSET: ONSET: ON-GOING

## 2022-03-31 ASSESSMENT — PAIN DESCRIPTION - FREQUENCY: FREQUENCY: CONTINUOUS

## 2022-03-31 ASSESSMENT — PAIN DESCRIPTION - PAIN TYPE: TYPE: CHRONIC PAIN

## 2022-03-31 ASSESSMENT — PAIN SCALES - GENERAL: PAINLEVEL_OUTOF10: 5

## 2022-03-31 ASSESSMENT — PAIN - FUNCTIONAL ASSESSMENT: PAIN_FUNCTIONAL_ASSESSMENT: PREVENTS OR INTERFERES SOME ACTIVE ACTIVITIES AND ADLS

## 2022-03-31 ASSESSMENT — PAIN DESCRIPTION - DESCRIPTORS: DESCRIPTORS: THROBBING

## 2022-03-31 ASSESSMENT — PAIN DESCRIPTION - PROGRESSION: CLINICAL_PROGRESSION: NOT CHANGED

## 2022-03-31 NOTE — PROGRESS NOTES
[address]                                                   Progress Note and Procedure Note      Radha Pressley  MEDICAL RECORD NUMBER:  [id number]  AGE: 70 y.o. GENDER: female  : 1950  EPISODE DATE:  3/31/2022    Subjective:     Chief Complaint   Patient presents with    Wound Check         HISTORY of PRESENT ILLNESS HPI    Vivek Blas is a 70 y. o. female who presents today for wound/ulcer evaluation. History of Wound Context: Patient recently underwent a spinal fusion and now requires physical therapy post op. PT wants to utilize the therapy pool for treatment, however, the patient has an new onset open wound to the right buttock so therapy will not allow her to use the pool until the wound is healed. Per patient, , patient has had limited movement since pre-op and developed an open wound to left buttock. Wound was slough covered, full thickness - today clean, pale/pink, partially covered with non-adherent dried skin to kinza wound. Discussed with  need to prevent skin breakdown by using additional products such as liquid skin prep and calmoseptine between buttocks.  reports one open area from \"where the tape took skin off\" yet, acknowledges \"I'm not using the skin prep any more it didn't help\". Re-instructed the skin prep is to prevent such occurrence. Also complains of right lower extremity wound/rash. Bilateral lower extremities with hemosiderin staining, patient denies current edema, slight venous dermatitis to right anterior mid leg. States her legs only swelled up once, and it hasn't recurred. When attempting to educate patient on venous insufficiency,  Patient states \" no that's not it because-it's not on my upper thigh-see it's normal\". Indicating she feels the hemosiderin staining is from her \"tan\". Will continue to educate during future appointments, does not appear to be receptive at this time. Patient has IDDM, last hgA1C is 7.0.    Wound/Ulcer Pain Timing/Severity: intermittent  Quality of pain: burning, tender  Severity:  3 / 10   Modifying Factors: Pain worsens with care and subsides with rest.   Associated Signs/Symptoms: drainage and pain     Ulcer Identification:  Ulcer Type: pressure and traumatic  Contributing Factors: diabetes, poor glucose control, decreased mobility, shear force and obesity     Wound: N/A        PAST MEDICAL HISTORY        Diagnosis Date    Anxiety 4/4/2019    Arthritis     Bronchitis     Diabetes mellitus ([address]     Diabetic neuropathy associated with diabetes mellitus due to underlying condition ([address] 11/3/2017    Essential hypertension 4/4/2019    Hypercholesterolemia     Hypertension     Morbid obesity due to excess calories ([address] 4/4/2019    Thyroid disease     Wound infection 2018    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 without evidence of necrosis (CODE)    Diabetic neuropathy associated with diabetes mellitus due to underlying condition ([address]    Abscess of foot including toes, left    Unable to ambulate    Ataxia    Acute renal failure (ARF) ([address]    Hypothyroid    Morbid obesity due to excess calories ([address]    Essential hypertension    Benzodiazepine dependence ([address]    Anxiety    Bronchitis    OA (osteoarthritis)    Acute on chronic renal failure (HCC)    Hx of Chronic ulcer of left foot ([address]    HLD (hyperlipidemia)    Fracture of right tibia and fibula    Abnormality of gait and mobility due to Impaired Mobility secondary to Flare up of OA. [facility] admit 4/04/19.  Uncontrolled type 2 diabetes mellitus with hyperglycemia (HCC)    Nocturnal hypoxemia due to obesity    Nocturnal hypoxia    Sleep apnea    Open wound of right buttock    Buttock wound        FAMILY HISTORY    Family History   Problem Relation Age of Onset    Cancer Father        SOCIAL HISTORY    Social History     Tobacco Use    Smoking status: Never Smoker    Smokeless tobacco: Never Used   Vaping Use    Vaping Use: Never used   Substance Use Topics    Alcohol use: No    Drug use: No       ALLERGIES    Allergies   Allergen Reactions    Nitrofurantoin Other (See Comments)     Leg swelling     Tizanidine      Other reaction(s): Mental Status Change    Sulfa Antibiotics Rash       MEDICATIONS    Current Outpatient Medications on File Prior to Encounter   Medication Sig Dispense Refill    ammonium lactate (LAC-HYDRIN) 12 % lotion Apply topically daily after cleansing legs-with mild soap and water. Apply to intact areas of skin only. 225 g 1    oxyCODONE HCl (OXY-IR) 10 MG immediate release tablet Take 10 mg by mouth every 6 hours as needed for Pain.       Capsaicin (ZOSTRIX) 0.035 % CREA cream Apply topically 3 times daily      furosemide (LASIX) 40 MG tablet Take 40 mg by mouth 2 times daily 1.5 tablet by mouth as directed; 1 tablet in AM and 1/2 tablet at NOON      oxybutynin (DITROPAN-XL) 5 MG extended release tablet Take 5 mg by mouth daily      traZODone (DESYREL) 150 MG tablet Take 150 mg by mouth nightly      metoprolol (LOPRESSOR) 100 MG tablet Take 100 mg by mouth daily      atorvastatin (LIPITOR) 10 MG tablet Take 10 mg by mouth daily      DULoxetine (CYMBALTA) 60 MG extended release capsule Take 60 mg by mouth daily      polyethylene glycol (GLYCOLAX) 17 g packet Take 17 g by mouth every other day      Cholecalciferol (VITAMIN D3) 50 MCG (2000 UT) CAPS Take by mouth daily      Menthol-Methyl Salicylate (MUSCLE RUB) 10-15 % CREA cream Apply topically 2 times daily 1 application apply on the skin twice a day; arms and shoulders      insulin aspart (NOVOLOG FLEXPEN) 100 UNIT/ML injection pen Inject 10 Units into the skin 3 times daily (before meals) 5 pen 3    coenzyme Q10 100 MG CAPS capsule Take 1 capsule by mouth 2 times daily (before meals) 60 capsule 1    insulin glargine (TOUJEO SOLOSTAR) 300 UNIT/ML injection pen Inject 25 Units into the skin nightly 3 pen 3    insulin lispro (HUMALOG KWIKPEN) 100 UNIT/ML pen Inject 10 Units into the skin 3 times daily (before meals) 5 pen 3    Insulin Pen Needle (NOVOFINE) 32G X 6 MM MISC 1 Device by Does not apply route 4 times daily (before meals and nightly) 300 each 3    valsartan-hydrochlorothiazide (DIOVAN-HCT) 320-25 MG per tablet Take 1 tablet by mouth daily      Lactobacillus (PROBIOTIC ACIDOPHILUS) TABS Take 1 tablet by mouth daily 60 tablet 0    metoprolol succinate (TOPROL XL) 100 MG extended release tablet Take 100 mg by mouth daily      Multiple Vitamins-Minerals (CENTRUM SILVER PO) Take 1 tablet by mouth daily      levothyroxine (SYNTHROID) 125 MCG tablet Take 125 mcg by mouth Daily      metFORMIN (GLUCOPHAGE) 1000 MG tablet Take 500 mg by mouth 2 times daily (with meals)        No current facility-administered medications on file prior to encounter. REVIEW OF SYSTEMS    Pertinent items are noted in HPI. Objective:      BP (!) 128/58   Pulse 61   Temp 96.3 °F (35.7 °C) (Temporal)   Resp 18   LMP  (LMP Unknown)     Wt Readings from Last 3 Encounters:   09/24/20 240 lb (108.9 kg)   05/09/20 236 lb (107 kg)   04/04/19 275 lb (124.7 kg)       PHYSICAL EXAM    Constitutional:   Well nourished and well developed. Appears neat and clean. Patient is alert, oriented x3, and in no apparent distress.      Respiratory:  Respiratory effort is easy and symmetric bilaterally. Rate is normal at rest and on room air. Vascular:  Pedal Pulses is palpable and audible with doppler. Capillary refill is <3 sec to digits bilateral.  Extremities negative for pitting edema. Neurological:   Sensation is intact to lower extremities. Dermatological:  Wound description noted in wound assessment. The wound remains open, pale, with surrounding dried moisture associated skin damage areas. Bilateral lower legs are dried with right anterior mid leg with venous dermatitis type excoriation. Psychiatric:  Judgement and insight intact. Short and long term memory intact. No evidence of depression, anxiety, or agitation. Patient is calm, cooperative, and communicative. Appropriate interactions and affect. Assessment:      Problem List Items Addressed This Visit     Open wound of right buttock - Primary    Relevant Orders    Initiate Outpatient Wound Care Protocol           Procedure Note  Indications:  Based on my examination of this patient's wound(s)/ulcer(s) today, debridement is required to allow the wound base to be exposed sufficiently to apply the pluragel and prevent the dried skin areas from being torn off during dressing changes. .    Performed by: MARCO Ryder NP    Consent obtained:  Yes    Time out taken:  Yes    Pain Control:   lidocaine 4% cream topical       Debridement:Excisional Debridement    Using scissors and forceps the wound(s)/ulcer(s) was/were sharply debrided down through and including the removal of epidermis and dermis, dried and non adherent.         Devitalized Tissue Debrided:  non adherent dried epidermis/dermis     Pre Debridement Measurements:  Are located in the [facility]  Documentation Flow Sheet    Wound/Ulcer #: 1    Post Debridement Measurements:  Wound/Ulcer Descriptions are Pre Debridement except measurements:    Wound 02/24/22 Buttocks Right #1 (Active)   Wound Image   03/31/22 1034   Wound Etiology Pressure Stage  2 03/31/22 1034   Wound Cleansed Cleansed with saline 03/31/22 1034   Dressing/Treatment Honey gel/honey paste;Silicone border 64/04/36 1017   Wound Length (cm) 1.5 cm 03/31/22 1034   Wound Width (cm) 0.5 cm 03/31/22 1034   Wound Depth (cm) 0.1 cm 03/31/22 1034   Wound Surface Area (cm^2) 0.75 cm^2 03/31/22 1034   Change in Wound Size % (l*w) -7.14 03/31/22 1034   Wound Volume (cm^3) 0.075 cm^3 03/31/22 1034   Wound Healing % -7 03/31/22 1034   Post-Procedure Length (cm) 1.5 cm 03/31/22 1119   Post-Procedure Width (cm) 0.5 cm 03/31/22 1119   Post-Procedure Depth (cm) 0.1 cm 03/31/22 1119   Post-Procedure Surface Area (cm^2) 0.75 cm^2 03/31/22 1119   Post-Procedure Volume (cm^3) 0.075 cm^3 03/31/22 1119   Wound Assessment [place]/red;Slough 03/31/22 1034   Drainage Amount Scant 03/31/22 1034   Drainage Description Yellow 03/31/22 1034   Odor None 03/31/22 1034   Debby-wound Assessment Dry/flaky;Fragile 03/31/22 1034   Margins Defined edges 03/31/22 1034   Wound Thickness Description not for Pressure Injury Full thickness 03/31/22 1034   Number of days: 35            Percent of Wound/Ulcer Debrided: 50%    Total Surface Area Debrided:  0.375 sq cm     Diabetic/Pressure/Non Pressure Ulcers:  Ulcer:   Moisture associated skin damage, and shear     Bleeding:  None    Hemostasis Achieved:  not needed    Procedural Pain:  2  / 10     Post Procedural Pain:  1 / 10     Response to treatment:  With complaints of pain. Only small amount of non-adherent dried dermis/epidermis removed to prevent inadvertent tearing during dressing changes- reports pain to area both before, during and after debridement. Plan:     Continue with plan of care as below. Try to stay off buttocks frequently changing position during the day. Treatment Note please see attached Discharge Instructions    Written patient dismissal instructions given to patient and signed by patient or POA.          Discharge [address] [facility]   Physician Orders and Discharge [address], [address] [place] [address]  Telephone: [phone]      [phone]        NAME: Danni Natarajan  DATE OF BIRTH:  1950  MEDICAL RECORD NUMBER:  [id number]     Your  is: Jessica Wall     Home Care/Facility: None     Wound Location: Right Buttock     Dressing orders:. 1. Cleanse wound(s) with normal saline. Apply Skin Prep  to skin surrounding wound  - let dry (30sec - 1 min)         2. Apply Plurogel to wound Bed  3. Cover with dry dressing  4. Change daily                                                                                                                               Compression:      Offloading Device:     Other Instructions: Apply protective cream/ointment to buttocks crease ( Calmoseptine, Desitin) daily. Increase Fruit/Veg intake     Dressing Supplies:  Your dressing supplies will be ordered and delivered to your home.  If you do not receive them in a timely manner please call the wound center.       Keep all dressings clean, dry and intact.  Keep pressure off the wound(s) at all times.      Follow up visit  2  Weeks April 14, 2022 @      Please give 24 hour notice if unable to keep appointment. [phone]     If you experience any of the following, please call the Wound Care Service at  [phone] or go to the nearest emergency room.        *Increase in pain         *Temperature over 101           *Increase in drainage from your wound or a foul odor  *Uncontrolled swelling            *Need for compression bandage changes due to slippage, breakthrough drainage       PLEASE NOTE: IF YOU ARE UNABLE TO OBTAIN WOUND SUPPLIES, CONTINUE TO USE THE SUPPLIES YOU HAVE AVAILABLE UNTIL YOU ARE ABLE TO [facility].  IT IS MOST IMPORTANT TO KEEP THE WOUND COVERED AT ALL TIMES                Electronically signed by MARCO Hutchinson NP on 3/31/2022 at 1:32 PM

## 2022-04-07 ENCOUNTER — HOSPITAL ENCOUNTER (OUTPATIENT)
Dept: WOUND CARE | Age: 72
Discharge: HOME | End: 2022-04-07
Payer: COMMERCIAL

## 2022-04-07 VITALS
DIASTOLIC BLOOD PRESSURE: 62 MMHG | TEMPERATURE: 97 F | RESPIRATION RATE: 18 BRPM | SYSTOLIC BLOOD PRESSURE: 148 MMHG | HEART RATE: 62 BPM

## 2022-04-07 DIAGNOSIS — I87.2 VENOUS INSUFFICIENCY OF BOTH LOWER EXTREMITIES: ICD-10-CM

## 2022-04-07 DIAGNOSIS — S31.819A OPEN WOUND OF RIGHT BUTTOCK, INITIAL ENCOUNTER: Primary | ICD-10-CM

## 2022-04-07 PROCEDURE — 99213 OFFICE O/P EST LOW 20 MIN: CPT

## 2022-04-07 PROCEDURE — 6370000000 HC RX 637 (ALT 250 FOR IP): Performed by: NURSE PRACTITIONER

## 2022-04-07 PROCEDURE — 99213 OFFICE O/P EST LOW 20 MIN: CPT | Performed by: NURSE PRACTITIONER

## 2022-04-07 RX ORDER — LIDOCAINE 40 MG/G
CREAM TOPICAL ONCE
Status: CANCELLED | OUTPATIENT
Start: 2022-04-07 | End: 2022-04-07

## 2022-04-07 RX ORDER — CLOBETASOL PROPIONATE 0.5 MG/G
OINTMENT TOPICAL ONCE
Status: CANCELLED | OUTPATIENT
Start: 2022-04-07 | End: 2022-04-07

## 2022-04-07 RX ORDER — BETAMETHASONE DIPROPIONATE 0.05 %
OINTMENT (GRAM) TOPICAL ONCE
Status: CANCELLED | OUTPATIENT
Start: 2022-04-07 | End: 2022-04-07

## 2022-04-07 RX ORDER — BACITRACIN ZINC AND POLYMYXIN B SULFATE 500; 1000 [USP'U]/G; [USP'U]/G
OINTMENT TOPICAL ONCE
Status: CANCELLED | OUTPATIENT
Start: 2022-04-07 | End: 2022-04-07

## 2022-04-07 RX ORDER — LIDOCAINE HYDROCHLORIDE 20 MG/ML
JELLY TOPICAL ONCE
Status: CANCELLED | OUTPATIENT
Start: 2022-04-07 | End: 2022-04-07

## 2022-04-07 RX ORDER — GINSENG 100 MG
CAPSULE ORAL ONCE
Status: CANCELLED | OUTPATIENT
Start: 2022-04-07 | End: 2022-04-07

## 2022-04-07 RX ORDER — LIDOCAINE HYDROCHLORIDE 40 MG/ML
SOLUTION TOPICAL ONCE
Status: CANCELLED | OUTPATIENT
Start: 2022-04-07 | End: 2022-04-07

## 2022-04-07 RX ORDER — GENTAMICIN SULFATE 1 MG/G
OINTMENT TOPICAL ONCE
Status: CANCELLED | OUTPATIENT
Start: 2022-04-07 | End: 2022-04-07

## 2022-04-07 RX ORDER — BACITRACIN, NEOMYCIN, POLYMYXIN B 400; 3.5; 5 [USP'U]/G; MG/G; [USP'U]/G
OINTMENT TOPICAL ONCE
Status: CANCELLED | OUTPATIENT
Start: 2022-04-07 | End: 2022-04-07

## 2022-04-07 RX ORDER — LIDOCAINE 50 MG/G
OINTMENT TOPICAL ONCE
Status: CANCELLED | OUTPATIENT
Start: 2022-04-07 | End: 2022-04-07

## 2022-04-07 RX ADMIN — MUPIROCIN: 20 OINTMENT TOPICAL at 11:42

## 2022-04-07 ASSESSMENT — PAIN SCALES - GENERAL: PAINLEVEL_OUTOF10: 6

## 2022-04-07 ASSESSMENT — PAIN DESCRIPTION - DESCRIPTORS: DESCRIPTORS: THROBBING

## 2022-04-07 ASSESSMENT — PAIN DESCRIPTION - LOCATION: LOCATION: BUTTOCKS

## 2022-04-07 ASSESSMENT — PAIN DESCRIPTION - FREQUENCY: FREQUENCY: CONTINUOUS

## 2022-04-07 ASSESSMENT — PAIN DESCRIPTION - PAIN TYPE: TYPE: ACUTE PAIN

## 2022-04-07 ASSESSMENT — PAIN DESCRIPTION - ORIENTATION: ORIENTATION: RIGHT;LEFT

## 2022-04-07 ASSESSMENT — PAIN DESCRIPTION - ONSET: ONSET: ON-GOING

## 2022-04-07 ASSESSMENT — PAIN DESCRIPTION - PROGRESSION: CLINICAL_PROGRESSION: NOT CHANGED

## 2022-04-07 NOTE — PROGRESS NOTES
[address]   Progress Note and Procedure Note      Chhaya Rivera  MEDICAL RECORD NUMBER:  [id number]  AGE: 70 y.o. GENDER: female  : 1950  EPISODE DATE:  2022    Subjective:     Chief Complaint   Patient presents with    Wound Check     buttock wounds recheck         HISTORY of PRESENT ILLNESS HPI     Chhaya Rivera is a 70 y.o. female who presents today for wound/ulcer evaluation. History of Wound Context: returns with new buttock wound. Patient reports she obtained new specialty mattress as recommended, however, is not in bed, instead stays in recliner chair all day that has a lift function. She rarely ambulates and is in a wheelchair, reporting P.T. hurts. Discussed importance of staying off the wounds throughout the day, and need to re-position, and lie down intermittently.  states she never does. Advised to obtain gel specialty seating surface that needs to move with the patient to w/c, car and when in recliner, instructed to recline when in the chair and use the gel cushion at all times. Wound/Ulcer Pain Timing/Severity: intermittent  Quality of pain: tender  Severity:  5 / 10   Modifying Factors: Pain worsens with care, repositioning, or any movement.    Associated Signs/Symptoms: pain    Ulcer Identification:  Ulcer Type: pressure and traumatic  Contributing Factors: chronic pressure, decreased mobility, shear force and obesity    Wound: N/A        PAST MEDICAL HISTORY        Diagnosis Date    Anxiety 2019    Arthritis     Bronchitis     Diabetes mellitus ([address]     Diabetic neuropathy associated with diabetes mellitus due to underlying condition ([address] 11/3/2017    Essential hypertension 2019    Hypercholesterolemia     Hypertension     Morbid obesity due to excess calories ([address] 2019    Thyroid disease     Wound infection 2018    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 without evidence of necrosis        Problem List:    Patient Active Problem List   Diagnosis    Diabetes mellitus with diabetic peripheral angiopathy without gangrene, without long-term current use of insulin ([address]    Peripheral angiopathy (HCC)    Non-healing surgical wound    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 without evidence of necrosis (CODE)    Diabetic neuropathy associated with diabetes mellitus due to underlying condition ([address]    Abscess of foot including toes, left    Unable to ambulate    Ataxia    Acute renal failure (ARF) ([address]    Hypothyroid    Morbid obesity due to excess calories ([address]    Essential hypertension    Benzodiazepine dependence ([address]    Anxiety    Bronchitis    OA (osteoarthritis)    Acute on chronic renal failure (HCC)    Hx of Chronic ulcer of left foot (HCC)    HLD (hyperlipidemia)    Fracture of right tibia and fibula    Abnormality of gait and mobility due to Impaired Mobility secondary to Flare up of OA. [facility] admit 4/04/19.  Uncontrolled type 2 diabetes mellitus with hyperglycemia (HCC)    Nocturnal hypoxemia due to obesity    Nocturnal hypoxia    Sleep apnea    Open wound of right buttock    Buttock wound      Problem List Items Addressed This Visit     Open wound of right buttock - Primary    Relevant Medications    mupirocin (BACTROBAN) 2 % ointment    Other Relevant Orders    Initiate Outpatient Wound Care Protocol           PAST SURGICAL HISTORY    Past Surgical History:   Procedure Laterality Date    APPENDECTOMY      HERNIA REPAIR      ORTHOPEDIC SURGERY Left 2017    broken left foot. surgeries x3    TONSILLECTOMY N/A        FAMILY HISTORY    Family History   Problem Relation Age of Onset    Cancer Father        SOCIAL HISTORY    Social History     Tobacco Use    Smoking status: Never Smoker    Smokeless tobacco: Never Used   Vaping Use    Vaping Use: Never used   Substance Use Topics    Alcohol use: No    Drug use:  No ALLERGIES    Allergies   Allergen Reactions    Nitrofurantoin Other (See Comments)     Leg swelling     Tizanidine      Other reaction(s): Mental Status Change    Sulfa Antibiotics Rash       MEDICATIONS    Current Outpatient Medications on File Prior to Encounter   Medication Sig Dispense Refill    ammonium lactate (LAC-HYDRIN) 12 % lotion Apply topically daily to both lower legs, after washing with mild soap/water. Apply while still moist. 225 g 1    ammonium lactate (LAC-HYDRIN) 12 % lotion Apply topically daily after cleansing legs-with mild soap and water. Apply to intact areas of skin only. 225 g 1    oxyCODONE HCl (OXY-IR) 10 MG immediate release tablet Take 10 mg by mouth every 6 hours as needed for Pain.       Capsaicin (ZOSTRIX) 0.035 % CREA cream Apply topically 3 times daily      furosemide (LASIX) 40 MG tablet Take 40 mg by mouth 2 times daily 1.5 tablet by mouth as directed; 1 tablet in AM and 1/2 tablet at NOON      oxybutynin (DITROPAN-XL) 5 MG extended release tablet Take 5 mg by mouth daily      traZODone (DESYREL) 150 MG tablet Take 150 mg by mouth nightly      metoprolol (LOPRESSOR) 100 MG tablet Take 100 mg by mouth daily      atorvastatin (LIPITOR) 10 MG tablet Take 10 mg by mouth daily      DULoxetine (CYMBALTA) 60 MG extended release capsule Take 60 mg by mouth daily      polyethylene glycol (GLYCOLAX) 17 g packet Take 17 g by mouth every other day      Cholecalciferol (VITAMIN D3) 50 MCG (2000 UT) CAPS Take by mouth daily      Menthol-Methyl Salicylate (MUSCLE RUB) 10-15 % CREA cream Apply topically 2 times daily 1 application apply on the skin twice a day; arms and shoulders      insulin aspart (NOVOLOG FLEXPEN) 100 UNIT/ML injection pen Inject 10 Units into the skin 3 times daily (before meals) 5 pen 3    coenzyme Q10 100 MG CAPS capsule Take 1 capsule by mouth 2 times daily (before meals) 60 capsule 1    insulin glargine (TOUJEO SOLOSTAR) 300 UNIT/ML injection pen Inject 25 Units into the skin nightly 3 pen 3    insulin lispro (HUMALOG KWIKPEN) 100 UNIT/ML pen Inject 10 Units into the skin 3 times daily (before meals) 5 pen 3    Insulin Pen Needle (NOVOFINE) 32G X 6 MM MISC 1 Device by Does not apply route 4 times daily (before meals and nightly) 300 each 3    valsartan-hydrochlorothiazide (DIOVAN-HCT) 320-25 MG per tablet Take 1 tablet by mouth daily      Lactobacillus (PROBIOTIC ACIDOPHILUS) TABS Take 1 tablet by mouth daily 60 tablet 0    metoprolol succinate (TOPROL XL) 100 MG extended release tablet Take 100 mg by mouth daily      Multiple Vitamins-Minerals (CENTRUM SILVER PO) Take 1 tablet by mouth daily      levothyroxine (SYNTHROID) 125 MCG tablet Take 125 mcg by mouth Daily      metFORMIN (GLUCOPHAGE) 1000 MG tablet Take 500 mg by mouth 2 times daily (with meals)        No current facility-administered medications on file prior to encounter. REVIEW OF SYSTEMS    Pertinent items are noted in HPI. Objective:      BP (!) 148/62   Pulse 62   Temp 97 °F (36.1 °C) (Temporal)   Resp 18   LMP  (LMP Unknown)     Wt Readings from Last 3 Encounters:   09/24/20 240 lb (108.9 kg)   05/09/20 236 lb (107 kg)   04/04/19 275 lb (124.7 kg)       PHYSICAL EXAM    Constitutional:   Well nourished and well developed. Appears neat and clean. Patient is alert, oriented x3, and in no apparent distress. Respiratory:  Respiratory effort is easy and symmetric bilaterally. Rate is normal at rest and on room air. Vascular:    Capillary refill is <5 sec to digits bilateral.  Extremities negative for pitting edema. Neurological:  Gross and Light touch intact. Protective sensation present. Dermatological:  Wound description noted in wound assessment. There are two wounds present today, less erythema to the area, and wounds are partial thickness with dried skin to surrounding wound area.  Discussed with  (caregiver) need to thoroughly cleanse area twice daily, apply liquid skin prep to surrounding wound intact areas, and use mupirocin to wound bed with xeroform over. Psychiatric:  Judgement and insight intact. Short and long term memory intact. No evidence of depression, anxiety, or agitation. Patient is calm, cooperative, and communicative. Appropriate interactions and affect.       Assessment:      Problem List Items Addressed This Visit     Open wound of right buttock - Primary    Relevant Medications    mupirocin (BACTROBAN) 2 % ointment    Other Relevant Orders    Initiate Outpatient Wound Care Protocol               Wound 02/24/22 Buttocks Right #1 (Active)   Wound Image   04/07/22 1015   Wound Etiology Pressure Stage  2 04/07/22 1015   Wound Cleansed Cleansed with saline 04/07/22 1015   Dressing/Treatment Honey gel/honey paste;Silicone border 59/18/29 1017   Wound Length (cm) 0.7 cm 04/07/22 1015   Wound Width (cm) 0.7 cm 04/07/22 1015   Wound Depth (cm) 0.15 cm 04/07/22 1015   Wound Surface Area (cm^2) 0.49 cm^2 04/07/22 1015   Change in Wound Size % (l*w) 30 04/07/22 1015   Wound Volume (cm^3) 0.0735 cm^3 04/07/22 1015   Wound Healing % -5 04/07/22 1015   Post-Procedure Length (cm) 1.5 cm 03/31/22 1119   Post-Procedure Width (cm) 0.5 cm 03/31/22 1119   Post-Procedure Depth (cm) 0.1 cm 03/31/22 1119   Post-Procedure Surface Area (cm^2) 0.75 cm^2 03/31/22 1119   Post-Procedure Volume (cm^3) 0.075 cm^3 03/31/22 1119   Wound Assessment [place]/red;Sl[place] 04/07/22 1015   Drainage Amount Scant 04/07/22 1015   Drainage Description Serous 04/07/22 1015   Odor None 04/07/22 1015   Debby-wound Assessment Dry/flaky 04/07/22 1015   Margins Defined edges 04/07/22 1015   Wound Thickness Description not for Pressure Injury Partial thickness 04/07/22 1015   Number of days: 42       Wound 04/07/22 Buttocks Right;Distal #2  (Active)   Wound Image   04/07/22 1015   Wound Etiology Pressure Stage  2 04/07/22 1015   Wound Cleansed Cleansed with saline 04/07/22 1015   Wound Length (cm) 1 cm 04/07/22 1015   Wound Width (cm) 1.2 cm 04/07/22 1015   Wound Depth (cm) 0.15 cm 04/07/22 1015   Wound Surface Area (cm^2) 1.2 cm^2 04/07/22 1015   Wound Volume (cm^3) 0.18 cm^3 04/07/22 1015   Wound Assessment Pink/red 04/07/22 1015   Drainage Amount Small 04/07/22 1015   Drainage Description Serous 04/07/22 1015   Odor None 04/07/22 1015   Debby-wound Assessment Intact;Fragile 04/07/22 1015   Margins Undefined edges 04/07/22 1015   Wound Thickness Description not for Pressure Injury Partial thickness 04/07/22 1015   Number of days: 0         Plan:     Cleanse area twice daily with warm soap/water, mupirocin to open area with single layer of xeroform over. Use calmoseptine as skin barrier to surrounding area, then liquid skin prep under any portion of the skin the dressing adhesive will touch. Recheck in one week. Do not stay sitting upright in recliner all day/nite. Important to lie down or fully recline every few hours. Only sit on pressure relieving specialty surfaces such as a good gel cushion. Treatment Note please see attached Discharge Instructions    Written patient dismissal instructions given to patient and signed by patient or POA. Discharge [address] and [facility]   Physician Orders and Discharge [address]  Telephone: [phone]      [phone]        NAME: Yazmin Tay  DATE OF BIRTH:  1950  MEDICAL RECORD NUMBER:  [id number]     Your  is: Martín Reich     Home Care/Facility: None     Wound Location: Right Buttock  (2-areas)     Dressing orders:.Gently spray area with soap and water, rinse with plain water - gently pat dry.   Apply Skin Prep  to skin surrounding wound-let dry (30sec - 1 min)         2.  Apply thin layer of Mupirocin Ointment then place a single layer of Xeroform over wound bed ONLY. 3. Cover with dry dressing. 4. Change daily                                                                                                                               Compression:      Offloading Device: Change position every 1-2 hours. Limit sitting in chair. Try to use a gel cushion in chair / car / wheelchair.      Other Instructions: Apply protective cream/ointment to buttocks crease ( Calmoseptine, Desitin) daily. Increase Fruit/Veg intake.  Mupirocin ointment at your Pharmacy.    Apply Lac-Hydrin to both legs daily (not between toes). Dressing Supplies:      Keep all dressings clean, dry and intact.  Keep pressure off the wound(s) at all times.      Follow up visit  1  Weeks April 14, 2022 @ 11:00      Please give 24 hour notice if unable to keep appointment. [phone]     If you experience any of the following, please call the Wound Care Service at  [phone] or go to the nearest emergency room.        *Increase in pain         *Temperature over 101           *Increase in drainage from your wound or a foul odor  *Uncontrolled swelling            *Need for compression bandage changes due to slippage, breakthrough drainage       PLEASE NOTE: IF YOU ARE UNABLE TO OBTAIN WOUND SUPPLIES, CONTINUE TO USE THE SUPPLIES YOU HAVE AVAILABLE UNTIL YOU ARE ABLE TO [facility].  IT IS MOST IMPORTANT TO KEEP THE WOUND COVERED AT ALL TIMES                       Electronically signed by MARCO Alonso NP on 4/7/2022 at 11:28 AM

## 2022-04-08 NOTE — PROGRESS NOTES
Patient called in and requested prescription for mupirocin be called in to [facility] in [place] rather than [facility]. Mupirocin topical bid called in as requested.

## 2022-04-11 RX ORDER — BISMUTH TRIBROMOPH/PETROLATUM 5"X9"
1 BANDAGE TOPICAL DAILY
Qty: 12 EACH | Refills: 1 | Status: SHIPPED | OUTPATIENT
Start: 2022-04-11

## 2022-04-14 ENCOUNTER — HOSPITAL ENCOUNTER (OUTPATIENT)
Dept: WOUND CARE | Age: 72
Discharge: HOME | End: 2022-04-14
Payer: COMMERCIAL

## 2022-04-14 VITALS
TEMPERATURE: 97.7 F | SYSTOLIC BLOOD PRESSURE: 143 MMHG | HEART RATE: 62 BPM | RESPIRATION RATE: 18 BRPM | DIASTOLIC BLOOD PRESSURE: 65 MMHG

## 2022-04-14 DIAGNOSIS — E78.5 HYPERLIPIDEMIA, UNSPECIFIED HYPERLIPIDEMIA TYPE: ICD-10-CM

## 2022-04-14 DIAGNOSIS — E08.51 DIABETES MELLITUS DUE TO UNDERLYING CONDITION WITH DIABETIC PERIPHERAL ANGIOPATHY WITHOUT GANGRENE, WITHOUT LONG-TERM CURRENT USE OF INSULIN (HCC): Chronic | ICD-10-CM

## 2022-04-14 DIAGNOSIS — L97.521 CHRONIC ULCER OF LEFT FOOT LIMITED TO BREAKDOWN OF SKIN (HCC): ICD-10-CM

## 2022-04-14 DIAGNOSIS — R68.89 DECREASED STRENGTH, ENDURANCE, AND MOBILITY: ICD-10-CM

## 2022-04-14 DIAGNOSIS — R26.9 ABNORMALITY OF GAIT AND MOBILITY: ICD-10-CM

## 2022-04-14 DIAGNOSIS — E11.65 UNCONTROLLED TYPE 2 DIABETES MELLITUS WITH HYPERGLYCEMIA (HCC): ICD-10-CM

## 2022-04-14 DIAGNOSIS — Z74.09 DECREASED STRENGTH, ENDURANCE, AND MOBILITY: ICD-10-CM

## 2022-04-14 DIAGNOSIS — S31.819D WOUND OF BUTTOCK, RIGHT, SUBSEQUENT ENCOUNTER: ICD-10-CM

## 2022-04-14 DIAGNOSIS — S31.819A OPEN WOUND OF RIGHT BUTTOCK, INITIAL ENCOUNTER: Primary | ICD-10-CM

## 2022-04-14 DIAGNOSIS — R53.1 DECREASED STRENGTH, ENDURANCE, AND MOBILITY: ICD-10-CM

## 2022-04-14 DIAGNOSIS — I87.2 VENOUS INSUFFICIENCY OF BOTH LOWER EXTREMITIES: ICD-10-CM

## 2022-04-14 PROCEDURE — 99204 OFFICE O/P NEW MOD 45 MIN: CPT | Performed by: NURSE PRACTITIONER

## 2022-04-14 PROCEDURE — 99213 OFFICE O/P EST LOW 20 MIN: CPT

## 2022-04-14 RX ORDER — GINSENG 100 MG
CAPSULE ORAL ONCE
Status: CANCELLED | OUTPATIENT
Start: 2022-04-14 | End: 2022-04-14

## 2022-04-14 RX ORDER — LIDOCAINE HYDROCHLORIDE 20 MG/ML
JELLY TOPICAL ONCE
Status: CANCELLED | OUTPATIENT
Start: 2022-04-14 | End: 2022-04-14

## 2022-04-14 RX ORDER — BACITRACIN ZINC AND POLYMYXIN B SULFATE 500; 1000 [USP'U]/G; [USP'U]/G
OINTMENT TOPICAL ONCE
Status: CANCELLED | OUTPATIENT
Start: 2022-04-14 | End: 2022-04-14

## 2022-04-14 RX ORDER — BACITRACIN, NEOMYCIN, POLYMYXIN B 400; 3.5; 5 [USP'U]/G; MG/G; [USP'U]/G
OINTMENT TOPICAL ONCE
Status: CANCELLED | OUTPATIENT
Start: 2022-04-14 | End: 2022-04-14

## 2022-04-14 RX ORDER — LIDOCAINE HYDROCHLORIDE 40 MG/ML
SOLUTION TOPICAL ONCE
Status: CANCELLED | OUTPATIENT
Start: 2022-04-14 | End: 2022-04-14

## 2022-04-14 RX ORDER — LIDOCAINE 50 MG/G
OINTMENT TOPICAL ONCE
Status: CANCELLED | OUTPATIENT
Start: 2022-04-14 | End: 2022-04-14

## 2022-04-14 RX ORDER — CLOBETASOL PROPIONATE 0.5 MG/G
OINTMENT TOPICAL ONCE
Status: CANCELLED | OUTPATIENT
Start: 2022-04-14 | End: 2022-04-14

## 2022-04-14 RX ORDER — LIDOCAINE 40 MG/G
CREAM TOPICAL ONCE
Status: CANCELLED | OUTPATIENT
Start: 2022-04-14 | End: 2022-04-14

## 2022-04-14 RX ORDER — GENTAMICIN SULFATE 1 MG/G
OINTMENT TOPICAL ONCE
Status: CANCELLED | OUTPATIENT
Start: 2022-04-14 | End: 2022-04-14

## 2022-04-14 RX ORDER — BETAMETHASONE DIPROPIONATE 0.05 %
OINTMENT (GRAM) TOPICAL ONCE
Status: CANCELLED | OUTPATIENT
Start: 2022-04-14 | End: 2022-04-14

## 2022-04-14 ASSESSMENT — PAIN DESCRIPTION - PAIN TYPE: TYPE: ACUTE PAIN

## 2022-04-14 ASSESSMENT — PAIN DESCRIPTION - FREQUENCY: FREQUENCY: CONTINUOUS

## 2022-04-14 ASSESSMENT — PAIN DESCRIPTION - LOCATION: LOCATION: BUTTOCKS

## 2022-04-14 ASSESSMENT — PAIN DESCRIPTION - ONSET: ONSET: ON-GOING

## 2022-04-14 ASSESSMENT — PAIN SCALES - GENERAL: PAINLEVEL_OUTOF10: 7

## 2022-04-14 ASSESSMENT — PAIN DESCRIPTION - ORIENTATION: ORIENTATION: RIGHT

## 2022-04-14 ASSESSMENT — PAIN DESCRIPTION - DESCRIPTORS: DESCRIPTORS: THROBBING

## 2022-04-14 ASSESSMENT — PAIN DESCRIPTION - PROGRESSION: CLINICAL_PROGRESSION: NOT CHANGED

## 2022-04-14 NOTE — PLAN OF CARE
Problem: Pain:  Description: Pain management should include both nonpharmacologic and pharmacologic interventions. Goal: Pain level will decrease  Outcome: Ongoing     Problem: Pain:  Description: Pain management should include both nonpharmacologic and pharmacologic interventions. Goal: Control of acute pain  Outcome: Ongoing     Problem: Pain:  Description: Pain management should include both nonpharmacologic and pharmacologic interventions.   Goal: Control of chronic pain  Outcome: Ongoing     Problem: Pressure Ulcer:  Goal: Signs of wound healing will improve  Outcome: Ongoing     Problem: Pressure Ulcer:  Goal: Absence of new pressure ulcer  Outcome: Ongoing     Problem: Pressure Ulcer:  Goal: Will show no infection signs and symptoms  Outcome: Ongoing

## 2022-04-14 NOTE — PROGRESS NOTES
[address]   Progress Note and Procedure Note      Chhaya Rivera  MEDICAL RECORD NUMBER:  [id number]  AGE: 70 y.o. GENDER: female  : 1950  EPISODE DATE:  2022    Subjective:     Chief Complaint   Patient presents with    Wound Check     buttocks         HISTORY of PRESENT ILLNESS HPI   Chhaya Rivera is a 70 y.o. female who presents today for wound/ulcer evaluation. History of Wound Context: For recheck on buttock wounds, new one last week-patient reports still not controlled. Patient reports she obtained new specialty mattress as recommended, however, is not in bed, instead stays in recliner chair all day that has a lift function. She rarely ambulates and is in a wheelchair, reporting P.T. hurts. Discussed importance of staying off the wounds throughout the day, and need to re-position, and lie down intermittently.  states she never does. On recommendation did obtain gel specialty seating surface- however states it was really hard and not at all helpful so returned it. Today buttock wounds are deeper with new wound from last week larger. Will order gel seating surface through [employer]-with patient instructed to transfer the gel cushion to all seating areas such as recliner and wheelchair. Today, buttock wounds macerated, will apply triad and stop xeroform. Last HgA1C 7.6 (2022). C/o swelling and skin excoriation to right lower leg with heel erythema. Wound/Ulcer Pain Timing/Severity: intermittent  Quality of pain: tender  Severity:  4 / 10   Modifying Factors: Pain worsens with care, repositioning, or any movement.    Associated Signs/Symptoms: pain     Ulcer Identification:  Ulcer Type: pressure and traumatic  Contributing Factors: chronic pressure, decreased mobility, shear force and obesity     Wound: N/A          PAST MEDICAL HISTORY        Diagnosis Date    Anxiety 2019    Arthritis     Bronchitis     [facility]     Diabetic neuropathy associated with diabetes mellitus due to underlying condition ([address] 11/3/2017    Essential hypertension 4/4/2019    Hypercholesterolemia     Hypertension     Morbid obesity due to excess calories ([address] 4/4/2019    Thyroid disease     Wound infection 2018    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 without evidence of necrosis        Problem List:    Patient Active Problem List   Diagnosis    Diabetes mellitus with diabetic peripheral angiopathy without gangrene, without long-term current use of insulin ([address]    Peripheral angiopathy (HCC)    Non-healing surgical wound    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 without evidence of necrosis (CODE)    Diabetic neuropathy associated with diabetes mellitus due to underlying condition ([address]    Abscess of foot including toes, left    Unable to ambulate    Ataxia    Acute renal failure (ARF) ([address]    Hypothyroid    Morbid obesity due to excess calories ([address]    Essential hypertension    Benzodiazepine dependence ([address]    Anxiety    Bronchitis    OA (osteoarthritis)    Acute on chronic renal failure (HCC)    Hx of Chronic ulcer of left foot (HCC)    HLD (hyperlipidemia)    Fracture of right tibia and fibula    Abnormality of gait and mobility due to Impaired Mobility secondary to Flare up of OA. [facility] admit 4/04/19.     Uncontrolled type 2 diabetes mellitus with hyperglycemia (HCC)    Nocturnal hypoxemia due to obesity    Nocturnal hypoxia    Sleep apnea    Open wound of right buttock    Buttock wound    Venous insufficiency of both lower extremities    Wound of buttock, right, subsequent encounter    Decreased strength, endurance, and mobility      Problem List Items Addressed This Visit     Diabetes mellitus with diabetic peripheral angiopathy without gangrene, without long-term current use of insulin (HCC) (Chronic)    Relevant Orders    US DUP LOWER ART/BYPASS GRAFTS BILATERAL COMPLETE    Hx of Chronic ulcer of left foot ([facility] [address]    Relevant Orders    US DUP LOWER EXTREMITIES BILATERAL VENOUS    US DUP LOWER ART/BYPASS GRAFTS BILATERAL COMPLETE    HLD (hyperlipidemia)    Relevant Orders    US DUP LOWER EXTREMITIES BILATERAL VENOUS    US DUP LOWER ART/BYPASS GRAFTS BILATERAL COMPLETE    Abnormality of gait and mobility due to Impaired Mobility secondary to Flare up of OA. [facility] admit 4/04/19. Relevant Orders    US DUP LOWER EXTREMITIES BILATERAL VENOUS    US DUP LOWER ART/BYPASS GRAFTS BILATERAL COMPLETE    Uncontrolled type 2 diabetes mellitus with hyperglycemia ([facility] [address]    Relevant Orders    US DUP LOWER EXTREMITIES BILATERAL VENOUS    US DUP LOWER ART/BYPASS GRAFTS BILATERAL COMPLETE    Open wound of right buttock - Primary    Relevant Orders    Initiate Outpatient Wound Care Protocol    Venous insufficiency of both lower extremities    Relevant Orders    US DUP LOWER EXTREMITIES BILATERAL VENOUS    US DUP LOWER ART/BYPASS GRAFTS BILATERAL COMPLETE    Wound of buttock, right, subsequent encounter    Decreased strength, endurance, and mobility           PAST SURGICAL HISTORY    Past Surgical History:   Procedure Laterality Date    APPENDECTOMY      HERNIA REPAIR      ORTHOPEDIC SURGERY Left 2017    broken left foot.  surgeries x3    TONSILLECTOMY N/A        FAMILY HISTORY    Family History   Problem Relation Age of Onset    Cancer Father        SOCIAL HISTORY    Social History     Tobacco Use    Smoking status: Never Smoker    Smokeless tobacco: Never Used   Vaping Use    Vaping Use: Never used   Substance Use Topics    Alcohol use: No    Drug use: No       ALLERGIES    Allergies   Allergen Reactions    Nitrofurantoin Other (See Comments)     Leg swelling     Tizanidine      Other reaction(s): Mental Status Change    Sulfa Antibiotics Rash       MEDICATIONS    Current Outpatient Medications on File Prior to Encounter   Medication Sig Dispense Refill    Bismuth Tribromoph-Petrolatum (XEROFORM PETROLATUM GAUZE 5\"X9\") MISC external pads Apply 1 each topically daily Apply to affected area as directed daily, after mupirocin ointment. 12 each 1    mupirocin (BACTROBAN) 2 % ointment Apply topically to open area twice daily after cleansing with saline. 22 g 2    mupirocin (BACTROBAN) 2 % ointment Apply topically twice daily after cleansing with soap and water, then xeroform gauze then dry dressing,. 15 g 2    ammonium lactate (LAC-HYDRIN) 12 % lotion Apply topically daily to both lower legs, after washing with mild soap/water. Apply while still moist. 225 g 1    ammonium lactate (LAC-HYDRIN) 12 % lotion Apply topically daily after cleansing legs-with mild soap and water. Apply to intact areas of skin only. 225 g 1    oxyCODONE HCl (OXY-IR) 10 MG immediate release tablet Take 10 mg by mouth every 6 hours as needed for Pain.       Capsaicin (ZOSTRIX) 0.035 % CREA cream Apply topically 3 times daily      furosemide (LASIX) 40 MG tablet Take 40 mg by mouth 2 times daily 1.5 tablet by mouth as directed; 1 tablet in AM and 1/2 tablet at NOON      oxybutynin (DITROPAN-XL) 5 MG extended release tablet Take 5 mg by mouth daily      traZODone (DESYREL) 150 MG tablet Take 150 mg by mouth nightly      metoprolol (LOPRESSOR) 100 MG tablet Take 100 mg by mouth daily      atorvastatin (LIPITOR) 10 MG tablet Take 10 mg by mouth daily      DULoxetine (CYMBALTA) 60 MG extended release capsule Take 60 mg by mouth daily      polyethylene glycol (GLYCOLAX) 17 g packet Take 17 g by mouth every other day      Cholecalciferol (VITAMIN D3) 50 MCG (2000 UT) CAPS Take by mouth daily      Menthol-Methyl Salicylate (MUSCLE RUB) 10-15 % CREA cream Apply topically 2 times daily 1 application apply on the skin twice a day; arms and shoulders      insulin aspart (NOVOLOG FLEXPEN) 100 UNIT/ML injection pen Inject 10 Units into the skin 3 times daily (before meals) 5 pen 3    coenzyme Q10 100 MG CAPS capsule Take 1 capsule by mouth 2 times daily (before meals) 60 capsule 1    insulin glargine (TOUJEO SOLOSTAR) 300 UNIT/ML injection pen Inject 25 Units into the skin nightly 3 pen 3    insulin lispro (HUMALOG KWIKPEN) 100 UNIT/ML pen Inject 10 Units into the skin 3 times daily (before meals) 5 pen 3    Insulin Pen Needle (NOVOFINE) 32G X 6 MM MISC 1 Device by Does not apply route 4 times daily (before meals and nightly) 300 each 3    valsartan-hydrochlorothiazide (DIOVAN-HCT) 320-25 MG per tablet Take 1 tablet by mouth daily      Lactobacillus (PROBIOTIC ACIDOPHILUS) TABS Take 1 tablet by mouth daily 60 tablet 0    metoprolol succinate (TOPROL XL) 100 MG extended release tablet Take 100 mg by mouth daily      Multiple Vitamins-Minerals (CENTRUM SILVER PO) Take 1 tablet by mouth daily      levothyroxine (SYNTHROID) 125 MCG tablet Take 125 mcg by mouth Daily      metFORMIN (GLUCOPHAGE) 1000 MG tablet Take 500 mg by mouth 2 times daily (with meals)        No current facility-administered medications on file prior to encounter. REVIEW OF SYSTEMS    Pertinent items are noted in HPI. Objective:      BP (!) 143/65   Pulse 62   Temp 97.7 °F (36.5 °C) (Temporal)   Resp 18   LMP  (LMP Unknown)     Wt Readings from Last 3 Encounters:   09/24/20 240 lb (108.9 kg)   05/09/20 236 lb (107 kg)   04/04/19 275 lb (124.7 kg)       PHYSICAL EXAM    Constitutional:   Well nourished and well developed. Appears neat and clean. Patient is alert, oriented x3, and slightly anxious. Reports \"always in pain\" and nothing is improved. Respiratory:  Respiratory effort is easy and symmetric bilaterally. Rate is normal at rest and on room air. Vascular:  Pedal Pulses faintly palpable -verified with doppler. Capillary refill is <5 sec to digits bilateral.  Extremities positive for trace to 1+ pitting edema. Neurological:  Gross and Light touch intact.  Due to recent back surgery, impaired mobility, difficulty standing from seated position, so scoots across surface. Difficulty walking, so sits most of day, seldom lying in bed. Dermatological:  Wound description noted in wound assessment. Buttock wounds remain open/moist. Will change to triad cream and instruct  on proper application of. New wound present at last visit, now larger. Right lower leg has some hemosiderin staining, no visible varicosities. Patient states \"has compression hose\". Generalized area of now healed erythema of anterior lower mid leg. Dry skin/peeling improved since using lac hydrin lotion. Heel erythematous but blanches, no wound noted. Psychiatric:  Judgement and insight intact. Short and long term memory intact. No evidence of depression, anxiety, or agitation. Patient is calm today and communicative. Appropriate interactions and affect. Assessment:      Problem List Items Addressed This Visit     Diabetes mellitus with diabetic peripheral angiopathy without gangrene, without long-term current use of insulin (HCC) (Chronic)    Relevant Orders    US DUP LOWER ART/BYPASS GRAFTS BILATERAL COMPLETE    Hx of Chronic ulcer of left foot ([address]    Relevant Orders    US DUP LOWER EXTREMITIES BILATERAL VENOUS    US DUP LOWER ART/BYPASS GRAFTS BILATERAL COMPLETE    HLD (hyperlipidemia)    Relevant Orders    US DUP LOWER EXTREMITIES BILATERAL VENOUS    US DUP LOWER ART/BYPASS GRAFTS BILATERAL COMPLETE    Abnormality of gait and mobility due to Impaired Mobility secondary to Flare up of OA. [facility] admit 4/04/19.     Relevant Orders    US DUP LOWER EXTREMITIES BILATERAL VENOUS    US DUP LOWER ART/BYPASS GRAFTS BILATERAL COMPLETE    Uncontrolled type 2 diabetes mellitus with hyperglycemia ([address]    Relevant Orders    US DUP LOWER EXTREMITIES BILATERAL VENOUS    US DUP LOWER ART/BYPASS GRAFTS BILATERAL COMPLETE    Open wound of right buttock - Primary    Relevant Orders    Initiate Outpatient Wound Care Protocol    Venous insufficiency of both lower extremities    Relevant Orders    US DUP LOWER EXTREMITIES BILATERAL VENOUS    US DUP LOWER ART/BYPASS GRAFTS BILATERAL COMPLETE    Wound of buttock, right, subsequent encounter    Decreased strength, endurance, and mobility           Procedure Note  Indications:  Based on my examination of this patient's wound(s)/ulcer(s) today, debridement is not required to promote healing and evaluate the wound base. Wound 02/24/22 Buttocks Right #1 (Active)   Wound Image   04/14/22 1116   Wound Etiology Pressure Stage  2 04/14/22 1116   Wound Cleansed Cleansed with saline 04/14/22 1116   Dressing/Treatment Gauze dressing/dressing sponge;Protective barrier;Xeroform; Pharmaceutical agent (see MAR) 04/07/22 1139   Wound Length (cm) 0.4 cm 04/14/22 1116   Wound Width (cm) 0.7 cm 04/14/22 1116   Wound Depth (cm) 0.1 cm 04/14/22 1116   Wound Surface Area (cm^2) 0.28 cm^2 04/14/22 1116   Change in Wound Size % (l*w) 60 04/14/22 1116   Wound Volume (cm^3) 0.028 cm^3 04/14/22 1116   Wound Healing % 60 04/14/22 1116   Post-Procedure Length (cm) 0.4 cm 04/14/22 1218   Post-Procedure Width (cm) 0.7 cm 04/14/22 1218   Post-Procedure Depth (cm) 0.1 cm 04/14/22 1218   Post-Procedure Surface Area (cm^2) 0.28 cm^2 04/14/22 1218   Post-Procedure Volume (cm^3) 0.028 cm^3 04/14/22 1218   Wound Assessment Pink/red 04/14/22 1116   Drainage Amount Scant 04/14/22 1116   Drainage Description Serous 04/14/22 1116   Odor None 04/14/22 1116   Debby-wound Assessment Fragile; Intact 04/14/22 1116   Margins Defined edges 04/14/22 1116   Wound Thickness Description not for Pressure Injury Partial thickness 04/14/22 1116   Number of days: 49       Wound 04/07/22 Buttocks Right;Distal #2  (Active)   Wound Image   04/14/22 1116   Wound Etiology Pressure Stage  2 04/14/22 1116   Wound Cleansed Cleansed with saline 04/14/22 1116   Dressing/Treatment Gauze dressing/dressing sponge; Pharmaceutical agent (see MAR); Xeroform;Protective barrier 04/07/22 1139   Wound Length (cm) 3 cm 04/14/22 1116   Wound Width (cm) 1.4 cm 04/14/22 1116   Wound Depth (cm) 0.1 cm 04/14/22 1116   Wound Surface Area (cm^2) 4.2 cm^2 04/14/22 1116   Change in Wound Size % (l*w) -250 04/14/22 1116   Wound Volume (cm^3) 0.42 cm^3 04/14/22 1116   Wound Healing % -133 04/14/22 1116   Post-Procedure Length (cm) 3 cm 04/14/22 1218   Post-Procedure Width (cm) 1.4 cm 04/14/22 1218   Post-Procedure Depth (cm) 0.1 cm 04/14/22 1218   Post-Procedure Surface Area (cm^2) 4.2 cm^2 04/14/22 1218   Post-Procedure Volume (cm^3) 0.42 cm^3 04/14/22 1218   Wound Assessment Pink/red 04/14/22 1116   Drainage Amount Small 04/14/22 1116   Drainage Description Serous 04/14/22 1116   Odor None 04/14/22 1116   Debby-wound Assessment Fragile; Intact 04/14/22 1116   Margins Undefined edges 04/14/22 1116   Wound Thickness Description not for Pressure Injury Partial thickness 04/14/22 1116   Number of days: 7         Plan:   Stop xeroform and mupirocin to buttock, start daily application of Triad Coloplast wound dressing with dsd over.  instructed on proper application of. Will order Arterial and venous duplex scans (they will call you to schedule) prior to applying compression. In meantime continue daily application of lac hydrin (not between toes). [id number] contacted ([phone]) - re: gel cushion seating surface, they will contact the patient and deliver a gel seating surface. Recheck in 2 weeks      Treatment Note please see attached Discharge Instructions    Written patient dismissal instructions given to patient and signed by patient or POA.          Discharge [address] and [facility]   Physician Orders and Discharge Instructions  [address]  Telephone: [phone]      [phone]        NAME: Andrea West Wan                                                                                       DATE OF BIRTH:  1950  MEDICAL RECORD NUMBER:  [id number]     Your  is:  Carolyn     Home Care/Facility: None     Wound Location: Right Buttock  (2-areas)     Dressing orders:.Gently spray area with soap and water, rinse with plain water - gently pat dry. (You do not have to remove all the TRiad from skin/wound surface)     2. Apply a thin layer of Triad Coloplast    3. Cover with dry dressing. 4. Change daily                                                                                                                               Compression: CASSIDY's ordered today for your legs. [id number].      Offloading Device: Change position every 1-2 hours. Limit sitting in chair. Try to use a gel cushion in chair / car / wheelchair.      Other Instructions: Apply protective cream/ointment to buttocks crease ( Calmoseptine, Desitin) daily. Increase Fruit/Veg intake.     Apply Lac-Hydrin to both legs daily (not between toes). Dressing Supplies:      Keep all dressings clean, dry and intact.  Keep pressure off the wound(s) at all times.      Follow up visit  2  Week  2022 @      Please give 24 hour notice if unable to keep appointment. [phone]     If you experience any of the following, please call the Wound Care Service at  [phone] or go to the nearest emergency room.        *Increase in pain         *Temperature over 101           *Increase in drainage from your wound or a foul odor  *Uncontrolled swelling            *Need for compression bandage changes due to slippage, breakthrough drainage       PLEASE NOTE: IF YOU ARE UNABLE TO OBTAIN WOUND SUPPLIES, CONTINUE TO USE THE SUPPLIES YOU HAVE AVAILABLE UNTIL YOU ARE ABLE [facility]  IT IS MOST IMPORTANT TO KEEP THE WOUND COVERED AT ALL TIMES                         Electronically signed by MARCO Loyola NP on 2022 at 2:58 PM

## 2022-04-28 ENCOUNTER — HOSPITAL ENCOUNTER (OUTPATIENT)
Dept: WOUND CARE | Age: 72
Discharge: HOME | End: 2022-04-28
Payer: COMMERCIAL

## 2022-04-28 VITALS
DIASTOLIC BLOOD PRESSURE: 65 MMHG | TEMPERATURE: 97.3 F | RESPIRATION RATE: 18 BRPM | HEART RATE: 84 BPM | SYSTOLIC BLOOD PRESSURE: 148 MMHG

## 2022-04-28 DIAGNOSIS — Z74.09 COMPLICATIONS OF IMMOBILITY: ICD-10-CM

## 2022-04-28 DIAGNOSIS — S31.819A OPEN WOUND OF RIGHT BUTTOCK, INITIAL ENCOUNTER: ICD-10-CM

## 2022-04-28 DIAGNOSIS — R53.1 DECREASED STRENGTH, ENDURANCE, AND MOBILITY: Primary | ICD-10-CM

## 2022-04-28 DIAGNOSIS — Z74.09 DECREASED STRENGTH, ENDURANCE, AND MOBILITY: Primary | ICD-10-CM

## 2022-04-28 DIAGNOSIS — R68.89 DECREASED STRENGTH, ENDURANCE, AND MOBILITY: Primary | ICD-10-CM

## 2022-04-28 PROCEDURE — 99213 OFFICE O/P EST LOW 20 MIN: CPT | Performed by: NURSE PRACTITIONER

## 2022-04-28 PROCEDURE — 99213 OFFICE O/P EST LOW 20 MIN: CPT

## 2022-04-28 RX ORDER — CLOBETASOL PROPIONATE 0.5 MG/G
OINTMENT TOPICAL ONCE
Status: CANCELLED | OUTPATIENT
Start: 2022-04-28 | End: 2022-04-28

## 2022-04-28 RX ORDER — BACITRACIN ZINC AND POLYMYXIN B SULFATE 500; 1000 [USP'U]/G; [USP'U]/G
OINTMENT TOPICAL ONCE
Status: CANCELLED | OUTPATIENT
Start: 2022-04-28 | End: 2022-04-28

## 2022-04-28 RX ORDER — LIDOCAINE HYDROCHLORIDE 40 MG/ML
SOLUTION TOPICAL ONCE
Status: CANCELLED | OUTPATIENT
Start: 2022-04-28 | End: 2022-04-28

## 2022-04-28 RX ORDER — BETAMETHASONE DIPROPIONATE 0.05 %
OINTMENT (GRAM) TOPICAL ONCE
Status: CANCELLED | OUTPATIENT
Start: 2022-04-28 | End: 2022-04-28

## 2022-04-28 RX ORDER — BACITRACIN, NEOMYCIN, POLYMYXIN B 400; 3.5; 5 [USP'U]/G; MG/G; [USP'U]/G
OINTMENT TOPICAL ONCE
Status: CANCELLED | OUTPATIENT
Start: 2022-04-28 | End: 2022-04-28

## 2022-04-28 RX ORDER — LIDOCAINE 50 MG/G
OINTMENT TOPICAL ONCE
Status: CANCELLED | OUTPATIENT
Start: 2022-04-28 | End: 2022-04-28

## 2022-04-28 RX ORDER — LIDOCAINE HYDROCHLORIDE 20 MG/ML
JELLY TOPICAL ONCE
Status: CANCELLED | OUTPATIENT
Start: 2022-04-28 | End: 2022-04-28

## 2022-04-28 RX ORDER — GENTAMICIN SULFATE 1 MG/G
OINTMENT TOPICAL ONCE
Status: CANCELLED | OUTPATIENT
Start: 2022-04-28 | End: 2022-04-28

## 2022-04-28 RX ORDER — LIDOCAINE 40 MG/G
CREAM TOPICAL ONCE
Status: CANCELLED | OUTPATIENT
Start: 2022-04-28 | End: 2022-04-28

## 2022-04-28 RX ORDER — GINSENG 100 MG
CAPSULE ORAL ONCE
Status: CANCELLED | OUTPATIENT
Start: 2022-04-28 | End: 2022-04-28

## 2022-04-28 ASSESSMENT — PAIN DESCRIPTION - DESCRIPTORS: DESCRIPTORS: THROBBING

## 2022-04-28 ASSESSMENT — PAIN DESCRIPTION - ONSET: ONSET: ON-GOING

## 2022-04-28 ASSESSMENT — PAIN DESCRIPTION - ORIENTATION: ORIENTATION: RIGHT

## 2022-04-28 ASSESSMENT — PAIN DESCRIPTION - LOCATION: LOCATION: BUTTOCKS

## 2022-04-28 ASSESSMENT — PAIN - FUNCTIONAL ASSESSMENT: PAIN_FUNCTIONAL_ASSESSMENT: PREVENTS OR INTERFERES SOME ACTIVE ACTIVITIES AND ADLS

## 2022-04-28 ASSESSMENT — PAIN SCALES - GENERAL: PAINLEVEL_OUTOF10: 6

## 2022-04-28 ASSESSMENT — PAIN DESCRIPTION - PAIN TYPE: TYPE: ACUTE PAIN

## 2022-04-28 ASSESSMENT — PAIN DESCRIPTION - FREQUENCY: FREQUENCY: CONTINUOUS

## 2022-04-28 NOTE — PROGRESS NOTES
[address]   Progress Note and Procedure Note      Abbey Pierre  MEDICAL RECORD NUMBER:  [id number]  AGE: 70 y.o. GENDER: female  : 1950  EPISODE DATE:  2022    Subjective:     Chief Complaint   Patient presents with    Wound Check     right buttock         HISTORY of PRESENT ILLNESS HPI  Jeanmarie Blas is a 70 y. o. female who presents today for wound/ulcer evaluation. History of Wound Context: For recheck on buttock wounds, new one last week-patient reports still not controlled. Patient reports \"pain barely tolerable\". At last visit recommended specialty seating surface and this provider contacted her DME company for info on delivery of a gel cushion seating surface. Company stated they would call her and deliver the cushion- however, this has not happened. Patient contacted a different DME company who also advised her since she has a non-healing wound she also qualifies for a new specialty bed- both are to be delivered soon. Today buttock wounds are much improved in dimension and depth, with surrounding skin much drier. Less generalized erythema. Last HgA1C 7.6 (2022). At last visit, ordered duplex studies to r/o venous/arterial insufficiency - neither have been done. Patient reported she would like to only take care of one problem at a time. No further c/o lower extremity pain or swelling today.     Wound/Ulcer Pain Timing/Severity: intermittent  Quality of pain: tender  Severity:  4 / 10   Modifying Factors: Pain worsens with care, repositioning, or any movement.   Associated Signs/Symptoms: pain     Ulcer Identification:  Ulcer Type: pressure and traumatic  Contributing Factors: chronic pressure, decreased mobility, shear force and obesity     Wound: N/A    PAST MEDICAL HISTORY        Diagnosis Date    Anxiety 2019    Arthritis     Bronchitis     [facility] mellitus [facility]     Diabetic neuropathy associated with diabetes mellitus due to underlying condition ([facility] 11/3/2017    Essential hypertension 4/4/2019    Hypercholesterolemia     Hypertension     Morbid obesity due to excess calories ([address] 4/4/2019    Thyroid disease     Wound infection 2018    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 without evidence of necrosis        Problem List:    Patient Active Problem List   Diagnosis    Diabetes mellitus with diabetic peripheral angiopathy without gangrene, without long-term current use of insulin ([address]    Peripheral angiopathy (HCC)    Non-healing surgical wound    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 without evidence of necrosis (CODE)    Diabetic neuropathy associated with diabetes mellitus due to underlying condition ([address]    Abscess of foot including toes, left    Unable to ambulate    Ataxia    Acute renal failure (ARF) ([address]    Hypothyroid    Morbid obesity due to excess calories ([address]    Essential hypertension    Benzodiazepine dependence ([address]    Anxiety    Bronchitis    OA (osteoarthritis)    Acute on chronic renal failure (HCC)    Hx of Chronic ulcer of left foot (HCC)    HLD (hyperlipidemia)    Fracture of right tibia and fibula    Abnormality of gait and mobility due to Impaired Mobility secondary to Flare up of OA. [facility] admit 4/04/19.     Uncontrolled type 2 diabetes mellitus with hyperglycemia (HCC)    Nocturnal hypoxemia due to obesity    Nocturnal hypoxia    Sleep apnea    Open wound of right buttock    Buttock wound    Venous insufficiency of both lower extremities    Wound of buttock, right, subsequent encounter    Decreased strength, endurance, and mobility    Complications of immobility      Problem List Items Addressed This Visit        Other    Complications of immobility    Open wound of right buttock    Relevant Orders    Initiate Outpatient Wound Care Protocol    Decreased strength, endurance, and mobility - Primary    Relevant Orders Initiate Outpatient Wound Care Protocol           PAST SURGICAL HISTORY    Past Surgical History:   Procedure Laterality Date    APPENDECTOMY      HERNIA REPAIR      ORTHOPEDIC SURGERY Left 2017    broken left foot. surgeries x3    TONSILLECTOMY N/A        FAMILY HISTORY    Family History   Problem Relation Age of Onset    Cancer Father        SOCIAL HISTORY    Social History     Tobacco Use    Smoking status: Never Smoker    Smokeless tobacco: Never Used   Vaping Use    Vaping Use: Never used   Substance Use Topics    Alcohol use: No    Drug use: No       ALLERGIES    Allergies   Allergen Reactions    Nitrofurantoin Other (See Comments)     Leg swelling     Tizanidine      Other reaction(s): Mental Status Change    Sulfa Antibiotics Rash       MEDICATIONS    Current Outpatient Medications on File Prior to Encounter   Medication Sig Dispense Refill    Bismuth Tribromoph-Petrolatum (XEROFORM PETROLATUM GAUZE 5\"X9\") MISC external pads Apply 1 each topically daily Apply to affected area as directed daily, after mupirocin ointment. 12 each 1    ammonium lactate (LAC-HYDRIN) 12 % lotion Apply topically daily to both lower legs, after washing with mild soap/water. Apply while still moist. 225 g 1    ammonium lactate (LAC-HYDRIN) 12 % lotion Apply topically daily after cleansing legs-with mild soap and water. Apply to intact areas of skin only. 225 g 1    oxyCODONE HCl (OXY-IR) 10 MG immediate release tablet Take 10 mg by mouth every 6 hours as needed for Pain.       Capsaicin (ZOSTRIX) 0.035 % CREA cream Apply topically 3 times daily      furosemide (LASIX) 40 MG tablet Take 40 mg by mouth 2 times daily 1.5 tablet by mouth as directed; 1 tablet in AM and 1/2 tablet at NOON      oxybutynin (DITROPAN-XL) 5 MG extended release tablet Take 5 mg by mouth daily      traZODone (DESYREL) 150 MG tablet Take 150 mg by mouth nightly      metoprolol (LOPRESSOR) 100 MG tablet Take 100 mg by mouth daily      atorvastatin (LIPITOR) 10 MG tablet Take 10 mg by mouth daily      DULoxetine (CYMBALTA) 60 MG extended release capsule Take 60 mg by mouth daily      polyethylene glycol (GLYCOLAX) 17 g packet Take 17 g by mouth every other day      Cholecalciferol (VITAMIN D3) 50 MCG (2000 UT) CAPS Take by mouth daily      Menthol-Methyl Salicylate (MUSCLE RUB) 10-15 % CREA cream Apply topically 2 times daily 1 application apply on the skin twice a day; arms and shoulders      insulin aspart (NOVOLOG FLEXPEN) 100 UNIT/ML injection pen Inject 10 Units into the skin 3 times daily (before meals) 5 pen 3    coenzyme Q10 100 MG CAPS capsule Take 1 capsule by mouth 2 times daily (before meals) 60 capsule 1    insulin glargine (TOUJEO SOLOSTAR) 300 UNIT/ML injection pen Inject 25 Units into the skin nightly 3 pen 3    insulin lispro (HUMALOG KWIKPEN) 100 UNIT/ML pen Inject 10 Units into the skin 3 times daily (before meals) 5 pen 3    Insulin Pen Needle (NOVOFINE) 32G X 6 MM MISC 1 Device by Does not apply route 4 times daily (before meals and nightly) 300 each 3    valsartan-hydrochlorothiazide (DIOVAN-HCT) 320-25 MG per tablet Take 1 tablet by mouth daily      Lactobacillus (PROBIOTIC ACIDOPHILUS) TABS Take 1 tablet by mouth daily 60 tablet 0    metoprolol succinate (TOPROL XL) 100 MG extended release tablet Take 100 mg by mouth daily      Multiple Vitamins-Minerals (CENTRUM SILVER PO) Take 1 tablet by mouth daily      levothyroxine (SYNTHROID) 125 MCG tablet Take 125 mcg by mouth Daily      metFORMIN (GLUCOPHAGE) 1000 MG tablet Take 500 mg by mouth 2 times daily (with meals)        No current facility-administered medications on file prior to encounter. REVIEW OF SYSTEMS    Pertinent items are noted in HPI.     Objective:      BP (!) 148/65   Pulse 84   Temp 97.3 °F (36.3 °C) (Temporal)   Resp 18   LMP  (LMP Unknown)     Wt Readings from Last 3 Encounters:   09/24/20 240 lb (108.9 kg)   05/09/20 236 lb (107 kg) 04/04/19 275 lb (124.7 kg)       PHYSICAL EXAM    Constitutional:   Well nourished and well developed. Appears neat and clean. Patient is alert, oriented x3, and in no apparent distress. Respiratory:  Respiratory effort is easy and symmetric bilaterally. Rate is normal at rest and on room air. Vascular:  Capillary refill is <5 sec to digits bilateral.  Extremities negative for pitting edema. Neurological:  Gross and Light touch intact. Protective sensation present. Dermatological:  Wound description noted in wound assessment. Buttock wounds healing well, surrounding skin much drier and less erythematous. Complains of \"considerable\" pain- anticipates some relief with use of specialty seating surface when it arrives. Only two very small areas remain open. Psychiatric:  Judgement and insight intact. Short and long term memory intact. No evidence of depression, anxiety, or agitation. Patient is calm, cooperative, and communicative. Appropriate interactions and affect.       Assessment:      Problem List Items Addressed This Visit        Other    Complications of immobility    Open wound of right buttock    Relevant Orders    Initiate Outpatient Wound Care Protocol    Decreased strength, endurance, and mobility - Primary    Relevant Orders    Initiate Outpatient Wound Care Protocol            Wound 02/24/22 Buttocks Right #1 (Active)   Wound Image   04/28/22 0929   Wound Etiology Pressure Stage 2 04/28/22 0929   Wound Cleansed Cleansed with saline 04/28/22 0929   Dressing/Treatment Calmoseptine/zinc oxide with menthol 04/28/22 0959   Wound Length (cm) 0.2 cm 04/28/22 0929   Wound Width (cm) 0.2 cm 04/28/22 0929   Wound Depth (cm) 0.1 cm 04/28/22 0929   Wound Surface Area (cm^2) 0.04 cm^2 04/28/22 0929   Change in Wound Size % (l*w) 94.29 04/28/22 0929   Wound Volume (cm^3) 0.004 cm^3 04/28/22 0929   Wound Healing % 94 04/28/22 0929   Post-Procedure Length (cm) 0.4 cm 04/14/22 1218 Post-Procedure Width (cm) 0.7 cm 04/14/22 1218   Post-Procedure Depth (cm) 0.1 cm 04/14/22 1218   Post-Procedure Surface Area (cm^2) 0.28 cm^2 04/14/22 1218   Post-Procedure Volume (cm^3) 0.028 cm^3 04/14/22 1218   Wound Assessment Pink/red 04/28/22 0929   Drainage Amount Scant 04/28/22 0929   Drainage Description Serous 04/28/22 0929   Odor None 04/28/22 0929   Debby-wound Assessment Dry/flaky 04/28/22 0929   Margins Defined edges 04/28/22 0929   Wound Thickness Description not for Pressure Injury Partial thickness 04/28/22 0929   Number of days: 63       Wound 04/07/22 Buttocks Right;Distal #2  (Active)   Wound Image   04/28/22 0929   Wound Etiology Pressure Stage 2 04/28/22 0929   Wound Cleansed Cleansed with saline 04/28/22 0929   Dressing/Treatment Calmoseptine/zinc oxide with menthol 04/28/22 0959   Wound Length (cm) 0.1 cm 04/28/22 0929   Wound Width (cm) 0.5 cm 04/28/22 0929   Wound Depth (cm) 0.1 cm 04/28/22 0929   Wound Surface Area (cm^2) 0.05 cm^2 04/28/22 0929   Change in Wound Size % (l*w) 95.83 04/28/22 0929   Wound Volume (cm^3) 0.005 cm^3 04/28/22 0929   Wound Healing % 97 04/28/22 0929   Wound Assessment Pink/red 04/28/22 0929   Drainage Amount Scant 04/28/22 0929   Drainage Description Serous 04/28/22 0929   Odor None 04/28/22 0929   Debby-wound Assessment Dry/flaky 04/28/22 0929   Margins Defined edges 04/28/22 0929   Wound Thickness Description not for Pressure Injury Partial thickness 04/28/22 0929   Number of days: 21           Plan:     Continue twice daily application of Triad ointment. Keep affected area covered with dressing at all times. Recheck in 2 weeks. Treatment Note please see attached Discharge Instructions    Written patient dismissal instructions given to patient and signed by patient or POA.          Discharge Instructions       [address] [place] [address] and [facility]   Physician Orders and Discharge [address] [facility] [address]  [address]  Telephone: [phone]      [phone]        NAME: Frankie Adler  DATE OF BIRTH:  1950  MEDICAL RECORD NUMBER:  [id number]     Your  is: Vito Yeung     Home Care/Facility: None     Wound Location: Right Buttock  (2-areas)     Dressing orders:.Gently spray area with soap and water, rinse with plain water - gently pat dry. (You do not have to remove all the TRiad from skin/wound surface)     2. Apply a thin layer of Triad Coloplast    3. Cover with dry dressing use medipore tape. 4. Change daily                                                                                                                               Compression: CASSIDY's ordered today for your legs. [id number].      Offloading Device: Change position every 1-2 hours. Limit sitting in chair. Obtain a  gel cushion for: chair / car / wheelchair Do to chronic open buttock wounds.      Other Instructions: Apply protective cream/ointment to buttocks crease ( Calmoseptine, Desitin) daily. Increase Fruit/Veg intake.     Apply Lac-Hydrin to both legs daily (not between toes).      Dressing Supplies:      Keep all dressings clean, dry and intact.  Keep pressure off the wound(s) at all times.      Follow up visit  2  Week  May 12,  2022 @ 09:15     Please give 24 hour notice if unable to keep appointment.  [phone]     If you experience any of the following, please call the Wound Care Service at  [phone] or go to the nearest emergency room.        *Increase in pain         *Temperature over 101           *Increase in drainage from your wound or a foul odor  *Uncontrolled swelling            *Need for compression bandage changes due to slippage, breakthrough drainage       PLEASE NOTE: IF YOU ARE UNABLE TO OBTAIN WOUND SUPPLIES, CONTINUE TO USE THE SUPPLIES YOU HAVE AVAILABLE UNTIL YOU ARE ABLE TO REACH US.  IT IS MOST IMPORTANT TO KEEP THE WOUND COVERED AT ALL TIMES                               Electronically signed by MARCO Brennan NP on 4/28/2022 at 1:53 PM

## 2022-04-28 NOTE — PLAN OF CARE
Problem: Chronic Conditions and Co-morbidities  Goal: Patient's chronic conditions and co-morbidity symptoms are monitored and maintained or improved  Outcome: Progressing

## 2022-05-10 ENCOUNTER — TELEPHONE (OUTPATIENT)
Dept: WOUND CARE | Age: 72
End: 2022-05-10

## 2022-06-09 ENCOUNTER — HOSPITAL ENCOUNTER (OUTPATIENT)
Dept: WOUND CARE | Age: 72
Discharge: HOME | End: 2022-06-09
Payer: COMMERCIAL

## 2022-06-09 VITALS
SYSTOLIC BLOOD PRESSURE: 169 MMHG | TEMPERATURE: 96.2 F | HEART RATE: 59 BPM | DIASTOLIC BLOOD PRESSURE: 71 MMHG | RESPIRATION RATE: 16 BRPM

## 2022-06-09 DIAGNOSIS — R68.89 DECREASED STRENGTH, ENDURANCE, AND MOBILITY: Primary | ICD-10-CM

## 2022-06-09 DIAGNOSIS — Z74.09 DECREASED STRENGTH, ENDURANCE, AND MOBILITY: Primary | ICD-10-CM

## 2022-06-09 DIAGNOSIS — R53.1 DECREASED STRENGTH, ENDURANCE, AND MOBILITY: Primary | ICD-10-CM

## 2022-06-09 DIAGNOSIS — S31.819A OPEN WOUND OF RIGHT BUTTOCK, INITIAL ENCOUNTER: ICD-10-CM

## 2022-06-09 DIAGNOSIS — S31.829D BUTTOCK WOUND, LEFT, SUBSEQUENT ENCOUNTER: ICD-10-CM

## 2022-06-09 PROCEDURE — 99213 OFFICE O/P EST LOW 20 MIN: CPT | Performed by: NURSE PRACTITIONER

## 2022-06-09 PROCEDURE — 99213 OFFICE O/P EST LOW 20 MIN: CPT

## 2022-06-09 RX ORDER — LIDOCAINE HYDROCHLORIDE 20 MG/ML
JELLY TOPICAL ONCE
Status: CANCELLED | OUTPATIENT
Start: 2022-06-09 | End: 2022-06-09

## 2022-06-09 RX ORDER — LIDOCAINE 50 MG/G
OINTMENT TOPICAL ONCE
Status: CANCELLED | OUTPATIENT
Start: 2022-06-09 | End: 2022-06-09

## 2022-06-09 RX ORDER — CLOBETASOL PROPIONATE 0.5 MG/G
OINTMENT TOPICAL ONCE
Status: CANCELLED | OUTPATIENT
Start: 2022-06-09 | End: 2022-06-09

## 2022-06-09 RX ORDER — BACITRACIN, NEOMYCIN, POLYMYXIN B 400; 3.5; 5 [USP'U]/G; MG/G; [USP'U]/G
OINTMENT TOPICAL ONCE
Status: CANCELLED | OUTPATIENT
Start: 2022-06-09 | End: 2022-06-09

## 2022-06-09 RX ORDER — BETAMETHASONE DIPROPIONATE 0.05 %
OINTMENT (GRAM) TOPICAL ONCE
Status: CANCELLED | OUTPATIENT
Start: 2022-06-09 | End: 2022-06-09

## 2022-06-09 RX ORDER — PHENOL 1.4 %
AEROSOL, SPRAY (ML) MUCOUS MEMBRANE DAILY
COMMUNITY

## 2022-06-09 RX ORDER — GINSENG 100 MG
CAPSULE ORAL ONCE
Status: CANCELLED | OUTPATIENT
Start: 2022-06-09 | End: 2022-06-09

## 2022-06-09 RX ORDER — GENTAMICIN SULFATE 1 MG/G
OINTMENT TOPICAL ONCE
Status: CANCELLED | OUTPATIENT
Start: 2022-06-09 | End: 2022-06-09

## 2022-06-09 RX ORDER — BACITRACIN ZINC AND POLYMYXIN B SULFATE 500; 1000 [USP'U]/G; [USP'U]/G
OINTMENT TOPICAL ONCE
Status: CANCELLED | OUTPATIENT
Start: 2022-06-09 | End: 2022-06-09

## 2022-06-09 RX ORDER — LIDOCAINE 40 MG/G
CREAM TOPICAL ONCE
Status: CANCELLED | OUTPATIENT
Start: 2022-06-09 | End: 2022-06-09

## 2022-06-09 RX ORDER — ACETAMINOPHEN 500 MG
TABLET ORAL EVERY 6 HOURS PRN
COMMUNITY

## 2022-06-09 RX ORDER — LIDOCAINE HYDROCHLORIDE 40 MG/ML
SOLUTION TOPICAL ONCE
Status: CANCELLED | OUTPATIENT
Start: 2022-06-09 | End: 2022-06-09

## 2022-06-09 ASSESSMENT — PAIN DESCRIPTION - DESCRIPTORS: DESCRIPTORS: SORE

## 2022-06-09 ASSESSMENT — PAIN DESCRIPTION - FREQUENCY: FREQUENCY: INTERMITTENT

## 2022-06-09 ASSESSMENT — PAIN DESCRIPTION - LOCATION: LOCATION: BUTTOCKS

## 2022-06-09 ASSESSMENT — PAIN DESCRIPTION - PAIN TYPE: TYPE: CHRONIC PAIN

## 2022-06-09 ASSESSMENT — PAIN SCALES - GENERAL: PAINLEVEL_OUTOF10: 1

## 2022-06-09 NOTE — PLAN OF CARE
Problem: Chronic Conditions and Co-morbidities  Goal: Patient's chronic conditions and co-morbidity symptoms are monitored and maintained or improved  Outcome: Progressing     Problem: Pain  Goal: Verbalizes/displays adequate comfort level or baseline comfort level  Outcome: Progressing     Problem: Wound:  Goal: Will show signs of wound healing; wound closure and no evidence of infection  Description: Will show signs of wound healing; wound closure and no evidence of infection  Outcome: Progressing     Problem: Pressure Ulcer:  Goal: Signs of wound healing will improve  Description: Signs of wound healing will improve  Outcome: Progressing  Goal: Absence of new pressure ulcer  Description: Absence of new pressure ulcer  Outcome: Progressing

## 2022-06-09 NOTE — FLOWSHEET NOTE
[address]:     [employer] [id number] [address] p: [phone] f: [phone]     Ordering Center:     [address] [place] [address]  [phone]  WOUND CARE [phone]  FAX NUMBER [phone]    Patient Information:      Taye Gill  [address] [address]   [phone]   : 1950  AGE: 70 y.o.      GENDER: female   TODAYS DATE:  2022    Insurance:      PRIMARY INSURANCE:  Plan: [employer] [place] DUAL  Coverage: [employer] [place]  Effective Date: 2020  [id number] - (Medicare Managed)    SECONDARY INSURANCE:  Plan:   Coverage:   Effective Date:   @Email Data [email or web address]@    [unfilled]   [unfilled]     Patient Wound Information:      Problem List Items Addressed This Visit        Other    Buttock wound, left, subsequent encounter    Open wound of right buttock    Relevant Orders    Initiate Outpatient Wound Care Protocol    Decreased strength, endurance, and mobility - Primary    Relevant Orders    Initiate Outpatient Wound Care Protocol      ICD-10 CODES: O76.422G, S81.819D    WOUNDS REQUIRING DRESSING SUPPLIES:     Wound 22 Buttocks Right #1 (Active)   Wound Image   22 1030   Wound Etiology Pressure Stage 2 22 1030   Wound Cleansed Cleansed with saline 22 0929   Dressing/Treatment Other (comment) 22 1117   Wound Length (cm) 1.5 cm 22 1030   Wound Width (cm) 0.5 cm 22 1030   Wound Depth (cm) 0.1 cm 22 1030   Wound Surface Area (cm^2) 0.75 cm^2 22 1030   Change in Wound Size % (l*w) -7.14 22 1030   Wound Volume (cm^3) 0.075 cm^3 22 1030   Wound Healing % -7 22 1030   Post-Procedure Length (cm) 0.4 cm 22 1218   Post-Procedure Width (cm) 0.7 cm 04/14/22 1218   Post-Procedure Depth (cm) 0.1 cm 22   Post-Procedure Surface Area (cm^2) 0.28 cm^2 22   Post-Procedure Volume (cm^3) 0.028 cm^3 04/14/22 1218   Wound Assessment Pink/red 06/09/22 1030   Drainage Amount None 06/09/22 1030   Drainage Description Serous 04/28/22 0929   Odor None 04/28/22 0929   Debby-wound Assessment Fragile;Dry/flaky 06/09/22 1030   Margins Defined edges 04/28/22 0929   Wound Thickness Description not for Pressure Injury Full thickness 06/09/22 1030   Number of days: 105       Wound 06/09/22 Buttocks Left #3 (Active)   Wound Image   06/09/22 1033   Wound Etiology Pressure Stage 3 06/09/22 1033   Dressing/Treatment Other (comment) 06/09/22 1117   Wound Length (cm) 1.5 cm 06/09/22 1033   Wound Width (cm) 0.5 cm 06/09/22 1033   Wound Depth (cm) 0.1 cm 06/09/22 1033   Wound Surface Area (cm^2) 0.75 cm^2 06/09/22 1033   Wound Volume (cm^3) 0.075 cm^3 06/09/22 1033   Wound Assessment Pink/red 06/09/22 1033   Drainage Amount None 06/09/22 1033   Odor None 06/09/22 1033   Debby-wound Assessment Fragile;Dry/flaky 06/09/22 1033   Wound Thickness Description not for Pressure Injury Full thickness 06/09/22 1033   Number of days: 0          Supplies Requested :      WOUND #: 1 and 3   PRIMARY DRESSING:  Other: TRIAD  by [employer] and Secure with: 4X4 gauze pad     FREQUENCY OF DRESSING CHANGES:  Daily           ADDITIONAL ITEMS:  [] Gloves Small  [x] Gloves Medium [] Gloves Large [] Gloves XLarge  [] Tape 1\" [] Tape 2\" [] Tape 3\"  [x] Medipore Tape  [x] Saline  [] Skin Prep   [] Adhesive Remover   [] Cotton Tip Applicators   [] Other:    Patient Wound(s) Debrided: [x] Yes   [] No    Debribement Type: mechanical    Debridement Date: 6/9/22    Patient currently being seen by Home Health: [] Yes   [x] No    Duration for needed supplies:  []15  [x]30  []60  []90 Days    Provider Information:      [facility] MARCO Villarreal, CNP  NPI:  [id number]

## 2022-06-09 NOTE — PROGRESS NOTES
[address]   Progress Note and Procedure Note      Marlene Oliver  MEDICAL RECORD NUMBER:  [id number]  AGE: 70 y.o. GENDER: female  : 1950  EPISODE DATE:  2022    Subjective:     Chief Complaint   Patient presents with    Wound Check     right buttocks. New would on left buttocks         HISTORY of PRESENT ILLNESS HPI    Sharyle Mais Floom is a 70 y. o. female who presents today for wound/ulcer evaluation. History of Wound Context: Chronic non healing wounds to bilateral buttocks. Had back surgery one year ago, with severe limitations in mobility post op, with resulting chronic wounds related to pressure/shear. At last visit recommended specialty seating surface and this provider contacted her DME company for info on delivery of a gel cushion seating surface. Company stated they would call her and deliver the cushion- however, this has not happened. At last visit, ordered duplex studies to r/o venous/arterial insufficiency - neither have been done. Patient reported she would like to only take care of one problem at a time. No further c/o lower extremity pain or swelling today. Cancelled last follow up appointment due to having COVID. Today presents with now bilateral buttock wounds- larger then previously. During discussion of tx plan with patient- determined DME company had supplied patient with hydrogel rather then Triad cream. Will re-order.    Wound/Ulcer Pain Timing/Severity: intermittent  Quality of pain: tender  Severity:  3 / 10   Modifying Factors: Pain worsens with care, repositioning, or any movement.   Associated Signs/Symptoms: pain     Ulcer Identification:  Ulcer Type: pressure and traumatic  Contributing Factors: chronic pressure, decreased mobility, shear force and obesity     Wound: N/A        PAST MEDICAL HISTORY        Diagnosis Date    Anxiety 2019    Arthritis     Bronchitis     Diabetes mellitus [facility]     Diabetic neuropathy associated with diabetes mellitus due to underlying condition ([address] 11/3/2017    Essential hypertension 4/4/2019    Hypercholesterolemia     Hypertension     Morbid obesity due to excess calories ([address] 4/4/2019    Thyroid disease     Wound infection 2018    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 without evidence of necrosis        Problem List:    Patient Active Problem List   Diagnosis    Diabetes mellitus with diabetic peripheral angiopathy without gangrene, without long-term current use of insulin ([address]    Peripheral angiopathy (HCC)    Non-healing surgical wound    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 without evidence of necrosis (CODE)    Diabetic neuropathy associated with diabetes mellitus due to underlying condition ([address]    Abscess of foot including toes, left    Unable to ambulate    Ataxia    Acute renal failure (ARF) ([address]    Hypothyroid    Morbid obesity due to excess calories ([address]    Essential hypertension    Benzodiazepine dependence ([address]    Anxiety    Bronchitis    OA (osteoarthritis)    Acute on chronic renal failure (HCC)    Hx of Chronic ulcer of left foot (HCC)    HLD (hyperlipidemia)    Fracture of right tibia and fibula    Abnormality of gait and mobility due to Impaired Mobility secondary to Flare up of OA. [facility] admit 4/04/19.     Uncontrolled type 2 diabetes mellitus with hyperglycemia (HCC)    Nocturnal hypoxemia due to obesity    Nocturnal hypoxia    Sleep apnea    Open wound of right buttock    Buttock wound    Venous insufficiency of both lower extremities    Wound of buttock, right, subsequent encounter    Decreased strength, endurance, and mobility    Complications of immobility    Buttock wound, left, subsequent encounter      Problem List Items Addressed This Visit        Other    Buttock wound, left, subsequent encounter    Open wound of right buttock    Relevant Orders    Initiate Outpatient Wound Care NOON      oxybutynin (DITROPAN-XL) 5 MG extended release tablet Take 5 mg by mouth daily      traZODone (DESYREL) 150 MG tablet Take 150 mg by mouth nightly      metoprolol (LOPRESSOR) 100 MG tablet Take 100 mg by mouth daily      atorvastatin (LIPITOR) 10 MG tablet Take 10 mg by mouth daily      polyethylene glycol (GLYCOLAX) 17 g packet Take 17 g by mouth every other day      Cholecalciferol (VITAMIN D3) 50 MCG (2000 UT) CAPS Take by mouth daily      Menthol-Methyl Salicylate (MUSCLE RUB) 10-15 % CREA cream Apply topically 2 times daily 1 application apply on the skin twice a day; arms and shoulders      coenzyme Q10 100 MG CAPS capsule Take 1 capsule by mouth 2 times daily (before meals) 60 capsule 1    Lactobacillus (PROBIOTIC ACIDOPHILUS) TABS Take 1 tablet by mouth daily 60 tablet 0    levothyroxine (SYNTHROID) 125 MCG tablet Take 125 mcg by mouth Daily      metFORMIN (GLUCOPHAGE) 1000 MG tablet Take 500 mg by mouth 2 times daily (with meals)        No current facility-administered medications on file prior to encounter. REVIEW OF SYSTEMS    Pertinent items are noted in HPI. Objective:      BP (!) 169/71   Pulse 59   Temp (!) 96.2 °F (35.7 °C) (Infrared)   Resp 16   LMP  (LMP Unknown)     Wt Readings from Last 3 Encounters:   09/24/20 240 lb (108.9 kg)   05/09/20 236 lb (107 kg)   04/04/19 275 lb (124.7 kg)       PHYSICAL EXAM    Constitutional:   Well nourished and well developed. Appears neat and clean. Patient is alert, oriented x3, and in no apparent distress. Respiratory:  Respiratory effort is easy and symmetric bilaterally. Rate is normal at rest and on room air. Vascular:  Capillary refill is <5 sec to digits bilateral.  Extremities negative for pitting edema. Neurological:  Gross and Light touch intact. Dermatological:  Wound description noted in wound assessment. Today has second open area- now bilateral buttocks.  Determined has not been applying Triad but hydrogel- will re-order and begin applying Triad. Psychiatric:  Judgement and insight intact. Short and long term memory intact. No evidence of depression, anxiety, or agitation. Patient is calm, cooperative, and communicative. Appropriate interactions and affect.       Assessment:      Problem List Items Addressed This Visit        Other    Buttock wound, left, subsequent encounter    Open wound of right buttock    Relevant Orders    Initiate Outpatient Wound Care Protocol    Decreased strength, endurance, and mobility - Primary    Relevant Orders    Initiate Outpatient Wound Care Protocol             Wound 02/24/22 Buttocks Right #1 (Active)   Wound Image   06/09/22 1030   Wound Etiology Pressure Stage 2 06/09/22 1030   Wound Cleansed Cleansed with saline 04/28/22 0929   Dressing/Treatment Calmoseptine/zinc oxide with menthol 04/28/22 0959   Wound Length (cm) 1.5 cm 06/09/22 1030   Wound Width (cm) 0.5 cm 06/09/22 1030   Wound Depth (cm) 0.1 cm 06/09/22 1030   Wound Surface Area (cm^2) 0.75 cm^2 06/09/22 1030   Change in Wound Size % (l*w) -7.14 06/09/22 1030   Wound Volume (cm^3) 0.075 cm^3 06/09/22 1030   Wound Healing % -7 06/09/22 1030   Post-Procedure Length (cm) 0.4 cm 04/14/22 1218   Post-Procedure Width (cm) 0.7 cm 04/14/22 1218   Post-Procedure Depth (cm) 0.1 cm 04/14/22 1218   Post-Procedure Surface Area (cm^2) 0.28 cm^2 04/14/22 1218   Post-Procedure Volume (cm^3) 0.028 cm^3 04/14/22 1218   Wound Assessment Pink/red 06/09/22 1030   Drainage Amount None 06/09/22 1030   Drainage Description Serous 04/28/22 0929   Odor None 04/28/22 0929   Debby-wound Assessment Fragile;Dry/flaky 06/09/22 1030   Margins Defined edges 04/28/22 0929   Wound Thickness Description not for Pressure Injury Full thickness 06/09/22 1030   Number of days: 105       Wound 04/07/22 Buttocks Right;Distal #2  (Active)   Wound Image   04/28/22 0929   Wound Etiology Pressure Stage 2 06/09/22 1030   Wound Cleansed Cleansed with saline 04/28/22 0929   Dressing/Treatment Calmoseptine/zinc oxide with menthol 04/28/22 0959   Wound Length (cm) 0 cm 06/09/22 1030   Wound Width (cm) 0 cm 06/09/22 1030   Wound Depth (cm) 0 cm 06/09/22 1030   Wound Surface Area (cm^2) 0 cm^2 06/09/22 1030   Change in Wound Size % (l*w) 100 06/09/22 1030   Wound Volume (cm^3) 0 cm^3 06/09/22 1030   Wound Healing % 100 06/09/22 1030   Wound Assessment Epithelialization 06/09/22 1030   Drainage Amount None 06/09/22 1030   Drainage Description Serous 04/28/22 0929   Odor None 04/28/22 0929   Debby-wound Assessment Fragile;Dry/flaky 06/09/22 1030   Margins Defined edges 04/28/22 0929   Wound Thickness Description not for Pressure Injury Partial thickness 04/28/22 0929   Number of days: 62       Wound 06/09/22 Buttocks Left #3 (Active)   Wound Image   06/09/22 1033   Wound Etiology Pressure Stage 3 06/09/22 1033   Wound Length (cm) 1.5 cm 06/09/22 1033   Wound Width (cm) 0.5 cm 06/09/22 1033   Wound Depth (cm) 0.1 cm 06/09/22 1033   Wound Surface Area (cm^2) 0.75 cm^2 06/09/22 1033   Wound Volume (cm^3) 0.075 cm^3 06/09/22 1033   Wound Assessment Pink/red 06/09/22 1033   Drainage Amount None 06/09/22 1033   Odor None 06/09/22 1033   Debby-wound Assessment Fragile;Dry/flaky 06/09/22 1033   Wound Thickness Description not for Pressure Injury Full thickness 06/09/22 1033   Number of days: 0       Plan:     Continue with increased frequency of repositioning - contact DME company regarding gel cushion. Will re-order Triad ointment- use as instructed below. Re-check in 3 weeks to follow with Dr. Katharine Wilder. Treatment Note please see attached Discharge Instructions    Written patient dismissal instructions given to patient and signed by patient or POA.          Discharge [address] and [facility] Medicine   Physician Orders and Discharge [address]  [place], [place]  [address]  Telephone: [phone]      [phone]        NAME: Yohannes Young  DATE OF BIRTH:  1950  MEDICAL RECORD NUMBER:  [id number]     Your  is: Chon Saabmaureen     Home Care/Facility: None     Wound Location: Right and Left Buttocks     Dressing orders:.Gently spray area with soap and water, rinse with plain water - gently pat dry. (You do not have to remove all the TRiad from skin/wound surface)     2. Apply a thin layer of Triad Coloplast    3. Cover with dry dressing use medipore tape. 4. Change daily                                                                                                                               Compression: CASSIDY's ordered in the wound center for your legs. [id number].      Offloading Device: Change position every 1-2 hours. Limit sitting in chair. Obtain a  gel cushion for: chair / car / wheelchair Do to chronic open buttock wounds.      Other Instructions: Apply protective cream/ointment to buttocks crease ( Calmoseptine, Desitin) daily. Increase Fruit/Veg intake.     Apply Lac-Hydrin to both legs daily (not between toes).      Dressing Supplies:      Keep all dressings clean, dry and intact.  Keep pressure off the wound(s) at all times.      Follow up visit [facility] 2022 @ 1:15 pm      With Dr. Pace Bachelor     Please give 24 hour notice if unable to keep appointment.  [phone]     If you experience any of the following, please call the Wound Care Service at  [phone] or go to the nearest emergency room.        *Increase in pain         *Temperature over 101           *Increase in drainage from your wound or a foul odor  *Uncontrolled swelling            *Need for compression bandage changes due to slippage, breakthrough drainage       PLEASE NOTE: IF YOU ARE UNABLE TO OBTAIN WOUND SUPPLIES, CONTINUE TO USE THE SUPPLIES YOU HAVE AVAILABLE UNTIL YOU ARE ABLE TO REACH US.  IT IS MOST IMPORTANT TO KEEP THE WOUND COVERED AT ALL TIMES                            Electronically signed by MARCO Costa NP on 6/9/2022 at 11:06 AM

## 2022-07-06 ENCOUNTER — HOSPITAL ENCOUNTER (OUTPATIENT)
Dept: WOUND CARE | Age: 72
Discharge: HOME | End: 2022-07-06
Payer: COMMERCIAL

## 2022-07-06 VITALS
TEMPERATURE: 98.2 F | RESPIRATION RATE: 18 BRPM | DIASTOLIC BLOOD PRESSURE: 63 MMHG | HEART RATE: 62 BPM | SYSTOLIC BLOOD PRESSURE: 140 MMHG

## 2022-07-06 DIAGNOSIS — S31.829D BUTTOCK WOUND, LEFT, SUBSEQUENT ENCOUNTER: ICD-10-CM

## 2022-07-06 DIAGNOSIS — S31.819A OPEN WOUND OF RIGHT BUTTOCK, INITIAL ENCOUNTER: ICD-10-CM

## 2022-07-06 DIAGNOSIS — E11.65 UNCONTROLLED TYPE 2 DIABETES MELLITUS WITH HYPERGLYCEMIA (HCC): ICD-10-CM

## 2022-07-06 DIAGNOSIS — Z74.09 DECREASED STRENGTH, ENDURANCE, AND MOBILITY: Primary | ICD-10-CM

## 2022-07-06 DIAGNOSIS — R53.1 DECREASED STRENGTH, ENDURANCE, AND MOBILITY: Primary | ICD-10-CM

## 2022-07-06 DIAGNOSIS — R68.89 DECREASED STRENGTH, ENDURANCE, AND MOBILITY: Primary | ICD-10-CM

## 2022-07-06 DIAGNOSIS — Z74.09 COMPLICATIONS OF IMMOBILITY: ICD-10-CM

## 2022-07-06 PROCEDURE — 99213 OFFICE O/P EST LOW 20 MIN: CPT | Performed by: FAMILY MEDICINE

## 2022-07-06 PROCEDURE — 99213 OFFICE O/P EST LOW 20 MIN: CPT

## 2022-07-06 PROCEDURE — 6370000000 HC RX 637 (ALT 250 FOR IP): Performed by: NURSE PRACTITIONER

## 2022-07-06 RX ORDER — LIDOCAINE HYDROCHLORIDE 40 MG/ML
SOLUTION TOPICAL ONCE
Status: CANCELLED | OUTPATIENT
Start: 2022-07-06 | End: 2022-07-06

## 2022-07-06 RX ORDER — BACITRACIN, NEOMYCIN, POLYMYXIN B 400; 3.5; 5 [USP'U]/G; MG/G; [USP'U]/G
OINTMENT TOPICAL ONCE
Status: CANCELLED | OUTPATIENT
Start: 2022-07-06 | End: 2022-07-06

## 2022-07-06 RX ORDER — LIDOCAINE 40 MG/G
CREAM TOPICAL ONCE
Status: CANCELLED | OUTPATIENT
Start: 2022-07-06 | End: 2022-07-06

## 2022-07-06 RX ORDER — GINSENG 100 MG
CAPSULE ORAL ONCE
Status: CANCELLED | OUTPATIENT
Start: 2022-07-06 | End: 2022-07-06

## 2022-07-06 RX ORDER — BACITRACIN ZINC AND POLYMYXIN B SULFATE 500; 1000 [USP'U]/G; [USP'U]/G
OINTMENT TOPICAL ONCE
Status: CANCELLED | OUTPATIENT
Start: 2022-07-06 | End: 2022-07-06

## 2022-07-06 RX ORDER — LIDOCAINE HYDROCHLORIDE 20 MG/ML
JELLY TOPICAL ONCE
Status: CANCELLED | OUTPATIENT
Start: 2022-07-06 | End: 2022-07-06

## 2022-07-06 RX ORDER — CLOBETASOL PROPIONATE 0.5 MG/G
OINTMENT TOPICAL ONCE
Status: CANCELLED | OUTPATIENT
Start: 2022-07-06 | End: 2022-07-06

## 2022-07-06 RX ORDER — LIDOCAINE 50 MG/G
OINTMENT TOPICAL ONCE
Status: CANCELLED | OUTPATIENT
Start: 2022-07-06 | End: 2022-07-06

## 2022-07-06 RX ORDER — GENTAMICIN SULFATE 1 MG/G
OINTMENT TOPICAL ONCE
Status: CANCELLED | OUTPATIENT
Start: 2022-07-06 | End: 2022-07-06

## 2022-07-06 RX ORDER — BETAMETHASONE DIPROPIONATE 0.05 %
OINTMENT (GRAM) TOPICAL ONCE
Status: CANCELLED | OUTPATIENT
Start: 2022-07-06 | End: 2022-07-06

## 2022-07-06 RX ADMIN — MUPIROCIN: 20 OINTMENT TOPICAL at 14:04

## 2022-07-06 NOTE — PROGRESS NOTES
[address]   Progress Note and Procedure Note      Rashard Perez  MEDICAL RECORD NUMBER:  [id number]  AGE: 67 y.o. GENDER: female  : 1950  EPISODE DATE:  2022    Subjective:     Chief Complaint   Patient presents with    Wound Check     bilateral buttock wounds         HISTORY of PRESENT ILLNESS HPI     Rashard Perez is a 67 y.o. female who presents today for wound/ulcer evaluation. History of Wound Context: Patient with history of immobility secondary to spinal fusion procedure over one year ago. Wounds on buttocks have been present for months. Patient's  states that Triad cream is not working and is causing wounds to get larger. Patient complains of no fever, chills or increased pain. However wounds are painful when she sits on them but this is not new. She received her new gel cushion and has been using it. Patient spends most of her time sitting in her wheelchair or a chair at home.   Wound/Ulcer Pain Timing/Severity: intermittent  Quality of pain: tender  Severity:  3 / 10   Modifying Factors: Pain worsens with dressing change and sitting  Associated Signs/Symptoms: pain    Ulcer Identification:  Ulcer Type: pressure and traumatic  Contributing Factors: diabetes, chronic pressure, decreased mobility, shear force and obesity    Wound: N/A        PAST MEDICAL HISTORY        Diagnosis Date    Anxiety 2019    Arthritis     Bronchitis     Diabetes mellitus ([address]     Diabetic neuropathy associated with diabetes mellitus due to underlying condition ([address] 11/3/2017    Essential hypertension 2019    Hypercholesterolemia     Hypertension     Morbid obesity due to excess calories ([address] 2019    Thyroid disease     Wound infection 2018    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 surgeries x3    TONSILLECTOMY N/A        FAMILY HISTORY    Family History   Problem Relation Age of Onset    Cancer Father        SOCIAL HISTORY    Social History     Tobacco Use    Smoking status: Never Smoker    Smokeless tobacco: Never Used   Vaping Use    Vaping Use: Never used   Substance Use Topics    Alcohol use: No    Drug use: No       ALLERGIES    Allergies   Allergen Reactions    Nitrofurantoin Other (See Comments)     Leg swelling     Tizanidine      Other reaction(s): Mental Status Change    Sulfa Antibiotics Rash       MEDICATIONS    Current Outpatient Medications on File Prior to Encounter   Medication Sig Dispense Refill    Melatonin 10 MG TABS Take by mouth daily At bedtime      acetaminophen (TYLENOL) 500 MG tablet Take by mouth every 6 hours as needed for Pain      Bismuth Tribromoph-Petrolatum (XEROFORM PETROLATUM GAUZE 5\"X9\") MISC external pads Apply 1 each topically daily Apply to affected area as directed daily, after mupirocin ointment. 12 each 1    ammonium lactate (LAC-HYDRIN) 12 % lotion Apply topically daily to both lower legs, after washing with mild soap/water. Apply while still moist. 225 g 1    ammonium lactate (LAC-HYDRIN) 12 % lotion Apply topically daily after cleansing legs-with mild soap and water. Apply to intact areas of skin only. 225 g 1    oxyCODONE HCl (OXY-IR) 10 MG immediate release tablet Take 10 mg by mouth every 6 hours as needed for Pain.       Capsaicin (ZOSTRIX) 0.035 % CREA cream Apply topically 3 times daily      furosemide (LASIX) 40 MG tablet Take 40 mg by mouth 2 times daily 1.5 tablet by mouth as directed; 1 tablet in AM and 1/2 tablet at NOON      oxybutynin (DITROPAN-XL) 5 MG extended release tablet Take 5 mg by mouth daily      traZODone (DESYREL) 150 MG tablet Take 150 mg by mouth nightly      metoprolol (LOPRESSOR) 100 MG tablet Take 100 mg by mouth daily      atorvastatin (LIPITOR) 10 MG tablet Take 10 mg by mouth daily      polyethylene glycol (GLYCOLAX) 17 g packet Take 17 g by mouth every other day      Cholecalciferol (VITAMIN D3) 50 MCG (2000 UT) CAPS Take by mouth daily      Menthol-Methyl Salicylate (MUSCLE RUB) 10-15 % CREA cream Apply topically 2 times daily 1 application apply on the skin twice a day; arms and shoulders      coenzyme Q10 100 MG CAPS capsule Take 1 capsule by mouth 2 times daily (before meals) 60 capsule 1    Lactobacillus (PROBIOTIC ACIDOPHILUS) TABS Take 1 tablet by mouth daily 60 tablet 0    levothyroxine (SYNTHROID) 125 MCG tablet Take 125 mcg by mouth Daily      metFORMIN (GLUCOPHAGE) 1000 MG tablet Take 500 mg by mouth 2 times daily (with meals)        No current facility-administered medications on file prior to encounter. REVIEW OF SYSTEMS    Pertinent items are noted in HPI. Objective:      BP (!) 140/63   Pulse 62   Temp 98.2 °F (36.8 °C) (Temporal)   Resp 18   LMP  (LMP Unknown)     Wt Readings from Last 3 Encounters:   09/24/20 240 lb (108.9 kg)   05/09/20 236 lb (107 kg)   04/04/19 275 lb (124.7 kg)       PHYSICAL EXAM    Constitutional:   Well nourished and well developed. Appears neat and clean. Patient is alert, oriented x3, and in no apparent distress. Respiratory:  Respiratory effort is easy and symmetric bilaterally. Rate is normal at rest and on room air. Vascular:  Capillary refill is <5 sec to digits bilateral.  Patient  Neurological:  Gross and Light touch intact. Protective sensation intact     Dermatological:  Wound description noted in wound assessment. Patient with 2 small pressure wounds, 1 on each buttocks. Wounds appear clean and shallow. There is some dry flaking skin around wound. No surrounding erythema or swelling. No purulence. Areas are tender to touch. Psychiatric:  Judgement and insight intact. Short and long term memory intact. No evidence of depression, anxiety, or agitation.   Patient is calm, cooperative, and communicative. Appropriate interactions and affect. Assessment:      There are no active hospital problems to display for this patient. Procedure Note  Indications:  Based on my examination of this patient's wound(s)/ulcer(s) today, debridement is not required to promote healing and evaluate the wound base.     Wound 02/24/22 Buttocks Right #1 (Active)   Wound Image   07/06/22 1319   Wound Etiology Pressure Stage 3 07/06/22 1317   Wound Cleansed Cleansed with saline 04/28/22 0929   Dressing/Treatment Other (comment) 06/09/22 1117   Wound Length (cm) 1.5 cm 07/06/22 1319   Wound Width (cm) 0.4 cm 07/06/22 1319   Wound Depth (cm) 0.1 cm 07/06/22 1319   Wound Surface Area (cm^2) 0.6 cm^2 07/06/22 1319   Change in Wound Size % (l*w) 14.29 07/06/22 1319   Wound Volume (cm^3) 0.06 cm^3 07/06/22 1319   Wound Healing % 14 07/06/22 1319   Post-Procedure Length (cm) 0.4 cm 04/14/22 1218   Post-Procedure Width (cm) 0.7 cm 04/14/22 1218   Post-Procedure Depth (cm) 0.1 cm 04/14/22 1218   Post-Procedure Surface Area (cm^2) 0.28 cm^2 04/14/22 1218   Post-Procedure Volume (cm^3) 0.028 cm^3 04/14/22 1218   Wound Assessment Pink/red 07/06/22 1317   Drainage Amount None 07/06/22 1317   Drainage Description Serous 04/28/22 0929   Odor None 07/06/22 1317   Debby-wound Assessment Intact;Fragile 07/06/22 1317   Margins Defined edges 07/06/22 1317   Wound Thickness Description not for Pressure Injury Full thickness 06/09/22 1030   Number of days: 132       Wound 06/09/22 Buttocks Left #3 (Active)   Wound Image   07/06/22 1319   Wound Etiology Pressure Stage 3 07/06/22 1317   Dressing/Treatment Other (comment) 06/09/22 1117   Wound Length (cm) 0.8 cm 07/06/22 1319   Wound Width (cm) 1 cm 07/06/22 1319   Wound Depth (cm) 0.1 cm 07/06/22 1319   Wound Surface Area (cm^2) 0.8 cm^2 07/06/22 1319   Change in Wound Size % (l*w) -6.67 07/06/22 1319   Wound Volume (cm^3) 0.08 cm^3 07/06/22 1319   Wound Healing % -7 07/06/22 1319   Wound Assessment Pink/red 07/06/22 1317   Drainage Amount None 07/06/22 1317   Odor None 07/06/22 1317   Debby-wound Assessment Fragile;Dry/flaky 07/06/22 1317   Margins Defined edges 07/06/22 1317   Wound Thickness Description not for Pressure Injury Full thickness 06/09/22 1033   Number of days: 27                  Plan: We will try a trial of Bactroban ointment to wounds 1-2 times per day. Continue to use calmoseptine on surrounding areas. Strongly encouraged patient to stay off wounds as much as possible. Advised to spend some time laying down throughout the day on her side. Change position every 1-2 hours, limit time in chair. Call clinic if wounds are worsening for a sooner appointment. Go to ER with signs and symptoms of infection such as fever, increased pain, drainage, redness or swelling. Treatment Note please see attached Discharge Instructions    Written patient dismissal instructions given to patient and signed by patient or POA. Discharge [address] and [facility] Medicine   Physician Orders and Discharge [address]  Telephone: [phone]      [phone]        NAME: Brian Barone  DATE OF BIRTH:  1950  MEDICAL RECORD NUMBER:  [id number]     Your  is: Constanza Sauceda     Home Care/Facility: None     Wound Location: Right and Left Buttocks     Dressing orders:.Gently spray area with soap and water, rinse with plain water - gently pat dry.     2. Apply a thin layer of Mupirocin ointment   3. Cover with dry dressing use medipore tape. 4. Change daily                                                                                                                               Compression: CASSIDY's ordered in the wound center for your legs.  Scheduling: [phone].      Offloading Device: Change position every 1-2 hours. Limit sitting in chair.      Other Instructions: Apply protective cream/ointment to buttocks crease ( Calmoseptine, Desitin) daily. Increase Fruit/Veg intake.     Apply Lac-Hydrin to both legs daily (not between toes).      Dressing Supplies:      Keep all dressings clean, dry and intact.  Keep pressure off the wound(s) at all times.      Follow up visit  3  Weeks July 27 , 2022 @      2:30 pm      Please give 24 hour notice if unable to keep appointment. [phone]     If you experience any of the following, please call the Wound Care Service at  [phone] or go to the nearest emergency room.        *Increase in pain         *Temperature over 101           *Increase in drainage from your wound or a foul odor  *Uncontrolled swelling            *Need for compression bandage changes due to slippage, breakthrough drainage       PLEASE NOTE: IF YOU ARE UNABLE TO OBTAIN WOUND SUPPLIES, CONTINUE TO USE THE SUPPLIES YOU HAVE AVAILABLE UNTIL YOU ARE ABLE TO [address].  IT IS MOST IMPORTANT TO KEEP THE WOUND COVERED AT ALL TIMES                               Electronically signed by Daisha Jean MD on 7/6/2022 at 1:56 PM

## 2022-07-06 NOTE — PLAN OF CARE
Problem: Chronic Conditions and Co-morbidities  Goal: Patient's chronic conditions and co-morbidity symptoms are monitored and maintained or improved  Outcome: Progressing     Problem: Wound:  Goal: Will show signs of wound healing; wound closure and no evidence of infection  Outcome: Progressing     Problem: Pressure Ulcer:  Goal: Signs of wound healing will improve  Outcome: Progressing  Goal: Absence of new pressure ulcer  Outcome: Progressing

## 2022-07-27 ENCOUNTER — HOSPITAL ENCOUNTER (OUTPATIENT)
Dept: WOUND CARE | Age: 72
Discharge: HOME | End: 2022-07-27
Payer: COMMERCIAL

## 2022-07-27 VITALS
SYSTOLIC BLOOD PRESSURE: 174 MMHG | TEMPERATURE: 97.2 F | RESPIRATION RATE: 20 BRPM | DIASTOLIC BLOOD PRESSURE: 61 MMHG | HEART RATE: 67 BPM

## 2022-07-27 DIAGNOSIS — Z74.09 DECREASED STRENGTH, ENDURANCE, AND MOBILITY: Primary | ICD-10-CM

## 2022-07-27 DIAGNOSIS — S31.829D BUTTOCK WOUND, LEFT, SUBSEQUENT ENCOUNTER: ICD-10-CM

## 2022-07-27 DIAGNOSIS — R53.1 DECREASED STRENGTH, ENDURANCE, AND MOBILITY: Primary | ICD-10-CM

## 2022-07-27 DIAGNOSIS — S31.819D WOUND OF BUTTOCK, RIGHT, SUBSEQUENT ENCOUNTER: ICD-10-CM

## 2022-07-27 DIAGNOSIS — S31.819A OPEN WOUND OF RIGHT BUTTOCK, INITIAL ENCOUNTER: ICD-10-CM

## 2022-07-27 DIAGNOSIS — R68.89 DECREASED STRENGTH, ENDURANCE, AND MOBILITY: Primary | ICD-10-CM

## 2022-07-27 PROCEDURE — 99213 OFFICE O/P EST LOW 20 MIN: CPT

## 2022-07-27 PROCEDURE — 99213 OFFICE O/P EST LOW 20 MIN: CPT | Performed by: FAMILY MEDICINE

## 2022-07-27 PROCEDURE — 6370000000 HC RX 637 (ALT 250 FOR IP): Performed by: NURSE PRACTITIONER

## 2022-07-27 RX ORDER — BACITRACIN ZINC AND POLYMYXIN B SULFATE 500; 1000 [USP'U]/G; [USP'U]/G
OINTMENT TOPICAL ONCE
Status: CANCELLED | OUTPATIENT
Start: 2022-07-27 | End: 2022-07-27

## 2022-07-27 RX ORDER — BETAMETHASONE DIPROPIONATE 0.05 %
OINTMENT (GRAM) TOPICAL ONCE
Status: CANCELLED | OUTPATIENT
Start: 2022-07-27 | End: 2022-07-27

## 2022-07-27 RX ORDER — CLOBETASOL PROPIONATE 0.5 MG/G
OINTMENT TOPICAL ONCE
Status: CANCELLED | OUTPATIENT
Start: 2022-07-27 | End: 2022-07-27

## 2022-07-27 RX ORDER — BACITRACIN, NEOMYCIN, POLYMYXIN B 400; 3.5; 5 [USP'U]/G; MG/G; [USP'U]/G
OINTMENT TOPICAL ONCE
Status: CANCELLED | OUTPATIENT
Start: 2022-07-27 | End: 2022-07-27

## 2022-07-27 RX ORDER — LIDOCAINE 50 MG/G
OINTMENT TOPICAL ONCE
Status: CANCELLED | OUTPATIENT
Start: 2022-07-27 | End: 2022-07-27

## 2022-07-27 RX ORDER — LIDOCAINE HYDROCHLORIDE 20 MG/ML
JELLY TOPICAL ONCE
Status: CANCELLED | OUTPATIENT
Start: 2022-07-27 | End: 2022-07-27

## 2022-07-27 RX ORDER — GENTAMICIN SULFATE 1 MG/G
OINTMENT TOPICAL ONCE
Status: CANCELLED | OUTPATIENT
Start: 2022-07-27 | End: 2022-07-27

## 2022-07-27 RX ORDER — GINSENG 100 MG
CAPSULE ORAL ONCE
Status: CANCELLED | OUTPATIENT
Start: 2022-07-27 | End: 2022-07-27

## 2022-07-27 RX ORDER — LIDOCAINE 40 MG/G
CREAM TOPICAL ONCE
Status: CANCELLED | OUTPATIENT
Start: 2022-07-27 | End: 2022-07-27

## 2022-07-27 RX ORDER — LIDOCAINE HYDROCHLORIDE 40 MG/ML
SOLUTION TOPICAL ONCE
Status: CANCELLED | OUTPATIENT
Start: 2022-07-27 | End: 2022-07-27

## 2022-07-27 RX ADMIN — MUPIROCIN: 20 OINTMENT TOPICAL at 16:52

## 2022-07-27 ASSESSMENT — PAIN DESCRIPTION - ORIENTATION: ORIENTATION: RIGHT;LEFT

## 2022-07-27 ASSESSMENT — PAIN DESCRIPTION - DESCRIPTORS: DESCRIPTORS: BURNING;SORE

## 2022-07-27 ASSESSMENT — PAIN DESCRIPTION - LOCATION: LOCATION: BUTTOCKS

## 2022-07-27 ASSESSMENT — PAIN SCALES - GENERAL: PAINLEVEL_OUTOF10: 5

## 2022-07-27 NOTE — PLAN OF CARE
Problem: Chronic Conditions and Co-morbidities  Goal: Patient's chronic conditions and co-morbidity symptoms are monitored and maintained or improved  Outcome: Progressing     Problem: Wound:  Goal: Will show signs of wound healing; wound closure and no evidence of infection  Outcome: Progressing

## 2022-07-27 NOTE — DISCHARGE INSTRUCTIONS
[facility]   Physician Orders and Discharge Instructions  [address] [place] [address]  Telephone: [phone]        NAME:  Juan Lainez                                                                                       YOB: 1950  MEDICAL RECORD NUMBER:  [id number]     Your  is:  [address] [place] [address] Care/Facility: None     Wound Location: Right and Left Buttocks     Dressing orders:.Gently spray area with soap and water, rinse with plain water - gently pat dry. 2. Apply a thin layer of Mupirocin ointment (zinc, or calmoseptine oint to area irritated by tape)  3. Cover with a dry dressing / mepilex border  4. Change daily                                                                                                                               Compression: CASSIDY's ordered in the wound center for your legs. Scheduling: [id number]. Offloading Device: Change position every 1-2 hours. Limit sitting in chair. Other Instructions: Apply protective cream/ointment to buttocks crease ( Calmoseptine, Desitin) daily. Increase Fruit/Veg intake. Apply Lac-Hydrin to both legs daily (not between toes). Dressing Supplies:      Keep all dressings clean, dry and intact. Keep pressure off the wound(s) at all times. Follow up visit  2 weeks,  Augf. 10th, 2022 @      1:15     Please give 24 hour notice if unable to keep appointment. [phone]     If you experience any of the following, please call the Wound Care Service at  [phone] or go to the nearest emergency room.         *Increase in pain         *Temperature over 101           *Increase in drainage from your wound or a foul odor  *Uncontrolled swelling            *Need for compression bandage changes due to slippage, breakthrough drainage       PLEASE NOTE: IF YOU ARE UNABLE TO OBTAIN WOUND SUPPLIES, CONTINUE TO USE THE SUPPLIES YOU HAVE AVAILABLE UNTIL YOU ARE ABLE TO REACH US.  IT IS MOST IMPORTANT TO KEEP THE WOUND COVERED AT ALL TIMES

## 2022-07-27 NOTE — PROGRESS NOTES
[facility]   Progress Note and Procedure Note      Juan Lainez  MEDICAL RECORD NUMBER:  [id number]  AGE: 67 y.o. GENDER: female  : 1950  EPISODE DATE:  2022    Subjective:     Chief Complaint   Patient presents with    Wound Check     Right and left buttocks         HISTORY of PRESENT ILLNESS HPI     Juan Lainez is a 67 y.o. female who presents today for wound/ulcer evaluation. History of Wound Context: Patient is accompanied by her  who changes wound dressing daily. He states that wound has significantly improved with current treatment and they wish to continue this management. Unfortunately, he used tape today that he typically does not use. And when it was removed some mild tearing occurred in the periwound skin. No fever or chills. No increased drainage, erythema, swelling or pain. Wound/Ulcer Pain Timing/Severity: intermittent  Quality of pain: tender  Severity:  3  10   Modifying Factors: Pain worsens with care, pressure  Associated Signs/Symptoms: pain    Ulcer Identification:  Ulcer Type: pressure  Contributing Factors: chronic pressure, decreased mobility, shear force, and obesity    Wound: N/A        PAST MEDICAL HISTORY        Diagnosis Date    Anxiety 2019    Arthritis     Bronchitis     Diabetes mellitus ([address]     Diabetic neuropathy associated with diabetes mellitus due to underlying condition ([address] 11/3/2017    Essential hypertension 2019    Hypercholesterolemia     Hypertension     Morbid obesity due to excess calories ([address] 2019    Thyroid disease     Wound infection 2018    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  surgeries x3    TONSILLECTOMY N/A        FAMILY HISTORY    Family History   Problem Relation Age of every other day      Cholecalciferol (VITAMIN D3) 50 MCG (2000 UT) CAPS Take by mouth daily      Menthol-Methyl Salicylate (MUSCLE RUB) 10-15 % CREA cream Apply topically 2 times daily 1 application apply on the skin twice a day; arms and shoulders      coenzyme Q10 100 MG CAPS capsule Take 1 capsule by mouth 2 times daily (before meals) 60 capsule 1    Lactobacillus (PROBIOTIC ACIDOPHILUS) TABS Take 1 tablet by mouth daily 60 tablet 0    levothyroxine (SYNTHROID) 125 MCG tablet Take 125 mcg by mouth Daily      metFORMIN (GLUCOPHAGE) 1000 MG tablet Take 500 mg by mouth 2 times daily (with meals)        No current facility-administered medications on file prior to encounter. REVIEW OF SYSTEMS    Pertinent items are noted in HPI. Objective:      BP (!) 174/61   Pulse 67   Temp 97.2 °F (36.2 °C) (Temporal)   Resp 20   LMP  (LMP Unknown)     Wt Readings from Last 3 Encounters:   09/24/20 240 lb (108.9 kg)   05/09/20 236 lb (107 kg)   04/04/19 275 lb (124.7 kg)       PHYSICAL EXAM    Constitutional:   Well nourished and well developed. Appears neat and clean. Patient is alert, oriented x3, and in no apparent distress. Respiratory:  Respiratory effort is easy and symmetric bilaterally. Rate is normal at rest and on room air. Vascular:  Capillary refill is <5 sec to digits bilateral.  Extremities negative for pitting edema. Neurological:  Gross and Light touch intact. Protective sensation intact      Dermatological:  Wound description noted in wound assessment. Buttock wounds are healing. Surrounding skin improved overall however there is now a small abrasion to the right of the wound on patient's right buttocks from tape irritation. Bilateral wounds are clean with no purulence, surrounding erythema or swelling. Psychiatric:  Judgement and insight intact. Short and long term memory intact. No evidence of depression, anxiety, or agitation. Patient is calm, cooperative, and communicative. Appropriate interactions and affect. Assessment:      There are no active hospital problems to display for this patient. Procedure Note  Indications:  Based on my examination of this patient's wound(s)/ulcer(s) today, debridement is not required to promote healing and evaluate the wound base. Wound 02/24/22 Buttocks Right #1 (Active)   Wound Image   07/27/22 1605   Wound Etiology Pressure Stage 3 07/27/22 1605   Wound Cleansed Cleansed with saline 04/28/22 0929   Dressing/Treatment Antibacterial ointment 07/27/22 1650   Wound Length (cm) 1.5 cm 07/27/22 1605   Wound Width (cm) 0.5 cm 07/27/22 1605   Wound Depth (cm) 0.1 cm 07/27/22 1605   Wound Surface Area (cm^2) 0.75 cm^2 07/27/22 1605   Change in Wound Size % (l*w) -7.14 07/27/22 1605   Wound Volume (cm^3) 0.075 cm^3 07/27/22 1605   Wound Healing % -7 07/27/22 1605   Post-Procedure Length (cm) 0.4 cm 04/14/22 1218   Post-Procedure Width (cm) 0.7 cm 04/14/22 1218   Post-Procedure Depth (cm) 0.1 cm 04/14/22 1218   Post-Procedure Surface Area (cm^2) 0.28 cm^2 04/14/22 1218   Post-Procedure Volume (cm^3) 0.028 cm^3 04/14/22 1218   Wound Assessment Pink/red 07/27/22 1605   Drainage Amount None 07/27/22 1605   Drainage Description Serous 04/28/22 0929   Odor None 07/27/22 1605   Debby-wound Assessment Fragile; Excoriated 07/27/22 1605   Margins Defined edges 07/27/22 1605   Wound Thickness Description not for Pressure Injury Full thickness 06/09/22 1030   Number of days: 153       Wound 06/09/22 Buttocks Left #3 (Active)   Wound Image   07/27/22 1605   Wound Etiology Pressure Stage 3 07/27/22 1605   Dressing/Treatment Calmoseptine/zinc oxide with menthol;Dry dressing; Pharmaceutical agent (see MAR) 07/06/22 1407   Wound Length (cm) 0.5 cm 07/27/22 1605   Wound Width (cm) 0.5 cm 07/27/22 1605   Wound Depth (cm) 0.1 cm 07/27/22 1605   Wound Surface Area (cm^2) 0.25 cm^2 07/27/22 1605   Change in Wound Size % (l*w) 66.67 07/27/22 1605   Wound Volume (cm^3) 0.025 cm^3 07/27/22 1605   Wound Healing % 67 07/27/22 1605   Wound Assessment Pink/red 07/27/22 1605   Drainage Amount None 07/27/22 1605   Odor None 07/27/22 1605   Debby-wound Assessment Fragile 07/27/22 1605   Margins Defined edges 07/27/22 1605   Wound Thickness Description not for Pressure Injury Full thickness 06/09/22 1033   Number of days: 48                  Plan:     Advised patient to stay off wound is much as possible. Eat a healthy diet and drink plenty of fluids. Go to the emergency room with signs and symptoms of infection such as fever, increased pain, swelling, increased redness or drainage. Avoid using tape in future due to sensitivity of surrounding skin. Treatment Note please see attached Discharge Instructions    Written patient dismissal instructions given to patient and signed by patient or POA. Discharge [facility]   Physician Orders and Discharge Instructions  [address]  Telephone: [phone]        NAME:  Brigitte Kwan                                                                                       YOB: 1950  MEDICAL RECORD NUMBER:  [id number]     Your  is:  [address] [place] [address] Care/Facility: None     Wound Location: Right and Left Buttocks     Dressing orders:.Gently spray area with soap and water, rinse with plain water - gently pat dry. 2. Apply a thin layer of Mupirocin ointment (zinc, or calmoseptine oint to area irritated by tape)  3. Cover with a dry dressing / mepilex border  4. Change daily                                                                                                                               Compression: CASSIDY's ordered in the wound center for your legs. Scheduling: [id number]. Offloading Device: Change position every 1-2 hours. Limit sitting in chair.      Other Instructions: Apply protective cream/ointment to buttocks crease ( Calmoseptine, Desitin) daily. Increase Fruit/Veg intake. Apply Lac-Hydrin to both legs daily (not between toes). Dressing Supplies:      Keep all dressings clean, dry and intact. Keep pressure off the wound(s) at all times. Follow up visit  2 weeks,  Augf. 10th, 2022 @      1:15     Please give 24 hour notice if unable to keep appointment. [phone]     If you experience any of the following, please call the Wound Care Service at  [phone] or go to the nearest emergency room. *Increase in pain         *Temperature over 101           *Increase in drainage from your wound or a foul odor  *Uncontrolled swelling            *Need for compression bandage changes due to slippage, breakthrough drainage       PLEASE NOTE: IF YOU ARE UNABLE TO OBTAIN WOUND SUPPLIES, CONTINUE TO USE THE SUPPLIES YOU HAVE AVAILABLE UNTIL YOU ARE ABLE TO REACH US.  IT IS MOST IMPORTANT TO KEEP THE WOUND COVERED AT ALL TIMES                             Electronically signed by Bárbara Knox MD on 7/27/2022 at 4:52 PM

## 2022-08-10 ENCOUNTER — HOSPITAL ENCOUNTER (OUTPATIENT)
Dept: WOUND CARE | Age: 72
Discharge: HOME | End: 2022-08-10

## 2022-08-17 ENCOUNTER — HOSPITAL ENCOUNTER (OUTPATIENT)
Dept: WOUND CARE | Age: 72
Discharge: HOME | End: 2022-08-17
Payer: COMMERCIAL

## 2022-08-17 VITALS
RESPIRATION RATE: 20 BRPM | DIASTOLIC BLOOD PRESSURE: 59 MMHG | TEMPERATURE: 97.2 F | SYSTOLIC BLOOD PRESSURE: 185 MMHG | HEART RATE: 73 BPM

## 2022-08-17 DIAGNOSIS — S31.819A OPEN WOUND OF RIGHT BUTTOCK, INITIAL ENCOUNTER: ICD-10-CM

## 2022-08-17 DIAGNOSIS — R53.1 DECREASED STRENGTH, ENDURANCE, AND MOBILITY: Primary | ICD-10-CM

## 2022-08-17 DIAGNOSIS — Z74.09 DECREASED STRENGTH, ENDURANCE, AND MOBILITY: Primary | ICD-10-CM

## 2022-08-17 DIAGNOSIS — R68.89 DECREASED STRENGTH, ENDURANCE, AND MOBILITY: Primary | ICD-10-CM

## 2022-08-17 DIAGNOSIS — S31.829D WOUND OF LEFT BUTTOCK, SUBSEQUENT ENCOUNTER: ICD-10-CM

## 2022-08-17 PROCEDURE — 6370000000 HC RX 637 (ALT 250 FOR IP): Performed by: NURSE PRACTITIONER

## 2022-08-17 PROCEDURE — 99213 OFFICE O/P EST LOW 20 MIN: CPT

## 2022-08-17 PROCEDURE — 99213 OFFICE O/P EST LOW 20 MIN: CPT | Performed by: FAMILY MEDICINE

## 2022-08-17 RX ORDER — GINSENG 100 MG
CAPSULE ORAL ONCE
Status: CANCELLED | OUTPATIENT
Start: 2022-08-17 | End: 2022-08-17

## 2022-08-17 RX ORDER — BACITRACIN ZINC AND POLYMYXIN B SULFATE 500; 1000 [USP'U]/G; [USP'U]/G
OINTMENT TOPICAL ONCE
Status: CANCELLED | OUTPATIENT
Start: 2022-08-17 | End: 2022-08-17

## 2022-08-17 RX ORDER — GENTAMICIN SULFATE 1 MG/G
OINTMENT TOPICAL ONCE
Status: CANCELLED | OUTPATIENT
Start: 2022-08-17 | End: 2022-08-17

## 2022-08-17 RX ORDER — LIDOCAINE HYDROCHLORIDE 40 MG/ML
SOLUTION TOPICAL ONCE
Status: CANCELLED | OUTPATIENT
Start: 2022-08-17 | End: 2022-08-17

## 2022-08-17 RX ORDER — LIDOCAINE 50 MG/G
OINTMENT TOPICAL ONCE
Status: CANCELLED | OUTPATIENT
Start: 2022-08-17 | End: 2022-08-17

## 2022-08-17 RX ORDER — CLOBETASOL PROPIONATE 0.5 MG/G
OINTMENT TOPICAL ONCE
Status: CANCELLED | OUTPATIENT
Start: 2022-08-17 | End: 2022-08-17

## 2022-08-17 RX ORDER — PREDNISONE 20 MG/1
20 TABLET ORAL 2 TIMES DAILY
COMMUNITY

## 2022-08-17 RX ORDER — BETAMETHASONE DIPROPIONATE 0.05 %
OINTMENT (GRAM) TOPICAL ONCE
Status: CANCELLED | OUTPATIENT
Start: 2022-08-17 | End: 2022-08-17

## 2022-08-17 RX ORDER — LIDOCAINE HYDROCHLORIDE 20 MG/ML
JELLY TOPICAL ONCE
Status: CANCELLED | OUTPATIENT
Start: 2022-08-17 | End: 2022-08-17

## 2022-08-17 RX ORDER — BACITRACIN, NEOMYCIN, POLYMYXIN B 400; 3.5; 5 [USP'U]/G; MG/G; [USP'U]/G
OINTMENT TOPICAL ONCE
Status: CANCELLED | OUTPATIENT
Start: 2022-08-17 | End: 2022-08-17

## 2022-08-17 RX ORDER — LIDOCAINE 40 MG/G
CREAM TOPICAL ONCE
Status: CANCELLED | OUTPATIENT
Start: 2022-08-17 | End: 2022-08-17

## 2022-08-17 RX ADMIN — MUPIROCIN: 20 OINTMENT TOPICAL at 15:10

## 2022-08-17 NOTE — DISCHARGE INSTRUCTIONS
[facility]   Physician Orders and Discharge Instructions  [address]  Telephone: [phone]        NAME:  Antonio Valdez                                                                                       YOB: 1950  MEDICAL RECORD NUMBER:  [id number]     Your  is:  [address] [place] [address] Care/Facility: None     Wound Location: Right Buttocks     Dressing orders:.Gently spray area with soap and water, rinse with plain water - gently pat dry. 2. Apply a thin layer of Mupirocin ointment (zinc, or calmoseptine ointment to  irritated areas on both buttocks)   3. Cover with a dry dressing / mepilex border  4. Change daily                                                                                                                               Compression: CASSIDY's ordered in the wound center for your legs. Scheduling: [id number]. Offloading Device: Change position every 1-2 hours. Limit sitting in chair. Other Instructions: Apply protective cream/ointment to buttocks crease ( Calmoseptine, Desitin) daily. Increase Fruit/Veg intake. Apply Lac-Hydrin to both legs daily (not between toes). Keep both heels off surfaces - use a pillow or towel to help elevate. Dressing Supplies:      Keep all dressings clean, dry and intact. Keep pressure off the wound(s) at all times. Follow up visit  2 weeks,  August 31, 2022 @      1:45 pm      Please give 24 hour notice if unable to keep appointment. [phone]     If you experience any of the following, please call the Wound Care Service at  [phone] or go to the nearest emergency room.         *Increase in pain         *Temperature over 101           *Increase in drainage from your wound or a foul odor  *Uncontrolled swelling            *Need for compression bandage changes due to slippage, breakthrough drainage       PLEASE NOTE: IF YOU ARE UNABLE TO OBTAIN WOUND SUPPLIES, CONTINUE TO USE THE SUPPLIES YOU HAVE AVAILABLE UNTIL YOU ARE ABLE TO REACH US.  IT IS MOST IMPORTANT TO KEEP THE WOUND COVERED AT ALL TIMES

## 2022-08-17 NOTE — PROGRESS NOTES
[facility]   Progress Note and Procedure Note      Sue Zambrano  MEDICAL RECORD NUMBER:  [id number]  AGE: 67 y.o. GENDER: female  : 1950  EPISODE DATE:  2022    Subjective:     Chief Complaint   Patient presents with    Wound Check     Left and right buttocks         HISTORY of PRESENT ILLNESS HPI     Sue Zambrano is a 67 y.o. female who presents today for wound/ulcer evaluation. History of Wound Context: Patient is accompanied by her  who does dressing changes. He states wound on left buttocks is healed and the right one is improving slowly. Due to patient's immobility she is sitting most of the time. Patient states that she tries to spend time in her bed in different positions to take pressure off of her bottom. No fever or chills. Wound is tender and hurts with physical therapy exercises. Wound/Ulcer Pain Timing/Severity: intermittent  Quality of pain: tender  Severity:  4 / 10   Modifying Factors: Pain worsens with exercises and dressing change  Associated Signs/Symptoms: pain    Ulcer Identification:  Ulcer Type: pressure  Contributing Factors: diabetes, chronic pressure, decreased mobility, shear force, and obesity    Wound:  pressure ulcer        PAST MEDICAL HISTORY        Diagnosis Date    Anxiety 2019    Arthritis     Bronchitis     Diabetes mellitus ([address]     Diabetic neuropathy associated with diabetes mellitus due to underlying condition ([facility] 11/3/2017    Essential hypertension 2019    Hypercholesterolemia     Hypertension     Morbid obesity due to excess calories ([facility] 2019    Thyroid disease     Wound infection 2018    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  surgeries x3    TONSILLECTOMY N/A        FAMILY HISTORY    Family History   Problem Relation Age of Onset    Cancer Father        SOCIAL HISTORY    Social History     Tobacco Use    Smoking status: Never    Smokeless tobacco: Never   Vaping Use    Vaping Use: Never used   Substance Use Topics    Alcohol use: No    Drug use: No       ALLERGIES    Allergies   Allergen Reactions    Nitrofurantoin Other (See Comments)     Leg swelling     Tizanidine      Other reaction(s): Mental Status Change    Sulfa Antibiotics Rash       MEDICATIONS    Current Outpatient Medications on File Prior to Encounter   Medication Sig Dispense Refill    predniSONE (DELTASONE) 20 MG tablet Take 20 mg by mouth 2 times daily Two times a day for five days, then one time a day for five days. mupirocin (BACTROBAN) 2 % ointment Apply topically 2 times daily. 15 g 1    Melatonin 10 MG TABS Take by mouth daily At bedtime      acetaminophen (TYLENOL) 500 MG tablet Take by mouth every 6 hours as needed for Pain      Bismuth Tribromoph-Petrolatum (XEROFORM PETROLATUM GAUZE 5\"X9\") MISC external pads Apply 1 each topically daily Apply to affected area as directed daily, after mupirocin ointment. 12 each 1    ammonium lactate (LAC-HYDRIN) 12 % lotion Apply topically daily to both lower legs, after washing with mild soap/water. Apply while still moist. 225 g 1    ammonium lactate (LAC-HYDRIN) 12 % lotion Apply topically daily after cleansing legs-with mild soap and water. Apply to intact areas of skin only. 225 g 1    oxyCODONE HCl (OXY-IR) 10 MG immediate release tablet Take 10 mg by mouth every 6 hours as needed for Pain.       Capsaicin (ZOSTRIX) 0.035 % CREA cream Apply topically 3 times daily      furosemide (LASIX) 40 MG tablet Take 40 mg by mouth 2 times daily 1.5 tablet by mouth as directed; 1 tablet in AM and 1/2 tablet at NOON      oxybutynin (DITROPAN-XL) 5 MG extended release tablet Take 5 mg by mouth daily      traZODone (DESYREL) 150 MG tablet Take 150 mg by mouth nightly metoprolol (LOPRESSOR) 100 MG tablet Take 100 mg by mouth daily      atorvastatin (LIPITOR) 10 MG tablet Take 10 mg by mouth daily      polyethylene glycol (GLYCOLAX) 17 g packet Take 17 g by mouth every other day      Cholecalciferol (VITAMIN D3) 50 MCG (2000 UT) CAPS Take by mouth daily      Menthol-Methyl Salicylate (MUSCLE RUB) 10-15 % CREA cream Apply topically 2 times daily 1 application apply on the skin twice a day; arms and shoulders      coenzyme Q10 100 MG CAPS capsule Take 1 capsule by mouth 2 times daily (before meals) 60 capsule 1    Lactobacillus (PROBIOTIC ACIDOPHILUS) TABS Take 1 tablet by mouth daily 60 tablet 0    levothyroxine (SYNTHROID) 125 MCG tablet Take 125 mcg by mouth Daily      metFORMIN (GLUCOPHAGE) 1000 MG tablet Take 500 mg by mouth 2 times daily (with meals)        No current facility-administered medications on file prior to encounter. REVIEW OF SYSTEMS    Pertinent items are noted in HPI. Objective:      BP (!) 185/59   Pulse 73   Temp 97.2 °F (36.2 °C) (Temporal)   Resp 20   LMP  (LMP Unknown)     Wt Readings from Last 3 Encounters:   09/24/20 240 lb (108.9 kg)   05/09/20 236 lb (107 kg)   04/04/19 275 lb (124.7 kg)       PHYSICAL EXAM    Constitutional:   Well nourished and well developed. Appears neat and clean. Patient is alert, oriented x3, and in no apparent distress. Respiratory:  Respiratory effort is easy and symmetric bilaterally. Rate is normal at rest and on room air. Vascular: Capillary refill is <5 sec to digits bilateral.  Extremities positive for pitting edema- 1+ bilaterally    Neurological:  Gross and Light touch intact. Protective sensation intact at wound    Dermatological:  Wound description noted in wound assessment. Wound on left buttocks is healed, areas dry. Wound on right buttocks is showing slight improvement. Wound is clean with no eschar or slough. No surrounding erythema or swelling. No odor.     Psychiatric:  Judgement and insight intact. Short and long term memory intact. No evidence of depression, anxiety, or agitation. Patient is calm, cooperative, and communicative. Appropriate interactions and affect. Assessment:      There are no active hospital problems to display for this patient. Procedure Note  Indications:  Based on my examination of this patient's wound(s)/ulcer(s) today, debridement is not required to promote healing and evaluate the wound base.     Wound 02/24/22 Buttocks Right #1 (Active)   Wound Image   08/17/22 1450   Wound Etiology Pressure Stage 3 08/17/22 1450   Wound Cleansed Cleansed with saline 08/17/22 1450   Dressing/Treatment Antibacterial ointment;Silicone border 00/42/02 1511   Wound Length (cm) 1.2 cm 08/17/22 1450   Wound Width (cm) 0.7 cm 08/17/22 1450   Wound Depth (cm) 0.1 cm 08/17/22 1450   Wound Surface Area (cm^2) 0.84 cm^2 08/17/22 1450   Change in Wound Size % (l*w) -20 08/17/22 1450   Wound Volume (cm^3) 0.084 cm^3 08/17/22 1450   Wound Healing % -20 08/17/22 1450   Post-Procedure Length (cm) 0.4 cm 04/14/22 1218   Post-Procedure Width (cm) 0.7 cm 04/14/22 1218   Post-Procedure Depth (cm) 0.1 cm 04/14/22 1218   Post-Procedure Surface Area (cm^2) 0.28 cm^2 04/14/22 1218   Post-Procedure Volume (cm^3) 0.028 cm^3 04/14/22 1218   Wound Assessment Pink/red 08/17/22 1450   Drainage Amount Small 08/17/22 1450   Drainage Description Serosanguinous 08/17/22 1450   Odor None 08/17/22 1450   Debby-wound Assessment Intact 08/17/22 1450   Margins Defined edges 08/17/22 1450   Wound Thickness Description not for Pressure Injury Full thickness 06/09/22 1030   Number of days: 174       Wound 06/09/22 Buttocks Left #3 (Active)   Wound Image   08/17/22 1450   Wound Etiology Pressure Stage 3 08/17/22 1450   Dressing/Treatment Calmoseptine/zinc oxide with menthol 08/17/22 1511   Wound Length (cm) 0 cm 08/17/22 1450   Wound Width (cm) 0 cm 08/17/22 1450   Wound Depth (cm) 0 cm 08/17/22 1450   Wound Device: Change position every 1-2 hours. Limit sitting in chair. Other Instructions: Apply protective cream/ointment to buttocks crease ( Calmoseptine, Desitin) daily. Increase Fruit/Veg intake. Apply Lac-Hydrin to both legs daily (not between toes). Keep both heels off surfaces - use a pillow or towel to help elevate. Dressing Supplies:      Keep all dressings clean, dry and intact. Keep pressure off the wound(s) at all times. Follow up visit  2 weeks,  August 31, 2022 @      1:45 pm      Please give 24 hour notice if unable to keep appointment. [phone]     If you experience any of the following, please call the Wound Care Service at  [phone] or go to the nearest emergency room. *Increase in pain         *Temperature over 101           *Increase in drainage from your wound or a foul odor  *Uncontrolled swelling            *Need for compression bandage changes due to slippage, breakthrough drainage       PLEASE NOTE: IF YOU ARE UNABLE TO OBTAIN WOUND SUPPLIES, CONTINUE TO USE THE SUPPLIES YOU HAVE AVAILABLE UNTIL YOU ARE ABLE TO REACH US.  IT IS MOST IMPORTANT TO KEEP THE WOUND COVERED AT ALL TIMES                             Electronically signed by Nicole Yun MD on 8/17/2022 at 3:18 PM

## 2022-08-31 ENCOUNTER — HOSPITAL ENCOUNTER (OUTPATIENT)
Dept: WOUND CARE | Age: 72
Discharge: HOME | End: 2022-08-31
Payer: COMMERCIAL

## 2022-08-31 VITALS
DIASTOLIC BLOOD PRESSURE: 63 MMHG | TEMPERATURE: 98.2 F | SYSTOLIC BLOOD PRESSURE: 154 MMHG | RESPIRATION RATE: 22 BRPM | HEART RATE: 88 BPM

## 2022-08-31 DIAGNOSIS — S31.819A OPEN WOUND OF RIGHT BUTTOCK, INITIAL ENCOUNTER: ICD-10-CM

## 2022-08-31 DIAGNOSIS — R53.1 DECREASED STRENGTH, ENDURANCE, AND MOBILITY: Primary | ICD-10-CM

## 2022-08-31 DIAGNOSIS — S60.512A HAND ABRASION, LEFT, INITIAL ENCOUNTER: ICD-10-CM

## 2022-08-31 DIAGNOSIS — R68.89 DECREASED STRENGTH, ENDURANCE, AND MOBILITY: Primary | ICD-10-CM

## 2022-08-31 DIAGNOSIS — Z74.09 DECREASED STRENGTH, ENDURANCE, AND MOBILITY: Primary | ICD-10-CM

## 2022-08-31 PROCEDURE — 99213 OFFICE O/P EST LOW 20 MIN: CPT | Performed by: FAMILY MEDICINE

## 2022-08-31 PROCEDURE — 6370000000 HC RX 637 (ALT 250 FOR IP): Performed by: NURSE PRACTITIONER

## 2022-08-31 RX ORDER — LIDOCAINE 40 MG/G
CREAM TOPICAL ONCE
Status: COMPLETED | OUTPATIENT
Start: 2022-08-31 | End: 2022-08-31

## 2022-08-31 RX ORDER — LIDOCAINE HYDROCHLORIDE 20 MG/ML
JELLY TOPICAL ONCE
Status: CANCELLED | OUTPATIENT
Start: 2022-08-31 | End: 2022-08-31

## 2022-08-31 RX ORDER — BACITRACIN ZINC AND POLYMYXIN B SULFATE 500; 1000 [USP'U]/G; [USP'U]/G
OINTMENT TOPICAL ONCE
Status: CANCELLED | OUTPATIENT
Start: 2022-08-31 | End: 2022-08-31

## 2022-08-31 RX ORDER — GINSENG 100 MG
CAPSULE ORAL ONCE
Status: CANCELLED | OUTPATIENT
Start: 2022-08-31 | End: 2022-08-31

## 2022-08-31 RX ORDER — LIDOCAINE 50 MG/G
OINTMENT TOPICAL ONCE
Status: CANCELLED | OUTPATIENT
Start: 2022-08-31 | End: 2022-08-31

## 2022-08-31 RX ORDER — LIDOCAINE HYDROCHLORIDE 40 MG/ML
SOLUTION TOPICAL ONCE
Status: CANCELLED | OUTPATIENT
Start: 2022-08-31 | End: 2022-08-31

## 2022-08-31 RX ORDER — BETAMETHASONE DIPROPIONATE 0.05 %
OINTMENT (GRAM) TOPICAL ONCE
Status: CANCELLED | OUTPATIENT
Start: 2022-08-31 | End: 2022-08-31

## 2022-08-31 RX ORDER — GENTAMICIN SULFATE 1 MG/G
OINTMENT TOPICAL ONCE
Status: CANCELLED | OUTPATIENT
Start: 2022-08-31 | End: 2022-08-31

## 2022-08-31 RX ORDER — CLOBETASOL PROPIONATE 0.5 MG/G
OINTMENT TOPICAL ONCE
Status: CANCELLED | OUTPATIENT
Start: 2022-08-31 | End: 2022-08-31

## 2022-08-31 RX ORDER — LIDOCAINE 40 MG/G
CREAM TOPICAL ONCE
Status: CANCELLED | OUTPATIENT
Start: 2022-08-31 | End: 2022-08-31

## 2022-08-31 RX ORDER — AMMONIUM LACTATE 12 G/100G
CREAM TOPICAL PRN
Qty: 385 G | Refills: 1 | Status: SHIPPED | OUTPATIENT
Start: 2022-08-31 | End: 2022-09-30

## 2022-08-31 RX ORDER — BACITRACIN, NEOMYCIN, POLYMYXIN B 400; 3.5; 5 [USP'U]/G; MG/G; [USP'U]/G
OINTMENT TOPICAL ONCE
Status: CANCELLED | OUTPATIENT
Start: 2022-08-31 | End: 2022-08-31

## 2022-08-31 RX ADMIN — MUPIROCIN: 20 OINTMENT TOPICAL at 15:02

## 2022-08-31 RX ADMIN — LIDOCAINE 4%: 4 CREAM TOPICAL at 14:08

## 2022-08-31 ASSESSMENT — PAIN DESCRIPTION - DESCRIPTORS: DESCRIPTORS: BURNING

## 2022-08-31 ASSESSMENT — PAIN DESCRIPTION - LOCATION: LOCATION: BACK;BUTTOCKS

## 2022-08-31 ASSESSMENT — PAIN SCALES - GENERAL: PAINLEVEL_OUTOF10: 7

## 2022-08-31 ASSESSMENT — PAIN DESCRIPTION - ORIENTATION: ORIENTATION: RIGHT;LEFT

## 2022-08-31 NOTE — PROGRESS NOTES
[address] [address]   Progress Note and Procedure Note      Ross Calix  MEDICAL RECORD NUMBER:  [id number]  AGE: 67 y.o. GENDER: female  : 1950  EPISODE DATE:  2022    Subjective:     Chief Complaint   Patient presents with    Wound Check     Right buttock wound         HISTORY of PRESENT ILLNESS HPI     Ross Calix is a 67 y.o. female who presents today for wound/ulcer evaluation. 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 Wound on right buttocks from previous visit is unchanged. However there is a new smaller wound just distal.  Also her left buttock is beginning to show presence of scabbed wound that was previously healed. Patient's  cares for her wounds and states that the new wounds appeared just a few days ago. Patient has not been feeling well and has been sitting in her chair more often. Patient had back surgery over 1 year ago and subsequently developed limitations in mobility postop. Patient also has history of diabetes. Left hand wound appeared about a week ago. It is only mildly tender. No surrounding erythema or swelling noted by patient. Patient states she accidentally hit it against something and abrasion occurred.   Wound/Ulcer Pain Timing/Severity: intermittent  Quality of pain: tender  Severity:  3 / 10   Modifying Factors: Pain worsens with sitting, repositioning and dressing changes  Associated Signs/Symptoms: pain    Ulcer Identification:  Ulcer Type: pressure  Contributing Factors: diabetes, chronic pressure, decreased mobility, shear force, and obesity    Wound: N/A        PAST MEDICAL HISTORY        Diagnosis Date    Anxiety 2019    Arthritis     Bronchitis     Diabetes mellitus ([address]     Diabetic neuropathy associated with diabetes mellitus due to underlying condition ([address] 11/3/2017    Essential hypertension 2019    Hypercholesterolemia     Hypertension     Morbid obesity due to excess calories ([address] 4/4/2019    Thyroid disease     Wound infection 2018    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 surgeries x3    TONSILLECTOMY N/A        FAMILY HISTORY    Family History   Problem Relation Age of Onset    Cancer Father        SOCIAL HISTORY    Social History     Tobacco Use    Smoking status: Never    Smokeless tobacco: Never   Vaping Use    Vaping Use: Never used   Substance Use Topics    Alcohol use: No    Drug use: No       ALLERGIES    Allergies   Allergen Reactions    Nitrofurantoin Other (See Comments)     Leg swelling     Tizanidine      Other reaction(s): Mental Status Change    Sulfa Antibiotics Rash       MEDICATIONS    Current Outpatient Medications on File Prior to Encounter   Medication Sig Dispense Refill    predniSONE (DELTASONE) 20 MG tablet Take 20 mg by mouth 2 times daily Two times a day for five days, then one time a day for five days. mupirocin (BACTROBAN) 2 % ointment Apply topically 2 times daily. 15 g 1    Melatonin 10 MG TABS Take by mouth daily At bedtime      acetaminophen (TYLENOL) 500 MG tablet Take by mouth every 6 hours as needed for Pain      Bismuth Tribromoph-Petrolatum (XEROFORM PETROLATUM GAUZE 5\"X9\") MISC external pads Apply 1 each topically daily Apply to affected area as directed daily, after mupirocin ointment. 12 each 1    ammonium lactate (LAC-HYDRIN) 12 % lotion Apply topically daily to both lower legs, after washing with mild soap/water. Apply while still moist. 225 g 1    ammonium lactate (LAC-HYDRIN) 12 % lotion Apply topically daily after cleansing legs-with mild soap and water. Apply to intact areas of skin only. 225 g 1    oxyCODONE HCl (OXY-IR) 10 MG immediate release tablet Take 10 mg by mouth every 6 hours as needed for Pain. Capsaicin (ZOSTRIX) 0.035 % CREA cream Apply topically 3 times daily      furosemide (LASIX) 40 MG tablet Take 40 mg by mouth 2 times daily 1.5 tablet by mouth as directed; 1 tablet in AM and 1/2 tablet at NOON      oxybutynin (DITROPAN-XL) 5 MG extended release tablet Take 5 mg by mouth daily      traZODone (DESYREL) 150 MG tablet Take 150 mg by mouth nightly      metoprolol (LOPRESSOR) 100 MG tablet Take 100 mg by mouth daily      atorvastatin (LIPITOR) 10 MG tablet Take 10 mg by mouth daily      polyethylene glycol (GLYCOLAX) 17 g packet Take 17 g by mouth every other day      Cholecalciferol (VITAMIN D3) 50 MCG (2000 UT) CAPS Take by mouth daily      Menthol-Methyl Salicylate (MUSCLE RUB) 10-15 % CREA cream Apply topically 2 times daily 1 application apply on the skin twice a day; arms and shoulders      coenzyme Q10 100 MG CAPS capsule Take 1 capsule by mouth 2 times daily (before meals) 60 capsule 1    Lactobacillus (PROBIOTIC ACIDOPHILUS) TABS Take 1 tablet by mouth daily 60 tablet 0    levothyroxine (SYNTHROID) 125 MCG tablet Take 125 mcg by mouth Daily      metFORMIN (GLUCOPHAGE) 1000 MG tablet Take 500 mg by mouth 2 times daily (with meals)        No current facility-administered medications on file prior to encounter. REVIEW OF SYSTEMS    Pertinent items are noted in HPI. Objective:      BP (!) 154/63   Pulse 88   Temp 98.2 °F (36.8 °C) (Temporal)   Resp 22   LMP  (LMP Unknown)     Wt Readings from Last 3 Encounters:   09/24/20 240 lb (108.9 kg)   05/09/20 236 lb (107 kg)   04/04/19 275 lb (124.7 kg)       PHYSICAL EXAM    Constitutional:   Well nourished and well developed. Appears neat and clean. Patient is alert, oriented x3, and in no apparent distress. Respiratory:  Respiratory effort is easy and symmetric bilaterally. Rate is normal at rest and on room air.     Vascular:    Capillary refill is <5 sec to digits bilateral.  Extremities negative for pitting edema. Neurological:  Gross and Light touch intact. Protective sensation intact    Dermatological:  Wound description noted in wound assessment. There is a new open wound on right buttocks. And left buttocks has a new mildly scabbed area. No surrounding erythema or swelling of wounds. No odor. Wounds are tender to touch. Hand abrasion with no surrounding erythema or swelling. No warmth or odor. Psychiatric:  Judgement and insight intact. Short and long term memory intact. No evidence of depression, anxiety, or agitation. Patient is calm, cooperative, and communicative. Appropriate interactions and affect. Assessment:      Active Hospital Problems    Diagnosis Date Noted    Buttock wound, left, subsequent encounter [S31.829D] 06/09/2022     Priority: Medium    Diabetes mellitus with diabetic peripheral angiopathy without gangrene, without long-term current use of insulin ([facility] [E11.51] 09/22/2017        Procedure Note  Indications:  Based on my examination of this patient's wound(s)/ulcer(s) today, debridement is not required to promote healing and evaluate the wound base.     Wound 02/24/22 Buttocks Right #1 (Active)   Wound Image   08/31/22 1357   Wound Etiology Pressure Stage 3 08/31/22 1357   Wound Cleansed Cleansed with saline 08/31/22 1357   Dressing/Treatment Antibacterial ointment;Silicone border 06/27/63 1511   Wound Length (cm) 1.5 cm 08/31/22 1424   Wound Width (cm) 0.8 cm 08/31/22 1424   Wound Depth (cm) 0.1 cm 08/31/22 1424   Wound Surface Area (cm^2) 1.2 cm^2 08/31/22 1424   Change in Wound Size % (l*w) -71.43 08/31/22 1424   Wound Volume (cm^3) 0.12 cm^3 08/31/22 1424   Wound Healing % -71 08/31/22 1424   Post-Procedure Length (cm) 0.4 cm 04/14/22 1218   Post-Procedure Width (cm) 0.7 cm 04/14/22 1218   Post-Procedure Depth (cm) 0.1 cm 04/14/22 1218   Post-Procedure Surface Area (cm^2) 0.28 cm^2 04/14/22 1218   Post-Procedure Volume (cm^3) 0.028 cm^3 04/14/22 1218   Wound Assessment Slough;Pink/red 08/31/22 1357   Drainage Amount Small 08/31/22 1357   Drainage Description Serosanguinous; Yellow 08/31/22 1357   Odor None 08/31/22 1357   Debby-wound Assessment Intact 08/31/22 1357   Margins Defined edges 08/31/22 1357   Wound Thickness Description not for Pressure Injury Full thickness 06/09/22 1030   Number of days: 188       Wound 08/31/22 Buttocks Right;Distal #2 (Active)   Wound Length (cm) 1.5 cm 08/31/22 1429   Wound Width (cm) 0.75 cm 08/31/22 1429   Wound Depth (cm) 0.1 cm 08/31/22 1429   Wound Surface Area (cm^2) 1.125 cm^2 08/31/22 1429   Wound Volume (cm^3) 0.1125 cm^3 08/31/22 1429   Wound Assessment Pink/red 08/31/22 1429   Drainage Amount Scant 08/31/22 1429   Drainage Description Serous 08/31/22 1429   Odor None 08/31/22 1429   Debby-wound Assessment Intact 08/31/22 1429   Margins Defined edges 08/31/22 1429   Wound Thickness Description not for Pressure Injury Full thickness 08/31/22 1429   Number of days: 0       Wound 08/31/22 Hand Left;Dorsal #4 (Active)   Wound Image   08/31/22 1435   Wound Length (cm) 3 cm 08/31/22 1435   Wound Width (cm) 2.5 cm 08/31/22 1435   Wound Surface Area (cm^2) 7.5 cm^2 08/31/22 1435   Wound Assessment Slough 08/31/22 1435   Drainage Amount Scant 08/31/22 1435   Drainage Description Yellow 08/31/22 1435   Odor None 08/31/22 1435   Debby-wound Assessment Intact 08/31/22 1435   Margins Defined edges 08/31/22 1435   Wound Thickness Description not for Pressure Injury Full thickness 08/31/22 1435   Number of days: 0                  Plan:       Discussed with patient and her caregiver the importance of going to emergency room with signs symptoms of infection such as fever/chills, increased redness, swelling, increased drainage, warmth or odor. Also reinforced the importance of decreasing pressure on wounds by avoiding shear force and repositioning often. Patient has been using Lac-Hydrin cream for lower legs, and it works very well.   Patient request refill. Treatment Note please see attached Discharge Instructions    Written patient dismissal instructions given to patient and signed by patient or POA. Discharge [facility]   Physician Orders and Discharge Instructions  [address]  Telephone: [phone]        NAME:  Juan Lainez                                                                                       YOB: 1950  MEDICAL RECORD NUMBER:  [id number]     Your  is:  [address] [place] [address] Care/Facility: None     Wound Location: Right Buttocks     Dressing orders:.Gently spray area with soap and water, rinse with plain water - gently pat dry. 2. Apply a thin layer of Mupirocin ointment (zinc, or calmoseptine ointment to  irritated areas on both buttocks)   3. HYDROFERRA BLUE AND A [address] BORDER  4. Change daily                                                                                                                             LEFT DORSAL HAND WOUND:   1. CLEANSE WITH NORMAL SALINE  2. APPLY XEROFORM GAUZE AND A DRY DRESSING AND WRAP WITH CLING OR KERLIX    Compression: CASSIDY's ordered in the wound center for your legs. Scheduling: [id number]. Offloading Device: Change position every 1-2 hours. Limit sitting in chair. Other Instructions: Apply protective cream/ointment to buttocks crease ( Calmoseptine, Desitin) daily. Increase Fruit/Veg intake. Apply Lac-Hydrin to both legs daily (not between toes). Keep both heels off surfaces - use a pillow or towel to help elevate. Dressing Supplies:      Keep all dressings clean, dry and intact. Keep pressure off the wound(s) at all times. Follow up visit  2 weeks,  September 14TH, 2022 @      1:45 pm      Please give 24 hour notice if unable to keep appointment.  [phone]     If you experience any of the following, please call the Wound Care Service at  [phone] or go to the nearest emergency room. *Increase in pain         *Temperature over 101           *Increase in drainage from your wound or a foul odor  *Uncontrolled swelling            *Need for compression bandage changes due to slippage, breakthrough drainage       PLEASE NOTE: IF YOU ARE UNABLE TO OBTAIN WOUND SUPPLIES, CONTINUE TO USE THE SUPPLIES YOU HAVE AVAILABLE UNTIL YOU ARE ABLE TO REACH US.  IT IS MOST IMPORTANT TO KEEP THE WOUND COVERED AT ALL TIMES                       Electronically signed by Joseline Frank MD on 8/31/2022 at 2:43 PM

## 2022-08-31 NOTE — DISCHARGE INSTRUCTIONS
[facility]   Physician Orders and Discharge Instructions  [address]  Telephone: [phone]        NAME:  Kirt Hooker                                                                                       YOB: 1950  MEDICAL RECORD NUMBER:  [id number]     Your  is:  [address] [place] [address] Care/Facility: None     Wound Location: Right Buttocks     Dressing orders:.Gently spray area with soap and water, rinse with plain water - gently pat dry. 2. Apply a thin layer of Mupirocin ointment (zinc, or calmoseptine ointment to  irritated areas on both buttocks)   3. HYDROFERRA BLUE AND A [address] BORDER  4. Change daily                                                                                                                             LEFT DORSAL HAND WOUND:   1. CLEANSE WITH NORMAL SALINE  2. APPLY XEROFORM GAUZE AND A DRY DRESSING AND WRAP WITH CLING OR KERLIX    Compression: CASSIDY's ordered in the wound center for your legs. Scheduling: [id number]. Offloading Device: Change position every 1-2 hours. Limit sitting in chair. Other Instructions: Apply protective cream/ointment to buttocks crease ( Calmoseptine, Desitin) daily. Increase Fruit/Veg intake. Apply Lac-Hydrin to both legs daily (not between toes). Keep both heels off surfaces - use a pillow or towel to help elevate. Dressing Supplies:      Keep all dressings clean, dry and intact. Keep pressure off the wound(s) at all times. Follow up visit  2 weeks,  September 14TH, 2022 @      1:45 pm      Please give 24 hour notice if unable to keep appointment. [phone]     If you experience any of the following, please call the Wound Care Service at  [phone] or go to the nearest emergency room.         *Increase in pain         *Temperature over 101           *Increase in drainage from

## 2022-09-06 NOTE — FLOWSHEET NOTE
[address] [facility] [address]:     [employer] [id number] [facility], [address] [place] [address] p: [phone] f: [phone]     Ordering Center:     [address] [place] [address]  [phone]  WOUND CARE [phone]  FAX NUMBER [phone]    Patient Information:      Karyn Guzmán  [address] [facility]   [phone]   : 1950  AGE: 67 y.o.      GENDER: female   TODAYS DATE:  2022    Insurance:      PRIMARY INSURANCE:  Plan: [employer] [place] DUAL  Coverage: [place]  Effective Date: 2020  [id number] - (Medicare Managed)    SECONDARY INSURANCE:  Plan:   Coverage:   Effective Date:   [unfilled]    [unfilled]   [unfilled]     Patient Wound Information:      Problem List Items Addressed This Visit          Other    Hand abrasion, left, initial encounter    Open wound of right buttock    Decreased strength, endurance, and mobility - Primary       WOUNDS REQUIRING DRESSING SUPPLIES:     Wound 22 Buttocks Right #1 (Active)   Wound Image   22 1357   Wound Etiology Pressure Stage 3 22 1357   Wound Cleansed Cleansed with saline 22 1357   Dressing/Treatment Antibacterial ointment;Silicone border 37/64/55 1511   Wound Length (cm) 1.5 cm 22 1424   Wound Width (cm) 0.8 cm 22 1424   Wound Depth (cm) 0.1 cm 22 1424   Wound Surface Area (cm^2) 1.2 cm^2 22 1424   Change in Wound Size % (l*w) -71.43 22 1424   Wound Volume (cm^3) 0.12 cm^3 22 1424   Wound Healing % -71 22 1424   Post-Procedure Length (cm) 0.4 cm 22 1218   Post-Procedure Width (cm) 0.7 cm 22 1218   Post-Procedure Depth (cm) 0.1 cm 22 1218   Post-Procedure Surface Area (cm^2) 0.28 cm^2 22 1218   Post-Procedure Volume (cm^3) 0.028 cm^3 22 1218   Wound Assessment Slough;Pink/red 22 1357   Drainage Amount Small 22 1357   Drainage Description Serosanguinous; Yellow 08/31/22 1357   Odor None 08/31/22 1357   Debby-wound Assessment Intact 08/31/22 1357   Margins Defined edges 08/31/22 1357   Wound Thickness Description not for Pressure Injury Full thickness 06/09/22 1030   Number of days: 194       Wound 08/31/22 Buttocks Right;Distal #2 (Active)   Wound Length (cm) 1.5 cm 08/31/22 1429   Wound Width (cm) 0.75 cm 08/31/22 1429   Wound Depth (cm) 0.1 cm 08/31/22 1429   Wound Surface Area (cm^2) 1.125 cm^2 08/31/22 1429   Wound Volume (cm^3) 0.1125 cm^3 08/31/22 1429   Wound Assessment Pink/red 08/31/22 1429   Drainage Amount Scant 08/31/22 1429   Drainage Description Serous 08/31/22 1429   Odor None 08/31/22 1429   Debby-wound Assessment Intact 08/31/22 1429   Margins Defined edges 08/31/22 1429   Wound Thickness Description not for Pressure Injury Full thickness 08/31/22 1429   Number of days: 6       Wound 08/31/22 Hand Left;Dorsal #4 (Active)   Wound Image   08/31/22 1435   Wound Length (cm) 3 cm 08/31/22 1435   Wound Width (cm) 2.5 cm 08/31/22 1435   Wound Surface Area (cm^2) 7.5 cm^2 08/31/22 1435   Wound Assessment Slough 08/31/22 1435   Drainage Amount Scant 08/31/22 1435   Drainage Description Yellow 08/31/22 1435   Odor None 08/31/22 1435   Debby-wound Assessment Intact 08/31/22 1435   Margins Defined edges 08/31/22 1435   Wound Thickness Description not for Pressure Injury Full thickness 08/31/22 1435   Number of days: 6          Supplies Requested :      WOUND #: all  2   PRIMARY DRESSING:  Hydrofera Blue pad, Silicone border, Other: xeroform gauze   Cover and Secure with: Gauze pad  Conforming roll gauze     FREQUENCY OF DRESSING CHANGES:  Daily             ADDITIONAL ITEMS:  [] Gloves Small  [x] Gloves Medium [] Gloves Large [] Gloves XLarge  [] Tape 1\" [x] Tape 2\" [] Tape 3\"  [] Medipore Tape  [x] Saline  [] Skin Prep   [] Adhesive Remover   [] Cotton Tip Applicators   [] Other:    Patient Wound(s) Debrided: [x] Yes   [] No    Debribement Type:  mechanical    Debridement Date: 8/31/22    Patient currently being seen by Home Health: [] Yes   [x] No    Duration for needed supplies:  []15  [x]30  []60  []90 Days    Provider Information:      PROVIDER'S NAME:  Aminah Solano MD   NPI:  [id number]

## 2022-09-21 ENCOUNTER — HOSPITAL ENCOUNTER (OUTPATIENT)
Dept: WOUND CARE | Age: 72
Discharge: HOME | End: 2022-09-21
Payer: COMMERCIAL

## 2022-09-21 VITALS
SYSTOLIC BLOOD PRESSURE: 121 MMHG | DIASTOLIC BLOOD PRESSURE: 55 MMHG | TEMPERATURE: 97.1 F | HEART RATE: 72 BPM | RESPIRATION RATE: 20 BRPM

## 2022-09-21 DIAGNOSIS — R68.89 DECREASED STRENGTH, ENDURANCE, AND MOBILITY: ICD-10-CM

## 2022-09-21 DIAGNOSIS — R53.1 DECREASED STRENGTH, ENDURANCE, AND MOBILITY: ICD-10-CM

## 2022-09-21 DIAGNOSIS — Z74.09 DECREASED STRENGTH, ENDURANCE, AND MOBILITY: ICD-10-CM

## 2022-09-21 DIAGNOSIS — S31.819A OPEN WOUND OF RIGHT BUTTOCK, INITIAL ENCOUNTER: ICD-10-CM

## 2022-09-21 DIAGNOSIS — S60.512A HAND ABRASION, LEFT, INITIAL ENCOUNTER: Primary | ICD-10-CM

## 2022-09-21 DIAGNOSIS — Z74.09 COMPLICATIONS OF IMMOBILITY: ICD-10-CM

## 2022-09-21 PROCEDURE — 99213 OFFICE O/P EST LOW 20 MIN: CPT

## 2022-09-21 PROCEDURE — 6370000000 HC RX 637 (ALT 250 FOR IP): Performed by: NURSE PRACTITIONER

## 2022-09-21 PROCEDURE — 99213 OFFICE O/P EST LOW 20 MIN: CPT | Performed by: FAMILY MEDICINE

## 2022-09-21 RX ORDER — LIDOCAINE 40 MG/G
CREAM TOPICAL ONCE
Status: CANCELLED | OUTPATIENT
Start: 2022-09-21 | End: 2022-09-21

## 2022-09-21 RX ORDER — LIDOCAINE HYDROCHLORIDE 20 MG/ML
JELLY TOPICAL ONCE
Status: CANCELLED | OUTPATIENT
Start: 2022-09-21 | End: 2022-09-21

## 2022-09-21 RX ORDER — GINSENG 100 MG
CAPSULE ORAL ONCE
Status: CANCELLED | OUTPATIENT
Start: 2022-09-21 | End: 2022-09-21

## 2022-09-21 RX ORDER — BACITRACIN ZINC AND POLYMYXIN B SULFATE 500; 1000 [USP'U]/G; [USP'U]/G
OINTMENT TOPICAL ONCE
Status: CANCELLED | OUTPATIENT
Start: 2022-09-21 | End: 2022-09-21

## 2022-09-21 RX ORDER — BACITRACIN, NEOMYCIN, POLYMYXIN B 400; 3.5; 5 [USP'U]/G; MG/G; [USP'U]/G
OINTMENT TOPICAL ONCE
Status: CANCELLED | OUTPATIENT
Start: 2022-09-21 | End: 2022-09-21

## 2022-09-21 RX ORDER — LIDOCAINE 50 MG/G
OINTMENT TOPICAL ONCE
Status: CANCELLED | OUTPATIENT
Start: 2022-09-21 | End: 2022-09-21

## 2022-09-21 RX ORDER — LIDOCAINE HYDROCHLORIDE 40 MG/ML
SOLUTION TOPICAL ONCE
Status: CANCELLED | OUTPATIENT
Start: 2022-09-21 | End: 2022-09-21

## 2022-09-21 RX ORDER — GENTAMICIN SULFATE 1 MG/G
OINTMENT TOPICAL ONCE
Status: CANCELLED | OUTPATIENT
Start: 2022-09-21 | End: 2022-09-21

## 2022-09-21 RX ORDER — BETAMETHASONE DIPROPIONATE 0.05 %
OINTMENT (GRAM) TOPICAL ONCE
Status: CANCELLED | OUTPATIENT
Start: 2022-09-21 | End: 2022-09-21

## 2022-09-21 RX ORDER — CLOBETASOL PROPIONATE 0.5 MG/G
OINTMENT TOPICAL ONCE
Status: CANCELLED | OUTPATIENT
Start: 2022-09-21 | End: 2022-09-21

## 2022-09-21 RX ADMIN — MUPIROCIN: 20 OINTMENT TOPICAL at 15:05

## 2022-09-21 ASSESSMENT — PAIN DESCRIPTION - LOCATION: LOCATION: BUTTOCKS

## 2022-09-21 ASSESSMENT — PAIN - FUNCTIONAL ASSESSMENT: PAIN_FUNCTIONAL_ASSESSMENT: PREVENTS OR INTERFERES SOME ACTIVE ACTIVITIES AND ADLS

## 2022-09-21 ASSESSMENT — PAIN SCALES - GENERAL: PAINLEVEL_OUTOF10: 7

## 2022-09-21 ASSESSMENT — PAIN DESCRIPTION - DESCRIPTORS: DESCRIPTORS: BURNING;ACHING

## 2022-09-21 ASSESSMENT — PAIN DESCRIPTION - ORIENTATION: ORIENTATION: RIGHT;LEFT

## 2022-09-21 NOTE — PLAN OF CARE
Problem: Chronic Conditions and Co-morbidities  Goal: Patient's chronic conditions and co-morbidity symptoms are monitored and maintained or improved  Outcome: Progressing     Problem: Pain  Goal: Verbalizes/displays adequate comfort level or baseline comfort level  Outcome: Progressing     Problem: Chronic Conditions and Co-morbidities  Goal: Patient's chronic conditions and co-morbidity symptoms are monitored and maintained or improved  Outcome: Progressing     Problem: Pain  Goal: Verbalizes/displays adequate comfort level or baseline comfort level  Outcome: Progressing     Problem: Wound:  Goal: Will show signs of wound healing; wound closure and no evidence of infection  Description: Will show signs of wound healing; wound closure and no evidence of infection  Outcome: Progressing

## 2022-09-21 NOTE — PROGRESS NOTES
Relation Age of Onset    Cancer Father        SOCIAL HISTORY    Social History     Tobacco Use    Smoking status: Never    Smokeless tobacco: Never   Vaping Use    Vaping Use: Never used   Substance Use Topics    Alcohol use: No    Drug use: No       ALLERGIES    Allergies   Allergen Reactions    Nitrofurantoin Other (See Comments)     Leg swelling     Tizanidine      Other reaction(s): Mental Status Change    Gabapentin Anxiety    Sulfa Antibiotics Rash       MEDICATIONS    Current Outpatient Medications on File Prior to Encounter   Medication Sig Dispense Refill    mupirocin (BACTROBAN) 2 % ointment Apply topically with daily dressing change 15 g 0    ammonium lactate (LAC-HYDRIN) 12 % cream Apply topically as needed for Dry Skin Like topically to both lower legs daily after washing with mild soap and water. Apply while still moist. 385 g 1    predniSONE (DELTASONE) 20 MG tablet Take 20 mg by mouth 2 times daily Two times a day for five days, then one time a day for five days. mupirocin (BACTROBAN) 2 % ointment Apply topically 2 times daily. 15 g 1    Melatonin 10 MG TABS Take by mouth daily At bedtime      acetaminophen (TYLENOL) 500 MG tablet Take by mouth every 6 hours as needed for Pain      Bismuth Tribromoph-Petrolatum (XEROFORM PETROLATUM GAUZE 5\"X9\") MISC external pads Apply 1 each topically daily Apply to affected area as directed daily, after mupirocin ointment. 12 each 1    ammonium lactate (LAC-HYDRIN) 12 % lotion Apply topically daily to both lower legs, after washing with mild soap/water. Apply while still moist. 225 g 1    ammonium lactate (LAC-HYDRIN) 12 % lotion Apply topically daily after cleansing legs-with mild soap and water. Apply to intact areas of skin only. 225 g 1    oxyCODONE HCl (OXY-IR) 10 MG immediate release tablet Take 10 mg by mouth every 6 hours as needed for Pain.       Capsaicin (ZOSTRIX) 0.035 % CREA cream Apply topically 3 times daily      furosemide (LASIX) 40 MG tablet Take 40 mg by mouth 2 times daily 1.5 tablet by mouth as directed; 1 tablet in AM and 1/2 tablet at NOON      oxybutynin (DITROPAN-XL) 5 MG extended release tablet Take 5 mg by mouth daily      traZODone (DESYREL) 150 MG tablet Take 150 mg by mouth nightly      metoprolol (LOPRESSOR) 100 MG tablet Take 100 mg by mouth daily      atorvastatin (LIPITOR) 10 MG tablet Take 10 mg by mouth daily      polyethylene glycol (GLYCOLAX) 17 g packet Take 17 g by mouth every other day      Cholecalciferol (VITAMIN D3) 50 MCG (2000 UT) CAPS Take by mouth daily      Menthol-Methyl Salicylate (MUSCLE RUB) 10-15 % CREA cream Apply topically 2 times daily 1 application apply on the skin twice a day; arms and shoulders      coenzyme Q10 100 MG CAPS capsule Take 1 capsule by mouth 2 times daily (before meals) 60 capsule 1    Lactobacillus (PROBIOTIC ACIDOPHILUS) TABS Take 1 tablet by mouth daily 60 tablet 0    levothyroxine (SYNTHROID) 125 MCG tablet Take 125 mcg by mouth Daily      metFORMIN (GLUCOPHAGE) 1000 MG tablet Take 500 mg by mouth 2 times daily (with meals)        No current facility-administered medications on file prior to encounter. REVIEW OF SYSTEMS    Pertinent items are noted in HPI. Objective:      BP (!) 121/55   Pulse 72   Temp 97.1 °F (36.2 °C) (Temporal)   Resp 20   LMP  (LMP Unknown)     Wt Readings from Last 3 Encounters:   09/24/20 240 lb (108.9 kg)   05/09/20 236 lb (107 kg)   04/04/19 275 lb (124.7 kg)       PHYSICAL EXAM    Constitutional:   Well nourished and well developed. Appears neat and clean. Patient is alert, oriented x3, and in no apparent distress. Respiratory:  Respiratory effort is easy and symmetric bilaterally. Rate is normal at rest and on room air. Vascular:  Capillary refill is <5 sec to digits bilateral.  Extremities pos 1+ for pitting edema. Neurological:  Gross and Light touch intact.  Protective sensation intact at wound    Dermatological:  Wound description noted in wound assessment. Wound on right buttocks is improving. No surrounding erythema, swelling or warmth. No odor or significant drainage. There is a small tear on left buttocks. Hand wound is improved and healed. Psychiatric:  Judgement and insight intact. Short and long term memory intact. No evidence of depression, anxiety, or agitation. Patient is calm, cooperative, and communicative. Appropriate interactions and affect. Assessment:      There are no active hospital problems to display for this patient. Procedure Note  Indications:  Based on my examination of this patient's wound(s)/ulcer(s) today, debridement is not required to promote healing and evaluate the wound base. Wound 02/24/22 Buttocks Right #1 (Active)   Wound Image   09/21/22 1348   Wound Etiology Pressure Stage 3 09/21/22 1348   Wound Cleansed Cleansed with saline 09/21/22 1348   Dressing/Treatment Pharmaceutical agent (see MAR); Silicone border 93/41/14 1505   Wound Length (cm) 1.5 cm 09/21/22 1348   Wound Width (cm) 0.7 cm 09/21/22 1348   Wound Depth (cm) 0.1 cm 09/21/22 1348   Wound Surface Area (cm^2) 1.05 cm^2 09/21/22 1348   Change in Wound Size % (l*w) -50 09/21/22 1348   Wound Volume (cm^3) 0.105 cm^3 09/21/22 1348   Wound Healing % -50 09/21/22 1348   Post-Procedure Length (cm) 0.4 cm 04/14/22 1218   Post-Procedure Width (cm) 0.7 cm 04/14/22 1218   Post-Procedure Depth (cm) 0.1 cm 04/14/22 1218   Post-Procedure Surface Area (cm^2) 0.28 cm^2 04/14/22 1218   Post-Procedure Volume (cm^3) 0.028 cm^3 04/14/22 1218   Wound Assessment Slough;Pink/red 09/21/22 1348   Drainage Amount Small 09/21/22 1348   Drainage Description Serous 09/21/22 1348   Odor None 09/21/22 1348   Debby-wound Assessment Intact 09/21/22 1348   Margins Defined edges 09/21/22 1348   Wound Thickness Description not for Pressure Injury Full thickness 09/21/22 1348   Number of days: 209       Wound 08/31/22 Buttocks Right;Distal #2 (Active)   Wound Image 09/21/22 1348   Wound Etiology Other 09/21/22 1348   Wound Cleansed Cleansed with saline 09/21/22 1348   Dressing/Treatment Pharmaceutical agent (see MAR); Silicone border 74/12/90 1505   Wound Length (cm) 1.5 cm 09/21/22 1348   Wound Width (cm) 0.4 cm 09/21/22 1348   Wound Depth (cm) 0.1 cm 09/21/22 1348   Wound Surface Area (cm^2) 0.6 cm^2 09/21/22 1348   Change in Wound Size % (l*w) 46.67 09/21/22 1348   Wound Volume (cm^3) 0.06 cm^3 09/21/22 1348   Wound Healing % 47 09/21/22 1348   Wound Assessment Pink/red 09/21/22 1348   Drainage Amount Scant 09/21/22 1348   Drainage Description Serous 09/21/22 1348   Odor None 09/21/22 1348   Debby-wound Assessment Hyperkeratosis (callous) 09/21/22 1348   Margins Defined edges 09/21/22 1348   Wound Thickness Description not for Pressure Injury Full thickness 09/21/22 1348   Number of days: 21       Wound 08/31/22 Hand Left;Dorsal #4 (Active)   Wound Image   09/21/22 1348   Dressing/Treatment Petroleum gauze 09/21/22 1505   Wound Length (cm) 0 cm 09/21/22 1348   Wound Width (cm) 0 cm 09/21/22 1348   Wound Depth (cm) 0 cm 09/21/22 1348   Wound Surface Area (cm^2) 0 cm^2 09/21/22 1348   Change in Wound Size % (l*w) 100 09/21/22 1348   Wound Volume (cm^3) 0 cm^3 09/21/22 1348   Wound Assessment Dry 09/21/22 1348   Drainage Amount None 09/21/22 1348   Drainage Description Yellow 08/31/22 1435   Odor None 09/21/22 1348   Debby-wound Assessment Intact 09/21/22 1348   Margins Defined edges 08/31/22 1435   Wound Thickness Description not for Pressure Injury Full thickness 08/31/22 1435   Number of days: 21       Wound 09/21/22 Buttocks Left #5 (Active)   Wound Image   09/21/22 1401   Wound Etiology Traumatic 09/21/22 1401   Wound Cleansed Cleansed with saline 09/21/22 1401   Dressing/Treatment Pharmaceutical agent (see MAR); Silicone border 31/55/84 1505   Wound Length (cm) 1.4 cm 09/21/22 1401   Wound Width (cm) 0.3 cm 09/21/22 1401   Wound Depth (cm) 0 cm 09/21/22 1401   Wound Surface Area (cm^2) 0.42 cm^2 09/21/22 1401   Wound Volume (cm^3) 0 cm^3 09/21/22 1401   Wound Assessment Pink/red;Bleeding 09/21/22 1401   Drainage Amount Small 09/21/22 1401   Drainage Description Sanguinous 09/21/22 1401   Odor None 09/21/22 1401   Debby-wound Assessment Fragile 09/21/22 1401   Margins Defined edges 09/21/22 1401   Wound Thickness Description not for Pressure Injury Partial thickness 09/21/22 1401   Number of days: 0                  Plan:     Encouraged patient to go to emergency room with signs symptoms of infection such as increased swelling, increased pain, increased redness, drainage, odor, fever or chills. Advised patient to try to stay off of her bottom is much as possible to assist in healing of wound. Patient to change back to original bandages due to tearing of left buttock. Treatment Note please see attached Discharge Instructions    Written patient dismissal instructions given to patient and signed by patient or POA. Discharge [facility]   Physician Orders and Discharge Instructions  [address]  Telephone: [phone]        NAME:  Mervat Pike                                                                                       YOB: 1950  MEDICAL RECORD NUMBER:  [id number]     Your  is:  [address] [place] [address] Care/Facility: None     Wound Location: Right Buttocks and Left Buttocks     Dressing orders:.Gently spray area with soap and water, rinse with plain water - gently pat dry. 2. Apply a thin layer of Mupirocin ointment, cover with gauze then:   3. Cover with a Silicone Border  4.   Change Daily      Wound Location: Left Hand - Apply thin layer of Vaseline to the newly healed wound - 1- 2 times a day  for 1-2 weeks Compression: CASSIDY's ordered in the wound center for your legs. Scheduling: [id number]. Offloading Device: Change position every 1-2 hours. Limit sitting in chair. Other Instructions: Apply protective cream/ointment to buttocks crease ( Calmoseptine, Desitin) daily. Increase Fruit/Veg intake. Apply Lac-Hydrin to both legs daily (not between toes). Keep both heels off surfaces - use a pillow or towel to help elevate. Dressing Supplies:      Keep all dressings clean, dry and intact. Keep pressure off the wound(s) at all times. Follow up visit  3 weeks,  October 12, 2022 @    1:15 pm         Please give 24 hour notice if unable to keep appointment. [phone]     If you experience any of the following, please call the Wound Care Service at  [phone] or go to the nearest emergency room. *Increase in pain         *Temperature over 101           *Increase in drainage from your wound or a foul odor  *Uncontrolled swelling            *Need for compression bandage changes due to slippage, breakthrough drainage       PLEASE NOTE: IF YOU ARE UNABLE TO OBTAIN WOUND SUPPLIES, CONTINUE TO USE THE SUPPLIES YOU HAVE AVAILABLE UNTIL YOU ARE ABLE TO REACH US.  IT IS MOST IMPORTANT TO KEEP THE WOUND COVERED AT ALL TIMES                 Electronically signed by Rosa Rodríguez MD on 9/21/2022 at 3:31 PM

## 2022-09-21 NOTE — DISCHARGE INSTRUCTIONS
[facility]   Physician Orders and Discharge Instructions  [address]  Telephone: [phone]        NAME:  Shari Wilkins                                                                                       YOB: 1950  MEDICAL RECORD NUMBER:  [id number]     Your  is:  [address] [place] [address] Care/Facility: None     Wound Location: Right Buttocks and Left Buttocks     Dressing orders:.Gently spray area with soap and water, rinse with plain water - gently pat dry. 2. Apply a thin layer of Mupirocin ointment, cover with gauze then:   3. Cover with a Silicone Border  4. Change Daily      Wound Location: Left Hand - Apply thin layer of Vaseline to the newly healed wound - 1- 2 times a day  for 1-2 weeks                                                                                                                              Compression: CASSIDY's ordered in the wound center for your legs. Scheduling: [id number]. Offloading Device: Change position every 1-2 hours. Limit sitting in chair. Other Instructions: Apply protective cream/ointment to buttocks crease ( Calmoseptine, Desitin) daily. Increase Fruit/Veg intake. Apply Lac-Hydrin to both legs daily (not between toes). Keep both heels off surfaces - use a pillow or towel to help elevate. Dressing Supplies:      Keep all dressings clean, dry and intact. Keep pressure off the wound(s) at all times. Follow up visit  3 weeks,  October 12, 2022 @    1:15 pm         Please give 24 hour notice if unable to keep appointment. [phone]     If you experience any of the following, please call the Wound Care Service at  [phone] or go to the nearest emergency room.         *Increase in pain         *Temperature over 101           *Increase in drainage from your wound or a foul odor  *Uncontrolled swelling *Need for compression bandage changes due to slippage, breakthrough drainage       PLEASE NOTE: IF YOU ARE UNABLE TO OBTAIN WOUND SUPPLIES, CONTINUE TO USE THE SUPPLIES YOU HAVE AVAILABLE UNTIL YOU ARE ABLE TO REACH US.  IT IS MOST IMPORTANT TO KEEP THE WOUND COVERED AT ALL TIMES

## 2022-10-12 ENCOUNTER — HOSPITAL ENCOUNTER (OUTPATIENT)
Dept: WOUND CARE | Age: 72
Discharge: HOME | End: 2022-10-12
Payer: COMMERCIAL

## 2022-10-12 VITALS
TEMPERATURE: 98.5 F | SYSTOLIC BLOOD PRESSURE: 163 MMHG | HEART RATE: 73 BPM | DIASTOLIC BLOOD PRESSURE: 44 MMHG | RESPIRATION RATE: 18 BRPM

## 2022-10-12 DIAGNOSIS — S61.411A LACERATION OF RIGHT HAND WITHOUT FOREIGN BODY, INITIAL ENCOUNTER: ICD-10-CM

## 2022-10-12 DIAGNOSIS — R53.1 DECREASED STRENGTH, ENDURANCE, AND MOBILITY: Primary | ICD-10-CM

## 2022-10-12 DIAGNOSIS — R68.89 DECREASED STRENGTH, ENDURANCE, AND MOBILITY: Primary | ICD-10-CM

## 2022-10-12 DIAGNOSIS — S31.819A OPEN WOUND OF RIGHT BUTTOCK, INITIAL ENCOUNTER: ICD-10-CM

## 2022-10-12 DIAGNOSIS — Z74.09 DECREASED STRENGTH, ENDURANCE, AND MOBILITY: Primary | ICD-10-CM

## 2022-10-12 DIAGNOSIS — S60.512A HAND ABRASION, LEFT, INITIAL ENCOUNTER: ICD-10-CM

## 2022-10-12 PROCEDURE — 6370000000 HC RX 637 (ALT 250 FOR IP): Performed by: NURSE PRACTITIONER

## 2022-10-12 PROCEDURE — 99213 OFFICE O/P EST LOW 20 MIN: CPT

## 2022-10-12 RX ORDER — BETAMETHASONE DIPROPIONATE 0.05 %
OINTMENT (GRAM) TOPICAL ONCE
Status: CANCELLED | OUTPATIENT
Start: 2022-10-12 | End: 2022-10-12

## 2022-10-12 RX ORDER — GINSENG 100 MG
CAPSULE ORAL ONCE
Status: CANCELLED | OUTPATIENT
Start: 2022-10-12 | End: 2022-10-12

## 2022-10-12 RX ORDER — BACITRACIN ZINC AND POLYMYXIN B SULFATE 500; 1000 [USP'U]/G; [USP'U]/G
OINTMENT TOPICAL ONCE
Status: CANCELLED | OUTPATIENT
Start: 2022-10-12 | End: 2022-10-12

## 2022-10-12 RX ORDER — LIDOCAINE HYDROCHLORIDE 20 MG/ML
JELLY TOPICAL ONCE
Status: CANCELLED | OUTPATIENT
Start: 2022-10-12 | End: 2022-10-12

## 2022-10-12 RX ORDER — BACITRACIN, NEOMYCIN, POLYMYXIN B 400; 3.5; 5 [USP'U]/G; MG/G; [USP'U]/G
OINTMENT TOPICAL ONCE
Status: CANCELLED | OUTPATIENT
Start: 2022-10-12 | End: 2022-10-12

## 2022-10-12 RX ORDER — CLOBETASOL PROPIONATE 0.5 MG/G
OINTMENT TOPICAL ONCE
Status: CANCELLED | OUTPATIENT
Start: 2022-10-12 | End: 2022-10-12

## 2022-10-12 RX ORDER — LIDOCAINE 50 MG/G
OINTMENT TOPICAL ONCE
Status: CANCELLED | OUTPATIENT
Start: 2022-10-12 | End: 2022-10-12

## 2022-10-12 RX ORDER — GENTAMICIN SULFATE 1 MG/G
OINTMENT TOPICAL ONCE
Status: CANCELLED | OUTPATIENT
Start: 2022-10-12 | End: 2022-10-12

## 2022-10-12 RX ORDER — LIDOCAINE HYDROCHLORIDE 40 MG/ML
SOLUTION TOPICAL ONCE
Status: CANCELLED | OUTPATIENT
Start: 2022-10-12 | End: 2022-10-12

## 2022-10-12 RX ORDER — LIDOCAINE 40 MG/G
CREAM TOPICAL ONCE
Status: CANCELLED | OUTPATIENT
Start: 2022-10-12 | End: 2022-10-12

## 2022-10-12 RX ADMIN — MUPIROCIN: 20 OINTMENT TOPICAL at 14:19

## 2022-10-12 ASSESSMENT — PAIN SCALES - GENERAL: PAINLEVEL_OUTOF10: 5

## 2022-10-12 ASSESSMENT — PAIN DESCRIPTION - ORIENTATION: ORIENTATION: LEFT;RIGHT

## 2022-10-12 ASSESSMENT — PAIN DESCRIPTION - DESCRIPTORS: DESCRIPTORS: BURNING

## 2022-10-12 ASSESSMENT — PAIN DESCRIPTION - LOCATION: LOCATION: BUTTOCKS

## 2022-10-12 NOTE — DISCHARGE INSTRUCTIONS
[facility]   Physician Orders and Discharge Instructions  [address]  Telephone: [phone]        NAME:  Manuel Hector                                                                                       YOB: 1950  MEDICAL RECORD NUMBER:  [id number]     Your  is:  [address] [place] [address] Care/Facility: None     Wound Location: Right Buttocks and Left Buttocks     Dressing orders:. 1. Cleanse wound(s) with normal saline. 2. Apply dry BERNARDINO  Or equivalent to wound bed. 3. Moisten BERNARDINO with a few drops of normal saline. 4. Cover with Dry Dressing   5. Change  Every other day or Monday, Wednesday, and Friday  or daily if needed      Wound Location: Right Dorsal Hand  Dressing orders: Cleanse with Saline  Cover with a thin layer of Mupirocin and then Xeroform  Cover with a dry dressing  Change Daily                                                                                                                               Compression: CASSIDY's ordered in the wound center for your legs. Schedulin616- [id number]. Offloading Device: Change position every 1-2 hours. Limit sitting in chair. Other Instructions: Apply protective cream/ointment to buttocks crease ( Calmoseptine, Desitin) daily. Increase Fruit/Veg intake. Apply Lac-Hydrin to both legs daily (not between toes). Keep both heels off surfaces - use a pillow or towel to help elevate. Wear a protective glove or ace bandage over hands when getting in and out of your wheelchair. Dressing Supplies:      Keep all dressings clean, dry and intact. Keep pressure off the wound(s) at all times. Follow up visit  2 weeks 2022 @  1:45  pm         Please give 24 hour notice if unable to keep appointment.  [phone]     If you experience any of the following, please call the Wound Care Service at  [phone] or go to the nearest emergency room. *Increase in pain         *Temperature over 101           *Increase in drainage from your wound or a foul odor  *Uncontrolled swelling            *Need for compression bandage changes due to slippage, breakthrough drainage       PLEASE NOTE: IF YOU ARE UNABLE TO OBTAIN WOUND SUPPLIES, CONTINUE TO USE THE SUPPLIES YOU HAVE AVAILABLE UNTIL YOU ARE ABLE TO REACH US.  IT IS MOST IMPORTANT TO KEEP THE WOUND COVERED AT ALL TIMES

## 2022-10-26 ENCOUNTER — HOSPITAL ENCOUNTER (OUTPATIENT)
Dept: WOUND CARE | Age: 72
Discharge: HOME | End: 2022-10-26
Payer: COMMERCIAL

## 2022-10-26 VITALS — SYSTOLIC BLOOD PRESSURE: 101 MMHG | HEART RATE: 76 BPM | TEMPERATURE: 96.8 F | DIASTOLIC BLOOD PRESSURE: 65 MMHG

## 2022-10-26 DIAGNOSIS — Z74.09 DECREASED STRENGTH, ENDURANCE, AND MOBILITY: ICD-10-CM

## 2022-10-26 DIAGNOSIS — R53.1 DECREASED STRENGTH, ENDURANCE, AND MOBILITY: ICD-10-CM

## 2022-10-26 DIAGNOSIS — S60.512A HAND ABRASION, LEFT, INITIAL ENCOUNTER: Primary | ICD-10-CM

## 2022-10-26 DIAGNOSIS — S31.819A OPEN WOUND OF RIGHT BUTTOCK, INITIAL ENCOUNTER: ICD-10-CM

## 2022-10-26 DIAGNOSIS — R68.89 DECREASED STRENGTH, ENDURANCE, AND MOBILITY: ICD-10-CM

## 2022-10-26 PROCEDURE — 99213 OFFICE O/P EST LOW 20 MIN: CPT | Performed by: FAMILY MEDICINE

## 2022-10-26 PROCEDURE — 99214 OFFICE O/P EST MOD 30 MIN: CPT

## 2022-10-26 RX ORDER — LIDOCAINE HYDROCHLORIDE 40 MG/ML
SOLUTION TOPICAL ONCE
OUTPATIENT
Start: 2022-10-26 | End: 2022-10-26

## 2022-10-26 RX ORDER — BETAMETHASONE DIPROPIONATE 0.05 %
OINTMENT (GRAM) TOPICAL ONCE
OUTPATIENT
Start: 2022-10-26 | End: 2022-10-26

## 2022-10-26 RX ORDER — LIDOCAINE 50 MG/G
OINTMENT TOPICAL ONCE
OUTPATIENT
Start: 2022-10-26 | End: 2022-10-26

## 2022-10-26 RX ORDER — GINSENG 100 MG
CAPSULE ORAL ONCE
OUTPATIENT
Start: 2022-10-26 | End: 2022-10-26

## 2022-10-26 RX ORDER — LIDOCAINE HYDROCHLORIDE 20 MG/ML
JELLY TOPICAL ONCE
OUTPATIENT
Start: 2022-10-26 | End: 2022-10-26

## 2022-10-26 RX ORDER — GENTAMICIN SULFATE 1 MG/G
OINTMENT TOPICAL ONCE
OUTPATIENT
Start: 2022-10-26 | End: 2022-10-26

## 2022-10-26 RX ORDER — BACITRACIN ZINC AND POLYMYXIN B SULFATE 500; 1000 [USP'U]/G; [USP'U]/G
OINTMENT TOPICAL ONCE
OUTPATIENT
Start: 2022-10-26 | End: 2022-10-26

## 2022-10-26 RX ORDER — CLOBETASOL PROPIONATE 0.5 MG/G
OINTMENT TOPICAL ONCE
OUTPATIENT
Start: 2022-10-26 | End: 2022-10-26

## 2022-10-26 RX ORDER — BACITRACIN, NEOMYCIN, POLYMYXIN B 400; 3.5; 5 [USP'U]/G; MG/G; [USP'U]/G
OINTMENT TOPICAL ONCE
OUTPATIENT
Start: 2022-10-26 | End: 2022-10-26

## 2022-10-26 RX ORDER — LIDOCAINE 40 MG/G
CREAM TOPICAL ONCE
OUTPATIENT
Start: 2022-10-26 | End: 2022-10-26

## 2022-10-26 ASSESSMENT — PAIN DESCRIPTION - LOCATION: LOCATION: BUTTOCKS

## 2022-10-26 ASSESSMENT — PAIN DESCRIPTION - DESCRIPTORS: DESCRIPTORS: THROBBING;ACHING

## 2022-10-26 ASSESSMENT — PAIN SCALES - GENERAL: PAINLEVEL_OUTOF10: 6

## 2022-10-26 NOTE — PROGRESS NOTES
[facility] [address]   Progress Note and Procedure Note      Severo Kent  MEDICAL RECORD NUMBER:  [id number]  AGE: 67 y.o. GENDER: female  : 1950  EPISODE DATE:  10/26/2022    Subjective:     Chief Complaint   Patient presents with    Wound Check     Buttock and hand wounds         HISTORY of PRESENT ILLNESS HPI     Severo Kent is a 67 y.o. female who presents today for wound/ulcer evaluation. History of Wound Context: Patient is present with her  who cares for wound. He states the wound seems more shallow but the 2 wounds on her right buttocks have emerged. The wound on her left buttocks has improved. Patient spends most of her time sitting in chair. She does not like to lay on her bed due to discomfort. She is being evaluated for long COVID and has appoint with her PCP this week. No fever or chills. No increased redness or swelling around wound. No purulence, odor or warmth noted by caregiver.   Wound/Ulcer Pain Timing/Severity: intermittent, mild  Quality of pain: tender  Severity:  3 / 10   Modifying Factors: Pain worsens with dressing change, repositioning  Associated Signs/Symptoms: pain    Ulcer Identification:  Ulcer Type: pressure  Contributing Factors: diabetes, chronic pressure, decreased mobility, shear force, and obesity    Wound: N/A        PAST MEDICAL HISTORY        Diagnosis Date    Anxiety 2019    Arthritis     Bronchitis     Diabetes mellitus ([address]     Diabetic neuropathy associated with diabetes mellitus due to underlying condition ([address] 11/3/2017    Essential hypertension 2019    Hypercholesterolemia     Hypertension     Morbid obesity due to excess calories ([address] 2019    Thyroid disease     Wound infection 2018    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 surgeries x3    TONSILLECTOMY N/A        FAMILY HISTORY    Family History   Problem Relation Age of Onset    Cancer Father        SOCIAL HISTORY    Social History     Tobacco Use    Smoking status: Never    Smokeless tobacco: Never   Vaping Use    Vaping Use: Never used   Substance Use Topics    Alcohol use: No    Drug use: No       ALLERGIES    Allergies   Allergen Reactions    Nitrofurantoin Other (See Comments)     Leg swelling     Tizanidine      Other reaction(s): Mental Status Change    Gabapentin Anxiety    Sulfa Antibiotics Rash       MEDICATIONS    Current Outpatient Medications on File Prior to Encounter   Medication Sig Dispense Refill    mupirocin (BACTROBAN) 2 % ointment Apply topically with daily dressing change 15 g 0    predniSONE (DELTASONE) 20 MG tablet Take 20 mg by mouth 2 times daily Two times a day for five days, then one time a day for five days. mupirocin (BACTROBAN) 2 % ointment Apply topically 2 times daily. 15 g 1    Melatonin 10 MG TABS Take by mouth daily At bedtime      acetaminophen (TYLENOL) 500 MG tablet Take by mouth every 6 hours as needed for Pain      Bismuth Tribromoph-Petrolatum (XEROFORM PETROLATUM GAUZE 5\"X9\") MISC external pads Apply 1 each topically daily Apply to affected area as directed daily, after mupirocin ointment. 12 each 1    ammonium lactate (LAC-HYDRIN) 12 % lotion Apply topically daily to both lower legs, after washing with mild soap/water. Apply while still moist. 225 g 1    ammonium lactate (LAC-HYDRIN) 12 % lotion Apply topically daily after cleansing legs-with mild soap and water. Apply to intact areas of skin only. 225 g 1    oxyCODONE HCl (OXY-IR) 10 MG immediate release tablet Take 10 mg by mouth every 6 hours as needed for Pain.       Capsaicin (ZOSTRIX) 0.035 % CREA cream Apply topically 3 times daily      furosemide (LASIX) 40 MG tablet Take 40 mg by mouth 2 times daily 1.5 tablet by mouth as directed; 1 tablet in AM and 1/2 tablet at NOON      oxybutynin (DITROPAN-XL) 5 MG extended release tablet Take 5 mg by mouth daily      traZODone (DESYREL) 150 MG tablet Take 150 mg by mouth nightly      metoprolol (LOPRESSOR) 100 MG tablet Take 100 mg by mouth daily      atorvastatin (LIPITOR) 10 MG tablet Take 10 mg by mouth daily      polyethylene glycol (GLYCOLAX) 17 g packet Take 17 g by mouth every other day      Cholecalciferol (VITAMIN D3) 50 MCG (2000 UT) CAPS Take by mouth daily      Menthol-Methyl Salicylate (MUSCLE RUB) 10-15 % CREA cream Apply topically 2 times daily 1 application apply on the skin twice a day; arms and shoulders      coenzyme Q10 100 MG CAPS capsule Take 1 capsule by mouth 2 times daily (before meals) 60 capsule 1    Lactobacillus (PROBIOTIC ACIDOPHILUS) TABS Take 1 tablet by mouth daily 60 tablet 0    levothyroxine (SYNTHROID) 125 MCG tablet Take 125 mcg by mouth Daily      metFORMIN (GLUCOPHAGE) 1000 MG tablet Take 500 mg by mouth 2 times daily (with meals)        No current facility-administered medications on file prior to encounter. REVIEW OF SYSTEMS    Pertinent items are noted in HPI. Objective:      /65   Pulse 76   Temp 96.8 °F (36 °C) (Temporal)   LMP  (LMP Unknown)     Wt Readings from Last 3 Encounters:   09/24/20 240 lb (108.9 kg)   05/09/20 236 lb (107 kg)   04/04/19 275 lb (124.7 kg)       PHYSICAL EXAM    Constitutional:   Well nourished and well developed. Appears neat and clean. Patient is alert, oriented x3, and in no apparent distress. Respiratory:  Respiratory effort is easy and symmetric bilaterally. Rate is normal at rest and on room air. Vascular:  Capillary refill is <5 sec to digits bilateral.      Neurological:  Gross and Light touch intact. Protective sensation intact at wound    Dermatological:  Wound description noted in wound assessment. Wound on left buttocks is dry, no open sore at this time.   Wound on right buttocks is slightly larger in length but it is less deep in appearance. No surrounding erythema, swelling, purulence, warmth or odor. Wound on right hand is significantly improved. There is a small wound on left hand that is blistered. Psychiatric:  Judgement and insight intact. Short and long term memory intact. No evidence of depression, anxiety, or agitation. Patient is calm, cooperative, and communicative. Appropriate interactions and affect. Assessment:      There are no active hospital problems to display for this patient. Procedure Note  Indications:  Based on my examination of this patient's wound(s)/ulcer(s) today, debridement is not required to promote healing and evaluate the wound base. Wound 02/24/22 Buttocks Right #1 (Active)   Wound Image   10/26/22 1423   Wound Etiology Pressure Stage 3 10/26/22 1423   Wound Cleansed Cleansed with saline 10/26/22 1423   Dressing/Treatment Pharmaceutical agent (see MAR); Silicone border 50/83/95 1505   Wound Length (cm) 0 cm 10/26/22 1423   Wound Width (cm) 0 cm 10/26/22 1423   Wound Depth (cm) 0 cm 10/26/22 1423   Wound Surface Area (cm^2) 0 cm^2 10/26/22 1423   Change in Wound Size % (l*w) 100 10/26/22 1423   Wound Volume (cm^3) 0 cm^3 10/26/22 1423   Wound Healing % 100 10/26/22 1423   Post-Procedure Length (cm) 0.4 cm 04/14/22 1218   Post-Procedure Width (cm) 0.7 cm 04/14/22 1218   Post-Procedure Depth (cm) 0.1 cm 04/14/22 1218   Post-Procedure Surface Area (cm^2) 0.28 cm^2 04/14/22 1218   Post-Procedure Volume (cm^3) 0.028 cm^3 04/14/22 1218   Wound Assessment Eschar dry 10/26/22 1423   Drainage Amount None 10/26/22 1423   Drainage Description Serous; Serosanguinous 10/12/22 1327   Odor None 10/26/22 1423   Debby-wound Assessment Fragile 10/26/22 1423   Margins Defined edges 10/12/22 1327   Wound Thickness Description not for Pressure Injury Full thickness 09/21/22 1348   Number of days: 244       Wound 08/31/22 Buttocks Right;Distal #2 (Active)   Wound Image   10/26/22 9763 Wound Etiology Pressure Stage 2 10/26/22 1423   Wound Cleansed Cleansed with saline 10/26/22 1423   Dressing/Treatment Collagen with Ag;Dry dressing;Silicone pad 24/23/88 7335   Wound Length (cm) 1.8 cm 10/26/22 1423   Wound Width (cm) 1.5 cm 10/26/22 1423   Wound Depth (cm) 0.1 cm 10/26/22 1423   Wound Surface Area (cm^2) 2.7 cm^2 10/26/22 1423   Change in Wound Size % (l*w) -140 10/26/22 1423   Wound Volume (cm^3) 0.27 cm^3 10/26/22 1423   Wound Healing % -140 10/26/22 1423   Wound Assessment Pink/red 10/26/22 1423   Drainage Amount Moderate 10/26/22 1423   Drainage Description Serous; Yellow 10/26/22 1423   Odor None 10/26/22 1423   Debby-wound Assessment Hyperkeratosis (callous); Fragile 10/26/22 1423   Margins Undefined edges 10/26/22 1423   Wound Thickness Description not for Pressure Injury Full thickness 10/26/22 1423   Number of days: 56       Wound 09/21/22 Buttocks Left #5 (Active)   Wound Image   10/26/22 1423   Wound Etiology Pressure Stage 2 10/26/22 1423   Wound Cleansed Cleansed with saline 10/26/22 1423   Dressing/Treatment Collagen with Ag;Dry dressing;Silicone pad 59/51/57 5163   Wound Length (cm) 0.5 cm 10/26/22 1423   Wound Width (cm) 0.2 cm 10/26/22 1423   Wound Depth (cm) 0.1 cm 10/26/22 1423   Wound Surface Area (cm^2) 0.1 cm^2 10/26/22 1423   Change in Wound Size % (l*w) 76.19 10/26/22 1423   Wound Volume (cm^3) 0.01 cm^3 10/26/22 1423   Wound Assessment Pink/red;Slough 10/12/22 1327   Drainage Amount Small 10/12/22 1327   Drainage Description Serosanguinous 10/12/22 1327   Odor None 10/12/22 1327   Debby-wound Assessment Fragile 10/12/22 1327   Margins Defined edges 10/12/22 1327   Wound Thickness Description not for Pressure Injury Partial thickness 09/21/22 1401   Number of days: 35       Wound 10/12/22 Hand Right;Dorsal #6 (Active)   Wound Image   10/26/22 1408   Wound Etiology Traumatic 10/26/22 1408   Wound Cleansed Cleansed with saline 10/12/22 1336   Dressing/Treatment Xeroform; Pharmaceutical agent (see MAR) 10/12/22 1417   Wound Length (cm) 0.7 cm 10/26/22 1408   Wound Width (cm) 0.5 cm 10/26/22 1408   Wound Depth (cm) 0.1 cm 10/26/22 1408   Wound Surface Area (cm^2) 0.35 cm^2 10/26/22 1408   Change in Wound Size % (l*w) 80.56 10/26/22 1408   Wound Volume (cm^3) 0.035 cm^3 10/26/22 1408   Wound Healing % 81 10/26/22 1408   Wound Assessment Slough;Pink/red 10/26/22 1408   Drainage Amount Small 10/26/22 1408   Drainage Description Clear;Serous 10/26/22 1408   Odor None 10/26/22 1408   Debby-wound Assessment Fragile; Other (Comment) 10/26/22 1408   Margins Defined edges 10/26/22 1408   Wound Thickness Description not for Pressure Injury Full thickness 10/26/22 1408   Number of days: 14                  Plan:       Advised patient to try waffle cushion and to try to spend more time out of her wheelchair and off of her wound. Advised with emergency room signs symptoms of infection such as fever, chills, purulence, odor, warmth, redness around wound, increased pain or swelling. Patient's caregiver feels that the Ashlie has not been as helpful as other treatment options. Patient wishes to try treatment option without dressings-we will try, 17, reapply multiple times daily after stooling or urinating. Cover with cotton underwear- change daily or when soiled. Treatment Note please see attached Discharge Instructions    Written patient dismissal instructions given to patient and signed by patient or POA.          Discharge [facility]   Physician Orders and Discharge Instructions  [address]  Telephone: [phone]        NAME:  Severo Kent                                                                                       YOB: 1950  MEDICAL RECORD NUMBER:  [id number]     Your  is:  [facility] Care/Facility: None     Wound Location: Right Buttocks and Left Buttocks     Dressing orders:. 1. May cleanse with soap and water  Apply Calmoseptine cream twice a day and as needed  Place gauze  between buttock crease and change as needed (try to keep area dry)      Wound Location: Right Dorsal Hand  Dressing orders: Cleanse with Saline  Cover with a thin layer of Mupirocin and then Xeroform  Cover with a dry dressing  Change Daily                                                                                                                               Compression: CASSIDY's ordered in the wound center for your legs. Schedulin- [id number]. Offloading Device: Change position every 1-2 hours. Limit sitting in chair. Try to purchase a seat cushion EHOB ( [address] [facility] [address]). Other Instructions: Apply protective cream/ointment to buttocks crease ( Calmoseptine, Desitin) daily. Increase Fruit/Veg intake. Apply Lac-Hydrin to both legs daily (not between toes). Keep both heels off surfaces - use a pillow or towel to help elevate. Wear a protective glove or ace bandage over hands when getting in and out of your wheelchair. Dressing Supplies:      Keep all dressings clean, dry and intact. Keep pressure off the wound(s) at all times. Follow up visit  2 weeks 2022 @  1:15 pm         Please give 24 hour notice if unable to keep appointment. [phone]     If you experience any of the following, please call the Wound Care Service at  [phone] or go to the nearest emergency room. *Increase in pain         *Temperature over 101           *Increase in drainage from your wound or a foul odor  *Uncontrolled swelling            *Need for compression bandage changes due to slippage, breakthrough drainage       PLEASE NOTE: IF YOU ARE UNABLE TO OBTAIN WOUND SUPPLIES, CONTINUE TO USE THE SUPPLIES YOU HAVE AVAILABLE UNTIL YOU ARE ABLE TO REACH US.  IT IS MOST IMPORTANT TO KEEP THE WOUND COVERED AT ALL TIMES                       Electronically signed by Barrett Strickland MD on 10/26/2022 at 3:05 PM

## 2022-10-26 NOTE — DISCHARGE INSTRUCTIONS
[facility]   Physician Orders and Discharge Instructions  [facility]  [address] [facility]  Telephone: [phone]        NAME:  Minerva Kinsey                                                                                       YOB: 1950  MEDICAL RECORD NUMBER:  [id number]     Your  is:  [address] [place] [address] Care/Facility: None     Wound Location: Right Buttocks and Left Buttocks     Dressing orders:. 1. May cleanse with soap and water  Apply Calmoseptine cream twice a day and as needed  Place gauze  between buttock crease and change as needed (try to keep area dry)      Wound Location: Right Dorsal Hand  Dressing orders: Cleanse with Saline  Cover with a thin layer of Mupirocin and then Xeroform  Cover with a dry dressing  Change Daily                                                                                                                               Compression: CASSIDY's ordered in the wound center for your legs. Schedulin- [id number]. Offloading Device: Change position every 1-2 hours. Limit sitting in chair. Try to purchase a seat cushion [employer]OB ([employer]). Other Instructions: Apply protective cream/ointment to buttocks crease ( Calmoseptine, Desitin) daily. Increase Fruit/Veg intake. Apply Lac-Hydrin to both legs daily (not between toes). Keep both heels off surfaces - use a pillow or towel to help elevate. Wear a protective glove or ace bandage over hands when getting in and out of your wheelchair. Dressing Supplies:      Keep all dressings clean, dry and intact. Keep pressure off the wound(s) at all times. Follow up visit  2 weeks 2022 @  1:15 pm         Please give 24 hour notice if unable to keep appointment.  [phone]     If you experience any of the following, please call the Wound Care Service at  [phone] or go to the nearest emergency room. *Increase in pain         *Temperature over 101           *Increase in drainage from your wound or a foul odor  *Uncontrolled swelling            *Need for compression bandage changes due to slippage, breakthrough drainage       PLEASE NOTE: IF YOU ARE UNABLE TO OBTAIN WOUND SUPPLIES, CONTINUE TO USE THE SUPPLIES YOU HAVE AVAILABLE UNTIL YOU ARE ABLE TO REACH US.  IT IS MOST IMPORTANT TO KEEP THE WOUND COVERED AT ALL TIMES

## 2022-10-26 NOTE — PLAN OF CARE
Problem: Chronic Conditions and Co-morbidities  Goal: Patient's chronic conditions and co-morbidity symptoms are monitored and maintained or improved  Outcome: Progressing     Problem: Pain  Goal: Verbalizes/displays adequate comfort level or baseline comfort level  Outcome: Progressing     Problem: Pain  Goal: Verbalizes/displays adequate comfort level or baseline comfort level  Outcome: Progressing

## 2022-11-23 ENCOUNTER — HOSPITAL ENCOUNTER (OUTPATIENT)
Dept: WOUND CARE | Age: 72
Discharge: HOME | End: 2022-11-23
Payer: COMMERCIAL

## 2022-11-23 VITALS
HEART RATE: 80 BPM | TEMPERATURE: 97.9 F | SYSTOLIC BLOOD PRESSURE: 158 MMHG | RESPIRATION RATE: 18 BRPM | DIASTOLIC BLOOD PRESSURE: 58 MMHG

## 2022-11-23 PROCEDURE — 99213 OFFICE O/P EST LOW 20 MIN: CPT | Performed by: FAMILY MEDICINE

## 2022-11-23 PROCEDURE — 99213 OFFICE O/P EST LOW 20 MIN: CPT

## 2022-11-23 RX ORDER — CLOBETASOL PROPIONATE 0.5 MG/G
OINTMENT TOPICAL ONCE
OUTPATIENT
Start: 2022-11-23 | End: 2022-11-23

## 2022-11-23 RX ORDER — LIDOCAINE HYDROCHLORIDE 20 MG/ML
JELLY TOPICAL ONCE
OUTPATIENT
Start: 2022-11-23 | End: 2022-11-23

## 2022-11-23 RX ORDER — GENTAMICIN SULFATE 1 MG/G
OINTMENT TOPICAL ONCE
OUTPATIENT
Start: 2022-11-23 | End: 2022-11-23

## 2022-11-23 RX ORDER — LIDOCAINE HYDROCHLORIDE 40 MG/ML
SOLUTION TOPICAL ONCE
OUTPATIENT
Start: 2022-11-23 | End: 2022-11-23

## 2022-11-23 RX ORDER — BETAMETHASONE DIPROPIONATE 0.05 %
OINTMENT (GRAM) TOPICAL ONCE
OUTPATIENT
Start: 2022-11-23 | End: 2022-11-23

## 2022-11-23 RX ORDER — LIDOCAINE 50 MG/G
OINTMENT TOPICAL ONCE
OUTPATIENT
Start: 2022-11-23 | End: 2022-11-23

## 2022-11-23 RX ORDER — BACITRACIN, NEOMYCIN, POLYMYXIN B 400; 3.5; 5 [USP'U]/G; MG/G; [USP'U]/G
OINTMENT TOPICAL ONCE
OUTPATIENT
Start: 2022-11-23 | End: 2022-11-23

## 2022-11-23 RX ORDER — LIDOCAINE 40 MG/G
CREAM TOPICAL ONCE
OUTPATIENT
Start: 2022-11-23 | End: 2022-11-23

## 2022-11-23 RX ORDER — BACITRACIN ZINC AND POLYMYXIN B SULFATE 500; 1000 [USP'U]/G; [USP'U]/G
OINTMENT TOPICAL ONCE
OUTPATIENT
Start: 2022-11-23 | End: 2022-11-23

## 2022-11-23 RX ORDER — GINSENG 100 MG
CAPSULE ORAL ONCE
OUTPATIENT
Start: 2022-11-23 | End: 2022-11-23

## 2022-11-23 ASSESSMENT — PAIN DESCRIPTION - LOCATION: LOCATION: SACRUM

## 2022-11-23 ASSESSMENT — PAIN - FUNCTIONAL ASSESSMENT: PAIN_FUNCTIONAL_ASSESSMENT: ACTIVITIES ARE NOT PREVENTED

## 2022-11-23 ASSESSMENT — PAIN DESCRIPTION - DESCRIPTORS: DESCRIPTORS: SORE

## 2022-11-23 ASSESSMENT — PAIN SCALES - GENERAL: PAINLEVEL_OUTOF10: 4

## 2022-11-23 NOTE — PROGRESS NOTES
[facility]   Progress Note and Procedure Note      Gabrielle Webb  MEDICAL RECORD NUMBER:  [id number]  AGE: 67 y.o. GENDER: female  : 1950  EPISODE DATE:  2022    Subjective:     Chief Complaint   Patient presents with    Wound Check         HISTORY of PRESENT ILLNESS HPI     Gabrielle Webb is a 67 y.o. female who presents today for wound/ulcer evaluation. History of Wound Context: Overall patient states that her wounds are feeling better. She prefers to not have bandages and just use underwear as previously ordered. Sometimes wounds dry and then dry skin can stick to underwear. No fever or chills. Patient is currently being treated with prednisone due to [facility]. Wound seem to be getting smaller per patient's caregiver/. Wound/Ulcer Pain Timing/Severity: intermittent  Quality of pain: tender  Severity:  3 / 10   Modifying Factors: Pain worsens with touch and dressing change  Associated Signs/Symptoms: pain    Ulcer Identification:  Ulcer Type: pressure  Contributing Factors: diabetes, chronic pressure, decreased mobility, shear force, and obesity    Wound: N/A        PAST MEDICAL HISTORY        Diagnosis Date    Anxiety 2019    Arthritis     Bronchitis     Diabetes mellitus ([address]     Diabetic neuropathy associated with diabetes mellitus due to underlying condition ([address] 11/3/2017    Essential hypertension 2019    Hypercholesterolemia     Hypertension     Morbid obesity due to excess calories ([address] 2019    Thyroid disease     Wound infection 2018    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  surgeries x3    TONSILLECTOMY N/A        FAMILY HISTORY    Family History   Problem Relation Age of Onset    Cancer Father        SOCIAL HISTORY    Social History     Tobacco Use    Smoking status: Never    Smokeless tobacco: Never   Vaping Use    Vaping Use: Never used   Substance Use Topics    Alcohol use: No    Drug use: No       ALLERGIES    Allergies   Allergen Reactions    Nitrofurantoin Other (See Comments)     Leg swelling     Tizanidine      Other reaction(s): Mental Status Change    Gabapentin Anxiety    Sulfa Antibiotics Rash       MEDICATIONS    Current Outpatient Medications on File Prior to Encounter   Medication Sig Dispense Refill    mupirocin (BACTROBAN) 2 % ointment Apply topically with daily dressing change 15 g 0    predniSONE (DELTASONE) 20 MG tablet Take 20 mg by mouth 2 times daily Two times a day for five days, then one time a day for five days. mupirocin (BACTROBAN) 2 % ointment Apply topically 2 times daily. 15 g 1    Melatonin 10 MG TABS Take by mouth daily At bedtime      acetaminophen (TYLENOL) 500 MG tablet Take by mouth every 6 hours as needed for Pain      Bismuth Tribromoph-Petrolatum (XEROFORM PETROLATUM GAUZE 5\"X9\") MISC external pads Apply 1 each topically daily Apply to affected area as directed daily, after mupirocin ointment. 12 each 1    ammonium lactate (LAC-HYDRIN) 12 % lotion Apply topically daily to both lower legs, after washing with mild soap/water. Apply while still moist. 225 g 1    ammonium lactate (LAC-HYDRIN) 12 % lotion Apply topically daily after cleansing legs-with mild soap and water. Apply to intact areas of skin only. 225 g 1    oxyCODONE HCl (OXY-IR) 10 MG immediate release tablet Take 10 mg by mouth every 6 hours as needed for Pain.       Capsaicin (ZOSTRIX) 0.035 % CREA cream Apply topically 3 times daily      furosemide (LASIX) 40 MG tablet Take 40 mg by mouth 2 times daily 1.5 tablet by mouth as directed; 1 tablet in AM and 1/2 tablet at NOON      oxybutynin (DITROPAN-XL) 5 MG extended release tablet Take 5 mg by mouth daily      traZODone (DESYREL) 150 MG tablet Take 150 mg by mouth nightly      metoprolol (LOPRESSOR) 100 MG tablet Take 100 mg by mouth daily      atorvastatin (LIPITOR) 10 MG tablet Take 10 mg by mouth daily      polyethylene glycol (GLYCOLAX) 17 g packet Take 17 g by mouth every other day      Cholecalciferol (VITAMIN D3) 50 MCG (2000 UT) CAPS Take by mouth daily      Menthol-Methyl Salicylate (MUSCLE RUB) 10-15 % CREA cream Apply topically 2 times daily 1 application apply on the skin twice a day; arms and shoulders      coenzyme Q10 100 MG CAPS capsule Take 1 capsule by mouth 2 times daily (before meals) 60 capsule 1    Lactobacillus (PROBIOTIC ACIDOPHILUS) TABS Take 1 tablet by mouth daily 60 tablet 0    levothyroxine (SYNTHROID) 125 MCG tablet Take 125 mcg by mouth Daily      metFORMIN (GLUCOPHAGE) 1000 MG tablet Take 500 mg by mouth 2 times daily (with meals)        No current facility-administered medications on file prior to encounter. REVIEW OF SYSTEMS    Pertinent items are noted in HPI. Objective:      BP (!) 158/58   Pulse 80   Temp 97.9 °F (36.6 °C)   Resp 18   LMP  (LMP Unknown)     Wt Readings from Last 3 Encounters:   09/24/20 240 lb (108.9 kg)   05/09/20 236 lb (107 kg)   04/04/19 275 lb (124.7 kg)       PHYSICAL EXAM    Constitutional:   Well nourished and well developed. Appears neat and clean. Patient is alert, oriented x3, and in no apparent distress. Respiratory:  Respiratory effort is easy and symmetric bilaterally. Rate is normal at rest and on room air. Vascular:   Capillary refill is <5 sec to digits bilateral.     Neurological:  Gross and Light touch intact. Protective sensation intact at wound area    Dermatological:  Wound description noted in wound assessment. Buttock wounds are dry with some mild scabbing. There is no drainage. No odor or warmth. No swelling or surrounding erythema. Hand wounds are healed. Psychiatric:  Judgement and insight intact. Short and long term memory intact.   No evidence of depression, 1433   Wound Depth (cm) 0.1 cm 11/23/22 1433   Wound Surface Area (cm^2) 1.5 cm^2 11/23/22 1433   Change in Wound Size % (l*w) -33.33 11/23/22 1433   Wound Volume (cm^3) 0.15 cm^3 11/23/22 1433   Wound Healing % -33 11/23/22 1433   Wound Assessment Pink/red 11/23/22 1433   Drainage Amount Scant 11/23/22 1433   Drainage Description Serous 11/23/22 1433   Odor None 11/23/22 1433   Debby-wound Assessment Hyperkeratosis (callous); Fragile 11/23/22 1433   Margins Undefined edges 11/23/22 1433   Wound Thickness Description not for Pressure Injury Full thickness 11/23/22 1433   Number of days: 84       Wound 09/21/22 Buttocks Left #5 (Active)   Wound Image   11/23/22 1433   Wound Etiology Pressure Stage 2 11/23/22 1433   Wound Cleansed Cleansed with saline 11/23/22 1433   Dressing/Treatment Calmoseptine/zinc oxide with menthol; Other (comment) 11/23/22 1522   Wound Length (cm) 0.4 cm 11/23/22 1433   Wound Width (cm) 0.3 cm 11/23/22 1433   Wound Depth (cm) 0.1 cm 11/23/22 1433   Wound Surface Area (cm^2) 0.12 cm^2 11/23/22 1433   Change in Wound Size % (l*w) 71.43 11/23/22 1433   Wound Volume (cm^3) 0.012 cm^3 11/23/22 1433   Wound Assessment Pink/red 11/23/22 1433   Drainage Amount Scant 11/23/22 1433   Drainage Description Yellow 11/23/22 1433   Odor None 11/23/22 1433   Debby-wound Assessment Fragile 11/23/22 1433   Margins Defined edges 11/23/22 1433   Wound Thickness Description not for Pressure Injury Partial thickness 11/23/22 1433   Number of days: 63       Wound 10/12/22 Hand Right;Dorsal #6 (Active)   Wound Image   11/23/22 1433   Wound Etiology Traumatic 11/23/22 1433   Wound Cleansed Cleansed with saline 10/12/22 1336   Dressing/Treatment Open to air 11/23/22 1522   Wound Length (cm) 0 cm 11/23/22 1433   Wound Width (cm) 0 cm 11/23/22 1433   Wound Depth (cm) 0 cm 11/23/22 1433   Wound Surface Area (cm^2) 0 cm^2 11/23/22 1433   Change in Wound Size % (l*w) 100 11/23/22 1433   Wound Volume (cm^3) 0 cm^3 11/23/22 1433 Wound Healing % 100 22 1433   Wound Assessment Slough;Pink/red 10/26/22 1408   Drainage Amount Small 10/26/22 1408   Drainage Description Clear;Serous 10/26/22 1408   Odor None 10/26/22 1408   Debby-wound Assessment Fragile; Other (Comment) 10/26/22 1408   Margins Defined edges 10/26/22 1408   Wound Thickness Description not for Pressure Injury Full thickness 10/26/22 1408   Number of days: 42                  Plan:       Patient just got new cushion, encourage patient to stay off her bottom is much as possible. Advised to go to the emergency room with signs symptoms of infection such as fever, chills, lethargy, increased pain, increased drainage, odor, warmth, swelling or redness around wound. Treatment Note please see attached Discharge Instructions    Written patient dismissal instructions given to patient and signed by patient or POA. Discharge  [facility]   Physician Orders and Discharge Instructions  [address]  Telephone: [phone]        NAME:  Med Palmer                                                                                       YOB: 1950  MEDICAL RECORD NUMBER:  [id number]     Your  is:  [address] [place] [address] Care/Facility: None     Wound Location: Right Buttocks and Left Buttocks     Dressing orders:. 1. May cleanse with soap and water. Apply Calmoseptine cream twice a day and as needed. (Apply to dry areas too). Apply Vaseline (mix in) over the calmoseptine   Place gauze  between buttock crease and change as needed (try to keep area dry)                                                                                                                              Compression: CASSIDY's ordered in the wound center for your legs. Schedulin- [id number]. Offloading Device: Change position every 1-2 hours. Limit sitting in chair. Other Instructions: Apply protective cream/ointment to buttocks crease ( Calmoseptine, Desitin) daily. Increase Fruit/Veg intake. Apply Lac-Hydrin to both legs daily (not between toes). Keep both heels off surfaces - use a pillow or towel to help elevate. Wear a protective glove or ace bandage over hands when getting in and out of your wheelchair. Dressing Supplies:      Keep all dressings clean, dry and intact. Keep pressure off the wound(s) at all times. Follow up visit  3 weeks December 14, 2022 @      1:15 pm      Please give 24 hour notice if unable to keep appointment. [phone]     If you experience any of the following, please call the Wound Care Service at  [phone] or go to the nearest emergency room. *Increase in pain         *Temperature over 101           *Increase in drainage from your wound or a foul odor  *Uncontrolled swelling            *Need for compression bandage changes due to slippage, breakthrough drainage       PLEASE NOTE: IF YOU ARE UNABLE TO OBTAIN WOUND SUPPLIES, CONTINUE TO USE THE SUPPLIES YOU HAVE AVAILABLE UNTIL YOU ARE ABLE TO REACH US.  IT IS MOST IMPORTANT TO KEEP THE WOUND COVERED AT ALL TIMES                       Electronically signed by Rena Eli MD on 11/23/2022 at 5:50 PM

## 2022-11-23 NOTE — DISCHARGE INSTRUCTIONS
odor  *Uncontrolled swelling            *Need for compression bandage changes due to slippage, breakthrough drainage       PLEASE NOTE: IF YOU ARE UNABLE TO OBTAIN WOUND SUPPLIES, CONTINUE TO USE THE SUPPLIES YOU HAVE AVAILABLE UNTIL YOU ARE ABLE TO REACH US.  IT IS MOST IMPORTANT TO KEEP THE WOUND COVERED AT ALL TIMES

## 2022-11-23 NOTE — PLAN OF CARE
Problem: Chronic Conditions and Co-morbidities  Goal: Patient's chronic conditions and co-morbidity symptoms are monitored and maintained or improved  Outcome: Progressing     Problem: Pain  Goal: Verbalizes/displays adequate comfort level or baseline comfort level  Outcome: Progressing     Problem: Pain  Goal: Verbalizes/displays adequate comfort level or baseline comfort level  Outcome: Progressing     Problem: Chronic Conditions and Co-morbidities  Goal: Patient's chronic conditions and co-morbidity symptoms are monitored and maintained or improved  Outcome: Progressing     Problem: Wound:  Goal: Will show signs of wound healing; wound closure and no evidence of infection  Description: Will show signs of wound healing; wound closure and no evidence of infection  Outcome: Progressing

## 2022-12-07 ENCOUNTER — HOSPITAL ENCOUNTER (OUTPATIENT)
Dept: WOUND CARE | Age: 72
Discharge: HOME | End: 2022-12-07
Payer: COMMERCIAL

## 2022-12-07 VITALS
RESPIRATION RATE: 18 BRPM | SYSTOLIC BLOOD PRESSURE: 135 MMHG | HEART RATE: 72 BPM | DIASTOLIC BLOOD PRESSURE: 49 MMHG | TEMPERATURE: 97.9 F

## 2022-12-07 DIAGNOSIS — I87.2 VENOUS ULCER OF RIGHT LOWER EXTREMITY WITHOUT VARICOSE VEINS (HCC): ICD-10-CM

## 2022-12-07 DIAGNOSIS — L97.919 VENOUS ULCER OF RIGHT LOWER EXTREMITY WITHOUT VARICOSE VEINS (HCC): ICD-10-CM

## 2022-12-07 PROBLEM — I83.009 VENOUS ULCER, LIMITED TO BREAKDOWN OF SKIN (HCC): Status: ACTIVE | Noted: 2022-01-01

## 2022-12-07 PROBLEM — L97.901 VENOUS ULCER, LIMITED TO BREAKDOWN OF SKIN (HCC): Status: ACTIVE | Noted: 2022-01-01

## 2022-12-07 PROCEDURE — 99213 OFFICE O/P EST LOW 20 MIN: CPT

## 2022-12-07 RX ORDER — BETAMETHASONE DIPROPIONATE 0.05 %
OINTMENT (GRAM) TOPICAL ONCE
OUTPATIENT
Start: 2022-12-07 | End: 2022-12-07

## 2022-12-07 RX ORDER — LIDOCAINE 40 MG/G
CREAM TOPICAL ONCE
OUTPATIENT
Start: 2022-12-07 | End: 2022-12-07

## 2022-12-07 RX ORDER — GINSENG 100 MG
CAPSULE ORAL ONCE
OUTPATIENT
Start: 2022-12-07 | End: 2022-12-07

## 2022-12-07 RX ORDER — LIDOCAINE HYDROCHLORIDE 40 MG/ML
SOLUTION TOPICAL ONCE
OUTPATIENT
Start: 2022-12-07 | End: 2022-12-07

## 2022-12-07 RX ORDER — BACITRACIN, NEOMYCIN, POLYMYXIN B 400; 3.5; 5 [USP'U]/G; MG/G; [USP'U]/G
OINTMENT TOPICAL ONCE
OUTPATIENT
Start: 2022-12-07 | End: 2022-12-07

## 2022-12-07 RX ORDER — LIDOCAINE HYDROCHLORIDE 20 MG/ML
JELLY TOPICAL ONCE
OUTPATIENT
Start: 2022-12-07 | End: 2022-12-07

## 2022-12-07 RX ORDER — CLOBETASOL PROPIONATE 0.5 MG/G
OINTMENT TOPICAL ONCE
OUTPATIENT
Start: 2022-12-07 | End: 2022-12-07

## 2022-12-07 RX ORDER — GENTAMICIN SULFATE 1 MG/G
OINTMENT TOPICAL ONCE
OUTPATIENT
Start: 2022-12-07 | End: 2022-12-07

## 2022-12-07 RX ORDER — LIDOCAINE 50 MG/G
OINTMENT TOPICAL ONCE
OUTPATIENT
Start: 2022-12-07 | End: 2022-12-07

## 2022-12-07 RX ORDER — BACITRACIN ZINC AND POLYMYXIN B SULFATE 500; 1000 [USP'U]/G; [USP'U]/G
OINTMENT TOPICAL ONCE
OUTPATIENT
Start: 2022-12-07 | End: 2022-12-07

## 2022-12-07 NOTE — PROGRESS NOTES
[address] [address]   Progress Note and Procedure Note      Minerva Kinsey  MEDICAL RECORD NUMBER:  [id number]  AGE: 67 y.o. GENDER: female  : 1950  EPISODE DATE:  2022    Subjective:     Chief Complaint   Patient presents with    Wound Check     Right lower leg         HISTORY of PRESENT ILLNESS HPI     Minerva Kinsey is a 67 y.o. female who presents today for wound/ulcer evaluation. History of Wound Context: Declined exam of buttocks wound today. She only wants us to look at her new wound on her right lower extremity. New wound appeared a few days ago. She is unsure if she hit her shin. Area is draining clear fluid. Blister has formed. No fever or chills. Patient is on prednisone. Patient also has history of venous stasis and diabetes. Wound/Ulcer Pain Timing/Severity: mild  Quality of pain: tender  Severity:  2 / 10   Modifying Factors: Pain worsens with touch  Associated Signs/Symptoms: edema and drainage    Ulcer Identification:  Ulcer Type: venous  Contributing Factors: edema, venous stasis, diabetes, decreased mobility, and obesity    Wound: N/A        PAST MEDICAL HISTORY        Diagnosis Date    Anxiety 2019    Arthritis     Bronchitis     Diabetes mellitus ([facility] [address]     Diabetic neuropathy associated with diabetes mellitus due to underlying condition ([facility] [address] 11/3/2017    Essential hypertension 2019    Hypercholesterolemia     Hypertension     Morbid obesity due to excess calories ([facility] [address] 2019    Thyroid disease     Wound infection 2018    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  surgeries x3    TONSILLECTOMY N/A        FAMILY HISTORY    Family History   Problem Relation Age of Onset    Cancer Father        SOCIAL HISTORY    Social History Tobacco Use    Smoking status: Never    Smokeless tobacco: Never   Vaping Use    Vaping Use: Never used   Substance Use Topics    Alcohol use: No    Drug use: No       ALLERGIES    Allergies   Allergen Reactions    Nitrofurantoin Other (See Comments)     Leg swelling     Tizanidine      Other reaction(s): Mental Status Change    Gabapentin Anxiety    Sulfa Antibiotics Rash       MEDICATIONS    Current Outpatient Medications on File Prior to Encounter   Medication Sig Dispense Refill    mupirocin (BACTROBAN) 2 % ointment Apply topically with daily dressing change 15 g 0    predniSONE (DELTASONE) 20 MG tablet Take 20 mg by mouth 2 times daily Two times a day for five days, then one time a day for five days. mupirocin (BACTROBAN) 2 % ointment Apply topically 2 times daily. 15 g 1    Melatonin 10 MG TABS Take by mouth daily At bedtime      acetaminophen (TYLENOL) 500 MG tablet Take by mouth every 6 hours as needed for Pain      Bismuth Tribromoph-Petrolatum (XEROFORM PETROLATUM GAUZE 5\"X9\") MISC external pads Apply 1 each topically daily Apply to affected area as directed daily, after mupirocin ointment. 12 each 1    ammonium lactate (LAC-HYDRIN) 12 % lotion Apply topically daily to both lower legs, after washing with mild soap/water. Apply while still moist. 225 g 1    ammonium lactate (LAC-HYDRIN) 12 % lotion Apply topically daily after cleansing legs-with mild soap and water. Apply to intact areas of skin only. 225 g 1    oxyCODONE HCl (OXY-IR) 10 MG immediate release tablet Take 10 mg by mouth every 6 hours as needed for Pain.       Capsaicin (ZOSTRIX) 0.035 % CREA cream Apply topically 3 times daily      furosemide (LASIX) 40 MG tablet Take 40 mg by mouth 2 times daily 1.5 tablet by mouth as directed; 1 tablet in AM and 1/2 tablet at NOON      oxybutynin (DITROPAN-XL) 5 MG extended release tablet Take 5 mg by mouth daily      traZODone (DESYREL) 150 MG tablet Take 150 mg by mouth nightly      metoprolol (LOPRESSOR) 100 MG tablet Take 100 mg by mouth daily      atorvastatin (LIPITOR) 10 MG tablet Take 10 mg by mouth daily      polyethylene glycol (GLYCOLAX) 17 g packet Take 17 g by mouth every other day      Cholecalciferol (VITAMIN D3) 50 MCG (2000 UT) CAPS Take by mouth daily      Menthol-Methyl Salicylate (MUSCLE RUB) 10-15 % CREA cream Apply topically 2 times daily 1 application apply on the skin twice a day; arms and shoulders      coenzyme Q10 100 MG CAPS capsule Take 1 capsule by mouth 2 times daily (before meals) 60 capsule 1    Lactobacillus (PROBIOTIC ACIDOPHILUS) TABS Take 1 tablet by mouth daily 60 tablet 0    levothyroxine (SYNTHROID) 125 MCG tablet Take 125 mcg by mouth Daily      metFORMIN (GLUCOPHAGE) 1000 MG tablet Take 500 mg by mouth 2 times daily (with meals)        No current facility-administered medications on file prior to encounter. REVIEW OF SYSTEMS    Pertinent items are noted in HPI. Objective:      BP (!) 135/49   Pulse 72   Temp 97.9 °F (36.6 °C) (Temporal)   Resp 18   LMP  (LMP Unknown)     Wt Readings from Last 3 Encounters:   09/24/20 240 lb (108.9 kg)   05/09/20 236 lb (107 kg)   04/04/19 275 lb (124.7 kg)       PHYSICAL EXAM    Constitutional:   Well nourished and well developed. Appears neat and clean. Patient is alert, oriented x3, and in no apparent distress. Respiratory:  Respiratory effort is easy and symmetric bilaterally. Rate is normal at rest and on room air. Vascular:  Pedal Pulses is palpable and audible signal noted with doppler. Capillary refill is <5 sec to digits bilateral.  Extremities positive for 2+ pitting edema. Neurological:  Gross and Light touch intact. Protective sensation intact    Dermatological:  Wound description noted in wound assessment. Venous stasis ulcer with blistering on right shin. No surrounding erythema or swelling. No warmth or odor. No purulence. Psychiatric:  Judgement and insight intact.  Short and long term memory intact. No evidence of depression, anxiety, or agitation. Patient is calm, cooperative, and communicative. Appropriate interactions and affect. Assessment:      There are no active hospital problems to display for this patient. Procedure Note  Indications:  Based on my examination of this patient's wound(s)/ulcer(s) today, debridement is not required to promote healing and evaluate the wound base. A small hole was cut at base of blister to release fluid, after obtaining consent from patient. Procedure done after wound was cleaned with saline. Only clear fluid drained. No pain for patient. Wound 02/24/22 Buttocks Right #1 (Active)   Wound Image   11/23/22 1433   Wound Etiology Pressure Stage 3 11/23/22 1433   Wound Cleansed Cleansed with saline 10/26/22 1423   Dressing/Treatment Calmoseptine/zinc oxide with menthol; Other (comment) 11/23/22 1522   Wound Length (cm) 0.3 cm 11/23/22 1433   Wound Width (cm) 0.2 cm 11/23/22 1433   Wound Depth (cm) 0.1 cm 11/23/22 1433   Wound Surface Area (cm^2) 0.06 cm^2 11/23/22 1433   Change in Wound Size % (l*w) 91.43 11/23/22 1433   Wound Volume (cm^3) 0.006 cm^3 11/23/22 1433   Wound Healing % 91 11/23/22 1433   Post-Procedure Length (cm) 0.4 cm 04/14/22 1218   Post-Procedure Width (cm) 0.7 cm 04/14/22 1218   Post-Procedure Depth (cm) 0.1 cm 04/14/22 1218   Post-Procedure Surface Area (cm^2) 0.28 cm^2 04/14/22 1218   Post-Procedure Volume (cm^3) 0.028 cm^3 04/14/22 1218   Wound Assessment Dry;Pink/red 11/23/22 1433   Drainage Amount None 11/23/22 1433   Drainage Description Serous; Serosanguinous 10/12/22 1327   Odor None 11/23/22 1433   Debby-wound Assessment Fragile 11/23/22 1433   Margins Undefined edges 11/23/22 1433   Wound Thickness Description not for Pressure Injury Full thickness 11/23/22 1433   Number of days: 286       Wound 08/31/22 Buttocks Right;Distal #2 (Active)   Wound Image   11/23/22 1433   Wound Etiology Pressure Stage 2 11/23/22 1880 Wound Cleansed Cleansed with saline 11/23/22 1433   Dressing/Treatment Calmoseptine/zinc oxide with menthol; Other (comment);Gauze dressing/dressing sponge 11/23/22 1522   Wound Length (cm) 1.5 cm 11/23/22 1433   Wound Width (cm) 1 cm 11/23/22 1433   Wound Depth (cm) 0.1 cm 11/23/22 1433   Wound Surface Area (cm^2) 1.5 cm^2 11/23/22 1433   Change in Wound Size % (l*w) -33.33 11/23/22 1433   Wound Volume (cm^3) 0.15 cm^3 11/23/22 1433   Wound Healing % -33 11/23/22 1433   Wound Assessment Pink/red 11/23/22 1433   Drainage Amount Scant 11/23/22 1433   Drainage Description Serous 11/23/22 1433   Odor None 11/23/22 1433   Debby-wound Assessment Hyperkeratosis (callous); Fragile 11/23/22 1433   Margins Undefined edges 11/23/22 1433   Wound Thickness Description not for Pressure Injury Full thickness 11/23/22 1433   Number of days: 98       Wound 09/21/22 Buttocks Left #5 (Active)   Wound Image   11/23/22 1433   Wound Etiology Pressure Stage 2 11/23/22 1433   Wound Cleansed Cleansed with saline 11/23/22 1433   Dressing/Treatment Calmoseptine/zinc oxide with menthol; Other (comment) 11/23/22 1522   Wound Length (cm) 0.4 cm 11/23/22 1433   Wound Width (cm) 0.3 cm 11/23/22 1433   Wound Depth (cm) 0.1 cm 11/23/22 1433   Wound Surface Area (cm^2) 0.12 cm^2 11/23/22 1433   Change in Wound Size % (l*w) 71.43 11/23/22 1433   Wound Volume (cm^3) 0.012 cm^3 11/23/22 1433   Wound Assessment Pink/red 11/23/22 1433   Drainage Amount Scant 11/23/22 1433   Drainage Description Yellow 11/23/22 1433   Odor None 11/23/22 1433   Debby-wound Assessment Fragile 11/23/22 1433   Margins Defined edges 11/23/22 1433   Wound Thickness Description not for Pressure Injury Partial thickness 11/23/22 1433   Number of days: 77       Wound 12/07/22 Leg Right; Anterior; Lower #6 (Active)   Wound Image   12/07/22 1330   Wound Etiology Venous 12/07/22 1330   Wound Cleansed Cleansed with saline 12/07/22 1330   Wound Length (cm) 5 cm 12/07/22 1330   Wound Width (cm) 5.5 cm 12/07/22 1330   Wound Depth (cm) 0.1 cm 12/07/22 1330   Wound Surface Area (cm^2) 27.5 cm^2 12/07/22 1330   Wound Volume (cm^3) 2.75 cm^3 12/07/22 1330   Wound Assessment Fluid filled blister;Pink/red 12/07/22 1330   Drainage Amount Small 12/07/22 1330   Drainage Description Serous 12/07/22 1330   Odor None 12/07/22 1330   Debby-wound Assessment Intact 12/07/22 1330   Margins Undefined edges 12/07/22 1330   Wound Thickness Description not for Pressure Injury Full thickness 12/07/22 1330   Number of days: 0                  Plan:   Recommended patient have ABIs checked. Patient declines. Will place mild compression on at this time but explained to patient that if I had an CASSIDY report that was normal we could put higher compression which would improve healing of wound. Encourage patient to the emergency room signs symptoms of infection such as fever, chills, purulence, swelling, warmth, odor, pain, or redness around wound. Keep feet elevated when sitting. Treatment Note please see attached Discharge Instructions    Written patient dismissal instructions given to patient and signed by patient or POA. Discharge [facility]   Physician Orders and Discharge Instructions  [address]  Telephone: [phone]        NAME:  Chris Hector                                                                                       YOB: 1950  MEDICAL RECORD NUMBER:  [id number]     Your  is:  [address] [place] [address] Care/Facility: None     Wound Location: Right Buttocks and Left Buttocks     Dressing orders:. 1. May cleanse with soap and water. Apply Calmoseptine cream twice a day and as needed. (Apply to dry areas too).   Apply Vaseline (mix in) over the calmoseptine   Place gauze  between buttock crease and change as needed (try to keep area dry)      Wound Location: . 1. Cleanse wound(s) with normal saline. 2. Apply dry SILVERCEL OR CALCIUM ALGINATE WITH Ag or eqivalent to wound bed. 3. Cover with 4x4's and wrap with gauze (nikolas or kerlix)  4. Change  Every other day or Monday, Wednesday, and Friday                                                                                                                           Compression:. Apply 5-10mmHg Spandagrip to right and left  lower leg, apply first thing in the morning, remove at bedtime, elevate legs as much as possible during the day. CASSIDY's ordered in the wound center for your legs. Schedulin- [id number]. Offloading Device: Change position every 1-2 hours. Limit sitting in chair. Other Instructions: Apply protective cream/ointment to buttocks crease ( Calmoseptine, Desitin) daily. Increase Fruit/Veg intake. Apply Lac-Hydrin to both legs daily (not between toes). Keep both heels off surfaces - use a pillow or towel to help elevate. Wear a protective glove or ace bandage over hands when getting in and out of your wheelchair. Keep all dressings clean, dry and intact. Keep pressure off the wound(s) at all times. Follow up visit  1 week 2022 @      1:15 pm      Please give 24 hour notice if unable to keep appointment. [phone]     If you experience any of the following, please call the Wound Care Service at  [phone] or go to the nearest emergency room. *Increase in pain         *Temperature over 101           *Increase in drainage from your wound or a foul odor  *Uncontrolled swelling            *Need for compression bandage changes due to slippage, breakthrough drainage       PLEASE NOTE: IF YOU ARE UNABLE TO OBTAIN WOUND SUPPLIES, CONTINUE TO USE THE SUPPLIES YOU HAVE AVAILABLE UNTIL YOU ARE ABLE TO REACH US.  IT IS MOST IMPORTANT TO KEEP THE WOUND COVERED AT ALL TIMES                          Electronically signed by Nancy Marie MD on 12/7/2022 at 2:05 PM

## 2022-12-07 NOTE — FLOWSHEET NOTE
[address]:     [employer] [id number] [address] p: [phone] f: [phone]     Ordering Center:     [address] [place] [address]  [phone]  WOUND CARE [phone]  FAX NUMBER [phone]    Patient Information:      Chris Hector  [address] [place] [address] [phone]   : 1950  AGE: 67 y.o. GENDER: female   TODAYS DATE:  2022    Insurance:      PRIMARY INSURANCE:  Plan: [employer] [place] DUAL  Coverage: [place]  Effective Date: 2020  [id number] - (Medicare Managed)    SECONDARY INSURANCE:  Plan:   Coverage:   Effective Date:   [unfilled]    [unfilled]   [unfilled]     Patient Wound Information:      Problem List Items Addressed This Visit          Other    Venous ulcer of right lower extremity without varicose veins ([address]       WOUNDS REQUIRING DRESSING SUPPLIES:     Wound 22 Buttocks Right #1 (Active)   Wound Image   22 1433   Wound Etiology Pressure Stage 3 22 1433   Wound Cleansed Cleansed with saline 10/26/22 1423   Dressing/Treatment Calmoseptine/zinc oxide with menthol; Other (comment) 22 1522   Wound Length (cm) 0.3 cm 22 1433   Wound Width (cm) 0.2 cm 22 1433   Wound Depth (cm) 0.1 cm 22 1433   Wound Surface Area (cm^2) 0.06 cm^2 22 1433   Change in Wound Size % (l*w) 91.43 22 1433   Wound Volume (cm^3) 0.006 cm^3 22 1433   Wound Healing % 91 22 1433   Post-Procedure Length (cm) 0.4 cm 22 1218   Post-Procedure Width (cm) 0.7 cm 22 1218   Post-Procedure Depth (cm) 0.1 cm 22 1218   Post-Procedure Surface Area (cm^2) 0.28 cm^2 22 1218   Post-Procedure Volume (cm^3) 0.028 cm^3 22 1218   Wound Assessment Dry;Pink/red 22 1433   Drainage Amount None 22 1433   Drainage Description Serous; Serosanguinous 10/12/22 1327   Odor None 11/23/22 1433   Debby-wound Assessment Fragile 11/23/22 1433   Margins Undefined edges 11/23/22 1433   Wound Thickness Description not for Pressure Injury Full thickness 11/23/22 1433   Number of days: 286       Wound 08/31/22 Buttocks Right;Distal #2 (Active)   Wound Image   11/23/22 1433   Wound Etiology Pressure Stage 2 11/23/22 1433   Wound Cleansed Cleansed with saline 11/23/22 1433   Dressing/Treatment Calmoseptine/zinc oxide with menthol; Other (comment);Gauze dressing/dressing sponge 11/23/22 1522   Wound Length (cm) 1.5 cm 11/23/22 1433   Wound Width (cm) 1 cm 11/23/22 1433   Wound Depth (cm) 0.1 cm 11/23/22 1433   Wound Surface Area (cm^2) 1.5 cm^2 11/23/22 1433   Change in Wound Size % (l*w) -33.33 11/23/22 1433   Wound Volume (cm^3) 0.15 cm^3 11/23/22 1433   Wound Healing % -33 11/23/22 1433   Wound Assessment Pink/red 11/23/22 1433   Drainage Amount Scant 11/23/22 1433   Drainage Description Serous 11/23/22 1433   Odor None 11/23/22 1433   Debby-wound Assessment Hyperkeratosis (callous); Fragile 11/23/22 1433   Margins Undefined edges 11/23/22 1433   Wound Thickness Description not for Pressure Injury Full thickness 11/23/22 1433   Number of days: 98       Wound 09/21/22 Buttocks Left #5 (Active)   Wound Image   11/23/22 1433   Wound Etiology Pressure Stage 2 11/23/22 1433   Wound Cleansed Cleansed with saline 11/23/22 1433   Dressing/Treatment Calmoseptine/zinc oxide with menthol; Other (comment) 11/23/22 1522   Wound Length (cm) 0.4 cm 11/23/22 1433   Wound Width (cm) 0.3 cm 11/23/22 1433   Wound Depth (cm) 0.1 cm 11/23/22 1433   Wound Surface Area (cm^2) 0.12 cm^2 11/23/22 1433   Change in Wound Size % (l*w) 71.43 11/23/22 1433   Wound Volume (cm^3) 0.012 cm^3 11/23/22 1433   Wound Assessment Pink/red 11/23/22 1433   Drainage Amount Scant 11/23/22 1433   Drainage Description Yellow 11/23/22 1433   Odor None 11/23/22 1433   Debby-wound Assessment Fragile 11/23/22 1433   Margins Defined edges 11/23/22 1433   Wound Thickness Description not for Pressure Injury Partial thickness 11/23/22 1433   Number of days: 77       Wound 12/07/22 Leg Right; Anterior; Lower #6 (Active)   Wound Image   12/07/22 1330   Wound Etiology Venous 12/07/22 1330   Wound Cleansed Cleansed with saline 12/07/22 1330   Dressing/Treatment Alginate with Ag;Dry dressing 12/07/22 1417   Wound Length (cm) 5 cm 12/07/22 1330   Wound Width (cm) 5.5 cm 12/07/22 1330   Wound Depth (cm) 0.1 cm 12/07/22 1330   Wound Surface Area (cm^2) 27.5 cm^2 12/07/22 1330   Wound Volume (cm^3) 2.75 cm^3 12/07/22 1330   Wound Assessment Fluid filled blister;Pink/red 12/07/22 1330   Drainage Amount Small 12/07/22 1330   Drainage Description Serous 12/07/22 1330   Odor None 12/07/22 1330   Debby-wound Assessment Intact 12/07/22 1330   Margins Undefined edges 12/07/22 1330   Wound Thickness Description not for Pressure Injury Full thickness 12/07/22 1330   Number of days: 0          Supplies Requested :      WOUND #: 6   PRIMARY DRESSING:  Alginate with silver pad  SECONDARY DRESSING:  None   Cover and Secure with: 4X4 gauze pad  Conforming roll gauze     FREQUENCY OF DRESSING CHANGES:  Every other day           ADDITIONAL ITEMS:  [] Gloves Small  [x] Gloves Medium [] Gloves Large [] Gloves XLarge  [] Tape 1\" [x] Tape 2\" [] Tape 3\"  [] Medipore Tape  [x] Saline  [] Skin Prep   [] Adhesive Remover   [] Cotton Tip Applicators   [] Other:    Patient Wound(s) Debrided: [x] Yes   [] No    Debribement Type:  mechanical    Debridement Date: 12/7/22    Patient currently being seen by Home Health: [] Yes   [x] No    Duration for needed supplies:  []15  [x]30  []60  []90 Days    Provider Information:      PROVIDER'S NAME:  Sharmila Kulkarni MD   NPI:  [id number]

## 2022-12-07 NOTE — DISCHARGE INSTRUCTIONS
[facility]   Physician Orders and Discharge Instructions  [facility]  [address] [facility]  [facility]  Telephone: [phone]        NAME:  Josesito Boland                                                                                       YOB: 1950  MEDICAL RECORD NUMBER:  [id number]     Your  is:  [facility] [place] Care/Facility: None     Wound Location: Right Buttocks and Left Buttocks     Dressing orders:. 1. May cleanse with soap and water. Apply Calmoseptine cream twice a day and as needed. (Apply to dry areas too). Apply Vaseline (mix in) over the calmoseptine   Place gauze  between buttock crease and change as needed (try to keep area dry)      Wound Location: . 1. Cleanse wound(s) with normal saline. 2. Apply dry SILVERCEL OR CALCIUM ALGINATE WITH Ag or eqivalent to wound bed. 3. Cover with 4x4's and wrap with gauze (nikolas or kerlix)  4. Change  Every other day or Monday, Wednesday, and Friday                                                                                                                           Compression:. Apply 5-10mmHg Spandagrip to right and left  lower leg, apply first thing in the morning, remove at bedtime, elevate legs as much as possible during the day. CASSIDY's ordered in the wound center for your legs. Schedulin552- [id number]. Offloading Device: Change position every 1-2 hours. Limit sitting in chair. Other Instructions: Apply protective cream/ointment to buttocks crease ( Calmoseptine, Desitin) daily. Increase Fruit/Veg intake. Apply Lac-Hydrin to both legs daily (not between toes). Keep both heels off surfaces - use a pillow or towel to help elevate. Wear a protective glove or ace bandage over hands when getting in and out of your wheelchair. Keep all dressings clean, dry and intact. Keep pressure off the wound(s) at all times. Follow up visit  1 week December 14, 2022 @      1:15 pm      Please give 24 hour notice if unable to keep appointment. [phone]     If you experience any of the following, please call the Wound Care Service at  [phone] or go to the nearest emergency room. *Increase in pain         *Temperature over 101           *Increase in drainage from your wound or a foul odor  *Uncontrolled swelling            *Need for compression bandage changes due to slippage, breakthrough drainage       PLEASE NOTE: IF YOU ARE UNABLE TO OBTAIN WOUND SUPPLIES, CONTINUE TO USE THE SUPPLIES YOU HAVE AVAILABLE UNTIL YOU ARE ABLE TO REACH US.  IT IS MOST IMPORTANT TO KEEP THE WOUND COVERED AT ALL TIMES

## 2022-12-14 ENCOUNTER — HOSPITAL ENCOUNTER (OUTPATIENT)
Dept: WOUND CARE | Age: 72
Discharge: HOME | End: 2022-12-14
Payer: COMMERCIAL

## 2022-12-14 VITALS
HEART RATE: 74 BPM | TEMPERATURE: 97.2 F | SYSTOLIC BLOOD PRESSURE: 155 MMHG | RESPIRATION RATE: 18 BRPM | DIASTOLIC BLOOD PRESSURE: 43 MMHG

## 2022-12-14 DIAGNOSIS — L97.919 VENOUS ULCER OF RIGHT LOWER EXTREMITY WITHOUT VARICOSE VEINS (HCC): ICD-10-CM

## 2022-12-14 DIAGNOSIS — S31.819A OPEN WOUND OF RIGHT BUTTOCK, INITIAL ENCOUNTER: ICD-10-CM

## 2022-12-14 DIAGNOSIS — R53.1 DECREASED STRENGTH, ENDURANCE, AND MOBILITY: Primary | ICD-10-CM

## 2022-12-14 DIAGNOSIS — I83.001 VENOUS STASIS ULCER OF THIGH LIMITED TO BREAKDOWN OF SKIN, UNSPECIFIED LATERALITY, UNSPECIFIED WHETHER VARICOSE VEINS PRESENT (HCC): ICD-10-CM

## 2022-12-14 DIAGNOSIS — I87.2 VENOUS ULCER OF RIGHT LOWER EXTREMITY WITHOUT VARICOSE VEINS (HCC): ICD-10-CM

## 2022-12-14 DIAGNOSIS — Z74.09 DECREASED STRENGTH, ENDURANCE, AND MOBILITY: Primary | ICD-10-CM

## 2022-12-14 DIAGNOSIS — S60.512A HAND ABRASION, LEFT, INITIAL ENCOUNTER: ICD-10-CM

## 2022-12-14 DIAGNOSIS — I87.2 VENOUS INSUFFICIENCY OF BOTH LOWER EXTREMITIES: ICD-10-CM

## 2022-12-14 DIAGNOSIS — R68.89 DECREASED STRENGTH, ENDURANCE, AND MOBILITY: Primary | ICD-10-CM

## 2022-12-14 DIAGNOSIS — L97.101 VENOUS STASIS ULCER OF THIGH LIMITED TO BREAKDOWN OF SKIN, UNSPECIFIED LATERALITY, UNSPECIFIED WHETHER VARICOSE VEINS PRESENT (HCC): ICD-10-CM

## 2022-12-14 PROCEDURE — 87075 CULTR BACTERIA EXCEPT BLOOD: CPT

## 2022-12-14 PROCEDURE — 99213 OFFICE O/P EST LOW 20 MIN: CPT | Performed by: FAMILY MEDICINE

## 2022-12-14 PROCEDURE — 87070 CULTURE OTHR SPECIMN AEROBIC: CPT

## 2022-12-14 PROCEDURE — 99214 OFFICE O/P EST MOD 30 MIN: CPT

## 2022-12-14 PROCEDURE — 87186 SC STD MICRODIL/AGAR DIL: CPT

## 2022-12-14 RX ORDER — CLOBETASOL PROPIONATE 0.5 MG/G
OINTMENT TOPICAL ONCE
OUTPATIENT
Start: 2022-12-14 | End: 2022-12-14

## 2022-12-14 RX ORDER — BACITRACIN ZINC AND POLYMYXIN B SULFATE 500; 1000 [USP'U]/G; [USP'U]/G
OINTMENT TOPICAL ONCE
OUTPATIENT
Start: 2022-12-14 | End: 2022-12-14

## 2022-12-14 RX ORDER — GENTAMICIN SULFATE 1 MG/G
OINTMENT TOPICAL ONCE
OUTPATIENT
Start: 2022-12-14 | End: 2022-12-14

## 2022-12-14 RX ORDER — LIDOCAINE 50 MG/G
OINTMENT TOPICAL ONCE
OUTPATIENT
Start: 2022-12-14 | End: 2022-12-14

## 2022-12-14 RX ORDER — BETAMETHASONE DIPROPIONATE 0.05 %
OINTMENT (GRAM) TOPICAL ONCE
OUTPATIENT
Start: 2022-12-14 | End: 2022-12-14

## 2022-12-14 RX ORDER — LIDOCAINE HYDROCHLORIDE 20 MG/ML
JELLY TOPICAL ONCE
OUTPATIENT
Start: 2022-12-14 | End: 2022-12-14

## 2022-12-14 RX ORDER — BACITRACIN, NEOMYCIN, POLYMYXIN B 400; 3.5; 5 [USP'U]/G; MG/G; [USP'U]/G
OINTMENT TOPICAL ONCE
OUTPATIENT
Start: 2022-12-14 | End: 2022-12-14

## 2022-12-14 RX ORDER — CEPHALEXIN 500 MG/1
500 CAPSULE ORAL 2 TIMES DAILY
Qty: 14 CAPSULE | Refills: 0 | Status: SHIPPED | OUTPATIENT
Start: 2022-12-14 | End: 2022-12-21

## 2022-12-14 RX ORDER — LIDOCAINE 40 MG/G
CREAM TOPICAL ONCE
OUTPATIENT
Start: 2022-12-14 | End: 2022-12-14

## 2022-12-14 RX ORDER — GINSENG 100 MG
CAPSULE ORAL ONCE
OUTPATIENT
Start: 2022-12-14 | End: 2022-12-14

## 2022-12-14 RX ORDER — LIDOCAINE HYDROCHLORIDE 40 MG/ML
SOLUTION TOPICAL ONCE
OUTPATIENT
Start: 2022-12-14 | End: 2022-12-14

## 2022-12-14 ASSESSMENT — PAIN DESCRIPTION - LOCATION: LOCATION: BUTTOCKS;LEG

## 2022-12-14 ASSESSMENT — PAIN SCALES - GENERAL: PAINLEVEL_OUTOF10: 8

## 2022-12-14 NOTE — PROGRESS NOTES
[address] [address]   Progress Note and Procedure Note      Emperatriz Olsen  MEDICAL RECORD NUMBER:  [id number]  AGE: 67 y.o. GENDER: female  : 1950  EPISODE DATE:  2022    Subjective:     Chief Complaint   Patient presents with    Wound Check     Buttocks/ legs         HISTORY of PRESENT ILLNESS HPI     Emperatriz Olsen is a 67 y.o. female who presents today for wound/ulcer evaluation. History of Wound Context: Mirella Madrid presents today with complaint of worsening ulcer on right lower leg. The leg is more tender than previous. A few small areas above the wound opened up and began draining also. No fever or chills. Patient is being treated with prednisone for long COVID. She is being followed by multiple providers and is planning to see her PCP tomorrow. Patient has a history of renal insufficiency, creatinine 1.96 and 2022. No known allergy to Keflex or penicillin. Patient spends a lot of time sitting her chair. Her  states that she is not good about getting out of her chair. She rarely walks due to weakness in her legs and feet. Buttocks continues to have some mild discomfort secondary to dry skin per patient. Patient has tried multiple different treatments for her pressure wound including Triad, antibiotic ointment and Calmoseptine.   Wound/Ulcer Pain Timing/Severity: intermittent, moderate  Quality of pain: dull, tender  Severity:  5 / 10 in leg, less in buttocks  Modifying Factors: Pain worsens with touch- lower leg and buttocks  Associated Signs/Symptoms: edema, drainage, and pain    Ulcer Identification:  Ulcer Type: venous, diabetic, and pressure  Contributing Factors: edema, venous stasis, diabetes, chronic pressure, decreased mobility, shear force, and obesity    Wound: N/A        PAST MEDICAL HISTORY        Diagnosis Date    Anxiety 2019    Arthritis     Bronchitis     Diabetes mellitus ([facility] [facility]     Diabetic neuropathy associated with diabetes mellitus due to underlying condition ([facility] 11/3/2017    Essential hypertension 4/4/2019    Hypercholesterolemia     Hypertension     Morbid obesity due to excess calories (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 surgeries x3    TONSILLECTOMY N/A        FAMILY HISTORY    Family History   Problem Relation Age of Onset    Cancer Father        SOCIAL HISTORY    Social History     Tobacco Use    Smoking status: Never    Smokeless tobacco: Never   Vaping Use    Vaping Use: Never used   Substance Use Topics    Alcohol use: No    Drug use: No       ALLERGIES    Allergies   Allergen Reactions    Nitrofurantoin Other (See Comments)     Leg swelling     Tizanidine      Other reaction(s): Mental Status Change    Gabapentin Anxiety    Sulfa Antibiotics Rash       MEDICATIONS    Current Outpatient Medications on File Prior to Encounter   Medication Sig Dispense Refill    mupirocin (BACTROBAN) 2 % ointment Apply topically with daily dressing change 15 g 0    predniSONE (DELTASONE) 20 MG tablet Take 20 mg by mouth 2 times daily Two times a day for five days, then one time a day for five days. mupirocin (BACTROBAN) 2 % ointment Apply topically 2 times daily. 15 g 1    Melatonin 10 MG TABS Take by mouth daily At bedtime      acetaminophen (TYLENOL) 500 MG tablet Take by mouth every 6 hours as needed for Pain      Bismuth Tribromoph-Petrolatum (XEROFORM PETROLATUM GAUZE 5\"X9\") MISC external pads Apply 1 each topically daily Apply to affected area as directed daily, after mupirocin ointment. 12 each 1    ammonium lactate (LAC-HYDRIN) 12 % lotion Apply topically daily to both lower legs, after washing with mild soap/water.  Apply while still moist. 225 g 1 ammonium lactate (LAC-HYDRIN) 12 % lotion Apply topically daily after cleansing legs-with mild soap and water. Apply to intact areas of skin only. 225 g 1    oxyCODONE HCl (OXY-IR) 10 MG immediate release tablet Take 10 mg by mouth every 6 hours as needed for Pain. Capsaicin (ZOSTRIX) 0.035 % CREA cream Apply topically 3 times daily      furosemide (LASIX) 40 MG tablet Take 40 mg by mouth 2 times daily 1.5 tablet by mouth as directed; 1 tablet in AM and 1/2 tablet at NOON      oxybutynin (DITROPAN-XL) 5 MG extended release tablet Take 5 mg by mouth daily      traZODone (DESYREL) 150 MG tablet Take 150 mg by mouth nightly      metoprolol (LOPRESSOR) 100 MG tablet Take 100 mg by mouth daily      atorvastatin (LIPITOR) 10 MG tablet Take 10 mg by mouth daily      polyethylene glycol (GLYCOLAX) 17 g packet Take 17 g by mouth every other day      Cholecalciferol (VITAMIN D3) 50 MCG (2000 UT) CAPS Take by mouth daily      Menthol-Methyl Salicylate (MUSCLE RUB) 10-15 % CREA cream Apply topically 2 times daily 1 application apply on the skin twice a day; arms and shoulders      coenzyme Q10 100 MG CAPS capsule Take 1 capsule by mouth 2 times daily (before meals) 60 capsule 1    Lactobacillus (PROBIOTIC ACIDOPHILUS) TABS Take 1 tablet by mouth daily 60 tablet 0    levothyroxine (SYNTHROID) 125 MCG tablet Take 125 mcg by mouth Daily      metFORMIN (GLUCOPHAGE) 1000 MG tablet Take 500 mg by mouth 2 times daily (with meals)        No current facility-administered medications on file prior to encounter. REVIEW OF SYSTEMS    Pertinent items are noted in HPI. Objective:      BP (!) 155/43   Pulse 74   Temp 97.2 °F (36.2 °C) (Temporal)   Resp 18   LMP  (LMP Unknown)     Wt Readings from Last 3 Encounters:   09/24/20 240 lb (108.9 kg)   05/09/20 236 lb (107 kg)   04/04/19 275 lb (124.7 kg)       PHYSICAL EXAM    Constitutional:   Well nourished and well developed. Appears neat and clean.   Patient is alert, oriented x3, and in no apparent distress. Respiratory:  Respiratory effort is easy and symmetric bilaterally. Rate is normal at rest and on room air. Vascular:  Pedal Pulses-audible signal noted with doppler. Capillary refill is <5 sec to digits bilateral.  Extremities positive for pitting 2+ edema. Neurological:  Gross and Light touch intact. Protective sensation intact    Dermatological:  Wound description noted in wound assessment. Right venous leg ulcer is open, there are smaller venous ulcers surrounding larger wound. Surrounding skin is erythematous. Area around wound is tender. Drainage is clear. No odor or warmth. Wound on buttocks is dry with some bruising at base bilaterally. No warmth, odor or surrounding erythema. No maceration. Psychiatric:  Judgement and insight intact. Short and long term memory intact. No evidence of depression, anxiety, or agitation. Patient is calm, cooperative, and communicative. Appropriate interactions and affect. Assessment:      There are no active hospital problems to display for this patient. Procedure Note  Indications:  Based on my examination of this patient's wound(s)/ulcer(s) today, debridement is not required to promote healing and evaluate the wound base.     Wound 02/24/22 Buttocks Right #1 (Active)   Wound Image   12/14/22 1337   Wound Etiology Pressure Stage 2 12/14/22 1337   Wound Cleansed Cleansed with saline 12/14/22 1337   Dressing/Treatment Xeroform 12/14/22 1436   Wound Length (cm) 3.8 cm 12/14/22 1337   Wound Width (cm) 2.3 cm 12/14/22 1337   Wound Depth (cm) 0.2 cm 12/14/22 1337   Wound Surface Area (cm^2) 8.74 cm^2 12/14/22 1337   Change in Wound Size % (l*w) -1148.57 12/14/22 1337   Wound Volume (cm^3) 1.748 cm^3 12/14/22 1337   Wound Healing % -2397 12/14/22 1337   Post-Procedure Length (cm) 0.4 cm 04/14/22 1218   Post-Procedure Width (cm) 0.7 cm 04/14/22 1218   Post-Procedure Depth (cm) 0.1 cm 04/14/22 1218   Post-Procedure Surface Area (cm^2) 0.28 cm^2 04/14/22 1218   Post-Procedure Volume (cm^3) 0.028 cm^3 04/14/22 1218   Wound Assessment Pink/red;Purple/maroon 12/14/22 1337   Drainage Amount Scant 12/14/22 1337   Drainage Description Serosanguinous 12/14/22 1337   Odor None 12/14/22 1337   Debby-wound Assessment Fragile; Hyperkeratosis (callous) 12/14/22 1337   Margins Undefined edges 12/14/22 1337   Wound Thickness Description not for Pressure Injury Full thickness 11/23/22 1433   Number of days: 293       Wound 09/21/22 Buttocks Left #5 (Active)   Wound Image   12/14/22 1334   Wound Etiology Pressure Stage 2 12/14/22 1334   Wound Cleansed Cleansed with saline 12/14/22 1334   Dressing/Treatment Xeroform 12/14/22 1436   Wound Length (cm) 0.3 cm 12/14/22 1334   Wound Width (cm) 0.3 cm 12/14/22 1334   Wound Depth (cm) 0.1 cm 12/14/22 1334   Wound Surface Area (cm^2) 0.09 cm^2 12/14/22 1334   Change in Wound Size % (l*w) 78.57 12/14/22 1334   Wound Volume (cm^3) 0.009 cm^3 12/14/22 1334   Wound Assessment Pink/red;Dry 12/14/22 1334   Drainage Amount Scant 12/14/22 1334   Drainage Description Serosanguinous 12/14/22 1334   Odor None 12/14/22 1334   Debby-wound Assessment Hyperkeratosis (callous); Dry/flaky 12/14/22 1334   Margins Defined edges 12/14/22 1334   Wound Thickness Description not for Pressure Injury Partial thickness 11/23/22 1433   Number of days: 84       Wound 12/07/22 Leg Right; Anterior; Lower #6 (Active)   Wound Image   12/14/22 1334   Wound Etiology Venous 12/14/22 1334   Wound Cleansed Cleansed with saline 12/14/22 1334   Dressing/Treatment Alginate with Ag;Dry dressing 12/14/22 1436   Wound Length (cm) 10.3 cm 12/14/22 1334   Wound Width (cm) 8 cm 12/14/22 1334   Wound Depth (cm) 0.1 cm 12/14/22 1334   Wound Surface Area (cm^2) 82.4 cm^2 12/14/22 1334   Change in Wound Size % (l*w) -199.64 12/14/22 1334   Wound Volume (cm^3) 8.24 cm^3 12/14/22 1334   Wound Healing % -200 12/14/22 1334   Wound Assessment Pink/red 12/14/22 1334   Drainage Amount Moderate 12/14/22 1334   Drainage Description Serosanguinous 12/14/22 1334   Odor None 12/14/22 1334   Debby-wound Assessment Fragile 12/14/22 1334   Margins Undefined edges 12/14/22 1334   Wound Thickness Description not for Pressure Injury Full thickness 12/14/22 1334   Number of days: 7                  Plan:     I am concerned that wound on right lower leg has localized infection. We will treat with Keflex. Recommended labs and x-ray. Advised to have vascular studies, the studies were ordered today. Strongly encourage patient to use spend less time in her chair setting. We discussed at length the importance of reducing pressure to heal wound. Advised to go the emergency room with worsening leg pain, increased drainage, fever, chills, increased redness around wound, warmth, odor or purulence. Treatment Note please see attached Discharge Instructions    Written patient dismissal instructions given to patient and signed by patient or POA. Discharge [facility]   Physician Orders and Discharge Instructions  [address]  Telephone: [phone]        NAME:  Gabrielle Webb                                                                                       YOB: 1950  MEDICAL RECORD NUMBER:  [id number]     Your  is:  [address] [place] [address] Care/Facility: None     Wound Location: Right Buttocks and Left Buttocks     Dressing orders:. 1. May cleanse with soap and water. Apply Xeroform gauze to wound bed - 1 - 2 times a day. Wound Location: .Right Lower Leg  Dressing orders: 1. Cleanse wound(s) with normal saline. 2. Apply dry SILVERCEL OR CALCIUM ALGINATE WITH Ag or eqivalent to wound bed. 3. Cover with 4x4's and wrap with gauze (nikolas or kerlix)  4.  Change  Every other day or Monday, Wednesday, and Friday Compression:. Apply 5-10mmHg Spandagrip to right and left  lower leg, apply first thing in the morning, remove at bedtime, elevate legs as much as possible during the day. (38/40). Vascular tests for your legs ordered in the wound center today. Call  Scheduling to make an appointment: [phone]. Offloading Device: Change position every 1-2 hours. Limit sitting in chair. Other Instructions: Apply protective cream/ointment to buttocks crease ( Calmoseptine, Desitin) daily. Increase Fruit/Veg intake. Apply Lac-Hydrin to both legs daily (not between toes). Keep both heels off surfaces - use a pillow or towel to help elevate. Wear a protective glove or ace bandage over hands when getting in and out of your wheelchair. Keflex antibiotic was ordered today - please  and take as directed. Wound culture done today in the clinic. Keep all dressings clean, dry and intact. Keep pressure off the wound(s) at all times. Follow up visit  1 week December 21, 2022 @    1:00 pm      Please give 24 hour notice if unable to keep appointment. [phone]     If you experience any of the following, please call the Wound Care Service at  [phone] or go to the nearest emergency room. *Increase in pain         *Temperature over 101           *Increase in drainage from your wound or a foul odor  *Uncontrolled swelling            *Need for compression bandage changes due to slippage, breakthrough drainage       PLEASE NOTE: IF YOU ARE UNABLE TO OBTAIN WOUND SUPPLIES, CONTINUE TO USE THE SUPPLIES YOU HAVE AVAILABLE UNTIL YOU ARE ABLE TO REACH US.  IT IS MOST IMPORTANT TO KEEP THE WOUND COVERED AT ALL TIMES                       Electronically signed by Valentin Spence MD on 12/14/2022 at 2:43 PM

## 2022-12-14 NOTE — DISCHARGE INSTRUCTIONS
[facility]   Physician Orders and Discharge Instructions  [facility]  [facility]  Telephone: [phone]        NAME:  Alycia Jefferson                                                                                       YOB: 1950  MEDICAL RECORD NUMBER:  [id number]     Your  is:  [address] [place] [address] Care/Facility: None     Wound Location: Right Buttocks and Left Buttocks     Dressing orders:. 1. May cleanse with soap and water. Apply Xeroform gauze to wound bed - 1 - 2 times a day. Wound Location: .Right Lower Leg  Dressing orders: 1. Cleanse wound(s) with normal saline. 2. Apply dry SILVERCEL OR CALCIUM ALGINATE WITH Ag or eqivalent to wound bed. 3. Cover with 4x4's and wrap with gauze (nikolas or kerlix)  4. Change  Every other day or Monday, Wednesday, and Friday                                                                                                                           Compression:. Apply 5-10mmHg Spandagrip to right and left  lower leg, apply first thing in the morning, remove at bedtime, elevate legs as much as possible during the day. (38/40). Vascular tests for your legs ordered in the wound center today. Call  Scheduling to make an appointment: [phone]. Offloading Device: Change position every 1-2 hours. Limit sitting in chair. Other Instructions: Apply protective cream/ointment to buttocks crease ( Calmoseptine, Desitin) daily. Increase Fruit/Veg intake. Apply Lac-Hydrin to both legs daily (not between toes). Keep both heels off surfaces - use a pillow or towel to help elevate. Wear a protective glove or ace bandage over hands when getting in and out of your wheelchair. Keflex antibiotic was ordered today - please  and take as directed. Wound culture done today in the clinic. Keep all dressings clean, dry and intact. Keep pressure off the wound(s) at all times. Follow up visit  1 week December 21, 2022 @    1:00 pm      Please give 24 hour notice if unable to keep appointment. [phone]     If you experience any of the following, please call the Wound Care Service at  [phone] or go to the nearest emergency room. *Increase in pain         *Temperature over 101           *Increase in drainage from your wound or a foul odor  *Uncontrolled swelling            *Need for compression bandage changes due to slippage, breakthrough drainage       PLEASE NOTE: IF YOU ARE UNABLE TO OBTAIN WOUND SUPPLIES, CONTINUE TO USE THE SUPPLIES YOU HAVE AVAILABLE UNTIL YOU ARE ABLE TO REACH US.  IT IS MOST IMPORTANT TO KEEP THE WOUND COVERED AT ALL TIMES

## 2022-12-14 NOTE — PLAN OF CARE
Problem: Chronic Conditions and Co-morbidities  Goal: Patient's chronic conditions and co-morbidity symptoms are monitored and maintained or improved  Outcome: Progressing     Problem: Chronic Conditions and Co-morbidities  Goal: Patient's chronic conditions and co-morbidity symptoms are monitored and maintained or improved  Outcome: Progressing     Problem: Wound:  Goal: Will show signs of wound healing; wound closure and no evidence of infection  Outcome: Progressing

## 2022-12-14 NOTE — FLOWSHEET NOTE
[address]:     [employer] [id number] [facility], [address] [place] [address] [place] p: [phone] f: [phone]     Ordering Center:     [address] [facility]  [facility] [address]  [phone]  WOUND CARE [phone]  FAX NUMBER [phone]    Patient Information:      Edis Rubin  [address] [facility] [phone] [phone]   : 1950  AGE: 67 y.o.      GENDER: female   TODAYS DATE:  2022    Insurance:      PRIMARY INSURANCE:  Plan: [employer] [place] DUAL  Coverage: [place] 59  Effective Date: 2020  [id number] - (Medicare Managed)    SECONDARY INSURANCE:  Plan:   Coverage:   Effective Date:   @[employer]@    [unfilled]   @SECScott Regional HospitalN@     Patient Wound Information:      Problem List Items Addressed This Visit          Circulatory    Venous insufficiency of both lower extremities    Relevant Orders    US DUP LOWER ART/BYPASS GRAFTS BILATERAL COMPLETE       Other    Hand abrasion, left, initial encounter    Relevant Orders    Initiate Outpatient Wound Care Protocol    Venous ulcer, limited to breakdown of skin ([address]    Relevant Orders    [address] - [place] Duplex Extrem Venous, Bilat    Venous ulcer of right lower extremity without varicose veins ([address]    Relevant Orders    US DUP LOWER ART/BYPASS GRAFTS BILATERAL COMPLETE    00392 - [place] Duplex Extrem Venous, Bilat    CBC with Auto Differential    Comprehensive Metabolic Panel    C-Reactive Protein    XR TIBIA FIBULA RIGHT (2 VIEWS)    Open wound of right buttock    Relevant Orders    Initiate Outpatient Wound Care Protocol    Decreased strength, endurance, and mobility - Primary    Relevant Orders    Initiate Outpatient Wound Care Protocol       WOUNDS REQUIRING DRESSING SUPPLIES:     Wound 22 Buttocks Right #1 (Active)   Wound Image   22 1337   Wound Etiology Pressure Stage 2 22 1337   Wound Cleansed Cleansed with saline 22 1337 Dressing/Treatment Xeroform 12/14/22 1436   Wound Length (cm) 3.8 cm 12/14/22 1337   Wound Width (cm) 2.3 cm 12/14/22 1337   Wound Depth (cm) 0.2 cm 12/14/22 1337   Wound Surface Area (cm^2) 8.74 cm^2 12/14/22 1337   Change in Wound Size % (l*w) -1148.57 12/14/22 1337   Wound Volume (cm^3) 1.748 cm^3 12/14/22 1337   Wound Healing % -2397 12/14/22 1337   Post-Procedure Length (cm) 0.4 cm 04/14/22 1218   Post-Procedure Width (cm) 0.7 cm 04/14/22 1218   Post-Procedure Depth (cm) 0.1 cm 04/14/22 1218   Post-Procedure Surface Area (cm^2) 0.28 cm^2 04/14/22 1218   Post-Procedure Volume (cm^3) 0.028 cm^3 04/14/22 1218   Wound Assessment Pink/red;Purple/maroon 12/14/22 1337   Drainage Amount Scant 12/14/22 1337   Drainage Description Serosanguinous 12/14/22 1337   Odor None 12/14/22 1337   Debby-wound Assessment Fragile; Hyperkeratosis (callous) 12/14/22 1337   Margins Undefined edges 12/14/22 1337   Wound Thickness Description not for Pressure Injury Full thickness 11/23/22 1433   Number of days: 293       Wound 09/21/22 Buttocks Left #5 (Active)   Wound Image   12/14/22 1334   Wound Etiology Pressure Stage 2 12/14/22 1334   Wound Cleansed Cleansed with saline 12/14/22 1334   Dressing/Treatment Xeroform 12/14/22 1436   Wound Length (cm) 0.3 cm 12/14/22 1334   Wound Width (cm) 0.3 cm 12/14/22 1334   Wound Depth (cm) 0.1 cm 12/14/22 1334   Wound Surface Area (cm^2) 0.09 cm^2 12/14/22 1334   Change in Wound Size % (l*w) 78.57 12/14/22 1334   Wound Volume (cm^3) 0.009 cm^3 12/14/22 1334   Wound Assessment Pink/red;Dry 12/14/22 1334   Drainage Amount Scant 12/14/22 1334   Drainage Description Serosanguinous 12/14/22 1334   Odor None 12/14/22 1334   Debby-wound Assessment Hyperkeratosis (callous); Dry/flaky 12/14/22 1334   Margins Defined edges 12/14/22 1334   Wound Thickness Description not for Pressure Injury Partial thickness 11/23/22 1433   Number of days: 84       Wound 12/07/22 Leg Right; Anterior; Lower #6 (Active)   Wound Image   12/14/22 1334   Wound Etiology Venous 12/14/22 1334   Wound Cleansed Cleansed with saline 12/14/22 1334   Dressing/Treatment Alginate with Ag;Dry dressing 12/14/22 1436   Wound Length (cm) 10.3 cm 12/14/22 1334   Wound Width (cm) 8 cm 12/14/22 1334   Wound Depth (cm) 0.1 cm 12/14/22 1334   Wound Surface Area (cm^2) 82.4 cm^2 12/14/22 1334   Change in Wound Size % (l*w) -199.64 12/14/22 1334   Wound Volume (cm^3) 8.24 cm^3 12/14/22 1334   Wound Healing % -200 12/14/22 1334   Wound Assessment Pink/red 12/14/22 1334   Drainage Amount Moderate 12/14/22 1334   Drainage Description Serosanguinous 12/14/22 1334   Odor None 12/14/22 1334   Debby-wound Assessment Fragile 12/14/22 1334   Margins Undefined edges 12/14/22 1334   Wound Thickness Description not for Pressure Injury Full thickness 12/14/22 1334   Number of days: 7          Supplies Requested :      WOUND #: 6   PRIMARY DRESSING:  Alginate with silver pad  SECONDARY DRESSING:  None   Cover and Secure with: 4X4 gauze pad  Conforming roll gauze     FREQUENCY OF DRESSING CHANGES:  Every other day           ADDITIONAL ITEMS:  [] Gloves Small  [x] Gloves Medium [] Gloves Large [] Gloves XLarge  [] Tape 1\" [x] Tape 2\" [] Tape 3\"  [] Medipore Tape  [x] Saline  [] Skin Prep   [] Adhesive Remover   [] Cotton Tip Applicators   [] Other:    Patient Wound(s) Debrided: [x] Yes   [] No    Debribement Type:  mechanical    Debridement Date: 12/14/22    Patient currently being seen by Home Health: [] Yes   [x] No    Duration for needed supplies:  []15  [x]30  []60  []90 Days    Provider Information:      PROVIDER'S NAME:  Kathleen Galicia MD   NPI:  [id number]

## 2022-12-16 DIAGNOSIS — I73.9 PERIPHERAL ANGIOPATHY (HCC): Primary | ICD-10-CM

## 2022-12-16 DIAGNOSIS — M79.605 PAIN IN BOTH LOWER EXTREMITIES: ICD-10-CM

## 2022-12-16 DIAGNOSIS — I87.2 VENOUS ULCER OF RIGHT LOWER EXTREMITY WITHOUT VARICOSE VEINS (HCC): ICD-10-CM

## 2022-12-16 DIAGNOSIS — M79.604 PAIN IN BOTH LOWER EXTREMITIES: ICD-10-CM

## 2022-12-16 DIAGNOSIS — L97.919 VENOUS ULCER OF RIGHT LOWER EXTREMITY WITHOUT VARICOSE VEINS (HCC): ICD-10-CM

## 2022-12-16 DIAGNOSIS — R09.89 DECREASED PEDAL PULSES: ICD-10-CM

## 2022-12-16 NOTE — PROGRESS NOTES
Order canceled and replaced for arterial dulplex bilateral lower extremities- CPT code previously used not covered by medicare per [place].

## 2022-12-18 LAB
CULTURE WOUND: ABNORMAL
CULTURE WOUND: ABNORMAL
ORGANISM: ABNORMAL

## 2022-12-19 DIAGNOSIS — R68.89 DECREASED STRENGTH, ENDURANCE, AND MOBILITY: ICD-10-CM

## 2022-12-19 DIAGNOSIS — I73.9 PERIPHERAL ANGIOPATHY (HCC): Primary | ICD-10-CM

## 2022-12-19 DIAGNOSIS — Z74.09 DECREASED STRENGTH, ENDURANCE, AND MOBILITY: ICD-10-CM

## 2022-12-19 DIAGNOSIS — M79.605 PAIN IN BOTH LOWER EXTREMITIES: ICD-10-CM

## 2022-12-19 DIAGNOSIS — R53.1 DECREASED STRENGTH, ENDURANCE, AND MOBILITY: ICD-10-CM

## 2022-12-19 DIAGNOSIS — M79.604 PAIN IN BOTH LOWER EXTREMITIES: ICD-10-CM

## 2022-12-19 NOTE — PROGRESS NOTES
Pt called- order for arterial duplex not in system. Reordered- though I thought I placed it on Friday. Spoke with pt on the phone- she said the wound on her lower leg is a little better but she continues to have pain in the lower extremities- lower legs and feet. No fever or chills. She tolerated keflex without side effects. Spoke with her about MRSA on wound culture- recommended we switch to clindaymycin. Stop keflex. Called in Clindamycin 300mg QID for 7 days to pharmacy. Pt in agreement and in understanding of plan.

## 2022-12-21 ENCOUNTER — HOSPITAL ENCOUNTER (OUTPATIENT)
Dept: WOUND CARE | Age: 72
Discharge: HOME | End: 2022-12-21
Payer: COMMERCIAL

## 2022-12-21 VITALS
RESPIRATION RATE: 18 BRPM | SYSTOLIC BLOOD PRESSURE: 122 MMHG | TEMPERATURE: 97.4 F | DIASTOLIC BLOOD PRESSURE: 50 MMHG | HEART RATE: 73 BPM

## 2022-12-21 DIAGNOSIS — R68.89 DECREASED STRENGTH, ENDURANCE, AND MOBILITY: ICD-10-CM

## 2022-12-21 DIAGNOSIS — S31.819A OPEN WOUND OF RIGHT BUTTOCK, INITIAL ENCOUNTER: ICD-10-CM

## 2022-12-21 DIAGNOSIS — L97.919 VENOUS ULCER OF RIGHT LOWER EXTREMITY WITHOUT VARICOSE VEINS (HCC): ICD-10-CM

## 2022-12-21 DIAGNOSIS — M79.604 PAIN IN BOTH LOWER EXTREMITIES: ICD-10-CM

## 2022-12-21 DIAGNOSIS — R53.1 DECREASED STRENGTH, ENDURANCE, AND MOBILITY: ICD-10-CM

## 2022-12-21 DIAGNOSIS — Z74.09 DECREASED STRENGTH, ENDURANCE, AND MOBILITY: ICD-10-CM

## 2022-12-21 DIAGNOSIS — I73.9 PERIPHERAL ANGIOPATHY (HCC): ICD-10-CM

## 2022-12-21 DIAGNOSIS — M79.605 PAIN IN BOTH LOWER EXTREMITIES: ICD-10-CM

## 2022-12-21 DIAGNOSIS — S60.512A HAND ABRASION, LEFT, INITIAL ENCOUNTER: Primary | ICD-10-CM

## 2022-12-21 DIAGNOSIS — I87.2 VENOUS ULCER OF RIGHT LOWER EXTREMITY WITHOUT VARICOSE VEINS (HCC): ICD-10-CM

## 2022-12-21 PROCEDURE — 99214 OFFICE O/P EST MOD 30 MIN: CPT

## 2022-12-21 PROCEDURE — 6370000000 HC RX 637 (ALT 250 FOR IP): Performed by: NURSE PRACTITIONER

## 2022-12-21 RX ORDER — LIDOCAINE HYDROCHLORIDE 20 MG/ML
JELLY TOPICAL ONCE
OUTPATIENT
Start: 2022-12-21 | End: 2022-12-21

## 2022-12-21 RX ORDER — CLOBETASOL PROPIONATE 0.5 MG/G
OINTMENT TOPICAL ONCE
OUTPATIENT
Start: 2022-12-21 | End: 2022-12-21

## 2022-12-21 RX ORDER — GINSENG 100 MG
CAPSULE ORAL ONCE
OUTPATIENT
Start: 2022-12-21 | End: 2022-12-21

## 2022-12-21 RX ORDER — BACITRACIN ZINC AND POLYMYXIN B SULFATE 500; 1000 [USP'U]/G; [USP'U]/G
OINTMENT TOPICAL ONCE
OUTPATIENT
Start: 2022-12-21 | End: 2022-12-21

## 2022-12-21 RX ORDER — BACITRACIN, NEOMYCIN, POLYMYXIN B 400; 3.5; 5 [USP'U]/G; MG/G; [USP'U]/G
OINTMENT TOPICAL ONCE
OUTPATIENT
Start: 2022-12-21 | End: 2022-12-21

## 2022-12-21 RX ORDER — LIDOCAINE HYDROCHLORIDE 40 MG/ML
SOLUTION TOPICAL ONCE
OUTPATIENT
Start: 2022-12-21 | End: 2022-12-21

## 2022-12-21 RX ORDER — LIDOCAINE HYDROCHLORIDE 20 MG/ML
JELLY TOPICAL ONCE
Status: COMPLETED | OUTPATIENT
Start: 2022-12-21 | End: 2022-12-21

## 2022-12-21 RX ORDER — AMMONIUM LACTATE 12 G/100G
CREAM TOPICAL
Qty: 385 G | Refills: 1 | Status: SHIPPED | OUTPATIENT
Start: 2022-12-21 | End: 2023-01-20

## 2022-12-21 RX ORDER — GENTAMICIN SULFATE 1 MG/G
OINTMENT TOPICAL ONCE
OUTPATIENT
Start: 2022-12-21 | End: 2022-12-21

## 2022-12-21 RX ORDER — LIDOCAINE 50 MG/G
OINTMENT TOPICAL ONCE
OUTPATIENT
Start: 2022-12-21 | End: 2022-12-21

## 2022-12-21 RX ORDER — BETAMETHASONE DIPROPIONATE 0.05 %
OINTMENT (GRAM) TOPICAL ONCE
OUTPATIENT
Start: 2022-12-21 | End: 2022-12-21

## 2022-12-21 RX ORDER — LIDOCAINE 40 MG/G
CREAM TOPICAL ONCE
OUTPATIENT
Start: 2022-12-21 | End: 2022-12-21

## 2022-12-21 RX ADMIN — LIDOCAINE HYDROCHLORIDE: 20 JELLY TOPICAL at 13:42

## 2022-12-21 ASSESSMENT — PAIN SCALES - GENERAL: PAINLEVEL_OUTOF10: 10

## 2022-12-21 ASSESSMENT — PAIN DESCRIPTION - LOCATION: LOCATION: BUTTOCKS;LEG

## 2022-12-21 NOTE — PROGRESS NOTES
[facility] [address]   Progress Note and Procedure Note      Barbie Nguyen  MEDICAL RECORD NUMBER:  [id number]  AGE: 67 y.o. GENDER: female  : 1950  EPISODE DATE:  2022    Subjective:     Chief Complaint   Patient presents with    Wound Check     Buttock and leg wounds           HISTORY of PRESENT ILLNESS HPI     Barbie Nguyen is a 67 y.o. female who presents today for wound/ulcer evaluation. History of Wound Context: Patient states that her right shin wound is still painful. No increased pain or drainage. Redness is slightly better. Patient started clindamycin yesterday and stopped Keflex. I reordered the arterial duplex to both lower extremities. Patient is aware and plans to schedule. Buttocks wound is slightly improved but still very uncomfortable to patient. No fever or chills. Lower extremities still swollen. Patient had x-ray of right lower leg at her PCPs office last week. We do not have the results of this yet. Requested patient faxed to wound care center. Labs reviewed from 12/15/22-CBC shows WBC 12.5, CRP 2.5, nl sed rate and creatinine 2.29. CRP was 3.1 on 10/17/2022 and creatinine was 1.96 on 8/15/2022. Sed rate was previously elevated on 10/17/2022. Patient has not been good about keeping legs elevated and staying off of buttocks-due to her immobility, patient spends most of her time in chair.   Wound/Ulcer Pain Timing/Severity: intermittent, moderate  Quality of pain: dull, throbbing, tender  Severity:  5 / 10   Modifying Factors: Pain worsens with walking  Associated Signs/Symptoms: edema, drainage, and pain    Ulcer Identification:  Ulcer Type: venous, diabetic, and pressure  Contributing Factors: edema, venous stasis, diabetes, poor glucose control, chronic pressure, decreased mobility, shear force, and obesity    Wound: N/A        PAST MEDICAL HISTORY        Diagnosis Date    Anxiety 2019    Arthritis     Bronchitis     Diabetes mellitus ([address]     Diabetic neuropathy associated with diabetes mellitus due to underlying condition ([facility] [address] [facility] 11/3/2017    Essential hypertension 4/4/2019    Hypercholesterolemia     Hypertension     Morbid obesity due to excess calories ([facility] [address] 4/4/2019    Thyroid disease     Wound infection 2018    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 surgeries x3    TONSILLECTOMY N/A        FAMILY HISTORY    Family History   Problem Relation Age of Onset    Cancer Father        SOCIAL HISTORY    Social History     Tobacco Use    Smoking status: Never    Smokeless tobacco: Never   Vaping Use    Vaping Use: Never used   Substance Use Topics    Alcohol use: No    Drug use: No       ALLERGIES    Allergies   Allergen Reactions    Nitrofurantoin Other (See Comments)     Leg swelling     Tizanidine      Other reaction(s): Mental Status Change    Gabapentin Anxiety    Sulfa Antibiotics Rash       MEDICATIONS    Current Outpatient Medications on File Prior to Encounter   Medication Sig Dispense Refill    cephALEXin (KEFLEX) 500 MG capsule Take 1 capsule by mouth 2 times daily for 7 days 14 capsule 0    mupirocin (BACTROBAN) 2 % ointment Apply topically with daily dressing change 15 g 0    predniSONE (DELTASONE) 20 MG tablet Take 20 mg by mouth 2 times daily Two times a day for five days, then one time a day for five days. mupirocin (BACTROBAN) 2 % ointment Apply topically 2 times daily. 15 g 1    Melatonin 10 MG TABS Take by mouth daily At bedtime      acetaminophen (TYLENOL) 500 MG tablet Take by mouth every 6 hours as needed for Pain      Bismuth Tribromoph-Petrolatum (XEROFORM PETROLATUM GAUZE 5\"X9\") MISC external pads Apply 1 each topically daily Apply to affected area as directed daily, after mupirocin ointment.  12 each 1 ammonium lactate (LAC-HYDRIN) 12 % lotion Apply topically daily to both lower legs, after washing with mild soap/water. Apply while still moist. 225 g 1    ammonium lactate (LAC-HYDRIN) 12 % lotion Apply topically daily after cleansing legs-with mild soap and water. Apply to intact areas of skin only. 225 g 1    oxyCODONE HCl (OXY-IR) 10 MG immediate release tablet Take 10 mg by mouth every 6 hours as needed for Pain. Capsaicin (ZOSTRIX) 0.035 % CREA cream Apply topically 3 times daily      furosemide (LASIX) 40 MG tablet Take 40 mg by mouth 2 times daily 1.5 tablet by mouth as directed; 1 tablet in AM and 1/2 tablet at NOON      oxybutynin (DITROPAN-XL) 5 MG extended release tablet Take 5 mg by mouth daily      traZODone (DESYREL) 150 MG tablet Take 150 mg by mouth nightly      metoprolol (LOPRESSOR) 100 MG tablet Take 100 mg by mouth daily      atorvastatin (LIPITOR) 10 MG tablet Take 10 mg by mouth daily      polyethylene glycol (GLYCOLAX) 17 g packet Take 17 g by mouth every other day      Cholecalciferol (VITAMIN D3) 50 MCG (2000 UT) CAPS Take by mouth daily      Menthol-Methyl Salicylate (MUSCLE RUB) 10-15 % CREA cream Apply topically 2 times daily 1 application apply on the skin twice a day; arms and shoulders      coenzyme Q10 100 MG CAPS capsule Take 1 capsule by mouth 2 times daily (before meals) 60 capsule 1    Lactobacillus (PROBIOTIC ACIDOPHILUS) TABS Take 1 tablet by mouth daily 60 tablet 0    levothyroxine (SYNTHROID) 125 MCG tablet Take 125 mcg by mouth Daily      metFORMIN (GLUCOPHAGE) 1000 MG tablet Take 500 mg by mouth 2 times daily (with meals)        No current facility-administered medications on file prior to encounter. REVIEW OF SYSTEMS    Pertinent items are noted in HPI.     Objective:      BP (!) 122/50   Pulse 73   Temp 97.4 °F (36.3 °C) (Temporal)   Resp 18   LMP  (LMP Unknown)     Wt Readings from Last 3 Encounters:   09/24/20 240 lb (108.9 kg)   05/09/20 236 lb (107 kg) 04/04/19 275 lb (124.7 kg)       PHYSICAL EXAM    Constitutional:   Well nourished and well developed. Appears neat and clean. Patient is alert, oriented x3, and in no apparent distress. Respiratory:  Respiratory effort is easy and symmetric bilaterally. Rate is normal at rest and on room air. Vascular:  Pedal Pulses is palpable and audible signal noted with doppler. Capillary refill is <5 sec to digits bilateral.  Extremities positive for pitting edema, 2+. Neurological:  Gross and Light touch intact. Protective sensation intact. Dermatological:  Wound description noted in wound assessment. Wound on right shin appears to have less surrounding erythema. There are some new smaller shallow wounds on that leg. There is no warmth or swelling. No crepitus. No purulence or odor. Buttocks wound is less dry but still has some hyperkeratosis. No surrounding erythema or swelling. No odor or purulence or warmth. Psychiatric:  Judgement and insight intact. Short and long term memory intact. No evidence of depression, anxiety, or agitation. Patient is calm, cooperative, and communicative. Appropriate interactions and affect. Patient presented with disc of x-ray, we are unable to open. Assessment:      There are no active hospital problems to display for this patient. Procedure Note  Indications:  Based on my examination of this patient's wound(s)/ulcer(s) today, debridement is not required to promote healing and evaluate the wound base.     Wound 02/24/22 Buttocks Right #1 (Active)   Wound Image   12/21/22 1335   Wound Etiology Pressure Stage 2 12/21/22 1335   Wound Cleansed Cleansed with saline 12/21/22 1335   Dressing/Treatment Xeroform 12/14/22 1436   Wound Length (cm) 2 cm 12/21/22 1335   Wound Width (cm) 2.5 cm 12/21/22 1335   Wound Depth (cm) 0.2 cm 12/21/22 1335   Wound Surface Area (cm^2) 5 cm^2 12/21/22 1335   Change in Wound Size % (l*w) -614.29 12/21/22 1335   Wound Volume (cm^3) 1 cm^3 12/21/22 1335   Wound Healing % -1329 12/21/22 1335   Post-Procedure Length (cm) 0.4 cm 04/14/22 1218   Post-Procedure Width (cm) 0.7 cm 04/14/22 1218   Post-Procedure Depth (cm) 0.1 cm 04/14/22 1218   Post-Procedure Surface Area (cm^2) 0.28 cm^2 04/14/22 1218   Post-Procedure Volume (cm^3) 0.028 cm^3 04/14/22 1218   Wound Assessment Pink/red;Purple/maroon 12/21/22 1335   Drainage Amount Small 12/21/22 1335   Drainage Description Serosanguinous 12/21/22 1335   Odor None 12/21/22 1335   Debby-wound Assessment Other (Comment) 12/21/22 1335   Margins Undefined edges 12/21/22 1335   Wound Thickness Description not for Pressure Injury Full thickness 11/23/22 1433   Number of days: 300       Wound 09/21/22 Buttocks Left #5 (Active)   Wound Image   12/21/22 1335   Wound Etiology Pressure Stage 2 12/21/22 1335   Wound Cleansed Cleansed with saline 12/21/22 1335   Dressing/Treatment Xeroform 12/14/22 1436   Wound Length (cm) 0 cm 12/21/22 1335   Wound Width (cm) 0 cm 12/21/22 1335   Wound Depth (cm) 0 cm 12/21/22 1335   Wound Surface Area (cm^2) 0 cm^2 12/21/22 1335   Change in Wound Size % (l*w) 100 12/21/22 1335   Wound Volume (cm^3) 0 cm^3 12/21/22 1335   Wound Assessment Pink/red 12/21/22 1335   Drainage Amount None 12/21/22 1335   Drainage Description Serosanguinous 12/14/22 1334   Odor None 12/21/22 1335   Debby-wound Assessment Hyperkeratosis (callous); Dry/flaky 12/14/22 1334   Margins Defined edges 12/14/22 1334   Wound Thickness Description not for Pressure Injury Partial thickness 11/23/22 1433   Number of days: 91       Wound 12/07/22 Leg Right; Anterior; Lower #6 (Active)   Wound Image   12/21/22 1332   Wound Etiology Venous 12/21/22 1332   Wound Cleansed Cleansed with saline 12/21/22 1332   Dressing/Treatment Alginate with Ag;Dry dressing 12/14/22 1436   Wound Length (cm) 10.2 cm 12/21/22 1332   Wound Width (cm) 4.7 cm 12/21/22 1332   Wound Depth (cm) 0.1 cm 12/21/22 1332   Wound Surface Area (cm^2) Right Buttocks      Dressing orders:. 1. May cleanse with soap and water. Apply  Xeroform gauze to wound bed - 1 - 2 times a day. Use Calmoseptine on surrounding area      Wound Location: .Right Lower Leg  Dressing orders: 1. Cleanse wound(s) with normal saline. 2. Apply dry SILVERCEL OR CALCIUM ALGINATE WITH Ag or eqivalent to wound bed. Cover with Optilock if needed  3. Cover with 4x4's and wrap with gauze (nikolas or kerlix)  4. Change  Every other day or Monday, Wednesday, and Friday                                                                                                                           Compression:. Apply 5-10mmHg Spandagrip to right and left  lower leg, apply first thing in the morning, remove at bedtime, elevate legs as much as possible during the day. (38/40). Vascular tests for your legs ordered in the wound center today. Call  Scheduling to make an appointment: [phone]. Offloading Device: Change position every 1-2 hours. Limit sitting in chair. Other Instructions: Apply protective cream/ointment to buttocks crease ( Calmoseptine, Desitin) daily. Increase Fruit/Veg intake. Apply Lac-Hydrin to both legs daily (not between toes). Keep both heels off surfaces - use a pillow or towel to help elevate. Wear a protective glove or ace bandage over hands when getting in and out of your wheelchair. Finish taking your Clindamycin  antibiotic as ordered.  Lac Hydrin lotion at your pharmacy and apply to the dry flakey skin on lower legs daily (don't apply in open areas). Keep all dressings clean, dry and intact. Keep pressure off the wound(s) at all times. Follow up visit  1 week December 28, 2022 @    1:00 pm      Please give 24 hour notice if unable to keep appointment. [phone]     If you experience any of the following, please call the Wound Care Service at  [phone] or go to the nearest emergency room.         *Increase in pain         *Temperature over 101           *Increase in drainage from your wound or a foul odor  *Uncontrolled swelling            *Need for compression bandage changes due to slippage, breakthrough drainage       PLEASE NOTE: IF YOU ARE UNABLE TO OBTAIN WOUND SUPPLIES, CONTINUE TO USE THE SUPPLIES YOU HAVE AVAILABLE UNTIL YOU ARE ABLE TO REACH US.  IT IS MOST IMPORTANT TO KEEP THE WOUND COVERED AT ALL TIMES                          Electronically signed by Tessie Tam MD on 12/21/2022 at 2:16 PM

## 2022-12-21 NOTE — DISCHARGE INSTRUCTIONS
[facility]   Physician Orders and Discharge Instructions  [address]  Telephone: [phone]        NAME:  Crystal Bess                                                                                       YOB: 1950  MEDICAL RECORD NUMBER:  [id number]     Your  is:  [address] [place] [address] Care/Facility: None     Wound Location: Right Buttocks      Dressing orders:. 1. May cleanse with soap and water. Apply  Xeroform gauze to wound bed - 1 - 2 times a day. Use Calmoseptine on surrounding area      Wound Location: .Right Lower Leg  Dressing orders: 1. Cleanse wound(s) with normal saline. 2. Apply dry SILVERCEL OR CALCIUM ALGINATE WITH Ag or eqivalent to wound bed. Cover with Optilock if needed  3. Cover with 4x4's and wrap with gauze (nikolas or kerlix)  4. Change  Every other day or Monday, Wednesday, and Friday                                                                                                                           Compression:. Apply 5-10mmHg Spandagrip to right and left  lower leg, apply first thing in the morning, remove at bedtime, elevate legs as much as possible during the day. (38/40). Vascular tests for your legs ordered in the wound center today. Call  Scheduling to make an appointment: [phone]. Offloading Device: Change position every 1-2 hours. Limit sitting in chair. Other Instructions: Apply protective cream/ointment to buttocks crease ( Calmoseptine, Desitin) daily. Increase Fruit/Veg intake. Apply Lac-Hydrin to both legs daily (not between toes). Keep both heels off surfaces - use a pillow or towel to help elevate. Wear a protective glove or ace bandage over hands when getting in and out of your wheelchair. Finish taking your Clindamycin  antibiotic as ordered.    Lac Hydrin lotion at your pharmacy and apply to the dry flakey skin on lower legs daily (don't apply in open areas). Keep all dressings clean, dry and intact. Keep pressure off the wound(s) at all times. Follow up visit  1 week December 28, 2022 @    1:00 pm      Please give 24 hour notice if unable to keep appointment. [phone]     If you experience any of the following, please call the Wound Care Service at  [phone] or go to the nearest emergency room. *Increase in pain         *Temperature over 101           *Increase in drainage from your wound or a foul odor  *Uncontrolled swelling            *Need for compression bandage changes due to slippage, breakthrough drainage       PLEASE NOTE: IF YOU ARE UNABLE TO OBTAIN WOUND SUPPLIES, CONTINUE TO USE THE SUPPLIES YOU HAVE AVAILABLE UNTIL YOU ARE ABLE TO REACH US.  IT IS MOST IMPORTANT TO KEEP THE WOUND COVERED AT ALL TIMES

## 2022-12-21 NOTE — FLOWSHEET NOTE
[address]:     [employer] [id number] [facility], [address] [place], [place] [address]e Medical Edvin p: [phone] f: [phone]     Ordering Center:     [address] [place] [id number]  [phone]  WOUND CARE [phone]  FAX NUMBER [phone]    Patient Information:      Afsaneh Jung  [address] [place] [address] [phone]   : 1950  AGE: 67 y.o.      GENDER: female   TODAYS DATE:  2022    Insurance:      PRIMARY INSURANCE:  Plan: [employer] [place] DUAL  Coverage: [place]  Effective Date: 2020  [id number] - (Medicare Managed)    SECONDARY INSURANCE:  Plan: [place] MEDICAID  Coverage: [place] MEDICA[place]  Effective Date:   [unfilled]    [unfilled]   [unfilled]     Patient Wound Information:      Problem List Items Addressed This Visit          Circulatory    Peripheral angiopathy ([facility] [address] (Chronic)    Relevant Orders    US DUP LOWER ART/BYPASS GRAFTS BILATERAL COMPLETE       Other    Hand abrasion, left, initial encounter - Primary    Relevant Orders    Initiate Outpatient Wound Care Protocol    Venous ulcer of right lower extremity without varicose veins ([address]    Relevant Orders    US DUP LOWER ART/BYPASS GRAFTS BILATERAL COMPLETE    Open wound of right buttock    Relevant Orders    Initiate Outpatient Wound Care Protocol    Decreased strength, endurance, and mobility    Relevant Orders    Initiate Outpatient Wound Care Protocol    US DUP LOWER ART/BYPASS GRAFTS BILATERAL COMPLETE     Other Visit Diagnoses       Pain in both lower extremities        Relevant Orders    US DUP LOWER ART/BYPASS GRAFTS BILATERAL COMPLETE            WOUNDS REQUIRING DRESSING SUPPLIES:     Wound 22 Buttocks Right #1 (Active)   Wound Image   22 1335   Wound Etiology Pressure Stage 2 22 1335   Wound Cleansed Cleansed with saline 22 1335 Dressing/Treatment Xeroform;Dry dressing 12/21/22 1416   Wound Length (cm) 2 cm 12/21/22 1335   Wound Width (cm) 2.5 cm 12/21/22 1335   Wound Depth (cm) 0.2 cm 12/21/22 1335   Wound Surface Area (cm^2) 5 cm^2 12/21/22 1335   Change in Wound Size % (l*w) -614.29 12/21/22 1335   Wound Volume (cm^3) 1 cm^3 12/21/22 1335   Wound Healing % -1329 12/21/22 1335   Post-Procedure Length (cm) 0.4 cm 04/14/22 1218   Post-Procedure Width (cm) 0.7 cm 04/14/22 1218   Post-Procedure Depth (cm) 0.1 cm 04/14/22 1218   Post-Procedure Surface Area (cm^2) 0.28 cm^2 04/14/22 1218   Post-Procedure Volume (cm^3) 0.028 cm^3 04/14/22 1218   Wound Assessment Pink/red;Purple/maroon 12/21/22 1335   Drainage Amount Small 12/21/22 1335   Drainage Description Serosanguinous 12/21/22 1335   Odor None 12/21/22 1335   Debby-wound Assessment Other (Comment) 12/21/22 1335   Margins Undefined edges 12/21/22 1335   Wound Thickness Description not for Pressure Injury Full thickness 11/23/22 1433   Number of days: 300       Wound 12/07/22 Leg Right; Anterior; Lower #6 (Active)   Wound Image   12/21/22 1332   Wound Etiology Venous 12/21/22 1332   Wound Cleansed Cleansed with saline 12/21/22 1332   Dressing/Treatment Alginate with Ag;Dry dressing 12/21/22 1416   Wound Length (cm) 10.2 cm 12/21/22 1332   Wound Width (cm) 4.7 cm 12/21/22 1332   Wound Depth (cm) 0.1 cm 12/21/22 1332   Wound Surface Area (cm^2) 47.94 cm^2 12/21/22 1332   Change in Wound Size % (l*w) -74.33 12/21/22 1332   Wound Volume (cm^3) 4.794 cm^3 12/21/22 1332   Wound Healing % -74 12/21/22 1332   Wound Assessment Slough;Pink/red 12/21/22 1332   Drainage Amount Moderate 12/21/22 1332   Drainage Description Serosanguinous; Yellow 12/21/22 1332   Odor None 12/21/22 1332   Debby-wound Assessment Fragile 12/21/22 1332   Margins Defined edges 12/21/22 1332   Wound Thickness Description not for Pressure Injury Full thickness 12/21/22 1332   Number of days: 14          Supplies Requested : WOUND #: 5   PRIMARY DRESSING:  None  SECONDARY DRESSING:  Other: OPTILOCK   Cover and Secure with: None     FREQUENCY OF DRESSING CHANGES:  Every other day           ADDITIONAL ITEMS:  [] Gloves Small  [x] Gloves Medium [] Gloves Large [] Gloves XLarge  [] Tape 1\" [x] Tape 2\" [] Tape 3\"  [] Medipore Tape  [x] Saline  [] Skin Prep   [] Adhesive Remover   [] Cotton Tip Applicators   [] Other:    Patient Wound(s) Debrided: [x] Yes   [] No    Debribement Type:  MECHANICAL    Debridement Date: 12/21/22    Patient currently being seen by Home Health: [] Yes   [x] No    Duration for needed supplies:  []15  [x]30  []60  []90 Days    Provider Information:      PROVIDER'S NAME:  Karina Frias MD   NPI:  [id number]

## 2022-12-27 ENCOUNTER — HOSPITAL ENCOUNTER (OUTPATIENT)
Dept: ULTRASOUND IMAGING | Age: 72
Discharge: HOME | End: 2022-12-29
Payer: COMMERCIAL

## 2022-12-27 DIAGNOSIS — L97.919 VENOUS ULCER OF RIGHT LOWER EXTREMITY WITHOUT VARICOSE VEINS (HCC): ICD-10-CM

## 2022-12-27 DIAGNOSIS — E11.65 UNCONTROLLED TYPE 2 DIABETES MELLITUS WITH HYPERGLYCEMIA (HCC): ICD-10-CM

## 2022-12-27 DIAGNOSIS — I73.9 PERIPHERAL ANGIOPATHY (HCC): ICD-10-CM

## 2022-12-27 DIAGNOSIS — I87.2 VENOUS ULCER OF RIGHT LOWER EXTREMITY WITHOUT VARICOSE VEINS (HCC): ICD-10-CM

## 2022-12-27 DIAGNOSIS — Z74.09 DECREASED STRENGTH, ENDURANCE, AND MOBILITY: ICD-10-CM

## 2022-12-27 DIAGNOSIS — E78.5 HYPERLIPIDEMIA, UNSPECIFIED HYPERLIPIDEMIA TYPE: ICD-10-CM

## 2022-12-27 DIAGNOSIS — R68.89 DECREASED STRENGTH, ENDURANCE, AND MOBILITY: ICD-10-CM

## 2022-12-27 DIAGNOSIS — I87.2 VENOUS INSUFFICIENCY OF BOTH LOWER EXTREMITIES: ICD-10-CM

## 2022-12-27 DIAGNOSIS — M79.605 PAIN IN BOTH LOWER EXTREMITIES: ICD-10-CM

## 2022-12-27 DIAGNOSIS — R53.1 DECREASED STRENGTH, ENDURANCE, AND MOBILITY: ICD-10-CM

## 2022-12-27 DIAGNOSIS — R26.9 ABNORMALITY OF GAIT AND MOBILITY: ICD-10-CM

## 2022-12-27 DIAGNOSIS — L97.521 CHRONIC ULCER OF LEFT FOOT LIMITED TO BREAKDOWN OF SKIN (HCC): ICD-10-CM

## 2022-12-27 DIAGNOSIS — M79.604 PAIN IN BOTH LOWER EXTREMITIES: ICD-10-CM

## 2022-12-27 PROCEDURE — 93925 LOWER EXTREMITY STUDY: CPT

## 2022-12-27 PROCEDURE — 93970 EXTREMITY STUDY: CPT

## 2022-12-28 ENCOUNTER — HOSPITAL ENCOUNTER (OUTPATIENT)
Dept: WOUND CARE | Age: 72
Discharge: HOME | End: 2022-12-28
Payer: COMMERCIAL

## 2022-12-28 VITALS
TEMPERATURE: 96.8 F | DIASTOLIC BLOOD PRESSURE: 45 MMHG | SYSTOLIC BLOOD PRESSURE: 134 MMHG | RESPIRATION RATE: 18 BRPM | HEART RATE: 65 BPM

## 2022-12-28 DIAGNOSIS — R68.89 DECREASED STRENGTH, ENDURANCE, AND MOBILITY: ICD-10-CM

## 2022-12-28 DIAGNOSIS — Z74.09 DECREASED STRENGTH, ENDURANCE, AND MOBILITY: ICD-10-CM

## 2022-12-28 DIAGNOSIS — S31.819A OPEN WOUND OF RIGHT BUTTOCK, INITIAL ENCOUNTER: ICD-10-CM

## 2022-12-28 DIAGNOSIS — R53.1 DECREASED STRENGTH, ENDURANCE, AND MOBILITY: ICD-10-CM

## 2022-12-28 DIAGNOSIS — S60.512A HAND ABRASION, LEFT, INITIAL ENCOUNTER: Primary | ICD-10-CM

## 2022-12-28 PROCEDURE — 99213 OFFICE O/P EST LOW 20 MIN: CPT | Performed by: FAMILY MEDICINE

## 2022-12-28 PROCEDURE — 6370000000 HC RX 637 (ALT 250 FOR IP): Performed by: NURSE PRACTITIONER

## 2022-12-28 PROCEDURE — 99214 OFFICE O/P EST MOD 30 MIN: CPT

## 2022-12-28 RX ORDER — BACITRACIN ZINC AND POLYMYXIN B SULFATE 500; 1000 [USP'U]/G; [USP'U]/G
OINTMENT TOPICAL ONCE
OUTPATIENT
Start: 2022-12-28 | End: 2022-12-28

## 2022-12-28 RX ORDER — CLOBETASOL PROPIONATE 0.5 MG/G
OINTMENT TOPICAL ONCE
OUTPATIENT
Start: 2022-12-28 | End: 2022-12-28

## 2022-12-28 RX ORDER — GENTAMICIN SULFATE 1 MG/G
OINTMENT TOPICAL ONCE
OUTPATIENT
Start: 2022-12-28 | End: 2022-12-28

## 2022-12-28 RX ORDER — BETAMETHASONE DIPROPIONATE 0.05 %
OINTMENT (GRAM) TOPICAL ONCE
OUTPATIENT
Start: 2022-12-28 | End: 2022-12-28

## 2022-12-28 RX ORDER — LIDOCAINE HYDROCHLORIDE 40 MG/ML
SOLUTION TOPICAL ONCE
OUTPATIENT
Start: 2022-12-28 | End: 2022-12-28

## 2022-12-28 RX ORDER — LIDOCAINE HYDROCHLORIDE 20 MG/ML
JELLY TOPICAL ONCE
OUTPATIENT
Start: 2022-12-28 | End: 2022-12-28

## 2022-12-28 RX ORDER — BACITRACIN, NEOMYCIN, POLYMYXIN B 400; 3.5; 5 [USP'U]/G; MG/G; [USP'U]/G
OINTMENT TOPICAL ONCE
OUTPATIENT
Start: 2022-12-28 | End: 2022-12-28

## 2022-12-28 RX ORDER — LIDOCAINE 50 MG/G
OINTMENT TOPICAL ONCE
OUTPATIENT
Start: 2022-12-28 | End: 2022-12-28

## 2022-12-28 RX ORDER — LIDOCAINE 40 MG/G
CREAM TOPICAL ONCE
OUTPATIENT
Start: 2022-12-28 | End: 2022-12-28

## 2022-12-28 RX ORDER — LIDOCAINE HYDROCHLORIDE 20 MG/ML
JELLY TOPICAL ONCE
Status: COMPLETED | OUTPATIENT
Start: 2022-12-28 | End: 2022-12-28

## 2022-12-28 RX ORDER — GINSENG 100 MG
CAPSULE ORAL ONCE
OUTPATIENT
Start: 2022-12-28 | End: 2022-12-28

## 2022-12-28 RX ADMIN — LIDOCAINE HYDROCHLORIDE: 20 JELLY TOPICAL at 13:36

## 2022-12-28 ASSESSMENT — PAIN DESCRIPTION - ORIENTATION: ORIENTATION: RIGHT;LOWER

## 2022-12-28 ASSESSMENT — PAIN SCALES - GENERAL: PAINLEVEL_OUTOF10: 10

## 2022-12-28 ASSESSMENT — PAIN - FUNCTIONAL ASSESSMENT: PAIN_FUNCTIONAL_ASSESSMENT: ACTIVITIES ARE NOT PREVENTED

## 2022-12-28 ASSESSMENT — PAIN DESCRIPTION - LOCATION: LOCATION: LEG

## 2022-12-28 ASSESSMENT — PAIN DESCRIPTION - DESCRIPTORS: DESCRIPTORS: BURNING;THROBBING

## 2022-12-28 NOTE — PROGRESS NOTES
[facility] [address]   Progress Note and Procedure Note      Africa Betts  MEDICAL RECORD NUMBER:  [id number]  AGE: 67 y.o. GENDER: female  : 1950  EPISODE DATE:  2022    Subjective:     Chief Complaint   Patient presents with    Wound Check         HISTORY of PRESENT ILLNESS HPI     Africa Betts is a 67 y.o. female who presents today for wound/ulcer evaluation. History of Wound Context: Patient states that the wounds on both of her legs are improving and are less uncomfortable than previous. However during her venous studies, they were painful. Results of venous duplex and arterial duplex discussed with patient. Patient also plans to speak to her PCP regarding these results. No fever or chills. Patient is currently being worked up for [facility]. Her prednisone was recently decreased by half per patient and her . Her buttock wound is less tender and improving also. The swelling in her lower legs has improved.   Wound/Ulcer Pain Timing/Severity: intermittent, moderate  Quality of pain: throbbing, tender  Severity:  4 / 10   Modifying Factors: Pain worsens with standing and touch  Associated Signs/Symptoms: edema, drainage, and pain    Ulcer Identification:  Ulcer Type: venous, diabetic, and pressure  Contributing Factors: edema, venous stasis, diabetes, chronic pressure, decreased mobility, shear force, and obesity    Wound: N/A        PAST MEDICAL HISTORY        Diagnosis Date    Anxiety 2019    Arthritis     Bronchitis     Diabetes mellitus ([address]     Diabetic neuropathy associated with diabetes mellitus due to underlying condition ([address] 11/3/2017    Essential hypertension 2019    Hypercholesterolemia     Hypertension     Morbid obesity due to excess calories ([address] 2019    Thyroid disease     Wound infection 2018    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 surgeries x3    TONSILLECTOMY N/A        FAMILY HISTORY    Family History   Problem Relation Age of Onset    Cancer Father        SOCIAL HISTORY    Social History     Tobacco Use    Smoking status: Never    Smokeless tobacco: Never   Vaping Use    Vaping Use: Never used   Substance Use Topics    Alcohol use: No    Drug use: No       ALLERGIES    Allergies   Allergen Reactions    Nitrofurantoin Other (See Comments)     Leg swelling     Tizanidine      Other reaction(s): Mental Status Change    Gabapentin Anxiety    Sulfa Antibiotics Rash       MEDICATIONS    Current Outpatient Medications on File Prior to Encounter   Medication Sig Dispense Refill    ammonium lactate (LAC-HYDRIN) 12 % cream Apply topically as needed. 385 g 1    mupirocin (BACTROBAN) 2 % ointment Apply topically with daily dressing change 15 g 0    predniSONE (DELTASONE) 20 MG tablet Take 20 mg by mouth 2 times daily Two times a day for five days, then one time a day for five days. mupirocin (BACTROBAN) 2 % ointment Apply topically 2 times daily. 15 g 1    Melatonin 10 MG TABS Take by mouth daily At bedtime      acetaminophen (TYLENOL) 500 MG tablet Take by mouth every 6 hours as needed for Pain      Bismuth Tribromoph-Petrolatum (XEROFORM PETROLATUM GAUZE 5\"X9\") MISC external pads Apply 1 each topically daily Apply to affected area as directed daily, after mupirocin ointment. 12 each 1    ammonium lactate (LAC-HYDRIN) 12 % lotion Apply topically daily to both lower legs, after washing with mild soap/water. Apply while still moist. 225 g 1    ammonium lactate (LAC-HYDRIN) 12 % lotion Apply topically daily after cleansing legs-with mild soap and water. Apply to intact areas of skin only. 225 g 1    oxyCODONE HCl (OXY-IR) 10 MG immediate release tablet Take 10 mg by mouth every 6 hours as needed for Pain.       Capsaicin (ZOSTRIX) 0. 035 % CREA cream Apply topically 3 times daily      furosemide (LASIX) 40 MG tablet Take 40 mg by mouth 2 times daily 1.5 tablet by mouth as directed; 1 tablet in AM and 1/2 tablet at NOON      oxybutynin (DITROPAN-XL) 5 MG extended release tablet Take 5 mg by mouth daily      traZODone (DESYREL) 150 MG tablet Take 150 mg by mouth nightly      metoprolol (LOPRESSOR) 100 MG tablet Take 100 mg by mouth daily      atorvastatin (LIPITOR) 10 MG tablet Take 10 mg by mouth daily      polyethylene glycol (GLYCOLAX) 17 g packet Take 17 g by mouth every other day      Cholecalciferol (VITAMIN D3) 50 MCG (2000 UT) CAPS Take by mouth daily      Menthol-Methyl Salicylate (MUSCLE RUB) 10-15 % CREA cream Apply topically 2 times daily 1 application apply on the skin twice a day; arms and shoulders      coenzyme Q10 100 MG CAPS capsule Take 1 capsule by mouth 2 times daily (before meals) 60 capsule 1    Lactobacillus (PROBIOTIC ACIDOPHILUS) TABS Take 1 tablet by mouth daily 60 tablet 0    levothyroxine (SYNTHROID) 125 MCG tablet Take 125 mcg by mouth Daily      metFORMIN (GLUCOPHAGE) 1000 MG tablet Take 500 mg by mouth 2 times daily (with meals)        No current facility-administered medications on file prior to encounter. REVIEW OF SYSTEMS    Pertinent items are noted in HPI. Objective:      BP (!) 134/45   Pulse 65   Temp 96.8 °F (36 °C) (Temporal)   Resp 18   LMP  (LMP Unknown)     Wt Readings from Last 3 Encounters:   09/24/20 240 lb (108.9 kg)   05/09/20 236 lb (107 kg)   04/04/19 275 lb (124.7 kg)       PHYSICAL EXAM    Constitutional:   Well nourished and well developed. Appears neat and clean. Patient is alert, oriented x3, and in no apparent distress. Respiratory:  Respiratory effort is easy and symmetric bilaterally. Rate is normal at rest and on room air. Vascular:  Pedal Pulses is palpable and audible signal noted with doppler.   Capillary refill is <5 sec to digits bilateral.  Extremities positive for 1+ pitting edema. Neurological:  Gross and Light touch intact. Protective sensation intact in lower extremities    Dermatological:  Wound description noted in wound assessment. Buttock wound is smaller and has less dry scaling areas. The area is less tender. There is no surrounding swelling or erythema. No odor or warmth. Vaginal area appears to have whitish clumpy discharge. Left lower extremity wound appears to be venous with no surrounding erythema, swelling, purulence warmth or odor. Wound on right lower extremity is smaller in size. There is some granulation tissue. No surrounding erythema, swelling, purulence warmth or odor. Area is less tender. Psychiatric:  Judgement and insight intact. Short and long term memory intact. No evidence of depression, anxiety, or agitation. Patient is calm, cooperative, and communicative. Appropriate interactions and affect. Assessment:      There are no active hospital problems to display for this patient. Procedure Note  Indications:  Based on my examination of this patient's wound(s)/ulcer(s) today, debridement is not required to promote healing and evaluate the wound base.     Wound 02/24/22 Buttocks Right #1 (Active)   Wound Image   12/28/22 1317   Wound Etiology Pressure Stage 2 12/28/22 1317   Wound Cleansed Cleansed with saline 12/28/22 1317   Dressing/Treatment Xeroform;Dry dressing 12/21/22 1416   Wound Length (cm) 1.8 cm 12/28/22 1317   Wound Width (cm) 2.4 cm 12/28/22 1317   Wound Depth (cm) 0.2 cm 12/28/22 1317   Wound Surface Area (cm^2) 4.32 cm^2 12/28/22 1317   Change in Wound Size % (l*w) -517.14 12/28/22 1317   Wound Volume (cm^3) 0.864 cm^3 12/28/22 1317   Wound Healing % -1134 12/28/22 1317   Post-Procedure Length (cm) 0.4 cm 04/14/22 1218   Post-Procedure Width (cm) 0.7 cm 04/14/22 1218   Post-Procedure Depth (cm) 0.1 cm 04/14/22 1218   Post-Procedure Surface Area (cm^2) 0.28 cm^2 04/14/22 1218   Post-Procedure Volume (cm^3) 0.028 cm^3 04/14/22 1218   Wound Assessment Pink/red;Purple/maroon 12/28/22 1317   Drainage Amount Small 12/28/22 1317   Drainage Description Serous 12/28/22 1317   Odor None 12/28/22 1317   Debby-wound Assessment Maceration; Hyperkeratosis (callous) 12/28/22 1317   Margins Undefined edges 12/28/22 1317   Wound Thickness Description not for Pressure Injury Full thickness 12/28/22 1317   Number of days: 307       Wound 12/07/22 Leg Right; Anterior; Lower #6 (Active)   Wound Image   12/28/22 1317   Wound Etiology Venous 12/28/22 1317   Wound Cleansed Cleansed with saline 12/28/22 1317   Dressing/Treatment Alginate with Ag;Dry dressing 12/21/22 1416   Wound Length (cm) 3.2 cm 12/28/22 1317   Wound Width (cm) 3.5 cm 12/28/22 1317   Wound Depth (cm) 0.1 cm 12/28/22 1317   Wound Surface Area (cm^2) 11.2 cm^2 12/28/22 1317   Change in Wound Size % (l*w) 59.27 12/28/22 1317   Wound Volume (cm^3) 1.12 cm^3 12/28/22 1317   Wound Healing % 59 12/28/22 1317   Wound Assessment Granulation tissue;Pink/red 12/28/22 1317   Drainage Amount Moderate 12/28/22 1317   Drainage Description Serosanguinous; Yellow 12/28/22 1317   Odor None 12/28/22 1317   Debby-wound Assessment Fragile 12/28/22 1317   Margins Defined edges 12/28/22 1317   Wound Thickness Description not for Pressure Injury Full thickness 12/28/22 1317   Number of days: 21       Wound 12/28/22 Leg Left; Anterior; Lower #7 (Active)   Wound Image   12/28/22 1329   Wound Etiology Venous 12/28/22 1329   Wound Cleansed Cleansed with saline 12/28/22 1329   Wound Length (cm) 1.6 cm 12/28/22 1329   Wound Width (cm) 1.5 cm 12/28/22 1329   Wound Depth (cm) 0.1 cm 12/28/22 1329   Wound Surface Area (cm^2) 2.4 cm^2 12/28/22 1329   Wound Volume (cm^3) 0.24 cm^3 12/28/22 1329   Wound Assessment Pink/red 12/28/22 1329   Drainage Amount Moderate 12/28/22 1329   Drainage Description Serous; Yellow 12/28/22 1329   Odor None 12/28/22 1329   Debby-wound Assessment Fragile 12/28/22 1329 Margins Defined edges 12/28/22 1329   Wound Thickness Description not for Pressure Injury Full thickness 12/28/22 1329   Number of days: 0                  Plan:   Advised patient to have tib-fib x-ray results sent to our office from her other provider. We will increase compression to moderate to see if patient is able to tolerate now that we know that arterial ultrasound showed no significant stenosis. Encouraged patient to reposition often and decrease pressure on buttock wound. Recommended patient also try Monistat over-the-counter for vaginal irritation/discharge and to speak with her PCP regarding this. Recommended patient go to the emergency room with signs symptoms of infection such as fever, chills, lethargy, weakness, swelling, increased drainage, redness around wound, increased pain, warmth or odor. Treatment Note please see attached Discharge Instructions    Written patient dismissal instructions given to patient and signed by patient or POA. Discharge [facility]   Physician Orders and Discharge Instructions  [address]  Telephone: [phone]        NAME:  Gabrielle Webb                                                                                       YOB: 1950  MEDICAL RECORD NUMBER:  [id number]     Your  is:  [address] [place] [address] Care/Facility: None     Wound Location: Right Buttocks      Dressing orders:. 1. May cleanse with soap and water. Apply  Xeroform gauze to wound bed - 1 - 2 times a day. Use Calmoseptine on surrounding area      Wound Location: .Right  and Left Lower Leg  Dressing orders: 1. Cleanse wound(s) with normal saline. 2. Apply dry SILVERCEL OR CALCIUM ALGINATE WITH Ag or eqivalent to wound bed. Cover with Optilock  If needed. 3. Cover with 4x4's and wrap with gauze (nikolas or kerlix)  4. Change  Every other day or Monday, Wednesday, and Friday                                                                                                                           Compression:. Apply 10-20 mmHg Spandagrip to right and left  lower leg, apply first thing in the morning, remove at bedtime, elevate legs as much as possible during the day. (35/36)     Offloading Device: Change position every 1-2 hours. Limit sitting in chair. Other Instructions: Apply protective cream/ointment to buttocks crease ( Calmoseptine, Desitin) daily. Increase Fruit/Veg intake. Apply LacHydrin lotion to both legs daily (not between toes). Keep both heels off surfaces - use a pillow or towel to help elevate. Wear a protective glove or ace bandage over hands when getting in and out of your wheelchair. Apply Monistat to vagina as directed on package. Keep all dressings clean, dry and intact. Keep pressure off the wound(s) at all times. Follow up visit  2 weeks January 11, 2023 @    1:30 pm      Please give 24 hour notice if unable to keep appointment. [phone]     If you experience any of the following, please call the Wound Care Service at  [phone] or go to the nearest emergency room. *Increase in pain         *Temperature over 101           *Increase in drainage from your wound or a foul odor  *Uncontrolled swelling            *Need for compression bandage changes due to slippage, breakthrough drainage       PLEASE NOTE: IF YOU ARE UNABLE TO OBTAIN WOUND SUPPLIES, CONTINUE TO USE THE SUPPLIES YOU HAVE AVAILABLE UNTIL YOU ARE ABLE TO REACH US.  IT IS MOST IMPORTANT TO KEEP THE WOUND COVERED AT ALL TIMES                       Electronically signed by Jessi Escudero MD on 12/28/2022 at 2:18 PM

## 2022-12-28 NOTE — DISCHARGE INSTRUCTIONS
[facility]   Physician Orders and Discharge Instructions  [address]  Telephone: [phone]        NAME:  Josesito Boland                                                                                       YOB: 1950  MEDICAL RECORD NUMBER:  [id number]     Your  is:  [address] [place] [address] Care/Facility: None     Wound Location: Right Buttocks      Dressing orders:. 1. May cleanse with soap and water. Apply  Xeroform gauze to wound bed - 1 - 2 times a day. Use Calmoseptine on surrounding area      Wound Location: .Right  and Left Lower Leg  Dressing orders: 1. Cleanse wound(s) with normal saline. 2. Apply dry SILVERCEL OR CALCIUM ALGINATE WITH Ag or eqivalent to wound bed. Cover with Optilock  If needed. 3. Cover with 4x4's and wrap with gauze (nikolas or kerlix)  4. Change  Every other day or Monday, Wednesday, and Friday                                                                                                                           Compression:. Apply 10-20 mmHg Spandagrip to right and left  lower leg, apply first thing in the morning, remove at bedtime, elevate legs as much as possible during the day. (35/36)     Offloading Device: Change position every 1-2 hours. Limit sitting in chair. Other Instructions: Apply protective cream/ointment to buttocks crease ( Calmoseptine, Desitin) daily. Increase Fruit/Veg intake. Apply LacHydrin lotion to both legs daily (not between toes). Keep both heels off surfaces - use a pillow or towel to help elevate. Wear a protective glove or ace bandage over hands when getting in and out of your wheelchair. Apply Monistat to vagina as directed on package. Keep all dressings clean, dry and intact. Keep pressure off the wound(s) at all times.       Follow up visit  2 weeks January 11, 2023 @    1:30 pm Please give 24 hour notice if unable to keep appointment. [phone]     If you experience any of the following, please call the Wound Care Service at  [phone] or go to the nearest emergency room. *Increase in pain         *Temperature over 101           *Increase in drainage from your wound or a foul odor  *Uncontrolled swelling            *Need for compression bandage changes due to slippage, breakthrough drainage       PLEASE NOTE: IF YOU ARE UNABLE TO OBTAIN WOUND SUPPLIES, CONTINUE TO USE THE SUPPLIES YOU HAVE AVAILABLE UNTIL YOU ARE ABLE TO REACH US.  IT IS MOST IMPORTANT TO KEEP THE WOUND COVERED AT ALL TIMES

## 2022-12-28 NOTE — FLOWSHEET NOTE
[address] [facility], [address]:     [employer] [id number] [facility], [address] [place] [address] p: [phone] f: [phone]     Ordering Center:     [address] [place] [address]  [phone]  WOUND CARE [phone]  FAX NUMBER [phone]    Patient Information:      Gabrielle Webb  [address] [place] [address] [phone]   : 1950  AGE: 67 y.o.      GENDER: female   TODAYS DATE:  2022    Insurance:      PRIMARY INSURANCE:  Plan: [employer] [place] DUAL  Coverage: [place]  Effective Date: 2020  [id number] - (Medicare Managed)    SECONDARY INSURANCE:  Plan: [place] [employer]  Coverage: [place] [employer]  Effective Date:   [unfilled]    [unfilled]   [unfilled]     Patient Wound Information:      Problem List Items Addressed This Visit          Other    Hand abrasion, left, initial encounter - Primary    Relevant Orders    Initiate Outpatient Wound Care Protocol    Open wound of right buttock    Relevant Orders    Initiate Outpatient Wound Care Protocol    Decreased strength, endurance, and mobility    Relevant Orders    Initiate Outpatient Wound Care Protocol       WOUNDS REQUIRING DRESSING SUPPLIES:     Wound 22 Buttocks Right #1 (Active)   Wound Image   22 1317   Wound Etiology Pressure Stage 2 22 1317   Wound Cleansed Cleansed with saline 22 1317   Dressing/Treatment Xeroform;Dry dressing 22 1416   Wound Length (cm) 1.8 cm 22 1317   Wound Width (cm) 2.4 cm 22 1317   Wound Depth (cm) 0.2 cm 22 1317   Wound Surface Area (cm^2) 4.32 cm^2 22 1317   Change in Wound Size % (l*w) -517.14 22 1317   Wound Volume (cm^3) 0.864 cm^3 22 1317   Wound Healing % -1134 22 1317   Post-Procedure Length (cm) 0.4 cm 22 1218   Post-Procedure Width (cm) 0.7 cm 22 1218   Post-Procedure Depth (cm) 0.1 cm 04/14/22 1218   Post-Procedure Surface Area (cm^2) 0.28 cm^2 04/14/22 1218   Post-Procedure Volume (cm^3) 0.028 cm^3 04/14/22 1218   Wound Assessment Pink/red;Purple/maroon 12/28/22 1317   Drainage Amount Small 12/28/22 1317   Drainage Description Serous 12/28/22 1317   Odor None 12/28/22 1317   Debby-wound Assessment Maceration; Hyperkeratosis (callous) 12/28/22 1317   Margins Undefined edges 12/28/22 1317   Wound Thickness Description not for Pressure Injury Full thickness 12/28/22 1317   Number of days: 307       Wound 12/07/22 Leg Right; Anterior; Lower #6 (Active)   Wound Image   12/28/22 1317   Wound Etiology Venous 12/28/22 1317   Wound Cleansed Cleansed with saline 12/28/22 1317   Dressing/Treatment Alginate with Ag;Dry dressing 12/21/22 1416   Wound Length (cm) 3.2 cm 12/28/22 1317   Wound Width (cm) 3.5 cm 12/28/22 1317   Wound Depth (cm) 0.1 cm 12/28/22 1317   Wound Surface Area (cm^2) 11.2 cm^2 12/28/22 1317   Change in Wound Size % (l*w) 59.27 12/28/22 1317   Wound Volume (cm^3) 1.12 cm^3 12/28/22 1317   Wound Healing % 59 12/28/22 1317   Wound Assessment Granulation tissue;Pink/red 12/28/22 1317   Drainage Amount Moderate 12/28/22 1317   Drainage Description Serosanguinous; Yellow 12/28/22 1317   Odor None 12/28/22 1317   Debby-wound Assessment Fragile 12/28/22 1317   Margins Defined edges 12/28/22 1317   Wound Thickness Description not for Pressure Injury Full thickness 12/28/22 1317   Number of days: 21       Wound 12/28/22 Leg Left; Anterior; Lower #7 (Active)   Wound Image   12/28/22 1329   Wound Etiology Venous 12/28/22 1329   Wound Cleansed Cleansed with saline 12/28/22 1329   Wound Length (cm) 1.6 cm 12/28/22 1329   Wound Width (cm) 1.5 cm 12/28/22 1329   Wound Depth (cm) 0.1 cm 12/28/22 1329   Wound Surface Area (cm^2) 2.4 cm^2 12/28/22 1329   Wound Volume (cm^3) 0.24 cm^3 12/28/22 1329   Wound Assessment Pink/red 12/28/22 1329   Drainage Amount Moderate 12/28/22 1329   Drainage Description Serous; Yellow 12/28/22 1329   Odor None 12/28/22 1329   Debby-wound Assessment Fragile 12/28/22 1329   Margins Defined edges 12/28/22 1329   Wound Thickness Description not for Pressure Injury Full thickness 12/28/22 1329   Number of days: 0          Supplies Requested :      WOUND #: 1   PRIMARY DRESSING:  Other: Xeroform  SECONDARY DRESSING:  Other: none   Cover and Secure with: 4X4 gauze pad     FREQUENCY OF DRESSING CHANGES:  Daily     WOUND #: 6 and 7   PRIMARY DRESSING:  Alginate with silver pad  SECONDARY DRESSING:  Other: optilock for # 6   Cover and Secure with: Conforming roll gauze     FREQUENCY OF DRESSING CHANGES:  Every other day           ADDITIONAL ITEMS:  [] Gloves Small  [x] Gloves Medium [] Gloves Large [] Gloves XLarge  [] Tape 1\" [x] Tape 2\" [] Tape 3\"  [] Medipore Tape  [x] Saline  [] Skin Prep   [] Adhesive Remover   [] Cotton Tip Applicators   [] Other:    Patient Wound(s) Debrided: [x] Yes   [] No    Debribement Type:  mechanical    Debridement Date: 12/28/22    Patient currently being seen by Home Health: [] Yes   [x] No    Duration for needed supplies:  []15  [x]30  []60  []90 Days    Provider Information:      PROVIDER'S NAME:  Sharmila Kulkarni MD   NPI:  [id number]

## 2022-12-28 NOTE — PLAN OF CARE
Problem: Chronic Conditions and Co-morbidities  Goal: Patient's chronic conditions and co-morbidity symptoms are monitored and maintained or improved  Outcome: Progressing     Problem: Wound:  Goal: Will show signs of wound healing; wound closure and no evidence of infection  Description: Will show signs of wound healing; wound closure and no evidence of infection  Outcome: Progressing     Problem: Chronic Conditions and Co-morbidities  Goal: Patient's chronic conditions and co-morbidity symptoms are monitored and maintained or improved  Outcome: Progressing     Problem: Pain  Goal: Verbalizes/displays adequate comfort level or baseline comfort level  Outcome: Progressing

## 2023-01-01 ENCOUNTER — APPOINTMENT (OUTPATIENT)
Dept: GENERAL RADIOLOGY | Age: 73
End: 2023-01-01
Payer: COMMERCIAL

## 2023-01-01 ENCOUNTER — TELEPHONE (OUTPATIENT)
Dept: GASTROENTEROLOGY | Age: 73
End: 2023-01-01

## 2023-01-01 ENCOUNTER — APPOINTMENT (OUTPATIENT)
Dept: CT IMAGING | Age: 73
End: 2023-01-01
Payer: COMMERCIAL

## 2023-01-01 ENCOUNTER — HOSPITAL ENCOUNTER (INPATIENT)
Dept: INTERVENTIONAL RADIOLOGY/VASCULAR | Age: 73
Discharge: HOME | End: 2023-04-26
Payer: COMMERCIAL

## 2023-01-01 ENCOUNTER — HOSPITAL ENCOUNTER (INPATIENT)
Dept: INTERVENTIONAL RADIOLOGY/VASCULAR | Age: 73
Discharge: HOME | End: 2023-04-27
Payer: COMMERCIAL

## 2023-01-01 ENCOUNTER — HOSPITAL ENCOUNTER (INPATIENT)
Age: 73
LOS: 6 days | End: 2023-04-27
Attending: EMERGENCY MEDICINE | Admitting: INTERNAL MEDICINE
Payer: COMMERCIAL

## 2023-01-01 VITALS — SYSTOLIC BLOOD PRESSURE: 85 MMHG | HEART RATE: 87 BPM | OXYGEN SATURATION: 92 % | DIASTOLIC BLOOD PRESSURE: 43 MMHG

## 2023-01-01 VITALS
OXYGEN SATURATION: 96 % | SYSTOLIC BLOOD PRESSURE: 90 MMHG | HEART RATE: 78 BPM | RESPIRATION RATE: 11 BRPM | DIASTOLIC BLOOD PRESSURE: 48 MMHG

## 2023-01-01 VITALS
DIASTOLIC BLOOD PRESSURE: 100 MMHG | HEIGHT: 67 IN | TEMPERATURE: 97.7 F | HEART RATE: 168 BPM | RESPIRATION RATE: 26 BRPM | OXYGEN SATURATION: 95 % | SYSTOLIC BLOOD PRESSURE: 192 MMHG | WEIGHT: 245.81 LBS | BODY MASS INDEX: 38.58 KG/M2

## 2023-01-01 DIAGNOSIS — J18.9 BASAL PNEUMONIA OF BOTH LUNGS: Primary | ICD-10-CM

## 2023-01-01 LAB
ALBUMIN SERPL-MCNC: 1.97 G/DL (ref 3.75–5.01)
ALBUMIN SERPL-MCNC: 2 G/DL (ref 3.5–4.6)
ALBUMIN SERPL-MCNC: 2.1 G/DL (ref 3.5–4.6)
ALBUMIN SERPL-MCNC: 2.2 G/DL (ref 3.5–4.6)
ALP SERPL-CCNC: 131 U/L (ref 40–130)
ALP SERPL-CCNC: 136 U/L (ref 40–130)
ALP SERPL-CCNC: 137 U/L (ref 40–130)
ALP SERPL-CCNC: 148 U/L (ref 40–130)
ALP SERPL-CCNC: 162 U/L (ref 40–130)
ALPHA1 GLOB SERPL ELPH-MCNC: 0.33 G/DL (ref 0.19–0.46)
ALPHA2 GLOB SERPL ELPH-MCNC: 0.62 G/DL (ref 0.48–1.05)
ALT SERPL-CCNC: 16 U/L (ref 0–33)
ALT SERPL-CCNC: 18 U/L (ref 0–33)
ALT SERPL-CCNC: 19 U/L (ref 0–33)
ALT SERPL-CCNC: 19 U/L (ref 0–33)
ALT SERPL-CCNC: 20 U/L (ref 0–33)
ANION GAP SERPL CALCULATED.3IONS-SCNC: 12 MEQ/L (ref 9–15)
ANION GAP SERPL CALCULATED.3IONS-SCNC: 14 MEQ/L (ref 9–15)
ANION GAP SERPL CALCULATED.3IONS-SCNC: 14 MEQ/L (ref 9–15)
ANION GAP SERPL CALCULATED.3IONS-SCNC: 15 MEQ/L (ref 9–15)
ANION GAP SERPL CALCULATED.3IONS-SCNC: 15 MEQ/L (ref 9–15)
ANION GAP SERPL CALCULATED.3IONS-SCNC: 16 MEQ/L (ref 9–15)
ANION GAP SERPL CALCULATED.3IONS-SCNC: 9 MEQ/L (ref 9–15)
ANISOCYTOSIS BLD QL SMEAR: ABNORMAL
ANISOCYTOSIS BLD QL SMEAR: ABNORMAL
AST SERPL-CCNC: 25 U/L (ref 0–35)
AST SERPL-CCNC: 26 U/L (ref 0–35)
AST SERPL-CCNC: 29 U/L (ref 0–35)
AST SERPL-CCNC: 36 U/L (ref 0–35)
AST SERPL-CCNC: 40 U/L (ref 0–35)
B-GLOBULIN SERPL ELPH-MCNC: 0.52 G/DL (ref 0.48–1.1)
BACTERIA BLD CULT: NORMAL
BACTERIA BLD CULT: NORMAL
BASE EXCESS ARTERIAL: -10 (ref -3–3)
BASE EXCESS ARTERIAL: -10 (ref -3–3)
BASE EXCESS ARTERIAL: -8 (ref -3–3)
BASE EXCESS ARTERIAL: 5 (ref -3–3)
BASOPHILS # BLD: 0 K/UL (ref 0–0.2)
BASOPHILS # BLD: 0.1 K/UL (ref 0–0.2)
BASOPHILS # BLD: 0.1 K/UL (ref 0–0.2)
BASOPHILS NFR BLD: 0.3 %
BASOPHILS NFR BLD: 0.4 %
BASOPHILS NFR BLD: 0.5 %
BASOPHILS NFR BLD: 0.5 %
BASOPHILS NFR BLD: 0.6 %
BILIRUB SERPL-MCNC: 0.3 MG/DL (ref 0.2–0.7)
BILIRUB SERPL-MCNC: <0.2 MG/DL (ref 0.2–0.7)
BUN SERPL-MCNC: 11 MG/DL (ref 8–23)
BUN SERPL-MCNC: 15 MG/DL (ref 8–23)
BUN SERPL-MCNC: 18 MG/DL (ref 8–23)
BUN SERPL-MCNC: 21 MG/DL (ref 8–23)
BUN SERPL-MCNC: 21 MG/DL (ref 8–23)
BUN SERPL-MCNC: 22 MG/DL (ref 8–23)
BUN SERPL-MCNC: 22 MG/DL (ref 8–23)
BURR CELLS: ABNORMAL
CALCIUM IONIZED: 0.98 MMOL/L (ref 1.12–1.32)
CALCIUM IONIZED: 1.08 MMOL/L (ref 1.12–1.32)
CALCIUM IONIZED: 1.12 MMOL/L (ref 1.12–1.32)
CALCIUM IONIZED: 1.14 MMOL/L (ref 1.12–1.32)
CALCIUM SERPL-MCNC: 7.5 MG/DL (ref 8.5–9.9)
CALCIUM SERPL-MCNC: 7.6 MG/DL (ref 8.5–9.9)
CALCIUM SERPL-MCNC: 7.7 MG/DL (ref 8.5–9.9)
CALCIUM SERPL-MCNC: 7.8 MG/DL (ref 8.5–9.9)
CALCIUM SERPL-MCNC: 7.8 MG/DL (ref 8.5–9.9)
CHLORIDE SERPL-SCNC: 104 MEQ/L (ref 95–107)
CHLORIDE SERPL-SCNC: 104 MEQ/L (ref 95–107)
CHLORIDE SERPL-SCNC: 106 MEQ/L (ref 95–107)
CHLORIDE SERPL-SCNC: 108 MEQ/L (ref 95–107)
CHLORIDE SERPL-SCNC: 109 MEQ/L (ref 95–107)
CO2 SERPL-SCNC: 19 MEQ/L (ref 20–31)
CO2 SERPL-SCNC: 19 MEQ/L (ref 20–31)
CO2 SERPL-SCNC: 20 MEQ/L (ref 20–31)
CO2 SERPL-SCNC: 24 MEQ/L (ref 20–31)
CREAT SERPL-MCNC: 2.05 MG/DL (ref 0.5–0.9)
CREAT SERPL-MCNC: 2.37 MG/DL (ref 0.5–0.9)
CREAT SERPL-MCNC: 2.52 MG/DL (ref 0.5–0.9)
CREAT SERPL-MCNC: 2.77 MG/DL (ref 0.5–0.9)
CREAT SERPL-MCNC: 3.01 MG/DL (ref 0.5–0.9)
CREAT SERPL-MCNC: 3.21 MG/DL (ref 0.5–0.9)
CREAT SERPL-MCNC: 3.26 MG/DL (ref 0.5–0.9)
CREAT UR-MCNC: 51.1 MG/DL
EOSINOPHIL # BLD: 0 K/UL (ref 0–0.7)
EOSINOPHIL NFR BLD: 0 %
EOSINOPHIL NFR BLD: 0 %
EOSINOPHIL NFR BLD: 0.1 %
EOSINOPHIL NFR BLD: 0.2 %
EOSINOPHIL NFR BLD: 0.3 %
ERYTHROCYTE [DISTWIDTH] IN BLOOD BY AUTOMATED COUNT: 18.2 % (ref 11.5–14.5)
ERYTHROCYTE [DISTWIDTH] IN BLOOD BY AUTOMATED COUNT: 18.2 % (ref 11.5–14.5)
ERYTHROCYTE [DISTWIDTH] IN BLOOD BY AUTOMATED COUNT: 18.4 % (ref 11.5–14.5)
ERYTHROCYTE [DISTWIDTH] IN BLOOD BY AUTOMATED COUNT: 18.4 % (ref 11.5–14.5)
ERYTHROCYTE [DISTWIDTH] IN BLOOD BY AUTOMATED COUNT: 18.9 % (ref 11.5–14.5)
FERRITIN: 171 NG/ML (ref 13–150)
GAMMA GLOB SERPL ELPH-MCNC: 0.66 G/DL (ref 0.62–1.51)
GLOBULIN SER CALC-MCNC: 2 G/DL (ref 2.3–3.5)
GLOBULIN SER CALC-MCNC: 2.2 G/DL (ref 2.3–3.5)
GLOBULIN SER CALC-MCNC: 2.3 G/DL (ref 2.3–3.5)
GLOBULIN SER CALC-MCNC: 2.3 G/DL (ref 2.3–3.5)
GLOBULIN SER CALC-MCNC: 2.4 G/DL (ref 2.3–3.5)
GLUCOSE BLD-MCNC: 104 MG/DL (ref 70–99)
GLUCOSE BLD-MCNC: 105 MG/DL (ref 70–99)
GLUCOSE BLD-MCNC: 106 MG/DL (ref 70–99)
GLUCOSE BLD-MCNC: 107 MG/DL (ref 70–99)
GLUCOSE BLD-MCNC: 110 MG/DL (ref 70–99)
GLUCOSE BLD-MCNC: 112 MG/DL (ref 70–99)
GLUCOSE BLD-MCNC: 113 MG/DL (ref 70–99)
GLUCOSE BLD-MCNC: 114 MG/DL (ref 70–99)
GLUCOSE BLD-MCNC: 118 MG/DL (ref 70–99)
GLUCOSE BLD-MCNC: 127 MG/DL (ref 70–99)
GLUCOSE BLD-MCNC: 128 MG/DL (ref 70–99)
GLUCOSE BLD-MCNC: 130 MG/DL (ref 70–99)
GLUCOSE BLD-MCNC: 132 MG/DL (ref 70–99)
GLUCOSE BLD-MCNC: 133 MG/DL (ref 70–99)
GLUCOSE BLD-MCNC: 137 MG/DL (ref 70–99)
GLUCOSE BLD-MCNC: 141 MG/DL (ref 70–99)
GLUCOSE BLD-MCNC: 145 MG/DL (ref 70–99)
GLUCOSE BLD-MCNC: 145 MG/DL (ref 70–99)
GLUCOSE BLD-MCNC: 148 MG/DL (ref 70–99)
GLUCOSE BLD-MCNC: 155 MG/DL (ref 70–99)
GLUCOSE BLD-MCNC: 158 MG/DL (ref 70–99)
GLUCOSE BLD-MCNC: 161 MG/DL (ref 70–99)
GLUCOSE BLD-MCNC: 96 MG/DL (ref 70–99)
GLUCOSE SERPL-MCNC: 102 MG/DL (ref 70–99)
GLUCOSE SERPL-MCNC: 109 MG/DL (ref 70–99)
GLUCOSE SERPL-MCNC: 135 MG/DL (ref 70–99)
GLUCOSE SERPL-MCNC: 139 MG/DL (ref 70–99)
GLUCOSE SERPL-MCNC: 146 MG/DL (ref 70–99)
GLUCOSE SERPL-MCNC: 155 MG/DL (ref 70–99)
GLUCOSE SERPL-MCNC: 162 MG/DL (ref 70–99)
HBV SURFACE AG SERPL QL IA: NORMAL
HCO3 ARTERIAL: 17.6 MMOL/L (ref 21–29)
HCO3 ARTERIAL: 18.7 MMOL/L (ref 21–29)
HCO3 ARTERIAL: 19.2 MMOL/L (ref 21–29)
HCO3 ARTERIAL: 29.4 MMOL/L (ref 21–29)
HCT VFR BLD AUTO: 23 % (ref 36–48)
HCT VFR BLD AUTO: 26 % (ref 36–48)
HCT VFR BLD AUTO: 26.8 % (ref 37–47)
HCT VFR BLD AUTO: 27.9 % (ref 37–47)
HCT VFR BLD AUTO: 28.9 % (ref 37–47)
HCT VFR BLD AUTO: 29 % (ref 36–48)
HCT VFR BLD AUTO: 29.4 % (ref 37–47)
HCT VFR BLD AUTO: 29.7 % (ref 37–47)
HCT VFR BLD AUTO: 33 % (ref 36–48)
HGB BLD CALC-MCNC: 11.1 GM/DL (ref 12–16)
HGB BLD CALC-MCNC: 7.9 GM/DL (ref 12–16)
HGB BLD CALC-MCNC: 8.8 GM/DL (ref 12–16)
HGB BLD CALC-MCNC: 9.7 GM/DL (ref 12–16)
HGB BLD-MCNC: 8.1 G/DL (ref 12–16)
HGB BLD-MCNC: 8.6 G/DL (ref 12–16)
HGB BLD-MCNC: 8.8 G/DL (ref 12–16)
HGB BLD-MCNC: 9.1 G/DL (ref 12–16)
HGB BLD-MCNC: 9.1 G/DL (ref 12–16)
HYPOCHROMIA BLD QL SMEAR: ABNORMAL
INTERPRETATION SERPL IFE-IMP: ABNORMAL
IRON SATURATION: 20 % (ref 20–55)
IRON: 32 UG/DL (ref 37–145)
LACTATE BLDV-SCNC: 0.7 MMOL/L (ref 0.5–2.2)
LACTATE BLDV-SCNC: 1.3 MMOL/L (ref 0.5–2.2)
LACTATE: 1.12 MMOL/L (ref 0.4–2)
LACTATE: 1.13 MMOL/L (ref 0.4–2)
LACTATE: 1.44 MMOL/L (ref 0.4–2)
LACTATE: 6.4 MMOL/L (ref 0.4–2)
LV EF: 65 %
LVEF MODALITY: NORMAL
LYMPHOCYTES # BLD: 0 K/UL (ref 1–4.8)
LYMPHOCYTES # BLD: 1.1 K/UL (ref 1–4.8)
LYMPHOCYTES # BLD: 1.2 K/UL (ref 1–4.8)
LYMPHOCYTES # BLD: 1.2 K/UL (ref 1–4.8)
LYMPHOCYTES # BLD: 1.4 K/UL (ref 1–4.8)
LYMPHOCYTES NFR BLD: 1.5 %
LYMPHOCYTES NFR BLD: 10.8 %
LYMPHOCYTES NFR BLD: 11.3 %
LYMPHOCYTES NFR BLD: 13 %
LYMPHOCYTES NFR BLD: 14.7 %
MACROCYTES BLD QL SMEAR: ABNORMAL
MACROCYTES BLD QL SMEAR: ABNORMAL
MAGNESIUM SERPL-MCNC: 1.2 MG/DL (ref 1.7–2.4)
MAGNESIUM SERPL-MCNC: 1.3 MG/DL (ref 1.7–2.4)
MAGNESIUM SERPL-MCNC: 1.3 MG/DL (ref 1.7–2.4)
MCH RBC QN AUTO: 30.3 PG (ref 27–31.3)
MCH RBC QN AUTO: 30.5 PG (ref 27–31.3)
MCH RBC QN AUTO: 30.5 PG (ref 27–31.3)
MCH RBC QN AUTO: 30.6 PG (ref 27–31.3)
MCH RBC QN AUTO: 30.7 PG (ref 27–31.3)
MCHC RBC AUTO-ENTMCNC: 30.3 % (ref 33–37)
MCHC RBC AUTO-ENTMCNC: 30.4 % (ref 33–37)
MCHC RBC AUTO-ENTMCNC: 30.6 % (ref 33–37)
MCHC RBC AUTO-ENTMCNC: 30.7 % (ref 33–37)
MCHC RBC AUTO-ENTMCNC: 30.9 % (ref 33–37)
MCV RBC AUTO: 100.1 FL (ref 79.4–94.8)
MCV RBC AUTO: 100.8 FL (ref 79.4–94.8)
MCV RBC AUTO: 99.1 FL (ref 79.4–94.8)
MCV RBC AUTO: 99.5 FL (ref 79.4–94.8)
MCV RBC AUTO: 99.6 FL (ref 79.4–94.8)
METAMYELOCYTES NFR BLD MANUAL: 1 %
MONOCYTES # BLD: 0 K/UL (ref 0.2–0.8)
MONOCYTES # BLD: 0.1 K/UL (ref 0.2–0.8)
MONOCYTES # BLD: 0.3 K/UL (ref 0.2–0.8)
MONOCYTES # BLD: 0.3 K/UL (ref 0.2–0.8)
MONOCYTES # BLD: 0.5 K/UL (ref 0.2–0.8)
MONOCYTES NFR BLD: 1 %
MONOCYTES NFR BLD: 1.8 %
MONOCYTES NFR BLD: 2.6 %
MONOCYTES NFR BLD: 3.5 %
MONOCYTES NFR BLD: 4.4 %
MYELOCYTES NFR BLD MANUAL: 1 %
NEUTROPHILS # BLD: 10 K/UL (ref 1.4–6.5)
NEUTROPHILS # BLD: 10.1 K/UL (ref 1.4–6.5)
NEUTROPHILS # BLD: 11.3 K/UL (ref 1.4–6.5)
NEUTROPHILS # BLD: 4.3 K/UL (ref 1.4–6.5)
NEUTROPHILS # BLD: 6.6 K/UL (ref 1.4–6.5)
NEUTS BAND NFR BLD MANUAL: 1 % (ref 5–11)
NEUTS SEG NFR BLD: 77 %
NEUTS SEG NFR BLD: 81.5 %
NEUTS SEG NFR BLD: 83.9 %
NEUTS SEG NFR BLD: 85.7 %
NEUTS SEG NFR BLD: 99 %
NRBC BLD-RTO: 8 /100 WBC
O2 SAT, ARTERIAL: 100 % (ref 93–100)
O2 SAT, ARTERIAL: 87 % (ref 93–100)
O2 SAT, ARTERIAL: 92 % (ref 93–100)
O2 SAT, ARTERIAL: 97 % (ref 93–100)
PATH INTERP BLD-IMP: NORMAL
PATH INTERP BLD-IMP: YES
PCO2 ARTERIAL: 41 MM HG (ref 35–45)
PCO2 ARTERIAL: 44 MM HG (ref 35–45)
PCO2 ARTERIAL: 46 MM HG (ref 35–45)
PCO2 ARTERIAL: 50 MM HG (ref 35–45)
PERFORMED ON: ABNORMAL
PH ARTERIAL: 7.18 (ref 7.35–7.45)
PH ARTERIAL: 7.24 (ref 7.35–7.45)
PH ARTERIAL: 7.25 (ref 7.35–7.45)
PH ARTERIAL: 7.41 (ref 7.35–7.45)
PLATELET # BLD AUTO: 104 K/UL (ref 130–400)
PLATELET # BLD AUTO: 112 K/UL (ref 130–400)
PLATELET # BLD AUTO: 113 K/UL (ref 130–400)
PLATELET # BLD AUTO: 128 K/UL (ref 130–400)
PLATELET # BLD AUTO: 50 K/UL (ref 130–400)
PLATELET BLD QL SMEAR: ABNORMAL
PLATELET BLD QL SMEAR: ABNORMAL
PO2 ARTERIAL: 103 MM HG (ref 75–108)
PO2 ARTERIAL: 310 MM HG (ref 75–108)
PO2 ARTERIAL: 62 MM HG (ref 75–108)
PO2 ARTERIAL: 79 MM HG (ref 75–108)
POC CHLORIDE: 109 MEQ/L (ref 99–110)
POC CHLORIDE: 110 MEQ/L (ref 99–110)
POC CHLORIDE: 111 MEQ/L (ref 99–110)
POC CHLORIDE: 111 MEQ/L (ref 99–110)
POC CREATININE: 3.1 MG/DL (ref 0.6–1.2)
POC CREATININE: 3.2 MG/DL (ref 0.6–1.2)
POC CREATININE: 3.4 MG/DL (ref 0.6–1.2)
POC CREATININE: 3.5 MG/DL (ref 0.6–1.2)
POC FIO2: 15
POC FIO2: 40
POC FIO2: 50
POC FIO2: 6
POC SAMPLE TYPE: ABNORMAL
POIKILOCYTOSIS BLD QL SMEAR: ABNORMAL
POIKILOCYTOSIS BLD QL SMEAR: ABNORMAL
POTASSIUM SERPL-SCNC: 3.3 MEQ/L (ref 3.5–5.1)
POTASSIUM SERPL-SCNC: 3.5 MEQ/L (ref 3.5–5.1)
POTASSIUM SERPL-SCNC: 3.6 MEQ/L (ref 3.5–5.1)
POTASSIUM SERPL-SCNC: 3.8 MEQ/L (ref 3.4–4.9)
POTASSIUM SERPL-SCNC: 3.8 MEQ/L (ref 3.4–4.9)
POTASSIUM SERPL-SCNC: 3.9 MEQ/L (ref 3.4–4.9)
POTASSIUM SERPL-SCNC: 3.9 MEQ/L (ref 3.4–4.9)
POTASSIUM SERPL-SCNC: 4.1 MEQ/L (ref 3.4–4.9)
POTASSIUM SERPL-SCNC: 4.4 MEQ/L (ref 3.4–4.9)
POTASSIUM SERPL-SCNC: 4.5 MEQ/L (ref 3.4–4.9)
POTASSIUM SERPL-SCNC: 4.6 MEQ/L (ref 3.5–5.1)
PROCALCITONIN SERPL IA-MCNC: 0.15 NG/ML (ref 0–0.15)
PROCALCITONIN SERPL IA-MCNC: 0.16 NG/ML (ref 0–0.15)
PROT SERPL-MCNC: 4.1 G/DL (ref 6.3–8)
PROT SERPL-MCNC: 4.1 G/DL (ref 6.3–8.2)
PROT SERPL-MCNC: 4.2 G/DL (ref 6.3–8)
PROT SERPL-MCNC: 4.4 G/DL (ref 6.3–8)
PROT SERPL-MCNC: 4.5 G/DL (ref 6.3–8)
PROT SERPL-MCNC: 4.5 G/DL (ref 6.3–8)
PROT UR-MCNC: 24 MG/DL
PROT/CREAT UR-RTO: 0.5 ML/ML
PROT/CREAT UR-RTO: 0.5 ML/ML (ref 0–0.2)
PROTEIN ELECTROPHORESIS, SERUM: ABNORMAL
RBC # BLD AUTO: 2.65 M/UL (ref 4.2–5.4)
RBC # BLD AUTO: 2.79 M/UL (ref 4.2–5.4)
RBC # BLD AUTO: 2.9 M/UL (ref 4.2–5.4)
RBC # BLD AUTO: 2.96 M/UL (ref 4.2–5.4)
RBC # BLD AUTO: 2.98 M/UL (ref 4.2–5.4)
SCHISTOCYTES BLD QL SMEAR: ABNORMAL
SODIUM BLD-SCNC: 139 MEQ/L (ref 136–145)
SODIUM BLD-SCNC: 140 MEQ/L (ref 136–145)
SODIUM BLD-SCNC: 140 MEQ/L (ref 136–145)
SODIUM BLD-SCNC: 150 MEQ/L (ref 136–145)
SODIUM SERPL-SCNC: 136 MEQ/L (ref 135–144)
SODIUM SERPL-SCNC: 139 MEQ/L (ref 135–144)
SODIUM SERPL-SCNC: 140 MEQ/L (ref 135–144)
SODIUM SERPL-SCNC: 142 MEQ/L (ref 135–144)
SODIUM SERPL-SCNC: 142 MEQ/L (ref 135–144)
SODIUM SERPL-SCNC: 143 MEQ/L (ref 135–144)
SODIUM SERPL-SCNC: 144 MEQ/L (ref 135–144)
TCO2 ARTERIAL: 19 MMOL/L (ref 21–32)
TCO2 ARTERIAL: 20 MMOL/L (ref 21–32)
TCO2 ARTERIAL: 21 MMOL/L (ref 21–32)
TCO2 ARTERIAL: 31 MMOL/L (ref 21–32)
TOTAL IRON BINDING CAPACITY: 161 UG/DL (ref 250–450)
UNSATURATED IRON BINDING CAPACITY: 129 UG/DL (ref 112–347)
VARIANT LYMPHS NFR BLD: 7 %
WBC # BLD AUTO: 11.4 K/UL (ref 4.8–10.8)
WBC # BLD AUTO: 11.6 K/UL (ref 4.8–10.8)
WBC # BLD AUTO: 12 K/UL (ref 4.8–10.8)
WBC # BLD AUTO: 5.4 K/UL (ref 4.8–10.8)
WBC # BLD AUTO: 8.1 K/UL (ref 4.8–10.8)

## 2023-01-01 PROCEDURE — 2580000003 HC RX 258: Performed by: INTERNAL MEDICINE

## 2023-01-01 PROCEDURE — 71045 X-RAY EXAM CHEST 1 VIEW: CPT

## 2023-01-01 PROCEDURE — 82330 ASSAY OF CALCIUM: CPT

## 2023-01-01 PROCEDURE — 99232 SBSQ HOSP IP/OBS MODERATE 35: CPT | Performed by: NURSE PRACTITIONER

## 2023-01-01 PROCEDURE — 83550 IRON BINDING TEST: CPT

## 2023-01-01 PROCEDURE — 80048 BASIC METABOLIC PNL TOTAL CA: CPT

## 2023-01-01 PROCEDURE — 85025 COMPLETE CBC W/AUTO DIFF WBC: CPT

## 2023-01-01 PROCEDURE — 36556 INSERT NON-TUNNEL CV CATH: CPT

## 2023-01-01 PROCEDURE — 99223 1ST HOSP IP/OBS HIGH 75: CPT | Performed by: INTERNAL MEDICINE

## 2023-01-01 PROCEDURE — C9113 INJ PANTOPRAZOLE SODIUM, VIA: HCPCS | Performed by: SPECIALIST

## 2023-01-01 PROCEDURE — 6370000000 HC RX 637 (ALT 250 FOR IP): Performed by: NURSE PRACTITIONER

## 2023-01-01 PROCEDURE — 76937 US GUIDE VASCULAR ACCESS: CPT | Performed by: RADIOLOGY

## 2023-01-01 PROCEDURE — 2500000003 HC RX 250 WO HCPCS

## 2023-01-01 PROCEDURE — 77001 FLUOROGUIDE FOR VEIN DEVICE: CPT

## 2023-01-01 PROCEDURE — 6360000002 HC RX W HCPCS: Performed by: NURSE PRACTITIONER

## 2023-01-01 PROCEDURE — 92950 HEART/LUNG RESUSCITATION CPR: CPT

## 2023-01-01 PROCEDURE — 6370000000 HC RX 637 (ALT 250 FOR IP): Performed by: INTERNAL MEDICINE

## 2023-01-01 PROCEDURE — 99222 1ST HOSP IP/OBS MODERATE 55: CPT | Performed by: SPECIALIST

## 2023-01-01 PROCEDURE — 2580000003 HC RX 258: Performed by: SPECIALIST

## 2023-01-01 PROCEDURE — 6360000002 HC RX W HCPCS: Performed by: INTERNAL MEDICINE

## 2023-01-01 PROCEDURE — 5A1D70Z PERFORMANCE OF URINARY FILTRATION, INTERMITTENT, LESS THAN 6 HOURS PER DAY: ICD-10-PCS | Performed by: RADIOLOGY

## 2023-01-01 PROCEDURE — 0BH17EZ INSERTION OF ENDOTRACHEAL AIRWAY INTO TRACHEA, VIA NATURAL OR ARTIFICIAL OPENING: ICD-10-PCS | Performed by: INTERNAL MEDICINE

## 2023-01-01 PROCEDURE — 1210000000 HC MED SURG R&B

## 2023-01-01 PROCEDURE — 85014 HEMATOCRIT: CPT

## 2023-01-01 PROCEDURE — 84132 ASSAY OF SERUM POTASSIUM: CPT

## 2023-01-01 PROCEDURE — 2709999900 IR NONTUNNELED VASCULAR CATHETER > 5 YEARS

## 2023-01-01 PROCEDURE — 99222 1ST HOSP IP/OBS MODERATE 55: CPT | Performed by: NURSE PRACTITIONER

## 2023-01-01 PROCEDURE — 36600 WITHDRAWAL OF ARTERIAL BLOOD: CPT

## 2023-01-01 PROCEDURE — 2700000000 HC OXYGEN THERAPY PER DAY

## 2023-01-01 PROCEDURE — 84165 PROTEIN E-PHORESIS SERUM: CPT

## 2023-01-01 PROCEDURE — 87340 HEPATITIS B SURFACE AG IA: CPT

## 2023-01-01 PROCEDURE — A4216 STERILE WATER/SALINE, 10 ML: HCPCS | Performed by: SPECIALIST

## 2023-01-01 PROCEDURE — 2500000003 HC RX 250 WO HCPCS: Performed by: RADIOLOGY

## 2023-01-01 PROCEDURE — 31500 INSERT EMERGENCY AIRWAY: CPT

## 2023-01-01 PROCEDURE — 36415 COLL VENOUS BLD VENIPUNCTURE: CPT

## 2023-01-01 PROCEDURE — 82435 ASSAY OF BLOOD CHLORIDE: CPT

## 2023-01-01 PROCEDURE — 37799 UNLISTED PX VASCULAR SURGERY: CPT

## 2023-01-01 PROCEDURE — 76937 US GUIDE VASCULAR ACCESS: CPT

## 2023-01-01 PROCEDURE — 51798 US URINE CAPACITY MEASURE: CPT

## 2023-01-01 PROCEDURE — 6360000002 HC RX W HCPCS: Performed by: RADIOLOGY

## 2023-01-01 PROCEDURE — 2580000003 HC RX 258: Performed by: RADIOLOGY

## 2023-01-01 PROCEDURE — 83735 ASSAY OF MAGNESIUM: CPT

## 2023-01-01 PROCEDURE — 6360000004 HC RX CONTRAST MEDICATION: Performed by: INTERNAL MEDICINE

## 2023-01-01 PROCEDURE — 99231 SBSQ HOSP IP/OBS SF/LOW 25: CPT | Performed by: NURSE PRACTITIONER

## 2023-01-01 PROCEDURE — 2580000003 HC RX 258: Performed by: NURSE PRACTITIONER

## 2023-01-01 PROCEDURE — 82803 BLOOD GASES ANY COMBINATION: CPT

## 2023-01-01 PROCEDURE — 84145 PROCALCITONIN (PCT): CPT

## 2023-01-01 PROCEDURE — 99285 EMERGENCY DEPT VISIT HI MDM: CPT

## 2023-01-01 PROCEDURE — 6360000002 HC RX W HCPCS: Performed by: SPECIALIST

## 2023-01-01 PROCEDURE — 97167 OT EVAL HIGH COMPLEX 60 MIN: CPT

## 2023-01-01 PROCEDURE — 80053 COMPREHEN METABOLIC PANEL: CPT

## 2023-01-01 PROCEDURE — 93308 TTE F-UP OR LMTD: CPT

## 2023-01-01 PROCEDURE — 99232 SBSQ HOSP IP/OBS MODERATE 35: CPT | Performed by: INTERNAL MEDICINE

## 2023-01-01 PROCEDURE — 74018 RADEX ABDOMEN 1 VIEW: CPT

## 2023-01-01 PROCEDURE — 97535 SELF CARE MNGMENT TRAINING: CPT

## 2023-01-01 PROCEDURE — 83605 ASSAY OF LACTIC ACID: CPT

## 2023-01-01 PROCEDURE — 36556 INSERT NON-TUNNEL CV CATH: CPT | Performed by: RADIOLOGY

## 2023-01-01 PROCEDURE — 5A1935Z RESPIRATORY VENTILATION, LESS THAN 24 CONSECUTIVE HOURS: ICD-10-PCS | Performed by: INTERNAL MEDICINE

## 2023-01-01 PROCEDURE — 84295 ASSAY OF SERUM SODIUM: CPT

## 2023-01-01 PROCEDURE — P9047 ALBUMIN (HUMAN), 25%, 50ML: HCPCS | Performed by: INTERNAL MEDICINE

## 2023-01-01 PROCEDURE — 71250 CT THORAX DX C-: CPT

## 2023-01-01 PROCEDURE — 82948 REAGENT STRIP/BLOOD GLUCOSE: CPT

## 2023-01-01 PROCEDURE — 97162 PT EVAL MOD COMPLEX 30 MIN: CPT

## 2023-01-01 PROCEDURE — 99223 1ST HOSP IP/OBS HIGH 75: CPT | Performed by: RADIOLOGY

## 2023-01-01 PROCEDURE — 03HY32Z INSERTION OF MONITORING DEVICE INTO UPPER ARTERY, PERCUTANEOUS APPROACH: ICD-10-PCS | Performed by: INTERNAL MEDICINE

## 2023-01-01 PROCEDURE — 82565 ASSAY OF CREATININE: CPT

## 2023-01-01 PROCEDURE — 32555 ASPIRATE PLEURA W/ IMAGING: CPT

## 2023-01-01 PROCEDURE — 2709999900 IR GUIDED THORACENTESIS PLEURAL

## 2023-01-01 PROCEDURE — 96374 THER/PROPH/DIAG INJ IV PUSH: CPT

## 2023-01-01 PROCEDURE — 94002 VENT MGMT INPAT INIT DAY: CPT

## 2023-01-01 PROCEDURE — 77001 FLUOROGUIDE FOR VEIN DEVICE: CPT | Performed by: RADIOLOGY

## 2023-01-01 PROCEDURE — 2580000003 HC RX 258

## 2023-01-01 PROCEDURE — 2500000003 HC RX 250 WO HCPCS: Performed by: INTERNAL MEDICINE

## 2023-01-01 PROCEDURE — 2000000000 HC ICU R&B

## 2023-01-01 PROCEDURE — 82728 ASSAY OF FERRITIN: CPT

## 2023-01-01 PROCEDURE — 2580000003 HC RX 258: Performed by: EMERGENCY MEDICINE

## 2023-01-01 PROCEDURE — 6360000002 HC RX W HCPCS: Performed by: EMERGENCY MEDICINE

## 2023-01-01 PROCEDURE — 99213 OFFICE O/P EST LOW 20 MIN: CPT

## 2023-01-01 PROCEDURE — 04HY32Z INSERTION OF MONITORING DEVICE INTO LOWER ARTERY, PERCUTANEOUS APPROACH: ICD-10-PCS | Performed by: INTERNAL MEDICINE

## 2023-01-01 PROCEDURE — 74176 CT ABD & PELVIS W/O CONTRAST: CPT

## 2023-01-01 PROCEDURE — 99233 SBSQ HOSP IP/OBS HIGH 50: CPT | Performed by: INTERNAL MEDICINE

## 2023-01-01 PROCEDURE — 36620 INSERTION CATHETER ARTERY: CPT | Performed by: INTERNAL MEDICINE

## 2023-01-01 PROCEDURE — 84155 ASSAY OF PROTEIN SERUM: CPT

## 2023-01-01 PROCEDURE — 87040 BLOOD CULTURE FOR BACTERIA: CPT

## 2023-01-01 PROCEDURE — 96375 TX/PRO/DX INJ NEW DRUG ADDON: CPT

## 2023-01-01 PROCEDURE — 94660 CPAP INITIATION&MGMT: CPT

## 2023-01-01 PROCEDURE — 84156 ASSAY OF PROTEIN URINE: CPT

## 2023-01-01 PROCEDURE — 99291 CRITICAL CARE FIRST HOUR: CPT | Performed by: INTERNAL MEDICINE

## 2023-01-01 PROCEDURE — 02HV33Z INSERTION OF INFUSION DEVICE INTO SUPERIOR VENA CAVA, PERCUTANEOUS APPROACH: ICD-10-PCS | Performed by: RADIOLOGY

## 2023-01-01 PROCEDURE — 83540 ASSAY OF IRON: CPT

## 2023-01-01 RX ORDER — MECOBALAMIN 5000 MCG
10 TABLET,DISINTEGRATING ORAL
Status: DISCONTINUED | OUTPATIENT
Start: 2023-01-01 | End: 2023-01-01 | Stop reason: HOSPADM

## 2023-01-01 RX ORDER — MORPHINE SULFATE 2 MG/ML
1 INJECTION, SOLUTION INTRAMUSCULAR; INTRAVENOUS ONCE
Status: COMPLETED | OUTPATIENT
Start: 2023-01-01 | End: 2023-01-01

## 2023-01-01 RX ORDER — EPINEPHRINE 0.1 MG/ML
SYRINGE (ML) INJECTION
Status: COMPLETED | OUTPATIENT
Start: 2023-01-01 | End: 2023-01-01

## 2023-01-01 RX ORDER — METOLAZONE 5 MG/1
10 TABLET ORAL DAILY
Status: DISCONTINUED | OUTPATIENT
Start: 2023-01-01 | End: 2023-01-01 | Stop reason: HOSPADM

## 2023-01-01 RX ORDER — HYDROXYZINE PAMOATE 50 MG/1
50 CAPSULE ORAL 3 TIMES DAILY PRN
COMMUNITY

## 2023-01-01 RX ORDER — MEGESTROL ACETATE 40 MG/ML
200 SUSPENSION ORAL DAILY
Status: DISCONTINUED | OUTPATIENT
Start: 2023-01-01 | End: 2023-01-01 | Stop reason: HOSPADM

## 2023-01-01 RX ORDER — METOPROLOL TARTRATE 50 MG/1
50 TABLET, FILM COATED ORAL DAILY
Status: DISCONTINUED | OUTPATIENT
Start: 2023-01-01 | End: 2023-01-01

## 2023-01-01 RX ORDER — FENTANYL CITRATE 0.05 MG/ML
50 INJECTION, SOLUTION INTRAMUSCULAR; INTRAVENOUS ONCE
Status: COMPLETED | OUTPATIENT
Start: 2023-01-01 | End: 2023-01-01

## 2023-01-01 RX ORDER — PREDNISONE 10 MG/1
10 TABLET ORAL DAILY
Status: DISCONTINUED | OUTPATIENT
Start: 2023-01-01 | End: 2023-01-01

## 2023-01-01 RX ORDER — ACETAMINOPHEN 650 MG/1
650 SUPPOSITORY RECTAL EVERY 6 HOURS PRN
Status: DISCONTINUED | OUTPATIENT
Start: 2023-01-01 | End: 2023-01-01 | Stop reason: HOSPADM

## 2023-01-01 RX ORDER — INSULIN LISPRO 100 [IU]/ML
0-4 INJECTION, SOLUTION INTRAVENOUS; SUBCUTANEOUS NIGHTLY
Status: DISCONTINUED | OUTPATIENT
Start: 2023-01-01 | End: 2023-01-01 | Stop reason: HOSPADM

## 2023-01-01 RX ORDER — MIRTAZAPINE 15 MG/1
15 TABLET, FILM COATED ORAL NIGHTLY
COMMUNITY

## 2023-01-01 RX ORDER — PANTOPRAZOLE SODIUM 40 MG/10ML
40 INJECTION, POWDER, LYOPHILIZED, FOR SOLUTION INTRAVENOUS DAILY
COMMUNITY

## 2023-01-01 RX ORDER — ENOXAPARIN SODIUM 100 MG/ML
30 INJECTION SUBCUTANEOUS DAILY
Status: DISCONTINUED | OUTPATIENT
Start: 2023-01-01 | End: 2023-01-01 | Stop reason: ALTCHOICE

## 2023-01-01 RX ORDER — LEVOTHYROXINE SODIUM 0.12 MG/1
125 TABLET ORAL DAILY
Status: DISCONTINUED | OUTPATIENT
Start: 2023-01-01 | End: 2023-01-01 | Stop reason: HOSPADM

## 2023-01-01 RX ORDER — ONDANSETRON 4 MG/1
4 TABLET, ORALLY DISINTEGRATING ORAL EVERY 8 HOURS PRN
Status: DISCONTINUED | OUTPATIENT
Start: 2023-01-01 | End: 2023-01-01 | Stop reason: HOSPADM

## 2023-01-01 RX ORDER — POLYETHYLENE GLYCOL 3350 17 G/17G
17 POWDER, FOR SOLUTION ORAL DAILY PRN
Status: DISCONTINUED | OUTPATIENT
Start: 2023-01-01 | End: 2023-01-01 | Stop reason: HOSPADM

## 2023-01-01 RX ORDER — 0.9 % SODIUM CHLORIDE 0.9 %
250 INTRAVENOUS SOLUTION INTRAVENOUS
Status: DISCONTINUED | OUTPATIENT
Start: 2023-01-01 | End: 2023-01-01 | Stop reason: HOSPADM

## 2023-01-01 RX ORDER — DIPHENHYDRAMINE HYDROCHLORIDE 50 MG/ML
25 INJECTION INTRAMUSCULAR; INTRAVENOUS ONCE
Status: COMPLETED | OUTPATIENT
Start: 2023-01-01 | End: 2023-01-01

## 2023-01-01 RX ORDER — SODIUM CHLORIDE 450 MG/100ML
INJECTION, SOLUTION INTRAVENOUS CONTINUOUS
Status: DISCONTINUED | OUTPATIENT
Start: 2023-01-01 | End: 2023-01-01

## 2023-01-01 RX ORDER — POTASSIUM CHLORIDE 20 MEQ/1
20 TABLET, EXTENDED RELEASE ORAL 2 TIMES DAILY
COMMUNITY

## 2023-01-01 RX ORDER — METOCLOPRAMIDE HYDROCHLORIDE 5 MG/ML
10 INJECTION INTRAMUSCULAR; INTRAVENOUS EVERY 4 HOURS PRN
Status: DISCONTINUED | OUTPATIENT
Start: 2023-01-01 | End: 2023-01-01 | Stop reason: HOSPADM

## 2023-01-01 RX ORDER — LIDOCAINE HYDROCHLORIDE 20 MG/ML
INJECTION, SOLUTION INFILTRATION; PERINEURAL PRN
Status: COMPLETED | OUTPATIENT
Start: 2023-01-01 | End: 2023-01-01

## 2023-01-01 RX ORDER — MIRTAZAPINE 15 MG/1
30 TABLET, FILM COATED ORAL NIGHTLY
Status: DISCONTINUED | OUTPATIENT
Start: 2023-01-01 | End: 2023-01-01 | Stop reason: HOSPADM

## 2023-01-01 RX ORDER — SODIUM CHLORIDE 0.9 % (FLUSH) 0.9 %
5-40 SYRINGE (ML) INJECTION EVERY 12 HOURS SCHEDULED
Status: DISCONTINUED | OUTPATIENT
Start: 2023-01-01 | End: 2023-01-01 | Stop reason: HOSPADM

## 2023-01-01 RX ORDER — 0.9 % SODIUM CHLORIDE 0.9 %
INTRAVENOUS SOLUTION INTRAVENOUS CONTINUOUS PRN
Status: COMPLETED | OUTPATIENT
Start: 2023-01-01 | End: 2023-01-01

## 2023-01-01 RX ORDER — NOREPINEPHRINE BIT/0.9 % NACL 16MG/250ML
1-100 INFUSION BOTTLE (ML) INTRAVENOUS CONTINUOUS
Status: DISCONTINUED | OUTPATIENT
Start: 2023-01-01 | End: 2023-01-01 | Stop reason: HOSPADM

## 2023-01-01 RX ORDER — LANOLIN ALCOHOL/MO/W.PET/CERES
400 CREAM (GRAM) TOPICAL DAILY
Status: DISCONTINUED | OUTPATIENT
Start: 2023-01-01 | End: 2023-01-01 | Stop reason: HOSPADM

## 2023-01-01 RX ORDER — PREDNISONE 10 MG/1
5 TABLET ORAL DAILY
Status: DISCONTINUED | OUTPATIENT
Start: 2023-01-01 | End: 2023-01-01 | Stop reason: HOSPADM

## 2023-01-01 RX ORDER — MORPHINE SULFATE 4 MG/ML
INJECTION, SOLUTION INTRAMUSCULAR; INTRAVENOUS
Status: DISCONTINUED
Start: 2023-01-01 | End: 2023-01-01 | Stop reason: HOSPADM

## 2023-01-01 RX ORDER — CAPSAICIN 0.025 %
CREAM (GRAM) TOPICAL 3 TIMES DAILY
Status: DISCONTINUED | OUTPATIENT
Start: 2023-01-01 | End: 2023-01-01 | Stop reason: HOSPADM

## 2023-01-01 RX ORDER — SODIUM CHLORIDE 0.9 % (FLUSH) 0.9 %
5-40 SYRINGE (ML) INJECTION PRN
Status: DISCONTINUED | OUTPATIENT
Start: 2023-01-01 | End: 2023-01-01 | Stop reason: HOSPADM

## 2023-01-01 RX ORDER — ONDANSETRON 2 MG/ML
4 INJECTION INTRAMUSCULAR; INTRAVENOUS ONCE
Status: COMPLETED | OUTPATIENT
Start: 2023-01-01 | End: 2023-01-01

## 2023-01-01 RX ORDER — TAMSULOSIN HYDROCHLORIDE 0.4 MG/1
0.4 CAPSULE ORAL DAILY
Status: DISCONTINUED | OUTPATIENT
Start: 2023-01-01 | End: 2023-01-01

## 2023-01-01 RX ORDER — L. ACIDOPHILUS/L.BULGARICUS 1MM CELL
1 TABLET ORAL DAILY
Status: DISCONTINUED | OUTPATIENT
Start: 2023-01-01 | End: 2023-01-01 | Stop reason: HOSPADM

## 2023-01-01 RX ORDER — ENOXAPARIN SODIUM 100 MG/ML
30 INJECTION SUBCUTANEOUS 2 TIMES DAILY
Status: DISCONTINUED | OUTPATIENT
Start: 2023-01-01 | End: 2023-01-01

## 2023-01-01 RX ORDER — ERYTHROMYCIN 5 MG/G
OINTMENT OPHTHALMIC 3 TIMES DAILY
Status: DISCONTINUED | OUTPATIENT
Start: 2023-01-01 | End: 2023-01-01 | Stop reason: HOSPADM

## 2023-01-01 RX ORDER — FUROSEMIDE 10 MG/ML
40 INJECTION INTRAMUSCULAR; INTRAVENOUS 2 TIMES DAILY
Status: DISCONTINUED | OUTPATIENT
Start: 2023-01-01 | End: 2023-01-01

## 2023-01-01 RX ORDER — SODIUM CHLORIDE 9 MG/ML
INJECTION, SOLUTION INTRAVENOUS
Status: COMPLETED
Start: 2023-01-01 | End: 2023-01-01

## 2023-01-01 RX ORDER — SODIUM CHLORIDE 9 MG/ML
INJECTION, SOLUTION INTRAVENOUS PRN
Status: DISCONTINUED | OUTPATIENT
Start: 2023-01-01 | End: 2023-01-01 | Stop reason: HOSPADM

## 2023-01-01 RX ORDER — SUCCINYLCHOLINE CHLORIDE 20 MG/ML
INJECTION INTRAMUSCULAR; INTRAVENOUS
Status: COMPLETED | OUTPATIENT
Start: 2023-01-01 | End: 2023-01-01

## 2023-01-01 RX ORDER — ONDANSETRON 2 MG/ML
4 INJECTION INTRAMUSCULAR; INTRAVENOUS EVERY 6 HOURS PRN
Status: DISCONTINUED | OUTPATIENT
Start: 2023-01-01 | End: 2023-01-01 | Stop reason: HOSPADM

## 2023-01-01 RX ORDER — SODIUM CHLORIDE, SODIUM LACTATE, POTASSIUM CHLORIDE, AND CALCIUM CHLORIDE .6; .31; .03; .02 G/100ML; G/100ML; G/100ML; G/100ML
500 INJECTION, SOLUTION INTRAVENOUS ONCE
Status: DISCONTINUED | OUTPATIENT
Start: 2023-01-01 | End: 2023-01-01 | Stop reason: HOSPADM

## 2023-01-01 RX ORDER — HEPARIN SODIUM 1000 [USP'U]/ML
3000 INJECTION, SOLUTION INTRAVENOUS; SUBCUTANEOUS PRN
Status: DISCONTINUED | OUTPATIENT
Start: 2023-01-01 | End: 2023-01-01 | Stop reason: HOSPADM

## 2023-01-01 RX ORDER — ALBUMIN (HUMAN) 12.5 G/50ML
25 SOLUTION INTRAVENOUS ONCE
Status: COMPLETED | OUTPATIENT
Start: 2023-01-01 | End: 2023-01-01

## 2023-01-01 RX ORDER — TAMSULOSIN HYDROCHLORIDE 0.4 MG/1
0.4 CAPSULE ORAL DAILY
COMMUNITY

## 2023-01-01 RX ORDER — ONDANSETRON 4 MG/1
4 TABLET, FILM COATED ORAL EVERY 6 HOURS PRN
COMMUNITY

## 2023-01-01 RX ORDER — MIRTAZAPINE 15 MG/1
15 TABLET, FILM COATED ORAL NIGHTLY
Status: DISCONTINUED | OUTPATIENT
Start: 2023-01-01 | End: 2023-01-01

## 2023-01-01 RX ORDER — OXYBUTYNIN CHLORIDE 5 MG/1
15 TABLET, EXTENDED RELEASE ORAL DAILY
Status: DISCONTINUED | OUTPATIENT
Start: 2023-01-01 | End: 2023-01-01 | Stop reason: HOSPADM

## 2023-01-01 RX ORDER — INSULIN LISPRO 100 [IU]/ML
0-8 INJECTION, SOLUTION INTRAVENOUS; SUBCUTANEOUS
Status: DISCONTINUED | OUTPATIENT
Start: 2023-01-01 | End: 2023-01-01 | Stop reason: HOSPADM

## 2023-01-01 RX ORDER — ETOMIDATE 2 MG/ML
INJECTION INTRAVENOUS
Status: COMPLETED | OUTPATIENT
Start: 2023-01-01 | End: 2023-01-01

## 2023-01-01 RX ORDER — METOPROLOL TARTRATE 50 MG/1
50 TABLET, FILM COATED ORAL DAILY
COMMUNITY

## 2023-01-01 RX ORDER — ATORVASTATIN CALCIUM 10 MG/1
10 TABLET, FILM COATED ORAL DAILY
Status: DISCONTINUED | OUTPATIENT
Start: 2023-01-01 | End: 2023-01-01 | Stop reason: HOSPADM

## 2023-01-01 RX ORDER — HEPARIN SODIUM 1000 [USP'U]/ML
INJECTION, SOLUTION INTRAVENOUS; SUBCUTANEOUS PRN
Status: COMPLETED | OUTPATIENT
Start: 2023-01-01 | End: 2023-01-01

## 2023-01-01 RX ORDER — ACETAMINOPHEN 325 MG/1
650 TABLET ORAL EVERY 6 HOURS PRN
Status: DISCONTINUED | OUTPATIENT
Start: 2023-01-01 | End: 2023-01-01 | Stop reason: HOSPADM

## 2023-01-01 RX ORDER — NOREPINEPHRINE BIT/0.9 % NACL 16MG/250ML
INFUSION BOTTLE (ML) INTRAVENOUS
Status: COMPLETED
Start: 2023-01-01 | End: 2023-01-01

## 2023-01-01 RX ORDER — POTASSIUM CHLORIDE 20 MEQ/1
20 TABLET, EXTENDED RELEASE ORAL 2 TIMES DAILY WITH MEALS
Status: DISCONTINUED | OUTPATIENT
Start: 2023-01-01 | End: 2023-01-01 | Stop reason: HOSPADM

## 2023-01-01 RX ORDER — HEPARIN SODIUM 5000 [USP'U]/ML
5000 INJECTION, SOLUTION INTRAVENOUS; SUBCUTANEOUS EVERY 8 HOURS SCHEDULED
Status: DISCONTINUED | OUTPATIENT
Start: 2023-01-01 | End: 2023-01-01 | Stop reason: HOSPADM

## 2023-01-01 RX ORDER — PANTOPRAZOLE SODIUM 40 MG/1
40 TABLET, DELAYED RELEASE ORAL
Status: DISCONTINUED | OUTPATIENT
Start: 2023-01-01 | End: 2023-01-01

## 2023-01-01 RX ORDER — 0.9 % SODIUM CHLORIDE 0.9 %
500 INTRAVENOUS SOLUTION INTRAVENOUS ONCE
Status: COMPLETED | OUTPATIENT
Start: 2023-01-01 | End: 2023-01-01

## 2023-01-01 RX ORDER — METHYLPREDNISOLONE SODIUM SUCCINATE 125 MG/2ML
125 INJECTION, POWDER, LYOPHILIZED, FOR SOLUTION INTRAMUSCULAR; INTRAVENOUS ONCE
Status: COMPLETED | OUTPATIENT
Start: 2023-01-01 | End: 2023-01-01

## 2023-01-01 RX ORDER — 0.9 % SODIUM CHLORIDE 0.9 %
1000 INTRAVENOUS SOLUTION INTRAVENOUS ONCE
Status: COMPLETED | OUTPATIENT
Start: 2023-01-01 | End: 2023-01-01

## 2023-01-01 RX ADMIN — DIPHENHYDRAMINE HCL 5 ML: 12.5 LIQUID ORAL at 08:59

## 2023-01-01 RX ADMIN — CAPSAICIN: 0.25 CREAM TOPICAL at 10:55

## 2023-01-01 RX ADMIN — EPINEPHRINE 1 MG: 0.1 INJECTION, SOLUTION ENDOTRACHEAL; INTRACARDIAC; INTRAVENOUS at 03:26

## 2023-01-01 RX ADMIN — PANTOPRAZOLE SODIUM 40 MG: 40 INJECTION, POWDER, FOR SOLUTION INTRAVENOUS at 08:29

## 2023-01-01 RX ADMIN — POTASSIUM CHLORIDE 20 MEQ: 1500 TABLET, EXTENDED RELEASE ORAL at 09:03

## 2023-01-01 RX ADMIN — ERYTHROMYCIN: 5 OINTMENT OPHTHALMIC at 16:22

## 2023-01-01 RX ADMIN — ALBUMIN (HUMAN) 25 G: 0.25 INJECTION, SOLUTION INTRAVENOUS at 07:49

## 2023-01-01 RX ADMIN — Medication: at 21:35

## 2023-01-01 RX ADMIN — SODIUM CHLORIDE 500 ML: 9 INJECTION, SOLUTION INTRAVENOUS at 11:28

## 2023-01-01 RX ADMIN — OXYBUTYNIN CHLORIDE 15 MG: 5 TABLET, EXTENDED RELEASE ORAL at 08:32

## 2023-01-01 RX ADMIN — ERYTHROMYCIN: 5 OINTMENT OPHTHALMIC at 14:43

## 2023-01-01 RX ADMIN — CAPSAICIN: 0.25 CREAM TOPICAL at 21:54

## 2023-01-01 RX ADMIN — ATORVASTATIN CALCIUM 10 MG: 10 TABLET, FILM COATED ORAL at 09:03

## 2023-01-01 RX ADMIN — LEVOTHYROXINE SODIUM 125 MCG: 0.12 TABLET ORAL at 07:11

## 2023-01-01 RX ADMIN — PREDNISONE 10 MG: 10 TABLET ORAL at 08:30

## 2023-01-01 RX ADMIN — PANTOPRAZOLE SODIUM 40 MG: 40 TABLET, DELAYED RELEASE ORAL at 06:45

## 2023-01-01 RX ADMIN — FUROSEMIDE 10 MG/HR: 10 INJECTION, SOLUTION INTRAMUSCULAR; INTRAVENOUS at 12:02

## 2023-01-01 RX ADMIN — ALBUMIN (HUMAN) 25 G: 0.25 INJECTION, SOLUTION INTRAVENOUS at 10:56

## 2023-01-01 RX ADMIN — FUROSEMIDE 40 MG: 10 INJECTION, SOLUTION INTRAMUSCULAR; INTRAVENOUS at 10:46

## 2023-01-01 RX ADMIN — ATORVASTATIN CALCIUM 10 MG: 10 TABLET, FILM COATED ORAL at 07:31

## 2023-01-01 RX ADMIN — EPINEPHRINE 1 MG: 0.1 INJECTION, SOLUTION ENDOTRACHEAL; INTRACARDIAC; INTRAVENOUS at 03:29

## 2023-01-01 RX ADMIN — MEGESTROL ACETATE 200 MG: 40 SUSPENSION ORAL at 08:31

## 2023-01-01 RX ADMIN — MUPIROCIN: 20 OINTMENT TOPICAL at 10:57

## 2023-01-01 RX ADMIN — ERYTHROMYCIN: 5 OINTMENT OPHTHALMIC at 10:54

## 2023-01-01 RX ADMIN — Medication 1 TABLET: at 07:31

## 2023-01-01 RX ADMIN — MEGESTROL ACETATE 200 MG: 40 SUSPENSION ORAL at 10:46

## 2023-01-01 RX ADMIN — SODIUM BICARBONATE 50 MEQ: 84 INJECTION, SOLUTION INTRAVENOUS at 03:18

## 2023-01-01 RX ADMIN — FUROSEMIDE 40 MG: 10 INJECTION, SOLUTION INTRAMUSCULAR; INTRAVENOUS at 22:16

## 2023-01-01 RX ADMIN — NYSTATIN 500000 UNITS: 100000 SUSPENSION ORAL at 07:31

## 2023-01-01 RX ADMIN — SODIUM CHLORIDE: 9 INJECTION, SOLUTION INTRAVENOUS at 08:00

## 2023-01-01 RX ADMIN — EPINEPHRINE 1 MG: 0.1 INJECTION, SOLUTION ENDOTRACHEAL; INTRACARDIAC; INTRAVENOUS at 03:34

## 2023-01-01 RX ADMIN — Medication 10 ML: at 10:58

## 2023-01-01 RX ADMIN — Medication 5 MG: at 23:00

## 2023-01-01 RX ADMIN — FUROSEMIDE 40 MG: 10 INJECTION, SOLUTION INTRAMUSCULAR; INTRAVENOUS at 09:03

## 2023-01-01 RX ADMIN — DIPHENHYDRAMINE HYDROCHLORIDE 25 MG: 50 INJECTION, SOLUTION INTRAMUSCULAR; INTRAVENOUS at 23:06

## 2023-01-01 RX ADMIN — TAMSULOSIN HYDROCHLORIDE 0.4 MG: 0.4 CAPSULE ORAL at 07:31

## 2023-01-01 RX ADMIN — ERYTHROMYCIN: 5 OINTMENT OPHTHALMIC at 21:35

## 2023-01-01 RX ADMIN — MIRTAZAPINE 30 MG: 30 TABLET, FILM COATED ORAL at 22:16

## 2023-01-01 RX ADMIN — CAPSAICIN: 0.25 CREAM TOPICAL at 17:16

## 2023-01-01 RX ADMIN — Medication 8 MCG/MIN: at 18:58

## 2023-01-01 RX ADMIN — Medication 1 TABLET: at 08:30

## 2023-01-01 RX ADMIN — Medication 400 MG: at 08:32

## 2023-01-01 RX ADMIN — Medication: at 21:53

## 2023-01-01 RX ADMIN — SODIUM CHLORIDE: 4.5 INJECTION, SOLUTION INTRAVENOUS at 01:01

## 2023-01-01 RX ADMIN — MUPIROCIN: 20 OINTMENT TOPICAL at 08:38

## 2023-01-01 RX ADMIN — HEPARIN SODIUM 2600 UNITS: 1000 INJECTION INTRAVENOUS; SUBCUTANEOUS at 15:10

## 2023-01-01 RX ADMIN — MEGESTROL ACETATE 200 MG: 40 SUSPENSION ORAL at 09:10

## 2023-01-01 RX ADMIN — NYSTATIN 500000 UNITS: 100000 SUSPENSION ORAL at 21:56

## 2023-01-01 RX ADMIN — ERYTHROMYCIN: 5 OINTMENT OPHTHALMIC at 15:58

## 2023-01-01 RX ADMIN — MIRTAZAPINE 15 MG: 15 TABLET, FILM COATED ORAL at 22:16

## 2023-01-01 RX ADMIN — ACETAMINOPHEN 650 MG: 325 TABLET ORAL at 16:55

## 2023-01-01 RX ADMIN — ENOXAPARIN SODIUM 30 MG: 100 INJECTION SUBCUTANEOUS at 08:29

## 2023-01-01 RX ADMIN — NYSTATIN 500000 UNITS: 100000 SUSPENSION ORAL at 21:54

## 2023-01-01 RX ADMIN — MEGESTROL ACETATE 200 MG: 40 SUSPENSION ORAL at 08:38

## 2023-01-01 RX ADMIN — PERFLUTREN 1.5 ML: 6.52 INJECTION, SUSPENSION INTRAVENOUS at 10:45

## 2023-01-01 RX ADMIN — SODIUM CHLORIDE 500 ML: 9 INJECTION, SOLUTION INTRAVENOUS at 13:12

## 2023-01-01 RX ADMIN — MUPIROCIN: 20 OINTMENT TOPICAL at 08:59

## 2023-01-01 RX ADMIN — LEVOTHYROXINE SODIUM 125 MCG: 0.12 TABLET ORAL at 06:45

## 2023-01-01 RX ADMIN — MORPHINE SULFATE 1 MG: 2 INJECTION, SOLUTION INTRAMUSCULAR; INTRAVENOUS at 22:16

## 2023-01-01 RX ADMIN — ERYTHROMYCIN: 5 OINTMENT OPHTHALMIC at 22:16

## 2023-01-01 RX ADMIN — ERYTHROMYCIN: 5 OINTMENT OPHTHALMIC at 08:41

## 2023-01-01 RX ADMIN — ERYTHROMYCIN: 5 OINTMENT OPHTHALMIC at 21:55

## 2023-01-01 RX ADMIN — SODIUM CHLORIDE 1000 ML: 9 INJECTION, SOLUTION INTRAVENOUS at 20:21

## 2023-01-01 RX ADMIN — NYSTATIN 500000 UNITS: 100000 SUSPENSION ORAL at 08:38

## 2023-01-01 RX ADMIN — DEXMEDETOMIDINE 0.2 MCG/KG/HR: 100 INJECTION, SOLUTION INTRAVENOUS at 20:37

## 2023-01-01 RX ADMIN — Medication 10 ML: at 20:49

## 2023-01-01 RX ADMIN — MUPIROCIN: 20 OINTMENT TOPICAL at 08:44

## 2023-01-01 RX ADMIN — FUROSEMIDE 40 MG: 10 INJECTION, SOLUTION INTRAMUSCULAR; INTRAVENOUS at 08:30

## 2023-01-01 RX ADMIN — ENOXAPARIN SODIUM 30 MG: 100 INJECTION SUBCUTANEOUS at 08:43

## 2023-01-01 RX ADMIN — Medication 10 ML: at 21:56

## 2023-01-01 RX ADMIN — FUROSEMIDE 40 MG: 10 INJECTION, SOLUTION INTRAMUSCULAR; INTRAVENOUS at 18:53

## 2023-01-01 RX ADMIN — CAPSAICIN: 0.25 CREAM TOPICAL at 08:37

## 2023-01-01 RX ADMIN — SODIUM BICARBONATE 50 MEQ: 84 INJECTION, SOLUTION INTRAVENOUS at 03:08

## 2023-01-01 RX ADMIN — FUROSEMIDE 10 MG/HR: 10 INJECTION, SOLUTION INTRAMUSCULAR; INTRAVENOUS at 23:01

## 2023-01-01 RX ADMIN — EPINEPHRINE 1 MG: 0.1 INJECTION, SOLUTION ENDOTRACHEAL; INTRACARDIAC; INTRAVENOUS at 03:36

## 2023-01-01 RX ADMIN — Medication 400 MG: at 08:46

## 2023-01-01 RX ADMIN — POTASSIUM CHLORIDE 20 MEQ: 1500 TABLET, EXTENDED RELEASE ORAL at 08:30

## 2023-01-01 RX ADMIN — ERYTHROMYCIN: 5 OINTMENT OPHTHALMIC at 17:16

## 2023-01-01 RX ADMIN — Medication: at 20:50

## 2023-01-01 RX ADMIN — OXYBUTYNIN CHLORIDE 15 MG: 5 TABLET, EXTENDED RELEASE ORAL at 08:30

## 2023-01-01 RX ADMIN — Medication 10 MG: at 22:16

## 2023-01-01 RX ADMIN — PANTOPRAZOLE SODIUM 40 MG: 40 INJECTION, POWDER, FOR SOLUTION INTRAVENOUS at 08:43

## 2023-01-01 RX ADMIN — Medication 10 ML: at 22:16

## 2023-01-01 RX ADMIN — ERYTHROMYCIN: 5 OINTMENT OPHTHALMIC at 20:52

## 2023-01-01 RX ADMIN — Medication 1 TABLET: at 09:03

## 2023-01-01 RX ADMIN — ERYTHROMYCIN: 5 OINTMENT OPHTHALMIC at 08:42

## 2023-01-01 RX ADMIN — Medication 1 TABLET: at 08:32

## 2023-01-01 RX ADMIN — FUROSEMIDE 10 MG/HR: 10 INJECTION, SOLUTION INTRAMUSCULAR; INTRAVENOUS at 12:32

## 2023-01-01 RX ADMIN — ERYTHROMYCIN: 5 OINTMENT OPHTHALMIC at 13:18

## 2023-01-01 RX ADMIN — FUROSEMIDE 10 MG/HR: 10 INJECTION, SOLUTION INTRAMUSCULAR; INTRAVENOUS at 11:54

## 2023-01-01 RX ADMIN — DIPHENHYDRAMINE HCL 5 ML: 12.5 LIQUID ORAL at 22:00

## 2023-01-01 RX ADMIN — PREDNISONE 5 MG: 5 TABLET ORAL at 09:10

## 2023-01-01 RX ADMIN — IRON SUCROSE 100 MG: 20 INJECTION, SOLUTION INTRAVENOUS at 10:48

## 2023-01-01 RX ADMIN — SODIUM CHLORIDE 250 ML: 9 INJECTION, SOLUTION INTRAVENOUS at 14:21

## 2023-01-01 RX ADMIN — CAPSAICIN: 0.25 CREAM TOPICAL at 21:35

## 2023-01-01 RX ADMIN — LIDOCAINE HYDROCHLORIDE 10 ML: 20 INJECTION, SOLUTION INFILTRATION; PERINEURAL at 15:01

## 2023-01-01 RX ADMIN — Medication: at 08:46

## 2023-01-01 RX ADMIN — ONDANSETRON 4 MG: 2 INJECTION INTRAMUSCULAR; INTRAVENOUS at 16:32

## 2023-01-01 RX ADMIN — CAPSAICIN: 0.25 CREAM TOPICAL at 22:16

## 2023-01-01 RX ADMIN — EPINEPHRINE 1 MG: 0.1 INJECTION, SOLUTION ENDOTRACHEAL; INTRACARDIAC; INTRAVENOUS at 03:14

## 2023-01-01 RX ADMIN — POTASSIUM CHLORIDE 20 MEQ: 1500 TABLET, EXTENDED RELEASE ORAL at 10:52

## 2023-01-01 RX ADMIN — PREDNISONE 10 MG: 10 TABLET ORAL at 07:31

## 2023-01-01 RX ADMIN — Medication: at 10:56

## 2023-01-01 RX ADMIN — Medication: at 21:55

## 2023-01-01 RX ADMIN — EPINEPHRINE 1 MG: 0.1 INJECTION, SOLUTION ENDOTRACHEAL; INTRACARDIAC; INTRAVENOUS at 03:17

## 2023-01-01 RX ADMIN — Medication 10 MG: at 21:35

## 2023-01-01 RX ADMIN — METOCLOPRAMIDE 10 MG: 5 INJECTION, SOLUTION INTRAMUSCULAR; INTRAVENOUS at 10:46

## 2023-01-01 RX ADMIN — CAPSAICIN: 0.25 CREAM TOPICAL at 13:18

## 2023-01-01 RX ADMIN — OXYBUTYNIN CHLORIDE 15 MG: 5 TABLET, EXTENDED RELEASE ORAL at 07:31

## 2023-01-01 RX ADMIN — DIPHENHYDRAMINE HCL 5 ML: 12.5 LIQUID ORAL at 21:55

## 2023-01-01 RX ADMIN — ATORVASTATIN CALCIUM 10 MG: 10 TABLET, FILM COATED ORAL at 08:30

## 2023-01-01 RX ADMIN — METOPROLOL TARTRATE 50 MG: 50 TABLET ORAL at 07:31

## 2023-01-01 RX ADMIN — ACETAMINOPHEN 650 MG: 325 TABLET ORAL at 04:06

## 2023-01-01 RX ADMIN — EPINEPHRINE 1 MG: 0.1 INJECTION, SOLUTION ENDOTRACHEAL; INTRACARDIAC; INTRAVENOUS at 03:31

## 2023-01-01 RX ADMIN — PANTOPRAZOLE SODIUM 40 MG: 40 INJECTION, POWDER, FOR SOLUTION INTRAVENOUS at 11:35

## 2023-01-01 RX ADMIN — CAPSAICIN: 0.25 CREAM TOPICAL at 21:55

## 2023-01-01 RX ADMIN — Medication: at 09:12

## 2023-01-01 RX ADMIN — ONDANSETRON 4 MG: 2 INJECTION INTRAMUSCULAR; INTRAVENOUS at 20:15

## 2023-01-01 RX ADMIN — MIRTAZAPINE 30 MG: 30 TABLET, FILM COATED ORAL at 21:35

## 2023-01-01 RX ADMIN — Medication 400 MG: at 09:03

## 2023-01-01 RX ADMIN — Medication 250 ML: at 14:21

## 2023-01-01 RX ADMIN — CAPSAICIN: 0.25 CREAM TOPICAL at 16:23

## 2023-01-01 RX ADMIN — EPINEPHRINE 1 MG: 0.1 INJECTION, SOLUTION ENDOTRACHEAL; INTRACARDIAC; INTRAVENOUS at 03:12

## 2023-01-01 RX ADMIN — FUROSEMIDE 40 MG: 10 INJECTION, SOLUTION INTRAMUSCULAR; INTRAVENOUS at 08:32

## 2023-01-01 RX ADMIN — ENOXAPARIN SODIUM 30 MG: 100 INJECTION SUBCUTANEOUS at 07:31

## 2023-01-01 RX ADMIN — ETOMIDATE 20 MG: 2 INJECTION, SOLUTION INTRAVENOUS at 02:32

## 2023-01-01 RX ADMIN — EPINEPHRINE 1 MG: 0.1 INJECTION, SOLUTION ENDOTRACHEAL; INTRACARDIAC; INTRAVENOUS at 03:07

## 2023-01-01 RX ADMIN — ENOXAPARIN SODIUM 30 MG: 100 INJECTION SUBCUTANEOUS at 11:35

## 2023-01-01 RX ADMIN — SODIUM CHLORIDE: 4.5 INJECTION, SOLUTION INTRAVENOUS at 21:48

## 2023-01-01 RX ADMIN — CAPSAICIN: 0.25 CREAM TOPICAL at 08:58

## 2023-01-01 RX ADMIN — PREDNISONE 5 MG: 5 TABLET ORAL at 08:32

## 2023-01-01 RX ADMIN — Medication 10 ML: at 08:59

## 2023-01-01 RX ADMIN — CAPSAICIN: 0.25 CREAM TOPICAL at 09:11

## 2023-01-01 RX ADMIN — NYSTATIN 500000 UNITS: 100000 SUSPENSION ORAL at 08:43

## 2023-01-01 RX ADMIN — OXYBUTYNIN CHLORIDE 15 MG: 5 TABLET, EXTENDED RELEASE ORAL at 09:03

## 2023-01-01 RX ADMIN — FUROSEMIDE 40 MG: 10 INJECTION, SOLUTION INTRAMUSCULAR; INTRAVENOUS at 18:59

## 2023-01-01 RX ADMIN — NYSTATIN 500000 UNITS: 100000 SUSPENSION ORAL at 08:31

## 2023-01-01 RX ADMIN — ERYTHROMYCIN: 5 OINTMENT OPHTHALMIC at 08:58

## 2023-01-01 RX ADMIN — ATORVASTATIN CALCIUM 10 MG: 10 TABLET, FILM COATED ORAL at 08:32

## 2023-01-01 RX ADMIN — PANTOPRAZOLE SODIUM 40 MG: 40 INJECTION, POWDER, FOR SOLUTION INTRAVENOUS at 08:31

## 2023-01-01 RX ADMIN — Medication: at 08:57

## 2023-01-01 RX ADMIN — LEVOTHYROXINE SODIUM 125 MCG: 0.12 TABLET ORAL at 06:33

## 2023-01-01 RX ADMIN — Medication 10 ML: at 09:12

## 2023-01-01 RX ADMIN — ERYTHROMYCIN: 5 OINTMENT OPHTHALMIC at 11:21

## 2023-01-01 RX ADMIN — SODIUM CHLORIDE: 4.5 INJECTION, SOLUTION INTRAVENOUS at 13:16

## 2023-01-01 RX ADMIN — MEGESTROL ACETATE 200 MG: 40 SUSPENSION ORAL at 08:51

## 2023-01-01 RX ADMIN — NYSTATIN 500000 UNITS: 100000 SUSPENSION ORAL at 18:59

## 2023-01-01 RX ADMIN — ENOXAPARIN SODIUM 30 MG: 100 INJECTION SUBCUTANEOUS at 22:17

## 2023-01-01 RX ADMIN — SODIUM CHLORIDE 500 ML: 9 INJECTION, SOLUTION INTRAVENOUS at 14:46

## 2023-01-01 RX ADMIN — METOLAZONE 10 MG: 5 TABLET ORAL at 16:18

## 2023-01-01 RX ADMIN — EPINEPHRINE 1 MG: 0.1 INJECTION, SOLUTION ENDOTRACHEAL; INTRACARDIAC; INTRAVENOUS at 02:44

## 2023-01-01 RX ADMIN — PANTOPRAZOLE SODIUM 40 MG: 40 INJECTION, POWDER, FOR SOLUTION INTRAVENOUS at 09:09

## 2023-01-01 RX ADMIN — Medication 10 ML: at 08:43

## 2023-01-01 RX ADMIN — CAPSAICIN: 0.25 CREAM TOPICAL at 08:42

## 2023-01-01 RX ADMIN — ERYTHROMYCIN: 5 OINTMENT OPHTHALMIC at 09:11

## 2023-01-01 RX ADMIN — SODIUM BICARBONATE 50 MEQ: 84 INJECTION, SOLUTION INTRAVENOUS at 02:45

## 2023-01-01 RX ADMIN — EPINEPHRINE 1 MG: 0.1 INJECTION, SOLUTION ENDOTRACHEAL; INTRACARDIAC; INTRAVENOUS at 03:21

## 2023-01-01 RX ADMIN — FUROSEMIDE 10 MG/HR: 10 INJECTION, SOLUTION INTRAMUSCULAR; INTRAVENOUS at 04:15

## 2023-01-01 RX ADMIN — NYSTATIN 500000 UNITS: 100000 SUSPENSION ORAL at 13:20

## 2023-01-01 RX ADMIN — EPINEPHRINE 1 MG: 0.1 INJECTION, SOLUTION ENDOTRACHEAL; INTRACARDIAC; INTRAVENOUS at 03:19

## 2023-01-01 RX ADMIN — METHYLPREDNISOLONE SODIUM SUCCINATE 125 MG: 125 INJECTION, POWDER, FOR SOLUTION INTRAMUSCULAR; INTRAVENOUS at 23:06

## 2023-01-01 RX ADMIN — EPINEPHRINE 1 MG: 0.1 INJECTION, SOLUTION ENDOTRACHEAL; INTRACARDIAC; INTRAVENOUS at 03:23

## 2023-01-01 RX ADMIN — CAPSAICIN: 0.25 CREAM TOPICAL at 11:21

## 2023-01-01 RX ADMIN — FENTANYL CITRATE 50 MCG: 0.05 INJECTION, SOLUTION INTRAMUSCULAR; INTRAVENOUS at 20:15

## 2023-01-01 RX ADMIN — Medication: at 22:16

## 2023-01-01 RX ADMIN — ACETAMINOPHEN 650 MG: 325 TABLET ORAL at 02:39

## 2023-01-01 ASSESSMENT — PAIN DESCRIPTION - DESCRIPTORS
DESCRIPTORS: ACHING;DISCOMFORT
DESCRIPTORS: ACHING
DESCRIPTORS: DISCOMFORT

## 2023-01-01 ASSESSMENT — ENCOUNTER SYMPTOMS
PHOTOPHOBIA: 0
SHORTNESS OF BREATH: 0
VOMITING: 0
SHORTNESS OF BREATH: 1
EYES NEGATIVE: 1
CONSTIPATION: 0
DIARRHEA: 0
COUGH: 0
SORE THROAT: 0
PHOTOPHOBIA: 0
WHEEZING: 0
RHINORRHEA: 0
ABDOMINAL PAIN: 1
COUGH: 0
BACK PAIN: 0
ABDOMINAL DISTENTION: 0
CONSTIPATION: 0
SHORTNESS OF BREATH: 0
BACK PAIN: 1
ABDOMINAL PAIN: 1
VOMITING: 0
COUGH: 0
COUGH: 0
DIARRHEA: 0
NAUSEA: 0
SHORTNESS OF BREATH: 0
PHOTOPHOBIA: 0
ALLERGIC/IMMUNOLOGIC NEGATIVE: 1
COUGH: 0
EYES NEGATIVE: 1
SORE THROAT: 0
WHEEZING: 0
DIARRHEA: 0
CHEST TIGHTNESS: 0
DIARRHEA: 0
EYE DISCHARGE: 0
TROUBLE SWALLOWING: 1
NAUSEA: 0
NAUSEA: 0
VOMITING: 0
SINUS PRESSURE: 0
NAUSEA: 0
ABDOMINAL PAIN: 1
BACK PAIN: 0
WHEEZING: 0
SHORTNESS OF BREATH: 1
VOMITING: 0
ABDOMINAL DISTENTION: 0
SHORTNESS OF BREATH: 1
NAUSEA: 1
EYE DISCHARGE: 0
VOMITING: 0

## 2023-01-01 ASSESSMENT — PAIN SCALES - GENERAL
PAINLEVEL_OUTOF10: 3
PAINLEVEL_OUTOF10: 10
PAINLEVEL_OUTOF10: 5
PAINLEVEL_OUTOF10: 9
PAINLEVEL_OUTOF10: 6
PAINLEVEL_OUTOF10: 10
PAINLEVEL_OUTOF10: 6
PAINLEVEL_OUTOF10: 4
PAINLEVEL_OUTOF10: 0

## 2023-01-01 ASSESSMENT — PAIN DESCRIPTION - LOCATION
LOCATION: ARM;BACK
LOCATION: GENERALIZED
LOCATION: ARM;GENERALIZED
LOCATION: BACK
LOCATION: BACK

## 2023-01-01 ASSESSMENT — VISUAL ACUITY: OU: 1

## 2023-01-01 ASSESSMENT — PAIN DESCRIPTION - ORIENTATION
ORIENTATION: RIGHT;LEFT
ORIENTATION: RIGHT;LEFT

## 2023-01-01 ASSESSMENT — PATIENT HEALTH QUESTIONNAIRE - PHQ9: SUM OF ALL RESPONSES TO PHQ QUESTIONS 1-9: 0

## 2023-01-01 ASSESSMENT — LIFESTYLE VARIABLES
HOW OFTEN DO YOU HAVE A DRINK CONTAINING ALCOHOL: NEVER
HOW MANY STANDARD DRINKS CONTAINING ALCOHOL DO YOU HAVE ON A TYPICAL DAY: PATIENT DOES NOT DRINK
HOW OFTEN DO YOU HAVE A DRINK CONTAINING ALCOHOL: NEVER

## 2023-01-01 ASSESSMENT — PAIN - FUNCTIONAL ASSESSMENT
PAIN_FUNCTIONAL_ASSESSMENT: 0-10
PAIN_FUNCTIONAL_ASSESSMENT: PREVENTS OR INTERFERES SOME ACTIVE ACTIVITIES AND ADLS

## 2023-01-01 ASSESSMENT — PULMONARY FUNCTION TESTS
PIF_VALUE: 35
PIF_VALUE: 33

## 2023-01-01 ASSESSMENT — PAIN DESCRIPTION - FREQUENCY: FREQUENCY: INTERMITTENT

## 2023-01-01 ASSESSMENT — PAIN DESCRIPTION - PAIN TYPE: TYPE: CHRONIC PAIN

## 2023-01-11 ENCOUNTER — HOSPITAL ENCOUNTER (OUTPATIENT)
Dept: WOUND CARE | Age: 73
Discharge: HOME | End: 2023-01-11
Payer: COMMERCIAL

## 2023-01-11 VITALS
RESPIRATION RATE: 18 BRPM | SYSTOLIC BLOOD PRESSURE: 142 MMHG | HEART RATE: 72 BPM | TEMPERATURE: 97 F | DIASTOLIC BLOOD PRESSURE: 42 MMHG

## 2023-01-11 DIAGNOSIS — S31.819A OPEN WOUND OF RIGHT BUTTOCK, INITIAL ENCOUNTER: ICD-10-CM

## 2023-01-11 DIAGNOSIS — S51.812S SKIN TEAR OF LEFT FOREARM WITHOUT COMPLICATION, SEQUELA: ICD-10-CM

## 2023-01-11 DIAGNOSIS — Z74.09 DECREASED STRENGTH, ENDURANCE, AND MOBILITY: ICD-10-CM

## 2023-01-11 DIAGNOSIS — R68.89 DECREASED STRENGTH, ENDURANCE, AND MOBILITY: ICD-10-CM

## 2023-01-11 DIAGNOSIS — I83.029 VENOUS ULCER OF LEFT LEG (HCC): ICD-10-CM

## 2023-01-11 DIAGNOSIS — R53.1 DECREASED STRENGTH, ENDURANCE, AND MOBILITY: ICD-10-CM

## 2023-01-11 DIAGNOSIS — S60.512A HAND ABRASION, LEFT, INITIAL ENCOUNTER: Primary | ICD-10-CM

## 2023-01-11 DIAGNOSIS — L97.929 VENOUS ULCER OF LEFT LEG (HCC): ICD-10-CM

## 2023-01-11 DIAGNOSIS — L97.919 VENOUS ULCER OF RIGHT LOWER EXTREMITY WITHOUT VARICOSE VEINS (HCC): ICD-10-CM

## 2023-01-11 DIAGNOSIS — I87.2 VENOUS ULCER OF RIGHT LOWER EXTREMITY WITHOUT VARICOSE VEINS (HCC): ICD-10-CM

## 2023-01-11 PROBLEM — S51.812A SKIN TEAR OF LEFT FOREARM WITHOUT COMPLICATION: Status: ACTIVE | Noted: 2023-01-11

## 2023-01-11 PROCEDURE — 99213 OFFICE O/P EST LOW 20 MIN: CPT

## 2023-01-11 RX ORDER — LIDOCAINE 40 MG/G
CREAM TOPICAL ONCE
OUTPATIENT
Start: 2023-01-11 | End: 2023-01-11

## 2023-01-11 RX ORDER — GINSENG 100 MG
CAPSULE ORAL ONCE
OUTPATIENT
Start: 2023-01-11 | End: 2023-01-11

## 2023-01-11 RX ORDER — LIDOCAINE HYDROCHLORIDE 40 MG/ML
SOLUTION TOPICAL ONCE
OUTPATIENT
Start: 2023-01-11 | End: 2023-01-11

## 2023-01-11 RX ORDER — BACITRACIN ZINC AND POLYMYXIN B SULFATE 500; 1000 [USP'U]/G; [USP'U]/G
OINTMENT TOPICAL ONCE
OUTPATIENT
Start: 2023-01-11 | End: 2023-01-11

## 2023-01-11 RX ORDER — BETAMETHASONE DIPROPIONATE 0.05 %
OINTMENT (GRAM) TOPICAL ONCE
OUTPATIENT
Start: 2023-01-11 | End: 2023-01-11

## 2023-01-11 RX ORDER — LIDOCAINE 50 MG/G
OINTMENT TOPICAL ONCE
OUTPATIENT
Start: 2023-01-11 | End: 2023-01-11

## 2023-01-11 RX ORDER — BACITRACIN, NEOMYCIN, POLYMYXIN B 400; 3.5; 5 [USP'U]/G; MG/G; [USP'U]/G
OINTMENT TOPICAL ONCE
OUTPATIENT
Start: 2023-01-11 | End: 2023-01-11

## 2023-01-11 RX ORDER — GENTAMICIN SULFATE 1 MG/G
OINTMENT TOPICAL ONCE
OUTPATIENT
Start: 2023-01-11 | End: 2023-01-11

## 2023-01-11 RX ORDER — LIDOCAINE HYDROCHLORIDE 20 MG/ML
JELLY TOPICAL ONCE
OUTPATIENT
Start: 2023-01-11 | End: 2023-01-11

## 2023-01-11 RX ORDER — CLOBETASOL PROPIONATE 0.5 MG/G
OINTMENT TOPICAL ONCE
OUTPATIENT
Start: 2023-01-11 | End: 2023-01-11

## 2023-01-11 ASSESSMENT — PAIN DESCRIPTION - ORIENTATION: ORIENTATION: RIGHT;LEFT

## 2023-01-11 ASSESSMENT — PAIN SCALES - GENERAL: PAINLEVEL_OUTOF10: 5

## 2023-01-11 ASSESSMENT — PAIN DESCRIPTION - LOCATION: LOCATION: LEG

## 2023-01-11 NOTE — PLAN OF CARE
Problem: Chronic Conditions and Co-morbidities  Goal: Patient's chronic conditions and co-morbidity symptoms are monitored and maintained or improved  Outcome: Progressing     Problem: Pain  Goal: Verbalizes/displays adequate comfort level or baseline comfort level  Outcome: Progressing     Problem: Wound:  Goal: Will show signs of wound healing; wound closure and no evidence of infection  Outcome: Progressing

## 2023-01-11 NOTE — PROGRESS NOTES
[facility] [address]   Progress Note and Procedure Note      Meliza Villareal  MEDICAL RECORD NUMBER:  [id number]  AGE: 67 y.o. GENDER: female  : 1950  EPISODE DATE:  2023    Subjective:     Chief Complaint   Patient presents with    Wound Check     Bilateral leg wounds, right buttock wound         HISTORY of PRESENT ILLNESS HPI     Meliza Villareal is a 67 y.o. female who presents today for wound/ulcer evaluation. History of Wound Context: Patient states that she is feeling better. The wound on her bottom has healed. Her lower legs are less swollen and wounds are getting smaller. She still has some discomfort in her right lower leg wound. She says it burns at times. She fell out of her chair last week and caused wound on left forearm. They are using Xeroform gauze and it is healing well. No fever or chills. Patient has followed up with her PCP this past week and plans to see rheumatology for further evaluation.    Wound/Ulcer Pain Timing/Severity: intermittent  Quality of pain: burning, tender  Severity:  3  10   Modifying Factors: Pain worsens with touch  Associated Signs/Symptoms: edema, drainage, and pain    Ulcer Identification:  Ulcer Type: venous, diabetic, pressure, traumatic, and skin tear  Contributing Factors: edema, venous stasis, diabetes, chronic pressure, decreased mobility, shear force, and obesity    Wound: N/A        PAST MEDICAL HISTORY        Diagnosis Date    Anxiety 2019    Arthritis     Bronchitis     Diabetes mellitus ([address]     Diabetic neuropathy associated with diabetes mellitus due to underlying condition ([address] 11/3/2017    Essential hypertension 2019    Hypercholesterolemia     Hypertension     Morbid obesity due to excess calories ([address] 2019    Thyroid disease     Wound infection 2018    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 surgeries x3    TONSILLECTOMY N/A        FAMILY HISTORY    Family History   Problem Relation Age of Onset    Cancer Father        SOCIAL HISTORY    Social History     Tobacco Use    Smoking status: Never    Smokeless tobacco: Never   Vaping Use    Vaping Use: Never used   Substance Use Topics    Alcohol use: No    Drug use: No       ALLERGIES    Allergies   Allergen Reactions    Nitrofurantoin Other (See Comments)     Leg swelling     Tizanidine      Other reaction(s): Mental Status Change    Gabapentin Anxiety    Sulfa Antibiotics Rash       MEDICATIONS    Current Outpatient Medications on File Prior to Encounter   Medication Sig Dispense Refill    ammonium lactate (LAC-HYDRIN) 12 % cream Apply topically as needed. 385 g 1    mupirocin (BACTROBAN) 2 % ointment Apply topically with daily dressing change 15 g 0    predniSONE (DELTASONE) 20 MG tablet Take 10 mg by mouth daily      mupirocin (BACTROBAN) 2 % ointment Apply topically 2 times daily. 15 g 1    Melatonin 10 MG TABS Take by mouth daily At bedtime      acetaminophen (TYLENOL) 500 MG tablet Take by mouth every 6 hours as needed for Pain      Bismuth Tribromoph-Petrolatum (XEROFORM PETROLATUM GAUZE 5\"X9\") MISC external pads Apply 1 each topically daily Apply to affected area as directed daily, after mupirocin ointment. 12 each 1    ammonium lactate (LAC-HYDRIN) 12 % lotion Apply topically daily to both lower legs, after washing with mild soap/water. Apply while still moist. 225 g 1    ammonium lactate (LAC-HYDRIN) 12 % lotion Apply topically daily after cleansing legs-with mild soap and water. Apply to intact areas of skin only. 225 g 1    oxyCODONE HCl (OXY-IR) 10 MG immediate release tablet Take 10 mg by mouth every 6 hours as needed for Pain.       Capsaicin (ZOSTRIX) 0.035 % CREA cream Apply topically 3 times daily      furosemide (LASIX) 40 MG tablet Take 40 mg by mouth 2 times daily 1.5 tablet by mouth as directed; 1 tablet in AM and 1/2 tablet at NOON      oxybutynin (DITROPAN-XL) 5 MG extended release tablet Take 5 mg by mouth daily      traZODone (DESYREL) 150 MG tablet Take 150 mg by mouth nightly      metoprolol (LOPRESSOR) 100 MG tablet Take 100 mg by mouth daily      atorvastatin (LIPITOR) 10 MG tablet Take 10 mg by mouth daily      polyethylene glycol (GLYCOLAX) 17 g packet Take 17 g by mouth every other day      Cholecalciferol (VITAMIN D3) 50 MCG (2000 UT) CAPS Take by mouth daily      Menthol-Methyl Salicylate (MUSCLE RUB) 10-15 % CREA cream Apply topically 2 times daily 1 application apply on the skin twice a day; arms and shoulders      coenzyme Q10 100 MG CAPS capsule Take 1 capsule by mouth 2 times daily (before meals) 60 capsule 1    Lactobacillus (PROBIOTIC ACIDOPHILUS) TABS Take 1 tablet by mouth daily 60 tablet 0    levothyroxine (SYNTHROID) 125 MCG tablet Take 125 mcg by mouth Daily      metFORMIN (GLUCOPHAGE) 1000 MG tablet Take 500 mg by mouth 2 times daily (with meals)        No current facility-administered medications on file prior to encounter. REVIEW OF SYSTEMS    Pertinent items are noted in HPI. Objective:      BP (!) 142/42   Pulse 72   Temp 97 °F (36.1 °C) (Temporal)   Resp 18   LMP  (LMP Unknown)     Wt Readings from Last 3 Encounters:   09/24/20 240 lb (108.9 kg)   05/09/20 236 lb (107 kg)   04/04/19 275 lb (124.7 kg)       PHYSICAL EXAM    Constitutional:   Well nourished and well developed. Appears neat and clean. Patient is alert, oriented x3, and in no apparent distress. Respiratory:  Respiratory effort is easy and symmetric bilaterally. Rate is normal at rest and on room air. Vascular:  Pedal Pulses is palpable and audible signal noted with doppler. Capillary refill is <5 sec to digits bilateral.  Extremities positive for 1+ pitting edema. Neurological:  Gross and Light touch intact.  Protective sensation intact    Dermatological:  Wound description noted in wound assessment. Wound on buttocks is healed. Wound on right lower leg is smaller. It continues to drain a clear fluid. Swelling and redness around wound has improved. Mild slough. Wound bed pink with limited granulation tissue. There is no warmth, crepitus or odor. Leg overall is much less tender than previous around wound. Wound on left lower leg is smaller with no surrounding erythema, swelling, warmth or odor. Positive clear drainage. Wound bed is pink with limited granulation tissue. Wound on left arm is skin tear that is healing. No surrounding erythema, swelling, significant drainage, purulence, odor or warmth. Psychiatric:  Judgement and insight intact. Short and long term memory intact. No evidence of depression, anxiety, or agitation. Patient is calm, cooperative, and communicative. Appropriate interactions and affect. Assessment:      There are no active hospital problems to display for this patient. Procedure Note  Indications:  Based on my examination of this patient's wound(s)/ulcer(s) today, debridement is not required to promote healing and evaluate the wound base.     Wound 02/24/22 Buttocks Right #1 (Active)   Wound Image   01/11/23 1401   Wound Etiology Pressure Stage 2 01/11/23 1401   Wound Cleansed Cleansed with saline 01/11/23 1401   Dressing/Treatment Calmoseptine/zinc oxide with menthol;Xeroform 12/28/22 1433   Wound Length (cm) 0 cm 01/11/23 1401   Wound Width (cm) 0 cm 01/11/23 1401   Wound Depth (cm) 0 cm 01/11/23 1401   Wound Surface Area (cm^2) 0 cm^2 01/11/23 1401   Change in Wound Size % (l*w) 100 01/11/23 1401   Wound Volume (cm^3) 0 cm^3 01/11/23 1401   Wound Healing % 100 01/11/23 1401   Post-Procedure Length (cm) 0.4 cm 04/14/22 1218   Post-Procedure Width (cm) 0.7 cm 04/14/22 1218   Post-Procedure Depth (cm) 0.1 cm 04/14/22 1218   Post-Procedure Surface Area (cm^2) 0.28 cm^2 04/14/22 1218 Post-Procedure Volume (cm^3) 0.028 cm^3 04/14/22 1218   Wound Assessment Epithelialization 01/11/23 1401   Drainage Amount None 01/11/23 1401   Drainage Description Serous 12/28/22 1317   Odor None 01/11/23 1401   Debby-wound Assessment Maceration; Hyperkeratosis (callous) 12/28/22 1317   Margins Undefined edges 12/28/22 1317   Wound Thickness Description not for Pressure Injury Full thickness 12/28/22 1317   Number of days: 321       Wound 12/07/22 Leg Right; Anterior; Lower #6 (Active)   Wound Image   01/11/23 1401   Wound Etiology Venous 01/11/23 1401   Wound Cleansed Cleansed with saline 01/11/23 1401   Dressing/Treatment Alginate with Ag;Dry dressing 12/28/22 1433   Wound Length (cm) 2.1 cm 01/11/23 1401   Wound Width (cm) 2.4 cm 01/11/23 1401   Wound Depth (cm) 0.1 cm 01/11/23 1401   Wound Surface Area (cm^2) 5.04 cm^2 01/11/23 1401   Change in Wound Size % (l*w) 81.67 01/11/23 1401   Wound Volume (cm^3) 0.504 cm^3 01/11/23 1401   Wound Healing % 82 01/11/23 1401   Wound Assessment Pink/red;Slough 01/11/23 1401   Drainage Amount Moderate 01/11/23 1401   Drainage Description Yellow;Serosanguinous 01/11/23 1401   Odor None 01/11/23 1401   Debby-wound Assessment Intact 01/11/23 1401   Margins Defined edges 01/11/23 1401   Wound Thickness Description not for Pressure Injury Full thickness 01/11/23 1401   Number of days: 35       Wound 12/28/22 Leg Left; Anterior; Lower #7 (Active)   Wound Image   01/11/23 1401   Wound Etiology Venous 01/11/23 1401   Wound Cleansed Cleansed with saline 01/11/23 1401   Dressing/Treatment Alginate with Ag;Dry dressing 12/28/22 1433   Wound Length (cm) 0.9 cm 01/11/23 1401   Wound Width (cm) 0.9 cm 01/11/23 1401   Wound Depth (cm) 0.1 cm 01/11/23 1401   Wound Surface Area (cm^2) 0.81 cm^2 01/11/23 1401   Change in Wound Size % (l*w) 66.25 01/11/23 1401   Wound Volume (cm^3) 0.081 cm^3 01/11/23 1401   Wound Healing % 66 01/11/23 1401   Wound Assessment Pink/red 01/11/23 1401   Drainage Amount Small 01/11/23 1401   Drainage Description Yellow 01/11/23 1401   Odor None 01/11/23 1401   Debby-wound Assessment Intact 01/11/23 1401   Margins Defined edges 01/11/23 1401   Wound Thickness Description not for Pressure Injury Full thickness 01/11/23 1401   Number of days: 14       Wound 01/11/23 Arm Left # 8 (Active)   Wound Image   01/11/23 1401   Wound Etiology Traumatic 01/11/23 1401   Wound Cleansed Cleansed with saline 01/11/23 1401   Wound Length (cm) 1 cm 01/11/23 1401   Wound Width (cm) 0.6 cm 01/11/23 1401   Wound Depth (cm) 0.1 cm 01/11/23 1401   Wound Surface Area (cm^2) 0.6 cm^2 01/11/23 1401   Wound Volume (cm^3) 0.06 cm^3 01/11/23 1401   Wound Assessment Slough;Pink/red 01/11/23 1401   Drainage Amount Scant 01/11/23 1401   Drainage Description Serosanguinous 01/11/23 1401   Odor None 01/11/23 1401   Debby-wound Assessment Fragile 01/11/23 1401   Margins Undefined edges 01/11/23 1401   Wound Thickness Description not for Pressure Injury Full thickness 01/11/23 1401   Number of days: 0                  Plan:     Encouraged patient to see rheumatology for evaluation. Advised to go to emergency room signs symptoms of infection such as fever, chills, increased pain, increased redness around wound, swelling, purulence, odor or warmth. Encouraged patient to stay off of her bottom is much as possible. Treatment Note please see attached Discharge Instructions    Written patient dismissal instructions given to patient and signed by patient or POA.          Discharge [facility]   Physician Orders and Discharge Instructions  [address]  Telephone: [phone]        NAME:  Med Palmer                                                                                       YOB: 1950  MEDICAL RECORD NUMBER:  [id number]     Your Case Manager is:  [facility] Care/Facility: None     Wound Location: Left Arm     Dressing orders:. 1. May cleanse with normal saline  Apply  Xeroform gauze to wound bed and cover with dry dressing  Change Daily and as needed      Wound Location: .Right  and Left Lower Leg    Dressing orders: 1. Cleanse wound(s) with normal saline. 2. Apply dry SILVERCEL OR CALCIUM ALGINATE WITH Ag or eqivalent to wound bed. Cover with Optilock  If needed. 3. Cover with 4x4's and wrap with gauze (nikolas or kerlix)  4. Change  Every other day or Monday, Wednesday, and Friday                                                                                                                           Compression:. Apply 10-20 mmHg Spandagrip to right and left  lower leg, apply first thing in the morning, remove at bedtime, elevate legs as much as possible during the day. (38/39)     Offloading Device: Change position every 1-2 hours. Limit sitting in chair. Other Instructions: Apply protective cream/ointment to buttocks crease ( Calmoseptine, Desitin) daily. Increase Fruit/Veg intake. Apply LacHydrin lotion to both legs daily (not between toes). Keep both heels off surfaces - use a pillow or towel to help elevate. Wear a protective glove or ace bandage over hands when getting in and out of your wheelchair. Apply Monistat to vagina as directed on package. Keep all dressings clean, dry and intact. Keep pressure off the wound(s) at all times. Follow up visit  2 weeks January 25, 2023 @     1:45 pm      Please give 24 hour notice if unable to keep appointment. [phone]     If you experience any of the following, please call the Wound Care Service at  [phone] or go to the nearest emergency room.         *Increase in pain         *Temperature over 101           *Increase in drainage from your wound or a foul odor  *Uncontrolled swelling            *Need for compression bandage changes due to slippage, breakthrough drainage       PLEASE NOTE: IF YOU ARE UNABLE TO OBTAIN WOUND SUPPLIES, CONTINUE TO USE THE SUPPLIES YOU HAVE AVAILABLE UNTIL YOU ARE ABLE TO REACH US.  IT IS MOST IMPORTANT TO KEEP THE WOUND COVERED AT ALL TIMES                       Electronically signed by Caitie Song MD on 1/11/2023 at 2:28 PM

## 2023-01-11 NOTE — DISCHARGE INSTRUCTIONS
[facility]   Physician Orders and Discharge Instructions  [facility]  [address] [facility]  [facility]  Telephone: [phone]        NAME:  Mary Ellen Peoples                                                                                       YOB: 1950  MEDICAL RECORD NUMBER:  [id number]     Your  is:  [address] [place] [address] Care/Facility: None     Wound Location: Left Arm     Dressing orders:. 1. May cleanse with normal saline  Apply  Xeroform gauze to wound bed and cover with dry dressing  Change Daily and as needed      Wound Location: .Right  and Left Lower Leg    Dressing orders: 1. Cleanse wound(s) with normal saline. 2. Apply dry SILVERCEL OR CALCIUM ALGINATE WITH Ag or eqivalent to wound bed. Cover with Optilock  If needed. 3. Cover with 4x4's and wrap with gauze (nikolas or kerlix)  4. Change  Every other day or Monday, Wednesday, and Friday                                                                                                                           Compression:. Apply 10-20 mmHg Spandagrip to right and left  lower leg, apply first thing in the morning, remove at bedtime, elevate legs as much as possible during the day. (38/39)     Offloading Device: Change position every 1-2 hours. Limit sitting in chair. Other Instructions: Apply protective cream/ointment to buttocks crease ( Calmoseptine, Desitin) daily. Increase Fruit/Veg intake. Apply LacHydrin lotion to both legs daily (not between toes). Keep both heels off surfaces - use a pillow or towel to help elevate. Wear a protective glove or ace bandage over hands when getting in and out of your wheelchair. Apply Monistat to vagina as directed on package. Keep all dressings clean, dry and intact. Keep pressure off the wound(s) at all times.       Follow up visit  2 weeks January 25, 2023 @     1:45 pm      Please give 24 hour notice if unable to keep appointment. [phone]     If you experience any of the following, please call the Wound Care Service at  [phone] or go to the nearest emergency room. *Increase in pain         *Temperature over 101           *Increase in drainage from your wound or a foul odor  *Uncontrolled swelling            *Need for compression bandage changes due to slippage, breakthrough drainage       PLEASE NOTE: IF YOU ARE UNABLE TO OBTAIN WOUND SUPPLIES, CONTINUE TO USE THE SUPPLIES YOU HAVE AVAILABLE UNTIL YOU ARE ABLE TO REACH US.  IT IS MOST IMPORTANT TO KEEP THE WOUND COVERED AT ALL TIMES

## 2023-02-01 ENCOUNTER — HOSPITAL ENCOUNTER (OUTPATIENT)
Dept: WOUND CARE | Age: 73
Discharge: HOME | End: 2023-02-01
Payer: COMMERCIAL

## 2023-02-01 VITALS — HEART RATE: 70 BPM | TEMPERATURE: 97.2 F | DIASTOLIC BLOOD PRESSURE: 46 MMHG | SYSTOLIC BLOOD PRESSURE: 115 MMHG

## 2023-02-01 DIAGNOSIS — R53.1 DECREASED STRENGTH, ENDURANCE, AND MOBILITY: ICD-10-CM

## 2023-02-01 DIAGNOSIS — I87.2 VENOUS INSUFFICIENCY OF BOTH LOWER EXTREMITIES: ICD-10-CM

## 2023-02-01 DIAGNOSIS — I83.029 VENOUS ULCER OF LEFT LEG (HCC): ICD-10-CM

## 2023-02-01 DIAGNOSIS — S60.512A HAND ABRASION, LEFT, INITIAL ENCOUNTER: Primary | ICD-10-CM

## 2023-02-01 DIAGNOSIS — R68.89 DECREASED STRENGTH, ENDURANCE, AND MOBILITY: ICD-10-CM

## 2023-02-01 DIAGNOSIS — L97.919 VENOUS ULCER OF RIGHT LOWER EXTREMITY WITHOUT VARICOSE VEINS (HCC): ICD-10-CM

## 2023-02-01 DIAGNOSIS — I87.2 VENOUS ULCER OF RIGHT LOWER EXTREMITY WITHOUT VARICOSE VEINS (HCC): ICD-10-CM

## 2023-02-01 DIAGNOSIS — L97.929 VENOUS ULCER OF LEFT LEG (HCC): ICD-10-CM

## 2023-02-01 DIAGNOSIS — S31.819A OPEN WOUND OF RIGHT BUTTOCK, INITIAL ENCOUNTER: ICD-10-CM

## 2023-02-01 DIAGNOSIS — Z74.09 DECREASED STRENGTH, ENDURANCE, AND MOBILITY: ICD-10-CM

## 2023-02-01 PROCEDURE — 6370000000 HC RX 637 (ALT 250 FOR IP): Performed by: NURSE PRACTITIONER

## 2023-02-01 PROCEDURE — 97597 DBRDMT OPN WND 1ST 20 CM/<: CPT

## 2023-02-01 PROCEDURE — 99213 OFFICE O/P EST LOW 20 MIN: CPT

## 2023-02-01 RX ORDER — BACITRACIN ZINC AND POLYMYXIN B SULFATE 500; 1000 [USP'U]/G; [USP'U]/G
OINTMENT TOPICAL ONCE
OUTPATIENT
Start: 2023-02-01 | End: 2023-02-01

## 2023-02-01 RX ORDER — LIDOCAINE HYDROCHLORIDE 20 MG/ML
JELLY TOPICAL ONCE
OUTPATIENT
Start: 2023-02-01 | End: 2023-02-01

## 2023-02-01 RX ORDER — BACITRACIN, NEOMYCIN, POLYMYXIN B 400; 3.5; 5 [USP'U]/G; MG/G; [USP'U]/G
OINTMENT TOPICAL ONCE
OUTPATIENT
Start: 2023-02-01 | End: 2023-02-01

## 2023-02-01 RX ORDER — LIDOCAINE HYDROCHLORIDE 40 MG/ML
SOLUTION TOPICAL ONCE
OUTPATIENT
Start: 2023-02-01 | End: 2023-02-01

## 2023-02-01 RX ORDER — LIDOCAINE 50 MG/G
OINTMENT TOPICAL ONCE
OUTPATIENT
Start: 2023-02-01 | End: 2023-02-01

## 2023-02-01 RX ORDER — LIDOCAINE 40 MG/G
CREAM TOPICAL ONCE
Status: COMPLETED | OUTPATIENT
Start: 2023-02-01 | End: 2023-02-01

## 2023-02-01 RX ORDER — GENTAMICIN SULFATE 1 MG/G
OINTMENT TOPICAL ONCE
OUTPATIENT
Start: 2023-02-01 | End: 2023-02-01

## 2023-02-01 RX ORDER — BETAMETHASONE DIPROPIONATE 0.05 %
OINTMENT (GRAM) TOPICAL ONCE
OUTPATIENT
Start: 2023-02-01 | End: 2023-02-01

## 2023-02-01 RX ORDER — GINSENG 100 MG
CAPSULE ORAL ONCE
OUTPATIENT
Start: 2023-02-01 | End: 2023-02-01

## 2023-02-01 RX ORDER — CLOBETASOL PROPIONATE 0.5 MG/G
OINTMENT TOPICAL ONCE
OUTPATIENT
Start: 2023-02-01 | End: 2023-02-01

## 2023-02-01 RX ORDER — LIDOCAINE 40 MG/G
CREAM TOPICAL ONCE
OUTPATIENT
Start: 2023-02-01 | End: 2023-02-01

## 2023-02-01 RX ADMIN — LIDOCAINE 4%: 4 CREAM TOPICAL at 13:43

## 2023-02-01 NOTE — PLAN OF CARE
Problem: Chronic Conditions and Co-morbidities  Goal: Patient's chronic conditions and co-morbidity symptoms are monitored and maintained or improved  Outcome: Progressing     Problem: Cognitive:  Goal: Knowledge of wound care  Description: Knowledge of wound care  Outcome: Progressing  Goal: Understands risk factors for wounds  Description: Understands risk factors for wounds  Outcome: Progressing     Problem: Pressure Ulcer:  Goal: Signs of wound healing will improve  Description: Signs of wound healing will improve  Outcome: Progressing  Goal: Absence of new pressure ulcer  Description: Absence of new pressure ulcer  Outcome: Progressing  Goal: Will show no infection signs and symptoms  Description: Will show no infection signs and symptoms  Outcome: Progressing     Problem: Weight control:  Goal: Ability to maintain an optimal weight for height and age will be supported  Description: Ability to maintain an optimal weight for height and age will be supported  Outcome: Progressing     Problem: Falls - Risk of:  Goal: Will remain free from falls  Description: Will remain free from falls  Outcome: Progressing     Problem: Blood Glucose:  Goal: Ability to maintain appropriate glucose levels will improve  Description: Ability to maintain appropriate glucose levels will improve  Outcome: Progressing

## 2023-02-01 NOTE — PROGRESS NOTES
[facility] [address]                                                   Progress Note and Procedure Note      Patt Churchill  MEDICAL RECORD NUMBER:  [id number]  AGE: 67 y.o. GENDER: female  : 1950  EPISODE DATE:  2023    Subjective:     Chief Complaint   Patient presents with    Wound Check     buttocks         HISTORY of PRESENT ILLNESS HPI     Patt Churchill is a 67 y.o. female who presents today for wound/ulcer evaluation. History of Wound Context: Patient states that wounds on buttocks, lower legs and left arm are improving. Areas are  at right lower leg wound and left arm wound. No fever or chills. Patient has not been wearing her compression stockings on right lower extremity. She is still sitting for extended periods of time in wheelchair. Wound/Ulcer Pain Timing/Severity: intermittent  Quality of pain: burning, tender  Severity:  3  10   Modifying Factors: Pain worsens with touch  Associated Signs/Symptoms: edema, drainage, and pain    Ulcer Identification:  Ulcer Type: venous, diabetic, pressure, and traumatic  Contributing Factors: edema, venous stasis, diabetes, chronic pressure, decreased mobility, shear force, and obesity    Wound: N/A        PAST MEDICAL HISTORY        Diagnosis Date    Anxiety 2019    Arthritis     Bronchitis     Diabetes mellitus ([facility]     Diabetic neuropathy associated with diabetes mellitus due to underlying condition ([facility] 11/3/2017    Essential hypertension 2019    Hypercholesterolemia     Hypertension     Morbid obesity due to excess calories (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  surgeries x3    TONSILLECTOMY N/A        FAMILY HISTORY    Family History   Problem Relation Age of Onset    Cancer Father        SOCIAL HISTORY    Social History     Tobacco Use    Smoking status: Never    Smokeless tobacco: Never   Vaping Use    Vaping Use: Never used   Substance Use Topics    Alcohol use: No    Drug use: No       ALLERGIES    Allergies   Allergen Reactions    Nitrofurantoin Other (See Comments)     Leg swelling     Tizanidine      Other reaction(s): Mental Status Change    Gabapentin Anxiety    Sulfa Antibiotics Rash       MEDICATIONS    Current Outpatient Medications on File Prior to Encounter   Medication Sig Dispense Refill    mupirocin (BACTROBAN) 2 % ointment Apply topically with daily dressing change 15 g 0    predniSONE (DELTASONE) 20 MG tablet Take 10 mg by mouth daily      mupirocin (BACTROBAN) 2 % ointment Apply topically 2 times daily. 15 g 1    Melatonin 10 MG TABS Take by mouth daily At bedtime      acetaminophen (TYLENOL) 500 MG tablet Take by mouth every 6 hours as needed for Pain      Bismuth Tribromoph-Petrolatum (XEROFORM PETROLATUM GAUZE 5\"X9\") MISC external pads Apply 1 each topically daily Apply to affected area as directed daily, after mupirocin ointment. 12 each 1    ammonium lactate (LAC-HYDRIN) 12 % lotion Apply topically daily to both lower legs, after washing with mild soap/water. Apply while still moist. 225 g 1    ammonium lactate (LAC-HYDRIN) 12 % lotion Apply topically daily after cleansing legs-with mild soap and water. Apply to intact areas of skin only. 225 g 1    oxyCODONE HCl (OXY-IR) 10 MG immediate release tablet Take 10 mg by mouth every 6 hours as needed for Pain.       Capsaicin (ZOSTRIX) 0.035 % CREA cream Apply topically 3 times daily      furosemide (LASIX) 40 MG tablet Take 40 mg by mouth 2 times daily 1.5 tablet by mouth as directed; 1 tablet in AM and 1/2 tablet at NOON      oxybutynin (DITROPAN-XL) 5 MG extended release tablet Take 5 mg by mouth daily      traZODone (DESYREL) 150 MG tablet Take 150 mg by mouth nightly      metoprolol (LOPRESSOR) 100 MG tablet Take 100 mg by mouth daily      atorvastatin (LIPITOR) 10 MG tablet Take 10 mg by mouth daily      polyethylene glycol (GLYCOLAX) 17 g packet Take 17 g by mouth every other day      Cholecalciferol (VITAMIN D3) 50 MCG (2000 UT) CAPS Take by mouth daily      Menthol-Methyl Salicylate (MUSCLE RUB) 10-15 % CREA cream Apply topically 2 times daily 1 application apply on the skin twice a day; arms and shoulders      coenzyme Q10 100 MG CAPS capsule Take 1 capsule by mouth 2 times daily (before meals) 60 capsule 1    Lactobacillus (PROBIOTIC ACIDOPHILUS) TABS Take 1 tablet by mouth daily 60 tablet 0    levothyroxine (SYNTHROID) 125 MCG tablet Take 125 mcg by mouth Daily      metFORMIN (GLUCOPHAGE) 1000 MG tablet Take 500 mg by mouth 2 times daily (with meals)        No current facility-administered medications on file prior to encounter. REVIEW OF SYSTEMS    Pertinent items are noted in HPI. Objective:      BP (!) 115/46   Pulse 70   Temp 97.2 °F (36.2 °C) (Temporal)   LMP  (LMP Unknown)     Wt Readings from Last 3 Encounters:   09/24/20 240 lb (108.9 kg)   05/09/20 236 lb (107 kg)   04/04/19 275 lb (124.7 kg)       PHYSICAL EXAM    Constitutional:   Well nourished and well developed. Appears neat and clean. Patient is alert, oriented x3, and in no apparent distress. Respiratory:  Respiratory effort is easy and symmetric bilaterally. Rate is normal at rest and on room air. Vascular:  Pedal Pulses is palpable and audible with doppler. Capillary refill is <3 sec to digits bilateral.  Extremities positive for 2+ pitting edema bilaterally. Neurological:   Sensation is intact to lower extremities. Dermatological:  Wound description noted in wound assessment. Both lower extremity wounds have significantly improved. These have no surrounding erythema, swelling, purulence or odor.   There is a scab on wound of right lower extremity that was debrided-underlying wound is clean and has pink granulation tissue. Wound on left arm is healed however area is still mildly tender. Buttock wound is essentially healed however there is still some dry skin-no open lesions. Psychiatric:  Judgement and insight intact. Short and long term memory intact. No evidence of depression, anxiety, or agitation. Patient is calm, cooperative, and communicative. Appropriate interactions and affect. Assessment:      There are no active hospital problems to display for this patient. Procedure Note  Indications:  Based on my examination of this patient's wound(s)/ulcer(s) today, debridement is required to promote healing and evaluate the wound base. Performed by: Lizy Yu MD    Consent obtained:  Yes    Time out taken:  Yes    Pain Control:pain control list: 2% lidocaine gel  thin layer applied topically to wound bed      Debridement:Selective debridement    Using curette the wound(s)/ulcer(s) was/were sharply debrided down through and including the removal of epidermis and necrotic tissue . Devitalized Tissue Debrided:  necrotic/eschar    Pre Debridement Measurements:  Are located in the [place]  Documentation Flow Sheet    Wound/Ulcer #: 6    Post Debridement Measurements:  Wound/Ulcer Descriptions are Pre Debridement except measurements:    Wound 12/07/22 Leg Right; Anterior; Lower #6 (Active)   Wound Image   02/01/23 1300   Wound Etiology Venous 02/01/23 1300   Wound Cleansed Cleansed with saline 02/01/23 1300   Dressing/Treatment Collagen with Ag;Dry dressing 02/01/23 1422   Wound Length (cm) 1.8 cm 02/01/23 1300   Wound Width (cm) 2 cm 02/01/23 1300   Wound Depth (cm) 0.1 cm 02/01/23 1300   Wound Surface Area (cm^2) 3.6 cm^2 02/01/23 1300   Change in Wound Size % (l*w) 86.91 02/01/23 1300   Wound Volume (cm^3) 0.36 cm^3 02/01/23 1300   Wound Healing % 87 02/01/23 1300   Post-Procedure Length (cm) 1.8 cm 02/01/23 1355   Post-Procedure Width (cm) 2 cm 02/01/23 1355   Post-Procedure Depth (cm) 0.1 cm 02/01/23 1355   Post-Procedure Surface Area (cm^2) 3.6 cm^2 02/01/23 1355   Post-Procedure Volume (cm^3) 0.36 cm^3 02/01/23 1355   Wound Assessment Eschar dry 02/01/23 1300   Drainage Amount None 02/01/23 1300   Drainage Description Yellow;Serosanguinous 01/11/23 1401   Odor None 02/01/23 1300   Debby-wound Assessment Intact 02/01/23 1300   Margins Defined edges 02/01/23 1300   Wound Thickness Description not for Pressure Injury Full thickness 02/01/23 1300   Number of days: 56       Wound 12/28/22 Leg Left; Anterior; Lower #7 (Active)   Wound Image   02/01/23 1300   Wound Etiology Venous 02/01/23 1300   Wound Cleansed Cleansed with saline 02/01/23 1300   Dressing/Treatment Open to air 02/01/23 1422   Wound Length (cm) 0.4 cm 02/01/23 1300   Wound Width (cm) 0.3 cm 02/01/23 1300   Wound Depth (cm) 0 cm 02/01/23 1300   Wound Surface Area (cm^2) 0.12 cm^2 02/01/23 1300   Change in Wound Size % (l*w) 95 02/01/23 1300   Wound Volume (cm^3) 0 cm^3 02/01/23 1300   Wound Healing % 100 02/01/23 1300   Wound Assessment Pink/red;Dry 02/01/23 1300   Drainage Amount None 02/01/23 1300   Drainage Description Yellow 01/11/23 1401   Odor None 02/01/23 1300   Debby-wound Assessment Intact 02/01/23 1300   Margins Defined edges 02/01/23 1300   Wound Thickness Description not for Pressure Injury Full thickness 02/01/23 1300   Number of days: 35            Percent of Wound/Ulcer Debrided: 100%    Total Surface Area Debrided:  0.12 sq cm     Diabetic/Pressure/Non Pressure Ulcers:  Ulcer: N/A      Bleeding:  Minimal    Hemostasis Achieved:  by pressure    Procedural Pain:  0  / 10     Post Procedural Pain:  0 / 10     Response to treatment:  Well tolerated by patient. Plan:     Encourage patient to use compression stockings as previously discussed. Advised to change positions frequently to avoid further pressure injury.   Advised to go the emergency room with signs symptoms of infection such as fever, chills, redness around wound, purulence, odor, warmth, swelling or increased pain. Treatment Note please see attached Discharge Instructions    Written patient dismissal instructions given to patient and signed by patient or POA. Discharge [facility]   Physician Orders and Discharge Instructions  [address]  Telephone: [phone]        NAME:  Ar Whiteside                                                                                       YOB: 1950  MEDICAL RECORD NUMBER:  [id number]     Your  is:  [address] [place] [address] Care/Facility: None       Wound Location: Right  and Left Lower Leg     Dressing orders: . 1. Cleanse wound(s) with normal saline. 2. Apply dry BERNARDINO  Or equivalent to wound bed. 3. Moisten BERNARDINO with a few drops of normal saline. 4. Cover with 4x4's and wrap with gauze (nikolas or kerlix)  5. Change  Every other day or Monday, Wednesday, and Friday                                                                                                                           Compression:. Apply 10-20 mmHg Spandagrip to Right and Left  lower leg, apply first thing in the morning, remove at bedtime, elevate legs as much as possible during the day. (38/39)     Offloading Device: Change position every 1-2 hours. Limit sitting in chair. Other Instructions: Apply protective cream/ointment to buttocks crease ( Calmoseptine, Desitin) daily. Increase Fruit/Veg intake. Apply LacHydrin lotion to both legs daily (not between toes). Keep both heels off surfaces - use a pillow or towel to help elevate. Wear a protective glove or ace bandage over hands when getting in and out of your wheelchair. Apply Monistat to vagina as directed on package.          Keep all dressings clean, dry and intact. Keep pressure off the wound(s) at all times. Follow up visit 2  week February 15,  2023 @    1:30 pm      Please give 24 hour notice if unable to keep appointment. [phone]     If you experience any of the following, please call the Wound Care Service at  [phone] or go to the nearest emergency room. *Increase in pain         *Temperature over 101           *Increase in drainage from your wound or a foul odor  *Uncontrolled swelling            *Need for compression bandage changes due to slippage, breakthrough drainage       PLEASE NOTE: IF YOU ARE UNABLE TO OBTAIN WOUND SUPPLIES, CONTINUE TO USE THE SUPPLIES YOU HAVE AVAILABLE UNTIL YOU ARE ABLE TO REACH US.  IT IS MOST IMPORTANT TO KEEP THE WOUND COVERED AT ALL TIMES                       Electronically signed by Caitie Song MD on 2/1/2023 at 2:57 PM

## 2023-02-01 NOTE — DISCHARGE INSTRUCTIONS
[facility]   Physician Orders and Discharge Instructions  [address]  Telephone: [phone]        NAME:  Minerva Kinsey                                                                                       YOB: 1950  MEDICAL RECORD NUMBER:  [id number]     Your  is:  [address] [place] [address] Care/Facility: None       Wound Location: Right  and Left Lower Leg     Dressing orders: . 1. Cleanse wound(s) with normal saline. 2. Apply dry BERNARDINO  Or equivalent to wound bed. 3. Moisten BERNARDINO with a few drops of normal saline. 4. Cover with 4x4's and wrap with gauze (nikolas or kerlix)  5. Change  Every other day or Monday, Wednesday, and Friday                                                                                                                           Compression:. Apply 10-20 mmHg Spandagrip to Right and Left  lower leg, apply first thing in the morning, remove at bedtime, elevate legs as much as possible during the day. (38/39)     Offloading Device: Change position every 1-2 hours. Limit sitting in chair. Other Instructions: Apply protective cream/ointment to buttocks crease ( Calmoseptine, Desitin) daily. Increase Fruit/Veg intake. Apply LacHydrin lotion to both legs daily (not between toes). Keep both heels off surfaces - use a pillow or towel to help elevate. Wear a protective glove or ace bandage over hands when getting in and out of your wheelchair. Apply Monistat to vagina as directed on package. Keep all dressings clean, dry and intact. Keep pressure off the wound(s) at all times. Follow up visit 2  week February 15,  2023 @    1:30 pm      Please give 24 hour notice if unable to keep appointment.  [phone]     If you experience any of the following, please call the Wound Care Service at  [phone] or go to the nearest emergency room.        *Increase in pain         *Temperature over 101           *Increase in drainage from your wound or a foul odor  *Uncontrolled swelling            *Need for compression bandage changes due to slippage, breakthrough drainage       PLEASE NOTE: IF YOU ARE UNABLE TO OBTAIN WOUND SUPPLIES, CONTINUE TO USE THE SUPPLIES YOU HAVE AVAILABLE UNTIL YOU ARE ABLE TO REACH US.  IT IS MOST IMPORTANT TO KEEP THE WOUND COVERED AT ALL TIMES

## 2023-03-13 ENCOUNTER — HOSPITAL ENCOUNTER (INPATIENT)
Age: 73
LOS: 6 days | Discharge: HOME | DRG: 683 | End: 2023-03-19
Attending: EMERGENCY MEDICINE | Admitting: INTERNAL MEDICINE
Payer: COMMERCIAL

## 2023-03-13 DIAGNOSIS — E87.6 HYPOKALEMIA: ICD-10-CM

## 2023-03-13 DIAGNOSIS — E86.0 DEHYDRATION: ICD-10-CM

## 2023-03-13 DIAGNOSIS — I48.91 ATRIAL FIBRILLATION, UNSPECIFIED TYPE (HCC): ICD-10-CM

## 2023-03-13 DIAGNOSIS — N17.9 ACUTE KIDNEY INJURY SUPERIMPOSED ON CHRONIC KIDNEY DISEASE (HCC): Primary | ICD-10-CM

## 2023-03-13 DIAGNOSIS — N18.9 ACUTE KIDNEY INJURY SUPERIMPOSED ON CHRONIC KIDNEY DISEASE (HCC): Primary | ICD-10-CM

## 2023-03-13 LAB
ALBUMIN SERPL-MCNC: 3.2 G/DL (ref 3.5–4.6)
ALP BLD-CCNC: 75 U/L (ref 40–130)
ALT SERPL-CCNC: 20 U/L (ref 0–33)
ANION GAP SERPL CALCULATED.3IONS-SCNC: 17 MEQ/L (ref 9–15)
AST SERPL-CCNC: 17 U/L (ref 0–35)
BACTERIA: NEGATIVE /HPF
BASOPHILS ABSOLUTE: 0 K/UL (ref 0–0.2)
BASOPHILS RELATIVE PERCENT: 0.2 %
BILIRUB SERPL-MCNC: 0.4 MG/DL (ref 0.2–0.7)
BILIRUBIN URINE: NEGATIVE
BLOOD, URINE: ABNORMAL
BUN BLDV-MCNC: 60 MG/DL (ref 8–23)
CALCIUM SERPL-MCNC: 9 MG/DL (ref 8.5–9.9)
CHLORIDE BLD-SCNC: 112 MEQ/L (ref 95–107)
CLARITY: ABNORMAL
CO2: 25 MEQ/L (ref 20–31)
COLOR: YELLOW
CREAT SERPL-MCNC: 4.23 MG/DL (ref 0.5–0.9)
EOSINOPHILS ABSOLUTE: 0 K/UL (ref 0–0.7)
EOSINOPHILS RELATIVE PERCENT: 0.1 %
EPITHELIAL CELLS, UA: >100 /HPF (ref 0–5)
GFR SERPL CREATININE-BSD FRML MDRD: 10.6 ML/MIN/{1.73_M2}
GLOBULIN: 2.2 G/DL (ref 2.3–3.5)
GLUCOSE BLD-MCNC: 204 MG/DL (ref 70–99)
GLUCOSE BLD-MCNC: 304 MG/DL (ref 70–99)
GLUCOSE URINE: NEGATIVE MG/DL
HCT VFR BLD CALC: 33.9 % (ref 37–47)
HEMOGLOBIN: 10.7 G/DL (ref 12–16)
HYALINE CASTS: ABNORMAL /HPF (ref 0–5)
KETONES, URINE: ABNORMAL MG/DL
LACTIC ACID: 2 MMOL/L (ref 0.5–2.2)
LEUKOCYTE ESTERASE, URINE: ABNORMAL
LYMPHOCYTES ABSOLUTE: 1.2 K/UL (ref 1–4.8)
LYMPHOCYTES RELATIVE PERCENT: 7.3 %
MAGNESIUM: 2.4 MG/DL (ref 1.7–2.4)
MCH RBC QN AUTO: 31.4 PG (ref 27–31.3)
MCHC RBC AUTO-ENTMCNC: 31.4 % (ref 33–37)
MCV RBC AUTO: 99.9 FL (ref 79.4–94.8)
MONOCYTES ABSOLUTE: 1.2 K/UL (ref 0.2–0.8)
MONOCYTES RELATIVE PERCENT: 7.4 %
NEUTROPHILS ABSOLUTE: 14.4 K/UL (ref 1.4–6.5)
NEUTROPHILS RELATIVE PERCENT: 85 %
NITRITE, URINE: NEGATIVE
PDW BLD-RTO: 16.5 % (ref 11.5–14.5)
PERFORMED ON: ABNORMAL
PH UA: 5 (ref 5–9)
PLATELET # BLD: 163 K/UL (ref 130–400)
POTASSIUM SERPL-SCNC: 3.1 MEQ/L (ref 3.4–4.9)
PROCALCITONIN: 0.21 NG/ML (ref 0–0.15)
PROTEIN UA: 30 MG/DL
RBC # BLD: 3.4 M/UL (ref 4.2–5.4)
RBC UA: ABNORMAL /HPF (ref 0–5)
RENAL EPITHELIAL, UA: ABNORMAL /HPF
SODIUM BLD-SCNC: 154 MEQ/L (ref 135–144)
SPECIFIC GRAVITY UA: 1.02 (ref 1–1.03)
TOTAL PROTEIN: 5.4 G/DL (ref 6.3–8)
URINE REFLEX TO CULTURE: YES
UROBILINOGEN, URINE: 0.2 E.U./DL
WBC # BLD: 16.9 K/UL (ref 4.8–10.8)
WBC UA: ABNORMAL /HPF (ref 0–5)
YEAST: PRESENT /HPF

## 2023-03-13 PROCEDURE — 87040 BLOOD CULTURE FOR BACTERIA: CPT

## 2023-03-13 PROCEDURE — 99285 EMERGENCY DEPT VISIT HI MDM: CPT

## 2023-03-13 PROCEDURE — 85025 COMPLETE CBC W/AUTO DIFF WBC: CPT

## 2023-03-13 PROCEDURE — 81001 URINALYSIS AUTO W/SCOPE: CPT

## 2023-03-13 PROCEDURE — 6360000002 HC RX W HCPCS: Performed by: EMERGENCY MEDICINE

## 2023-03-13 PROCEDURE — 93005 ELECTROCARDIOGRAM TRACING: CPT | Performed by: EMERGENCY MEDICINE

## 2023-03-13 PROCEDURE — 36415 COLL VENOUS BLD VENIPUNCTURE: CPT

## 2023-03-13 PROCEDURE — 1210000000 HC MED SURG R&B

## 2023-03-13 PROCEDURE — 96374 THER/PROPH/DIAG INJ IV PUSH: CPT

## 2023-03-13 PROCEDURE — 84145 PROCALCITONIN (PCT): CPT

## 2023-03-13 PROCEDURE — 80053 COMPREHEN METABOLIC PANEL: CPT

## 2023-03-13 PROCEDURE — 83735 ASSAY OF MAGNESIUM: CPT

## 2023-03-13 PROCEDURE — 83605 ASSAY OF LACTIC ACID: CPT

## 2023-03-13 PROCEDURE — 2580000003 HC RX 258: Performed by: EMERGENCY MEDICINE

## 2023-03-13 PROCEDURE — 96361 HYDRATE IV INFUSION ADD-ON: CPT

## 2023-03-13 PROCEDURE — 87086 URINE CULTURE/COLONY COUNT: CPT

## 2023-03-13 RX ORDER — POTASSIUM CHLORIDE 20 MEQ/1
40 TABLET, EXTENDED RELEASE ORAL ONCE
Status: DISCONTINUED | OUTPATIENT
Start: 2023-03-13 | End: 2023-03-13

## 2023-03-13 RX ORDER — POTASSIUM CHLORIDE 7.45 MG/ML
10 INJECTION INTRAVENOUS ONCE
Status: COMPLETED | OUTPATIENT
Start: 2023-03-13 | End: 2023-03-13

## 2023-03-13 RX ORDER — INSULIN LISPRO 100 [IU]/ML
0-4 INJECTION, SOLUTION INTRAVENOUS; SUBCUTANEOUS NIGHTLY
Status: DISCONTINUED | OUTPATIENT
Start: 2023-03-13 | End: 2023-03-19 | Stop reason: HOSPADM

## 2023-03-13 RX ORDER — 0.9 % SODIUM CHLORIDE 0.9 %
1000 INTRAVENOUS SOLUTION INTRAVENOUS ONCE
Status: COMPLETED | OUTPATIENT
Start: 2023-03-13 | End: 2023-03-13

## 2023-03-13 RX ORDER — DEXTROSE AND SODIUM CHLORIDE 5; .45 G/100ML; G/100ML
INJECTION, SOLUTION INTRAVENOUS CONTINUOUS
Status: DISCONTINUED | OUTPATIENT
Start: 2023-03-13 | End: 2023-03-15

## 2023-03-13 RX ORDER — INSULIN LISPRO 100 [IU]/ML
0-8 INJECTION, SOLUTION INTRAVENOUS; SUBCUTANEOUS
Status: DISCONTINUED | OUTPATIENT
Start: 2023-03-14 | End: 2023-03-19 | Stop reason: HOSPADM

## 2023-03-13 RX ADMIN — SODIUM CHLORIDE 1000 ML: 9 INJECTION, SOLUTION INTRAVENOUS at 15:50

## 2023-03-13 RX ADMIN — POTASSIUM CHLORIDE 10 MEQ: 7.46 INJECTION, SOLUTION INTRAVENOUS at 15:53

## 2023-03-13 RX ADMIN — SODIUM CHLORIDE 1000 ML: 9 INJECTION, SOLUTION INTRAVENOUS at 14:08

## 2023-03-13 ASSESSMENT — PAIN - FUNCTIONAL ASSESSMENT: PAIN_FUNCTIONAL_ASSESSMENT: 0-10

## 2023-03-13 ASSESSMENT — PAIN DESCRIPTION - LOCATION: LOCATION: GENERALIZED

## 2023-03-13 ASSESSMENT — PAIN SCALES - GENERAL: PAINLEVEL_OUTOF10: 8

## 2023-03-13 NOTE — ED NOTES
Pt  remains at bedside  Pt repositioned at this time   Pt has brief in place but unable to urinate at this time     Donavon Rooney RN  03/13/23 6196

## 2023-03-13 NOTE — ED NOTES
Attempted to place IV times 2 attempts.  Call placed to Charge Nurse Chato Burciaga RN at this time and will attempt ultrasounded IV as soon as available     Elijah Jorgensen RN  03/13/23 1465

## 2023-03-13 NOTE — ED PROVIDER NOTES
AMMONIUM LACTATE (LAC-HYDRIN) 12 % LOTION    Apply topically daily after cleansing legs-with mild soap and water. Apply to intact areas of skin only. AMMONIUM LACTATE (LAC-HYDRIN) 12 % LOTION    Apply topically daily to both lower legs, after washing with mild soap/water. Apply while still moist.    ATORVASTATIN (LIPITOR) 10 MG TABLET    Take 10 mg by mouth daily    BISMUTH TRIBROMOPH-PETROLATUM (XEROFORM PETROLATUM GAUZE 5\"X9\") MISC EXTERNAL PADS    Apply 1 each topically daily Apply to affected area as directed daily, after mupirocin ointment. CAPSAICIN (ZOSTRIX) 0.035 % CREA CREAM    Apply topically 3 times daily    CHOLECALCIFEROL (VITAMIN D3) 50 MCG (2000 UT) CAPS    Take by mouth daily    COENZYME Q10 100 MG CAPS CAPSULE    Take 1 capsule by mouth 2 times daily (before meals)    FUROSEMIDE (LASIX) 40 MG TABLET    Take 40 mg by mouth 2 times daily 1.5 tablet by mouth as directed; 1 tablet in AM and 1/2 tablet at NOON    LACTOBACILLUS (PROBIOTIC ACIDOPHILUS) TABS    Take 1 tablet by mouth daily    LEVOTHYROXINE (SYNTHROID) 125 MCG TABLET    Take 125 mcg by mouth Daily    MELATONIN 10 MG TABS    Take by mouth daily At bedtime    MENTHOL-METHYL SALICYLATE (MUSCLE RUB) 10-15 % CREA CREAM    Apply topically 2 times daily 1 application apply on the skin twice a day; arms and shoulders    METFORMIN (GLUCOPHAGE) 1000 MG TABLET    Take 500 mg by mouth 2 times daily (with meals)     METOPROLOL (LOPRESSOR) 100 MG TABLET    Take 100 mg by mouth daily    MUPIROCIN (BACTROBAN) 2 % OINTMENT    Apply topically 2 times daily. MUPIROCIN (BACTROBAN) 2 % OINTMENT    Apply topically with daily dressing change    OXYBUTYNIN (DITROPAN-XL) 5 MG EXTENDED RELEASE TABLET    Take 5 mg by mouth daily    OXYCODONE HCL (OXY-IR) 10 MG IMMEDIATE RELEASE TABLET    Take 10 mg by mouth every 6 hours as needed for Pain.     POLYETHYLENE GLYCOL (GLYCOLAX) 17 G PACKET    Take 17 g by mouth every other day    PREDNISONE (DELTASONE) 20 MG

## 2023-03-13 NOTE — ED NOTES
Pt educated a second times regarding oxygen and pt states that she will wear O2  Spo2 monitor sticker placed on pt finger due to pt did not like the other  Pt given warm blankets at this time        Johnice Goldmann, RN  03/13/23 9755

## 2023-03-13 NOTE — ED NOTES
Pt states that she still is unable to urinate   Updated Dr. Jorge Kerns at this time     Adarsh Marcial RN  03/13/23 6415

## 2023-03-13 NOTE — ED NOTES
Dr. Brooks Nevarez aware of Natalee Going RN to do ultra sounded IV     Adarsh Aniket RN  03/13/23 6595

## 2023-03-13 NOTE — ED NOTES
Pt refuses to take medications orally and states that she is unable to take anything PO and will vomit if she attempt to eat or drink anything     Svitlana Read RN  03/13/23 6861

## 2023-03-13 NOTE — ED NOTES
Pt SPO2 88% on RA due to PT refusing to wear oxygen at this time.   Pt educated that O2 may be needed at this time   Pt refuses to wear [address], RN  03/13/23 4317

## 2023-03-13 NOTE — ED NOTES
Updated Dr. Leah Colon of pt telemetry at this time and order for EKG     Sixto Boland RN  03/13/23 0165

## 2023-03-13 NOTE — ED NOTES
Patient refused to wear her 02 any longer as she said she does not wear this at home. Patient 02 saturation is normally 90-92 per patient and the spouse, aware that if it goes into the 80's the 02 needs to go back on.      Hessie Hodgkin, RN  03/13/23 68 Rocío Araiza RN  03/13/23 4398

## 2023-03-14 ENCOUNTER — APPOINTMENT (OUTPATIENT)
Dept: CT IMAGING | Age: 73
DRG: 683 | End: 2023-03-14
Payer: COMMERCIAL

## 2023-03-14 PROBLEM — N18.9 ACUTE KIDNEY INJURY SUPERIMPOSED ON CHRONIC KIDNEY DISEASE (HCC): Status: ACTIVE | Noted: 2019-04-04

## 2023-03-14 PROBLEM — R06.03 RESPIRATORY DISTRESS: Status: ACTIVE | Noted: 2019-06-05

## 2023-03-14 PROBLEM — K56.609 SBO (SMALL BOWEL OBSTRUCTION) (HCC): Status: ACTIVE | Noted: 2021-07-25

## 2023-03-14 PROBLEM — G95.9 CERVICAL MYELOPATHY (HCC): Status: ACTIVE | Noted: 2019-05-31

## 2023-03-14 PROBLEM — R11.0 NAUSEA: Status: ACTIVE | Noted: 2023-01-01

## 2023-03-14 PROBLEM — G89.4 CHRONIC PAIN DISORDER: Status: ACTIVE | Noted: 2020-07-01

## 2023-03-14 PROBLEM — G93.40 ENCEPHALOPATHY: Status: ACTIVE | Noted: 2019-06-06

## 2023-03-14 PROBLEM — E11.9 TYPE 2 DIABETES MELLITUS WITHOUT COMPLICATION, WITHOUT LONG-TERM CURRENT USE OF INSULIN (HCC): Status: ACTIVE | Noted: 2023-03-03

## 2023-03-14 PROBLEM — M79.10 MYALGIA: Status: ACTIVE | Noted: 2023-03-14

## 2023-03-14 PROBLEM — M25.561 PAIN IN RIGHT KNEE: Status: ACTIVE | Noted: 2019-03-12

## 2023-03-14 LAB
ANION GAP SERPL CALCULATED.3IONS-SCNC: 13 MEQ/L (ref 9–15)
BUN BLDV-MCNC: 55 MG/DL (ref 8–23)
CALCIUM SERPL-MCNC: 8.7 MG/DL (ref 8.5–9.9)
CHLORIDE BLD-SCNC: 116 MEQ/L (ref 95–107)
CO2: 23 MEQ/L (ref 20–31)
CREAT SERPL-MCNC: 3.99 MG/DL (ref 0.5–0.9)
EKG ATRIAL RATE: 109 BPM
EKG P AXIS: 58 DEGREES
EKG P-R INTERVAL: 208 MS
EKG Q-T INTERVAL: 278 MS
EKG QRS DURATION: 66 MS
EKG QTC CALCULATION (BAZETT): 374 MS
EKG R AXIS: -37 DEGREES
EKG T AXIS: 195 DEGREES
EKG VENTRICULAR RATE: 109 BPM
GFR SERPL CREATININE-BSD FRML MDRD: 11.3 ML/MIN/{1.73_M2}
GLUCOSE BLD-MCNC: 198 MG/DL (ref 70–99)
GLUCOSE BLD-MCNC: 212 MG/DL (ref 70–99)
GLUCOSE BLD-MCNC: 222 MG/DL (ref 70–99)
HCT VFR BLD CALC: 33.8 % (ref 37–47)
HEMOGLOBIN: 10.5 G/DL (ref 12–16)
MCH RBC QN AUTO: 31.1 PG (ref 27–31.3)
MCHC RBC AUTO-ENTMCNC: 31.1 % (ref 33–37)
MCV RBC AUTO: 100 FL (ref 79.4–94.8)
PDW BLD-RTO: 16.3 % (ref 11.5–14.5)
PERFORMED ON: ABNORMAL
PERFORMED ON: ABNORMAL
PLATELET # BLD: 139 K/UL (ref 130–400)
POTASSIUM REFLEX MAGNESIUM: 3.6 MEQ/L (ref 3.4–4.9)
RBC # BLD: 3.38 M/UL (ref 4.2–5.4)
SODIUM BLD-SCNC: 152 MEQ/L (ref 135–144)
WBC # BLD: 15.9 K/UL (ref 4.8–10.8)

## 2023-03-14 PROCEDURE — 2580000003 HC RX 258: Performed by: INTERNAL MEDICINE

## 2023-03-14 PROCEDURE — 6360000002 HC RX W HCPCS: Performed by: INTERNAL MEDICINE

## 2023-03-14 PROCEDURE — 1210000000 HC MED SURG R&B

## 2023-03-14 PROCEDURE — 74176 CT ABD & PELVIS W/O CONTRAST: CPT

## 2023-03-14 PROCEDURE — 2700000000 HC OXYGEN THERAPY PER DAY

## 2023-03-14 PROCEDURE — 85027 COMPLETE CBC AUTOMATED: CPT

## 2023-03-14 PROCEDURE — 99222 1ST HOSP IP/OBS MODERATE 55: CPT | Performed by: SPECIALIST

## 2023-03-14 PROCEDURE — 36415 COLL VENOUS BLD VENIPUNCTURE: CPT

## 2023-03-14 PROCEDURE — 99222 1ST HOSP IP/OBS MODERATE 55: CPT | Performed by: PHYSICAL MEDICINE & REHABILITATION

## 2023-03-14 PROCEDURE — 80048 BASIC METABOLIC PNL TOTAL CA: CPT

## 2023-03-14 PROCEDURE — 6370000000 HC RX 637 (ALT 250 FOR IP): Performed by: INTERNAL MEDICINE

## 2023-03-14 RX ORDER — POLYETHYLENE GLYCOL 3350 17 G/17G
17 POWDER, FOR SOLUTION ORAL DAILY PRN
Status: DISCONTINUED | OUTPATIENT
Start: 2023-03-14 | End: 2023-03-19 | Stop reason: HOSPADM

## 2023-03-14 RX ORDER — ACETAMINOPHEN 325 MG/1
650 TABLET ORAL EVERY 6 HOURS PRN
Status: DISCONTINUED | OUTPATIENT
Start: 2023-03-14 | End: 2023-03-14

## 2023-03-14 RX ORDER — SODIUM CHLORIDE 9 MG/ML
25 INJECTION, SOLUTION INTRAVENOUS PRN
Status: DISCONTINUED | OUTPATIENT
Start: 2023-03-14 | End: 2023-03-19 | Stop reason: HOSPADM

## 2023-03-14 RX ORDER — SODIUM CHLORIDE 9 MG/ML
INJECTION, SOLUTION INTRAVENOUS CONTINUOUS
Status: DISCONTINUED | OUTPATIENT
Start: 2023-03-14 | End: 2023-03-14

## 2023-03-14 RX ORDER — LACTULOSE 10 G/15ML
20 SOLUTION ORAL
Status: DISPENSED | OUTPATIENT
Start: 2023-03-14 | End: 2023-03-15

## 2023-03-14 RX ORDER — ONDANSETRON 2 MG/ML
4 INJECTION INTRAMUSCULAR; INTRAVENOUS EVERY 6 HOURS PRN
Status: DISCONTINUED | OUTPATIENT
Start: 2023-03-14 | End: 2023-03-19 | Stop reason: HOSPADM

## 2023-03-14 RX ORDER — ONDANSETRON 4 MG/1
4 TABLET, ORALLY DISINTEGRATING ORAL EVERY 8 HOURS PRN
Status: DISCONTINUED | OUTPATIENT
Start: 2023-03-14 | End: 2023-03-19 | Stop reason: HOSPADM

## 2023-03-14 RX ORDER — DEXTROSE MONOHYDRATE 100 MG/ML
INJECTION, SOLUTION INTRAVENOUS CONTINUOUS PRN
Status: DISCONTINUED | OUTPATIENT
Start: 2023-03-14 | End: 2023-03-19 | Stop reason: HOSPADM

## 2023-03-14 RX ORDER — SODIUM CHLORIDE 0.9 % (FLUSH) 0.9 %
5-40 SYRINGE (ML) INJECTION PRN
Status: DISCONTINUED | OUTPATIENT
Start: 2023-03-14 | End: 2023-03-19 | Stop reason: HOSPADM

## 2023-03-14 RX ORDER — AMMONIUM LACTATE 12 G/100G
LOTION TOPICAL PRN
Status: DISCONTINUED | OUTPATIENT
Start: 2023-03-14 | End: 2023-03-19 | Stop reason: HOSPADM

## 2023-03-14 RX ORDER — ENOXAPARIN SODIUM 100 MG/ML
30 INJECTION SUBCUTANEOUS DAILY
Status: DISCONTINUED | OUTPATIENT
Start: 2023-03-14 | End: 2023-03-19 | Stop reason: HOSPADM

## 2023-03-14 RX ORDER — ACETAMINOPHEN 650 MG/1
650 SUPPOSITORY RECTAL EVERY 6 HOURS PRN
Status: DISCONTINUED | OUTPATIENT
Start: 2023-03-14 | End: 2023-03-14

## 2023-03-14 RX ORDER — CIPROFLOXACIN 250 MG/1
250 TABLET, FILM COATED ORAL 2 TIMES DAILY
COMMUNITY
Start: 2023-03-13 | End: 2023-03-18

## 2023-03-14 RX ORDER — HYDROXYZINE HYDROCHLORIDE 50 MG/ML
25 INJECTION, SOLUTION INTRAMUSCULAR EVERY 6 HOURS PRN
Status: DISCONTINUED | OUTPATIENT
Start: 2023-03-14 | End: 2023-03-19 | Stop reason: HOSPADM

## 2023-03-14 RX ORDER — SODIUM CHLORIDE 0.9 % (FLUSH) 0.9 %
5-40 SYRINGE (ML) INJECTION EVERY 12 HOURS SCHEDULED
Status: DISCONTINUED | OUTPATIENT
Start: 2023-03-14 | End: 2023-03-19 | Stop reason: HOSPADM

## 2023-03-14 RX ORDER — ATORVASTATIN CALCIUM 10 MG/1
10 TABLET, FILM COATED ORAL DAILY
Status: DISCONTINUED | OUTPATIENT
Start: 2023-03-14 | End: 2023-03-19 | Stop reason: HOSPADM

## 2023-03-14 RX ADMIN — HYDROXYZINE HYDROCHLORIDE 25 MG: 50 INJECTION, SOLUTION INTRAMUSCULAR at 23:15

## 2023-03-14 RX ADMIN — ACETAMINOPHEN 650 MG: 325 TABLET ORAL at 09:36

## 2023-03-14 RX ADMIN — ENOXAPARIN SODIUM 30 MG: 100 INJECTION SUBCUTANEOUS at 09:37

## 2023-03-14 RX ADMIN — ACETAMINOPHEN 650 MG: 325 TABLET ORAL at 01:17

## 2023-03-14 RX ADMIN — SODIUM CHLORIDE: 9 INJECTION, SOLUTION INTRAVENOUS at 01:00

## 2023-03-14 RX ADMIN — ONDANSETRON 4 MG: 2 INJECTION INTRAMUSCULAR; INTRAVENOUS at 12:36

## 2023-03-14 RX ADMIN — CEFTRIAXONE SODIUM 1000 MG: 1 INJECTION, POWDER, FOR SOLUTION INTRAMUSCULAR; INTRAVENOUS at 18:28

## 2023-03-14 RX ADMIN — Medication 10 ML: at 22:14

## 2023-03-14 RX ADMIN — DEXTROSE AND SODIUM CHLORIDE: 5; 450 INJECTION, SOLUTION INTRAVENOUS at 18:31

## 2023-03-14 RX ADMIN — LACTULOSE 20 G: 20 SOLUTION ORAL at 18:32

## 2023-03-14 ASSESSMENT — ENCOUNTER SYMPTOMS
VISUAL CHANGE: 0
ABDOMINAL DISTENTION: 1
COUGH: 0
SORE THROAT: 0
ABDOMINAL PAIN: 1
VOMITING: 0
EYE REDNESS: 0
EYE PAIN: 0
CONSTIPATION: 1
STRIDOR: 0
SWOLLEN GLANDS: 0
DIARRHEA: 0
NAUSEA: 1
SHORTNESS OF BREATH: 1
WHEEZING: 0
PHOTOPHOBIA: 0
BACK PAIN: 1
CHANGE IN BOWEL HABIT: 1
BLOOD IN STOOL: 0

## 2023-03-14 ASSESSMENT — PAIN DESCRIPTION - LOCATION
LOCATION: GENERALIZED

## 2023-03-14 ASSESSMENT — PAIN DESCRIPTION - DESCRIPTORS
DESCRIPTORS: ACHING

## 2023-03-14 ASSESSMENT — PAIN SCALES - GENERAL
PAINLEVEL_OUTOF10: 0
PAINLEVEL_OUTOF10: 6
PAINLEVEL_OUTOF10: 8

## 2023-03-14 NOTE — H&P
Present*   BACTERIA Negative   CLARITYU CLOUDY*   SPECGRAV 1.022   LEUKOCYTESUR MODERATE*   UROBILINOGEN 0.2   BILIRUBINUR Negative   BLOODU TRACE*   GLUCOSEU Negative     -----------------------------------------------------------------   No results found. Assessment and Plan   Acute renal failure on chronic kidney disease. Clinically dehydrated. Initiate IV fluids, monitor renal function  DM: monitor glucose accordion, titrate meds as needed  HTN: monitor BP, adjust meds as needed  Hypothyroidism  Await completion of home medications in order to complete physician medication reconciliation  Class II obesity  DVT ppx  Disposition: Dependent on hospital course. Will discharge once medically stable. SW on board for discharge planning. 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 without evidence of necrosis (CODE) L97.425    Diabetic neuropathy associated with diabetes mellitus due to underlying condition ([facility] E08.40    Abscess of foot including toes, left L02.612    Unable to ambulate R26.2    Ataxia R27.0    Acute renal failure (ARF) ([facility]) N17.9    Hypothyroid E03.9    Morbid obesity due to excess calories ([facility]) E66.01    Essential hypertension I10    Benzodiazepine dependence ([facility]) F13.20    Anxiety F41.9    Bronchitis J40    OA (osteoarthritis) M19.90    Acute on chronic renal failure ([facility]) N17.9, N18.9    Hx of Chronic ulcer of left foot ([facility]) L97.529    HLD (hyperlipidemia) E78.5    Fracture of right tibia and fibula S82.201A, S82.401A    Abnormality of gait and mobility due to Impaired Mobility secondary to Flare up of OA. [facility] admit 4/04/19.  R26.9    Uncontrolled type 2 diabetes mellitus with hyperglycemia

## 2023-03-15 PROBLEM — E11.9 TYPE 2 DIABETES MELLITUS WITHOUT COMPLICATION, WITHOUT LONG-TERM CURRENT USE OF INSULIN (HCC): Status: RESOLVED | Noted: 2023-01-01 | Resolved: 2023-01-01

## 2023-03-15 PROBLEM — N30.01 ACUTE CYSTITIS WITH HEMATURIA: Status: ACTIVE | Noted: 2023-01-01

## 2023-03-15 LAB
ANION GAP SERPL CALCULATED.3IONS-SCNC: 16 MEQ/L (ref 9–15)
BUN BLDV-MCNC: 55 MG/DL (ref 8–23)
CALCIUM SERPL-MCNC: 8.6 MG/DL (ref 8.5–9.9)
CHLORIDE BLD-SCNC: 116 MEQ/L (ref 95–107)
CO2: 22 MEQ/L (ref 20–31)
CREAT SERPL-MCNC: 3.98 MG/DL (ref 0.5–0.9)
GFR SERPL CREATININE-BSD FRML MDRD: 11.4 ML/MIN/{1.73_M2}
GLUCOSE BLD-MCNC: 275 MG/DL (ref 70–99)
GLUCOSE BLD-MCNC: 289 MG/DL (ref 70–99)
GLUCOSE BLD-MCNC: 296 MG/DL (ref 70–99)
GLUCOSE BLD-MCNC: 383 MG/DL (ref 70–99)
HCT VFR BLD CALC: 32.6 % (ref 37–47)
HEMOGLOBIN: 10.4 G/DL (ref 12–16)
MAGNESIUM SERPL-MCNC: 2.3 MG/DL (ref 1.7–2.4)
MCH RBC QN AUTO: 32.3 PG (ref 27–31.3)
MCHC RBC AUTO-ENTMCNC: 31.8 % (ref 33–37)
MCV RBC AUTO: 101.6 FL (ref 79.4–94.8)
PDW BLD-RTO: 16.7 % (ref 11.5–14.5)
PERFORMED ON: ABNORMAL
PLATELET # BLD: 123 K/UL (ref 130–400)
POTASSIUM REFLEX MAGNESIUM: 3.3 MEQ/L (ref 3.4–4.9)
RBC # BLD: 3.21 M/UL (ref 4.2–5.4)
SODIUM BLD-SCNC: 154 MEQ/L (ref 135–144)
WBC # BLD: 16.3 K/UL (ref 4.8–10.8)

## 2023-03-15 PROCEDURE — 99232 SBSQ HOSP IP/OBS MODERATE 35: CPT | Performed by: SPECIALIST

## 2023-03-15 PROCEDURE — 1210000000 HC MED SURG R&B

## 2023-03-15 PROCEDURE — 2580000003 HC RX 258: Performed by: INTERNAL MEDICINE

## 2023-03-15 PROCEDURE — 99213 OFFICE O/P EST LOW 20 MIN: CPT

## 2023-03-15 PROCEDURE — 83735 ASSAY OF MAGNESIUM: CPT

## 2023-03-15 PROCEDURE — 85027 COMPLETE CBC AUTOMATED: CPT

## 2023-03-15 PROCEDURE — 6360000002 HC RX W HCPCS: Performed by: INTERNAL MEDICINE

## 2023-03-15 PROCEDURE — 6370000000 HC RX 637 (ALT 250 FOR IP): Performed by: INTERNAL MEDICINE

## 2023-03-15 PROCEDURE — 99232 SBSQ HOSP IP/OBS MODERATE 35: CPT | Performed by: PHYSICAL MEDICINE & REHABILITATION

## 2023-03-15 PROCEDURE — 80048 BASIC METABOLIC PNL TOTAL CA: CPT

## 2023-03-15 PROCEDURE — 36415 COLL VENOUS BLD VENIPUNCTURE: CPT

## 2023-03-15 RX ORDER — DEXTROSE MONOHYDRATE 50 MG/ML
INJECTION, SOLUTION INTRAVENOUS CONTINUOUS
Status: DISCONTINUED | OUTPATIENT
Start: 2023-03-15 | End: 2023-03-19 | Stop reason: HOSPADM

## 2023-03-15 RX ADMIN — ONDANSETRON 4 MG: 2 INJECTION INTRAMUSCULAR; INTRAVENOUS at 05:47

## 2023-03-15 RX ADMIN — ATORVASTATIN CALCIUM 10 MG: 10 TABLET, FILM COATED ORAL at 08:14

## 2023-03-15 RX ADMIN — INSULIN LISPRO 4 UNITS: 100 INJECTION, SOLUTION INTRAVENOUS; SUBCUTANEOUS at 11:35

## 2023-03-15 RX ADMIN — HYDROXYZINE HYDROCHLORIDE 25 MG: 50 INJECTION, SOLUTION INTRAMUSCULAR at 05:47

## 2023-03-15 RX ADMIN — DEXTROSE AND SODIUM CHLORIDE: 5; 450 INJECTION, SOLUTION INTRAVENOUS at 05:57

## 2023-03-15 RX ADMIN — INSULIN LISPRO 4 UNITS: 100 INJECTION, SOLUTION INTRAVENOUS; SUBCUTANEOUS at 15:44

## 2023-03-15 RX ADMIN — ENOXAPARIN SODIUM 30 MG: 100 INJECTION SUBCUTANEOUS at 08:14

## 2023-03-15 RX ADMIN — HYDROXYZINE HYDROCHLORIDE 25 MG: 50 INJECTION, SOLUTION INTRAMUSCULAR at 11:35

## 2023-03-15 RX ADMIN — CEFTRIAXONE SODIUM 1000 MG: 1 INJECTION, POWDER, FOR SOLUTION INTRAMUSCULAR; INTRAVENOUS at 15:43

## 2023-03-15 RX ADMIN — INSULIN LISPRO 8 UNITS: 100 INJECTION, SOLUTION INTRAVENOUS; SUBCUTANEOUS at 08:14

## 2023-03-15 RX ADMIN — DEXTROSE AND SODIUM CHLORIDE: 5; 450 INJECTION, SOLUTION INTRAVENOUS at 15:46

## 2023-03-16 PROBLEM — Z78.9 IMPAIRED MOBILITY AND ACTIVITIES OF DAILY LIVING: Status: ACTIVE | Noted: 2022-01-01

## 2023-03-16 LAB
ANION GAP SERPL CALCULATED.3IONS-SCNC: 12 MEQ/L (ref 9–15)
BACTERIA UR CULT: ABNORMAL
BACTERIA UR CULT: ABNORMAL
BUN SERPL-MCNC: 59 MG/DL (ref 8–23)
CALCIUM SERPL-MCNC: 8 MG/DL (ref 8.5–9.9)
CHLORIDE SERPL-SCNC: 116 MEQ/L (ref 95–107)
CO2 SERPL-SCNC: 25 MEQ/L (ref 20–31)
CREAT SERPL-MCNC: 4.04 MG/DL (ref 0.5–0.9)
ERYTHROCYTE [DISTWIDTH] IN BLOOD BY AUTOMATED COUNT: 17 % (ref 11.5–14.5)
GLUCOSE BLD-MCNC: 239 MG/DL (ref 70–99)
GLUCOSE BLD-MCNC: 274 MG/DL (ref 70–99)
GLUCOSE BLD-MCNC: 301 MG/DL (ref 70–99)
GLUCOSE BLD-MCNC: 372 MG/DL (ref 70–99)
GLUCOSE SERPL-MCNC: 364 MG/DL (ref 70–99)
HCT VFR BLD AUTO: 33.5 % (ref 37–47)
HGB BLD-MCNC: 10.1 G/DL (ref 12–16)
MAGNESIUM SERPL-MCNC: 2.2 MG/DL (ref 1.7–2.4)
MCH RBC QN AUTO: 31.4 PG (ref 27–31.3)
MCHC RBC AUTO-ENTMCNC: 30.1 % (ref 33–37)
MCV RBC AUTO: 104.6 FL (ref 79.4–94.8)
ORGANISM: ABNORMAL
PERFORMED ON: ABNORMAL
PLATELET # BLD AUTO: 111 K/UL (ref 130–400)
POTASSIUM SERPL-SCNC: 3.1 MEQ/L (ref 3.4–4.9)
RBC # BLD AUTO: 3.2 M/UL (ref 4.2–5.4)
SODIUM SERPL-SCNC: 153 MEQ/L (ref 135–144)
WBC # BLD AUTO: 15.6 K/UL (ref 4.8–10.8)

## 2023-03-16 PROCEDURE — 97167 OT EVAL HIGH COMPLEX 60 MIN: CPT

## 2023-03-16 PROCEDURE — 6370000000 HC RX 637 (ALT 250 FOR IP): Performed by: INTERNAL MEDICINE

## 2023-03-16 PROCEDURE — 2500000003 HC RX 250 WO HCPCS: Performed by: INTERNAL MEDICINE

## 2023-03-16 PROCEDURE — 85027 COMPLETE CBC AUTOMATED: CPT

## 2023-03-16 PROCEDURE — 6360000002 HC RX W HCPCS: Performed by: INTERNAL MEDICINE

## 2023-03-16 PROCEDURE — 1210000000 HC MED SURG R&B

## 2023-03-16 PROCEDURE — 2580000003 HC RX 258: Performed by: INTERNAL MEDICINE

## 2023-03-16 PROCEDURE — 80048 BASIC METABOLIC PNL TOTAL CA: CPT

## 2023-03-16 PROCEDURE — 99221 1ST HOSP IP/OBS SF/LOW 40: CPT | Performed by: COLON & RECTAL SURGERY

## 2023-03-16 PROCEDURE — 36415 COLL VENOUS BLD VENIPUNCTURE: CPT

## 2023-03-16 PROCEDURE — 99232 SBSQ HOSP IP/OBS MODERATE 35: CPT | Performed by: PHYSICAL MEDICINE & REHABILITATION

## 2023-03-16 PROCEDURE — 83735 ASSAY OF MAGNESIUM: CPT

## 2023-03-16 RX ORDER — FLUCONAZOLE 2 MG/ML
200 INJECTION, SOLUTION INTRAVENOUS EVERY 24 HOURS
Status: DISCONTINUED | OUTPATIENT
Start: 2023-03-16 | End: 2023-03-19 | Stop reason: HOSPADM

## 2023-03-16 RX ORDER — METOPROLOL TARTRATE 5 MG/5ML
2.5 INJECTION INTRAVENOUS EVERY 6 HOURS
Status: DISCONTINUED | OUTPATIENT
Start: 2023-03-16 | End: 2023-03-19 | Stop reason: HOSPADM

## 2023-03-16 RX ADMIN — Medication 5 ML: at 22:49

## 2023-03-16 RX ADMIN — INSULIN LISPRO 6 UNITS: 100 INJECTION, SOLUTION INTRAVENOUS; SUBCUTANEOUS at 12:27

## 2023-03-16 RX ADMIN — HYDROXYZINE HYDROCHLORIDE 25 MG: 50 INJECTION, SOLUTION INTRAMUSCULAR at 10:53

## 2023-03-16 RX ADMIN — METOPROLOL TARTRATE 2.5 MG: 5 INJECTION INTRAVENOUS at 22:49

## 2023-03-16 RX ADMIN — HYDROXYZINE HYDROCHLORIDE 25 MG: 50 INJECTION, SOLUTION INTRAMUSCULAR at 17:12

## 2023-03-16 RX ADMIN — FLUCONAZOLE IN SODIUM CHLORIDE 200 MG: 2 INJECTION, SOLUTION INTRAVENOUS at 13:58

## 2023-03-16 RX ADMIN — HYDROXYZINE HYDROCHLORIDE 25 MG: 50 INJECTION, SOLUTION INTRAMUSCULAR at 01:33

## 2023-03-16 RX ADMIN — METOPROLOL TARTRATE 2.5 MG: 5 INJECTION INTRAVENOUS at 10:43

## 2023-03-16 RX ADMIN — DEXTROSE MONOHYDRATE: 50 INJECTION, SOLUTION INTRAVENOUS at 21:11

## 2023-03-16 RX ADMIN — DEXTROSE MONOHYDRATE 1000 ML: 50 INJECTION, SOLUTION INTRAVENOUS at 01:37

## 2023-03-16 RX ADMIN — ONDANSETRON 4 MG: 2 INJECTION INTRAMUSCULAR; INTRAVENOUS at 01:33

## 2023-03-16 RX ADMIN — CEFTRIAXONE SODIUM 1000 MG: 1 INJECTION, POWDER, FOR SOLUTION INTRAMUSCULAR; INTRAVENOUS at 15:59

## 2023-03-16 RX ADMIN — INSULIN LISPRO 8 UNITS: 100 INJECTION, SOLUTION INTRAVENOUS; SUBCUTANEOUS at 08:06

## 2023-03-16 RX ADMIN — INSULIN LISPRO 4 UNITS: 100 INJECTION, SOLUTION INTRAVENOUS; SUBCUTANEOUS at 17:13

## 2023-03-16 RX ADMIN — METOPROLOL TARTRATE 2.5 MG: 5 INJECTION INTRAVENOUS at 15:57

## 2023-03-16 RX ADMIN — ENOXAPARIN SODIUM 30 MG: 100 INJECTION SUBCUTANEOUS at 08:06

## 2023-03-16 ASSESSMENT — PAIN SCALES - GENERAL
PAINLEVEL_OUTOF10: 0
PAINLEVEL_OUTOF10: 0

## 2023-03-16 NOTE — CONSULTS
negative  NEUROLOGICAL:  negative  BEHAVIOR/PSYCH:  negative    PHYSICAL EXAM:    VITALS:  /85   Pulse (!) 112   Temp 97.5 °F (36.4 °C) (Oral)   Resp 18   Ht 5' 6.5\" (1.689 m)   Wt 242 lb (109.8 kg)   LMP  (LMP Unknown)   SpO2 94%   BMI 38.47 kg/m²   CONSTITUTIONAL:  awake, alert, cooperative, no apparent distress, and appears stated age  EYES:  Lids and lashes normal, pupils equal, round and reactive to light, extra ocular muscles intact, sclera clear, conjunctiva normal  ENT:  Normocephalic, without obvious abnormality, atraumatic, sinuses nontender on palpation, external ears without lesions, oral pharynx with moist mucus membranes, tonsils without erythema or exudates, gums normal and good dentition. NECK:  Supple, symmetrical, trachea midline, no adenopathy, thyroid symmetric, not enlarged and no tenderness, skin normal  HEMATOLOGIC/LYMPHATICS:  no cervical lymphadenopathy  BACK:  Symmetric, no curvature, spinous processes are non-tender on palpation, paraspinous muscles are non-tender on palpation, no costal vertebral tenderness  LUNGS:  No increased work of breathing, good air exchange, clear to auscultation bilaterally, no crackles or wheezing  CARDIOVASCULAR:  Normal apical impulse, regular rate and rhythm, normal S1 and S2, no S3 or S4, and no murmur noted  ABDOMEN: Soft and nondistended, markedly obese, partially reducible hernia right lower quadrant similar to descriptions of previous abdominal wall hernias especially from August 2021. No skin changes present. Multiple surgical incisions from multiple abdominal operations in the past  MUSCULOSKELETAL: Apparently bedbound. Pressure reducing mattress in place.   NEUROLOGIC: Awake alert and oriented  SKIN:  no bruising or bleeding  DATA:    CBC:   Lab Results   Component Value Date/Time    WBC 15.6 03/16/2023 06:50 AM    RBC 3.20 03/16/2023 06:50 AM    HGB 10.1 03/16/2023 06:50 AM    HCT 33.5 03/16/2023 06:50 AM    .6 03/16/2023
IntraVENous, PRN  glucagon (rDNA) injection 1 mg, 1 mg, SubCUTAneous, PRN  dextrose 10 % infusion, , IntraVENous, Continuous PRN  atorvastatin (LIPITOR) tablet 10 mg, 10 mg, Oral, Daily  levothyroxine (SYNTHROID) tablet 125 mcg, 125 mcg, Oral, Daily  ammonium lactate (LAC-HYDRIN) 12 % lotion, , Topical, PRN  dextrose 5 % and 0.45 % sodium chloride infusion, , IntraVENous, Continuous  insulin lispro (HUMALOG) injection vial 0-8 Units, 0-8 Units, SubCUTAneous, TID WC  insulin lispro (HUMALOG) injection vial 0-4 Units, 0-4 Units, SubCUTAneous, Nightly      Social History:  Social History     Socioeconomic History    Marital status:      Spouse name: enrique amador    Number of children: 2    Years of education: 12    Highest education level: Not on file   Occupational History    Occupation: retired   Tobacco Use    Smoking status: Never    Smokeless tobacco: Never   Vaping Use    Vaping Use: Never used   Substance and Sexual Activity    Alcohol use: No    Drug use: No    Sexual activity: Not Currently   Other Topics Concern    Not on file   Social History Narrative    Lives With: Spouse Enrique    Type of Home: House in [address] [place] [address]-Two level, Able to Live on Main level with bedroom/bathroom    Home Access: Stairs to enter with rails - Number of Steps: 3- Rails: Both    Bathroom Shower/Tub: Walk-in shower(Patient states she has not been using shower since fall in January 2019)    Bathroom Toilet: Standard    Bathroom Equipment: 3-in-1 commode(Reports no room for a shower chair)    Home Equipment: 4 wheeled walker    ADL Assistance: Needs assistance    Bath: (Patient states  assists to wash feet)    Dressing: (Patient reports  provides assistance for socks and shoes and to help pull pants over hips)    Homemaking Assistance: Needs assistance( performs)    Ambulation Assistance: Needs assistance(Rollator)    Transfer Assistance: Needs assistance(Spouse assists with transfers due to
this patient. Will continue to follow. Please call if questions or concerns arise.     Electronically signed by Luis Coronel MD on 3/14/2023 at 4:30 PM

## 2023-03-16 NOTE — FLOWSHEET NOTE
[id number]:  Assessment completed, dressing intact to bilateral arms, administered soap suds enema to patient, no BM at this time. Zinc applied to coccyx. .Electronically signed by Liss Wills RN on 3/16/2023 at 9:48 AM  0945: Dr Liya Jackson  in to see patient, plan to transfer to [facility], will consult with Lissette Brian. Patient having episodes of AFIB, 125/135, Dr Lissette Brian notified. Patient refusing all oral medications. Dr Lissette Brian made aware. Blood glucose 372, Dr Lissette Brian notified, pending any new orders at this time. Electronically signed by Liss Wills RN on 3/16/2023 at 9:51 AM  1020: New orders received for rate change to 100ml/hr of Dextrose 5%. New orders for metoprolol 2.5mg IV for increased heart rate . Electronically signed by Liss Wills RN on 3/16/2023 at 10:22 AM  1058: Heart rate re-assessed 95 at this time. C/O restlessness, IM Vistaril given per request. Will continue to monitor throughout this shift. .Electronically signed by Liss Wills RN on 3/16/2023 at 10:59 AM  1339: New orders received for Diflucan IV for UTI, also transfer orders placed by Dr Lissette Brian to [facility], pending bed. .Electronically signed by Liss Wills RN on 3/16/2023 at 1:39 PM  1600: Dressings to bilateral arms changed per orders, scant drainage to left arm, no drainage noted to right arm, patient tolerated well. .Electronically signed by Liss Wills RN on 3/16/2023 at 4:08 PM  1700: Patient c/o restlessness, Vistaril IM given per PRN orders. Pattient pulled up in bed and turned at this time. .Electronically signed by Liss Wills RN on 3/16/2023 at 5:18 PM  [phone]: Patient currently Sinus rhythm 106, Dr Lissette Brian notified.  .Electronically signed by Liss Wills RN on 3/16/2023 at 5:21 PM

## 2023-03-16 NOTE — FLOWSHEET NOTE
Slept poorly this shift, was very. Was very restless this shift.  Using the call light frequently, staff gave much TLC

## 2023-03-17 LAB
ANION GAP SERPL CALCULATED.3IONS-SCNC: 11 MEQ/L (ref 9–15)
BUN SERPL-MCNC: 57 MG/DL (ref 8–23)
CALCIUM SERPL-MCNC: 8 MG/DL (ref 8.5–9.9)
CHLORIDE SERPL-SCNC: 111 MEQ/L (ref 95–107)
CO2 SERPL-SCNC: 24 MEQ/L (ref 20–31)
CREAT SERPL-MCNC: 4.03 MG/DL (ref 0.5–0.9)
ERYTHROCYTE [DISTWIDTH] IN BLOOD BY AUTOMATED COUNT: 16.6 % (ref 11.5–14.5)
GLUCOSE BLD-MCNC: 244 MG/DL (ref 70–99)
GLUCOSE BLD-MCNC: 274 MG/DL (ref 70–99)
GLUCOSE BLD-MCNC: 292 MG/DL (ref 70–99)
GLUCOSE BLD-MCNC: 296 MG/DL (ref 70–99)
GLUCOSE SERPL-MCNC: 291 MG/DL (ref 70–99)
HCT VFR BLD AUTO: 33.4 % (ref 37–47)
HGB BLD-MCNC: 10.2 G/DL (ref 12–16)
MAGNESIUM SERPL-MCNC: 2.3 MG/DL (ref 1.7–2.4)
MCH RBC QN AUTO: 32.2 PG (ref 27–31.3)
MCHC RBC AUTO-ENTMCNC: 30.7 % (ref 33–37)
MCV RBC AUTO: 104.8 FL (ref 79.4–94.8)
PERFORMED ON: ABNORMAL
PLATELET # BLD AUTO: 89 K/UL (ref 130–400)
PLATELET BLD QL SMEAR: ABNORMAL
POTASSIUM SERPL-SCNC: 3.1 MEQ/L (ref 3.4–4.9)
RBC # BLD AUTO: 3.18 M/UL (ref 4.2–5.4)
SLIDE REVIEW: ABNORMAL
SODIUM SERPL-SCNC: 146 MEQ/L (ref 135–144)
WBC # BLD AUTO: 13.4 K/UL (ref 4.8–10.8)

## 2023-03-17 PROCEDURE — 6370000000 HC RX 637 (ALT 250 FOR IP): Performed by: INTERNAL MEDICINE

## 2023-03-17 PROCEDURE — 1210000000 HC MED SURG R&B

## 2023-03-17 PROCEDURE — 83735 ASSAY OF MAGNESIUM: CPT

## 2023-03-17 PROCEDURE — 36415 COLL VENOUS BLD VENIPUNCTURE: CPT

## 2023-03-17 PROCEDURE — 85027 COMPLETE CBC AUTOMATED: CPT

## 2023-03-17 PROCEDURE — 6360000002 HC RX W HCPCS: Performed by: INTERNAL MEDICINE

## 2023-03-17 PROCEDURE — 2500000003 HC RX 250 WO HCPCS: Performed by: INTERNAL MEDICINE

## 2023-03-17 PROCEDURE — 99232 SBSQ HOSP IP/OBS MODERATE 35: CPT | Performed by: PHYSICAL MEDICINE & REHABILITATION

## 2023-03-17 PROCEDURE — 97535 SELF CARE MNGMENT TRAINING: CPT

## 2023-03-17 PROCEDURE — 2580000003 HC RX 258: Performed by: INTERNAL MEDICINE

## 2023-03-17 PROCEDURE — 80048 BASIC METABOLIC PNL TOTAL CA: CPT

## 2023-03-17 RX ADMIN — INSULIN LISPRO 4 UNITS: 100 INJECTION, SOLUTION INTRAVENOUS; SUBCUTANEOUS at 08:01

## 2023-03-17 RX ADMIN — METOPROLOL TARTRATE 2.5 MG: 5 INJECTION INTRAVENOUS at 17:08

## 2023-03-17 RX ADMIN — ENOXAPARIN SODIUM 30 MG: 100 INJECTION SUBCUTANEOUS at 08:01

## 2023-03-17 RX ADMIN — HYDROXYZINE HYDROCHLORIDE 25 MG: 50 INJECTION, SOLUTION INTRAMUSCULAR at 02:59

## 2023-03-17 RX ADMIN — METOPROLOL TARTRATE 2.5 MG: 5 INJECTION INTRAVENOUS at 11:43

## 2023-03-17 RX ADMIN — DEXTROSE MONOHYDRATE: 50 INJECTION, SOLUTION INTRAVENOUS at 08:03

## 2023-03-17 RX ADMIN — FLUCONAZOLE IN SODIUM CHLORIDE 200 MG: 2 INJECTION, SOLUTION INTRAVENOUS at 14:09

## 2023-03-17 RX ADMIN — HYDROXYZINE HYDROCHLORIDE 25 MG: 50 INJECTION, SOLUTION INTRAMUSCULAR at 17:54

## 2023-03-17 RX ADMIN — HYDROXYZINE HYDROCHLORIDE 25 MG: 50 INJECTION, SOLUTION INTRAMUSCULAR at 11:42

## 2023-03-17 RX ADMIN — INSULIN LISPRO 4 UNITS: 100 INJECTION, SOLUTION INTRAVENOUS; SUBCUTANEOUS at 17:06

## 2023-03-17 RX ADMIN — METOPROLOL TARTRATE 2.5 MG: 5 INJECTION INTRAVENOUS at 04:12

## 2023-03-17 RX ADMIN — ASCORBIC ACID, VITAMIN A PALMITATE, CHOLECALCIFEROL, THIAMINE HYDROCHLORIDE, RIBOFLAVIN-5 PHOSPHATE SODIUM, PYRIDOXINE HYDROCHLORIDE, NIACINAMIDE, DEXPANTHENOL, ALPHA-TOCOPHEROL ACETATE, VITAMIN K1, FOLIC ACID, BIOTIN, CYANOCOBALAMIN: 200; 3300; 200; 6; 3.6; 6; 40; 15; 10; 150; 600; 60; 5 INJECTION, SOLUTION INTRAVENOUS at 20:51

## 2023-03-17 RX ADMIN — CEFTRIAXONE SODIUM 1000 MG: 1 INJECTION, POWDER, FOR SOLUTION INTRAMUSCULAR; INTRAVENOUS at 17:09

## 2023-03-17 RX ADMIN — INSULIN LISPRO 4 UNITS: 100 INJECTION, SOLUTION INTRAVENOUS; SUBCUTANEOUS at 11:43

## 2023-03-17 RX ADMIN — METOPROLOL TARTRATE 2.5 MG: 5 INJECTION INTRAVENOUS at 22:34

## 2023-03-17 ASSESSMENT — PAIN SCALES - GENERAL: PAINLEVEL_OUTOF10: 0

## 2023-03-17 NOTE — PLAN OF CARE
Nutrition Problem #1: Inadequate oral intake  Intervention: Food and/or Nutrient Delivery: Continue Current Diet, Start Oral Nutrition Supplement, Modify Parenteral Nutrition (Continue Carb Control 4 diet  Add diabetic ONS BID  Continue PPN (Clinimix), recommend increasing rate to 60 ml/hr and add 500 ml 20% lipids to TPN to better meet energy needs  If opted, this would provide 1734 kcal, 61 gm protein.   Recommend adding a daily bowel regimen)
Nutrition Problem #1: Inadequate oral intake  Intervention: Food and/or Nutrient Delivery: Modify Current Diet  Nutritional  Goals: po intake >50%, N/V to subside
Patient to transfer to [facility] when bed available    Problem: Discharge Planning  Goal: Discharge to home or other facility with appropriate resources  Outcome: Progressing     Problem: Pain  Goal: Verbalizes/displays adequate comfort level or baseline comfort level  Outcome: Progressing     Problem: ABCDS Injury Assessment  Goal: Absence of physical injury  Outcome: Progressing     Problem: Skin/Tissue Integrity  Goal: Absence of new skin breakdown  Description: 1. Monitor for areas of redness and/or skin breakdown  2. Assess vascular access sites hourly  3. Every 4-6 hours minimum:  Change oxygen saturation probe site  4. Every 4-6 hours:  If on nasal continuous positive airway pressure, respiratory therapy assess nares and determine need for appliance change or resting period.   Outcome: Progressing     Problem: Safety - Adult  Goal: Free from fall injury  Outcome: Progressing     Problem: Chronic Conditions and Co-morbidities  Goal: Patient's chronic conditions and co-morbidity symptoms are monitored and maintained or improved  Outcome: Progressing     Problem: Nutrition Deficit:  Goal: Optimize nutritional status  Outcome: Progressing     Problem: Risk for Elopement  Goal: Patient will not exit the unit/facility without proper excort  Outcome: Progressing     Problem: Risk for Elopement  Goal: Patient will not exit the unit/facility without proper excort  Outcome: Progressing
Therapy evaluation completed. Please see daily notes and/or progress notes for details related to planned treatment interventions, goals and functional performance.
supplements, and vitamin/mineral supplements   Recommend, monitor, and adjust tube feedings and TPN/PPN based on assessed needs   Provide specific nutrition education to patient or family as appropriate     Problem: Risk for Elopement  Goal: Patient will not exit the unit/facility without proper excort  Outcome: Progressing  Flowsheets  Taken 3/17/2023 0800 by Katie Craig RN  Nursing Interventions for Elopement Risk:   Collaborate with family members/caregivers to mitigate the elopement risk   Communicate/escalate to /other team member the risk of elopement   Communicate/escalate to charge nurse the risk of elopement  Taken 3/17/2023 0437 by Idalmis Yuan RN  Nursing Interventions for Elopement Risk:   Collaborate with family members/caregivers to mitigate the elopement risk   Communicate/escalate to /other team member the risk of elopement   Communicate/escalate to charge nurse the risk of elopement

## 2023-03-17 NOTE — CARE COORDINATION
AWAITING BED FOR [facility] MAIN.
DC PLAN REMAINS RETURN TO [facility] AND WILL NEED PRECERT ONCE PT/OT EVALS OBTAINED
PT TO TRANSFER TO [facility] MAIN ONCE BED AVAILABLE.
AM-PAC:   /24    Family can provide assistance at DC: Yes  Would you like Case Management to discuss the discharge plan with any other family members/significant others, and if so, who? Yes (SPOUSE)  Plans to Return to Present Housing: No  Other Identified Issues/Barriers to RETURNING to current housing: RETURN TO SNF TO COMPLETE REHAB  Potential Assistance needed at discharge: [address]            Potential DME:    Patient expects to discharge to: [facility]  for transportation at discharge:      Financial    Payor: Keely Mitchell / Plan: Kim 15 / Product Type: *No Product type* /     Does insurance require precert for SNF: Yes    Potential assistance Purchasing Medications: No  Meds-to-Beds request: Yes      [facility] [address] [phone] - f [phone]  [address] [place] [address]  Phone: [phone] Fax: [phone]      Notes:    Factors facilitating achievement of predicted outcomes: Family support and Cooperative    Barriers to discharge: Upper extremity weakness, Lower extremity weakness, and Wound Care    Additional Case Management Notes: SPOKE WITH PATIENT AND SPOUSE AT BEDSIDE. PT WAS AT [facility] AND WAS SENT TO [address] [place] [address] SNF. WAS HOME WITH SPOUSE PRIOR. HAS WALKER, WC, HOSPITAL BED, BSC AND LIFT CHAIR. NO HOME 02. THEY WOULD LIKE PATIENT TO RETURN TO [address] [place] [address] TO FINISH HER REHAB THERE. AWARE WE WILL NEED PRECERT. LSW UPDATED. The Plan for Transition of Care is related to the following treatment goals of Dehydration [E86.0]  Hypokalemia [E87.6]  Acute renal failure (ARF) (HCC) [N17.9]  Acute kidney injury superimposed on chronic kidney disease ([address] [N17.9, N18.9]  Atrial fibrillation, unspecified type ([address] [U09.83]    IF APPLICABLE: The Patient and/or patient representative Will Boyd and her family were provided with a choice of provider and agrees with the discharge plan.  Freedom of

## 2023-03-17 NOTE — FLOWSHEET NOTE
0830: Patient assessment  completed, patient alert and oriented times 4, anxiousness noted at time, patient pulled up in bed at this time, orders for heating pad to ABD, Call placed to supply to bring machine. 1000: Spoke with therapy in regards to attempting therapy today, patient in agreement. Heating pad placed to ABD. Patient tolerating well.  1100: Heating pad removed, therapy in with patient. 1140: Patient request for Vistaril due to restlessness, Vistaril given per prn orders. .Electronically signed by Katie Craig RN on 3/17/2023 at 12:50 PM  1230: Vistaril effective, patient resting comfortably in bed, new orders obtained for TPN, TPN  to hang at 630 p.m. , education provided to patient. Patient states understanding. .Electronically signed by Katie Craig RN on 3/17/2023 at 12:50 PM  1301: Patient urine very dark tea colored at this time,patient currently being treated for UTI, urine is darker today than yesterday, also currently on continuous fluids IV,  Dr Emperatriz Denise notified. .Electronically signed by Katie Craig RN on 3/17/2023 at 1:02 PM  [phone]: 800 Dereck christina called for update on patient, currently no bed available, will call back. 1459: Transfer handoff report to [facility]. Electronically signed by Katie Craig RN on 3/17/2023 at 3:00 PM  1511: Call placed to Rebecca Carrero, patient spouse. Updated on care.  .Electronically signed by Katie Craig RN on 3/17/2023 at 3:13 PM

## 2023-03-18 VITALS
SYSTOLIC BLOOD PRESSURE: 125 MMHG | OXYGEN SATURATION: 98 % | DIASTOLIC BLOOD PRESSURE: 58 MMHG | TEMPERATURE: 98.7 F | BODY MASS INDEX: 37.87 KG/M2 | WEIGHT: 241.3 LBS | RESPIRATION RATE: 20 BRPM | HEART RATE: 98 BPM | HEIGHT: 67 IN

## 2023-03-18 LAB
ANION GAP SERPL CALCULATED.3IONS-SCNC: 9 MEQ/L (ref 9–15)
BACTERIA BLD CULT: NORMAL
BACTERIA BLD CULT: NORMAL
BUN SERPL-MCNC: 63 MG/DL (ref 8–23)
CALCIUM SERPL-MCNC: 7.8 MG/DL (ref 8.5–9.9)
CHLORIDE SERPL-SCNC: 109 MEQ/L (ref 95–107)
CO2 SERPL-SCNC: 23 MEQ/L (ref 20–31)
CREAT SERPL-MCNC: 3.89 MG/DL (ref 0.5–0.9)
ERYTHROCYTE [DISTWIDTH] IN BLOOD BY AUTOMATED COUNT: 15.9 % (ref 11.5–14.5)
GLUCOSE BLD-MCNC: 259 MG/DL (ref 70–99)
GLUCOSE BLD-MCNC: 278 MG/DL (ref 70–99)
GLUCOSE BLD-MCNC: 296 MG/DL (ref 70–99)
GLUCOSE BLD-MCNC: 331 MG/DL (ref 70–99)
GLUCOSE SERPL-MCNC: 312 MG/DL (ref 70–99)
HCT VFR BLD AUTO: 29.7 % (ref 37–47)
HGB BLD-MCNC: 9.3 G/DL (ref 12–16)
MAGNESIUM SERPL-MCNC: 2 MG/DL (ref 1.7–2.4)
MCH RBC QN AUTO: 32.1 PG (ref 27–31.3)
MCHC RBC AUTO-ENTMCNC: 31.5 % (ref 33–37)
MCV RBC AUTO: 101.8 FL (ref 79.4–94.8)
PERFORMED ON: ABNORMAL
PLATELET # BLD AUTO: 82 K/UL (ref 130–400)
POTASSIUM SERPL-SCNC: 3.4 MEQ/L (ref 3.4–4.9)
RBC # BLD AUTO: 2.92 M/UL (ref 4.2–5.4)
SODIUM SERPL-SCNC: 141 MEQ/L (ref 135–144)
WBC # BLD AUTO: 9.1 K/UL (ref 4.8–10.8)

## 2023-03-18 PROCEDURE — 36415 COLL VENOUS BLD VENIPUNCTURE: CPT

## 2023-03-18 PROCEDURE — 2580000003 HC RX 258: Performed by: INTERNAL MEDICINE

## 2023-03-18 PROCEDURE — 6360000002 HC RX W HCPCS: Performed by: INTERNAL MEDICINE

## 2023-03-18 PROCEDURE — 6370000000 HC RX 637 (ALT 250 FOR IP): Performed by: INTERNAL MEDICINE

## 2023-03-18 PROCEDURE — 80048 BASIC METABOLIC PNL TOTAL CA: CPT

## 2023-03-18 PROCEDURE — 2500000003 HC RX 250 WO HCPCS: Performed by: INTERNAL MEDICINE

## 2023-03-18 PROCEDURE — 1210000000 HC MED SURG R&B

## 2023-03-18 PROCEDURE — 85027 COMPLETE CBC AUTOMATED: CPT

## 2023-03-18 PROCEDURE — 83735 ASSAY OF MAGNESIUM: CPT

## 2023-03-18 RX ADMIN — METOPROLOL TARTRATE 2.5 MG: 5 INJECTION INTRAVENOUS at 16:30

## 2023-03-18 RX ADMIN — Medication 10 ML: at 22:01

## 2023-03-18 RX ADMIN — METOPROLOL TARTRATE 2.5 MG: 5 INJECTION INTRAVENOUS at 10:36

## 2023-03-18 RX ADMIN — HYDROXYZINE HYDROCHLORIDE 25 MG: 50 INJECTION, SOLUTION INTRAMUSCULAR at 10:36

## 2023-03-18 RX ADMIN — METOPROLOL TARTRATE 2.5 MG: 5 INJECTION INTRAVENOUS at 21:54

## 2023-03-18 RX ADMIN — DEXTROSE MONOHYDRATE: 50 INJECTION, SOLUTION INTRAVENOUS at 22:05

## 2023-03-18 RX ADMIN — INSULIN LISPRO 4 UNITS: 100 INJECTION, SOLUTION INTRAVENOUS; SUBCUTANEOUS at 12:12

## 2023-03-18 RX ADMIN — INSULIN LISPRO 4 UNITS: 100 INJECTION, SOLUTION INTRAVENOUS; SUBCUTANEOUS at 16:51

## 2023-03-18 RX ADMIN — INSULIN LISPRO 6 UNITS: 100 INJECTION, SOLUTION INTRAVENOUS; SUBCUTANEOUS at 09:04

## 2023-03-18 RX ADMIN — ATORVASTATIN CALCIUM 10 MG: 10 TABLET, FILM COATED ORAL at 21:00

## 2023-03-18 RX ADMIN — CEFTRIAXONE SODIUM 1000 MG: 1 INJECTION, POWDER, FOR SOLUTION INTRAMUSCULAR; INTRAVENOUS at 16:31

## 2023-03-18 RX ADMIN — METOPROLOL TARTRATE 2.5 MG: 5 INJECTION INTRAVENOUS at 05:13

## 2023-03-18 RX ADMIN — FLUCONAZOLE IN SODIUM CHLORIDE 200 MG: 2 INJECTION, SOLUTION INTRAVENOUS at 13:54

## 2023-03-18 RX ADMIN — Medication 10 ML: at 10:25

## 2023-03-18 ASSESSMENT — PAIN SCALES - GENERAL: PAINLEVEL_OUTOF10: 0

## 2023-03-18 NOTE — FLOWSHEET NOTE
Patients  getting upset , wants to talk to the Doctor, says hes worried that his wife will get pneumonia, because of the coughing.

## 2023-03-18 NOTE — FLOWSHEET NOTE
Patients  would like to talk to the physician, Dr. Milena Foreman aware of this.  is in the patients room.

## 2023-03-18 NOTE — FLOWSHEET NOTE
I cleaned the pts skin tears bilat arms with saline, applied vaseline guaze and guaze dressing to both arms.

## 2023-03-18 NOTE — FLOWSHEET NOTE
Patient sitting up in bed. Staff repositioned pt for comfort. . Pt denies pain. has a moist congested cough,non productive. Patient has oxygen on at 3 litres nasal cannula, pulse ox is 98%.  on telemetry. /54. Pt has a pure wik, tea colored urine. Call light in pts reach. Pt refused her breakfast this morning. Fall precautions.

## 2023-03-19 PROCEDURE — 2700000000 HC OXYGEN THERAPY PER DAY

## 2023-03-20 NOTE — PROGRESS NOTES
AM assessment completed. Pt refuses to be turned in bed for this nurse to assess skin posteriorly. Pt complains of generalized pain and is offered Tylenol. Pt refused at first, however was agreeable after being encourage by her spouse who is at the bedside. Home/NH med received from nightshift RN, this nurse upated home list in chart. Pt safety maintained.
Accepted to Dr Evan Smith service at [facility]
Assumed care from [facility], [address] [place] [address]. Assessed patient. Alert and oriented to person. Knows she is in the hospital, but thought she was in [address] [place] [address]. Uncertain of date or year. Stated that \" I know that, but I'm just tired\". Incontinent of medium amount soft, brown stool. Brief and linens changed. Repositioned for comfort.   Electronically signed by Kiara Campos RN on 3/17/2023 at 4:17 PM
Bedside handoff given to Sarah Weir RN  Electronically signed by Tata Dyer RN on 3/17/2023 at 7:41 PM
Dressing change at [facility].  Upper extremities done
Gely Peters is a 67 y.o. female patient.     Current Facility-Administered Medications   Medication Dose Route Frequency Provider Last Rate Last Admin    sodium chloride flush 0.9 % injection 5-40 mL  5-40 mL IntraVENous 2 times per day Wilton Simon MD   10 mL at 03/14/23 2214    sodium chloride flush 0.9 % injection 5-40 mL  5-40 mL IntraVENous PRN Wilton Simon MD        0.9 % sodium chloride infusion  25 mL IntraVENous PRN Wilton Simon MD        enoxaparin Sodium (LOVENOX) injection 30 mg  30 mg SubCUTAneous Daily Wilton Simon MD   30 mg at 03/15/23 0814    ondansetron (ZOFRAN-ODT) disintegrating tablet 4 mg  4 mg Oral Q8H PRN Wilton Simon MD        Or    ondansetron (ZOFRAN) injection 4 mg  4 mg IntraVENous Q6H PRN Wilton Simon MD   4 mg at 03/15/23 0547    polyethylene glycol (GLYCOLAX) packet 17 g  17 g Oral Daily PRN Wilton Simon MD        glucose chewable tablet 16 g  4 tablet Oral PRN Wilton Simon MD        dextrose bolus 10% 125 mL  125 mL IntraVENous PRN Wilton Simon MD        Or    dextrose bolus 10% 250 mL  250 mL IntraVENous PRN Wilton Simon MD        glucagon (rDNA) injection 1 mg  1 mg SubCUTAneous PRN Wilton Simon MD        dextrose 10 % infusion   IntraVENous Continuous PRN Wilton Simon MD        atorvastatin (LIPITOR) tablet 10 mg  10 mg Oral Daily Wilton Simon MD   10 mg at 03/15/23 8565    levothyroxine (SYNTHROID) tablet 125 mcg  125 mcg Oral Daily Wilton Simon MD        ammonium lactate (LAC-HYDRIN) 12 % lotion   Topical PRN Wilton Simon MD        cefTRIAXone (ROCEPHIN) 1,000 mg in sodium chloride 0.9 % 50 mL IVPB (mini-bag)  1,000 mg IntraVENous Q24H Wilton Simon MD   Stopped at 03/15/23 1628    hydrOXYzine (VISTARIL) injection 25 mg  25 mg IntraMUSCular Q6H PRN López Wylie MD   25 mg at 03/15/23 1135    dextrose 5 % and 0.45 % sodium chloride infusion   IntraVENous Continuous Wilton Simon  mL/hr at 03/15/23 1546 New Bag at 03/15/23
Hospitalist Progress Note      Date of Admission: 3/13/2023  Chief Complaint:    Chief Complaint   Patient presents with    Other     Abnormal labs       Subjective:  No new complaints.   No nausea, vomiting, chest pain, or headache      Medications:    Infusion Medications    PN-Adult premixed (4.25/10) Peripheral solution with electrolytes      dextrose 100 mL/hr at 03/17/23 1805    sodium chloride      dextrose       Scheduled Medications    metoprolol  2.5 mg IntraVENous Q6H    fluconazole  200 mg IntraVENous Q24H    sodium chloride flush  5-40 mL IntraVENous 2 times per day    [Held by provider] enoxaparin  30 mg SubCUTAneous Daily    atorvastatin  10 mg Oral Daily    levothyroxine  125 mcg Oral Daily    cefTRIAXone (ROCEPHIN) IV  1,000 mg IntraVENous Q24H    insulin lispro  0-8 Units SubCUTAneous TID WC    insulin lispro  0-4 Units SubCUTAneous Nightly     PRN Meds: sodium chloride flush, sodium chloride, ondansetron **OR** ondansetron, polyethylene glycol, glucose, dextrose bolus **OR** dextrose bolus, glucagon (rDNA), dextrose, ammonium lactate, hydrOXYzine    Intake/Output Summary (Last 24 hours) at 3/17/2023 1859  Last data filed at 3/17/2023 1805  Gross per 24 hour   Intake 3983.73 ml   Output --   Net 3983.73 ml       Exam:  BP (!) 109/37   Pulse 100   Temp 98.4 °F (36.9 °C) (Oral)   Resp 18   Ht 5' 6.5\" (1.689 m)   Wt 241 lb 4.8 oz (109.5 kg)   LMP  (LMP Unknown)   SpO2 100%   BMI 38.36 kg/m²   Head: Normocephalic, atraumatic  Sclera clear  Neck JVD flat  Lungs: normal effort of breathing    Labs:   Recent Labs     03/15/23  0508 03/16/23  0650 03/17/23  0518   WBC 16.3* 15.6* 13.4*   HGB 10.4* 10.1* 10.2*   HCT 32.6* 33.5* 33.4*   * 111* 89*       Recent Labs     03/15/23  0508 03/16/23  0650 03/17/23  0518   * 153* 146*   K 3.3* 3.1* 3.1*   * 116* 111*   CO2 22 25 24   BUN 55* 59* 57*   CREATININE 3.98* 4.04* 4.03*   CALCIUM 8.6 8.0* 8.0*       No results for input(s): INR in
Hospitalist Progress Note      Date of Admission: 3/13/2023  Chief Complaint:    Chief Complaint   Patient presents with    Other     Abnormal labs       Subjective:  No new complaints. No nausea, vomiting, chest pain, or headache      Medications:    Infusion Medications    [START ON 3/17/2023] PN-Adult premixed (4.25/10) Peripheral solution with electrolytes      dextrose 100 mL/hr at 03/16/23 1043    sodium chloride      dextrose       Scheduled Medications    metoprolol  2.5 mg IntraVENous Q6H    fluconazole  200 mg IntraVENous Q24H    sodium chloride flush  5-40 mL IntraVENous 2 times per day    enoxaparin  30 mg SubCUTAneous Daily    atorvastatin  10 mg Oral Daily    levothyroxine  125 mcg Oral Daily    cefTRIAXone (ROCEPHIN) IV  1,000 mg IntraVENous Q24H    insulin lispro  0-8 Units SubCUTAneous TID WC    insulin lispro  0-4 Units SubCUTAneous Nightly     PRN Meds: sodium chloride flush, sodium chloride, ondansetron **OR** ondansetron, polyethylene glycol, glucose, dextrose bolus **OR** dextrose bolus, glucagon (rDNA), dextrose, ammonium lactate, hydrOXYzine    Intake/Output Summary (Last 24 hours) at 3/16/2023 1950  Last data filed at 3/16/2023 1201  Gross per 24 hour   Intake 50 ml   Output --   Net 50 ml     Exam:  /72   Pulse 100   Temp 98 °F (36.7 °C) (Oral)   Resp 18   Ht 5' 6.5\" (1.689 m)   Wt 242 lb (109.8 kg)   LMP  (LMP Unknown)   SpO2 94%   BMI 38.47 kg/m²   Head: Normocephalic, atraumatic  Sclera clear  Neck JVD flat  Lungs: normal effort of breathing    Labs:   Recent Labs     03/14/23  0601 03/15/23  0508 03/16/23  0650   WBC 15.9* 16.3* 15.6*   HGB 10.5* 10.4* 10.1*   HCT 33.8* 32.6* 33.5*    123* 111*       Recent Labs     03/14/23  0601 03/15/23  0508 03/16/23  0650   * 154* 153*   K 3.6 3.3* 3.1*   * 116* 116*   CO2 23 22 25   BUN 55* 55* 59*   CREATININE 3.99* 3.98* 4.04*   CALCIUM 8.7 8.6 8.0*       No results for input(s): INR in the last 72 hours.   No
Hospitalist Progress Note      Date of Admission: 3/13/2023  Chief Complaint:    Chief Complaint   Patient presents with    Other     Abnormal labs       Subjective:  No new complaints. No nausea, vomiting, chest pain, or headache      Medications:    Infusion Medications    sodium chloride      dextrose      dextrose 5 % and 0.45 % NaCl       Scheduled Medications    sodium chloride flush  5-40 mL IntraVENous 2 times per day    enoxaparin  30 mg SubCUTAneous Daily    atorvastatin  10 mg Oral Daily    levothyroxine  125 mcg Oral Daily    lactulose  20 g Oral 6 times per day    cefTRIAXone (ROCEPHIN) IV  1,000 mg IntraVENous Q24H    insulin lispro  0-8 Units SubCUTAneous TID WC    insulin lispro  0-4 Units SubCUTAneous Nightly     PRN Meds: sodium chloride flush, sodium chloride, ondansetron **OR** ondansetron, polyethylene glycol, glucose, dextrose bolus **OR** dextrose bolus, glucagon (rDNA), dextrose, ammonium lactate  No intake or output data in the 24 hours ending 03/14/23 1740  Exam:  BP (!) 151/82   Pulse (!) 148   Temp 98.5 °F (36.9 °C) (Oral)   Resp 20   Ht 5' 6.5\" (1.689 m)   Wt 246 lb 3.2 oz (111.7 kg)   LMP  (LMP Unknown)   SpO2 94%   BMI 39.14 kg/m²   Head: Normocephalic, atraumatic  Sclera clear  Neck JVD flat  Lungs: normal effort of breathing    Labs:   Recent Labs     03/13/23  0711 03/13/23  1315 03/14/23  0601   WBC 16.9* 16.9* 15.9*   HGB 11.2* 10.7* 10.5*   HCT 35.6* 33.9* 33.8*    163 139     Recent Labs     03/13/23  0711 03/13/23  1315 03/14/23  0601   * 154* 152*   K 3.1* 3.1* 3.6   * 112* 116*   CO2 24 25 23   BUN 58* 60* 55*   CREATININE 3.97* 4.23* 3.99*   CALCIUM 9.4 9.0 8.7   AST  --  17  --    ALT  --  20  --    BILITOT  --  0.4  --    ALKPHOS  --  75  --      No results for input(s): INR in the last 72 hours. No results for input(s): Hakan Cowan in the last 72 hours.   Radiology:  CT ABDOMEN PELVIS WO CONTRAST Additional Contrast? None    (Results
[facility] Occupational Therapy      Date: 3/14/2023  Patient Name: Tea Crockett        MRN: [id number]  Account: [de-identified]   : 1950  (67 y.o.)  Room: [place] [address]    Chart reviewed, attempted OT at  for OTE. Patient not seen 2° to:    Hold per nursing request due to: pt agitation  Will attempt again when able. Spoke to [employer], RN aware. Will attempt again when able.     Electronically signed by Brianne Mcallister OT on 3/14/2023 at 2:38 PM
Nursing report received from first shift nurse. Pt in bed resting. Pt Currently denies pain. Wearing O2 via nasal canula. Bed in lowest position. Side rails x2, call light in reach. Nurse will monitor Pt throughout the night     [id number] [facility] called said pt has a bed at [facility] main. Report called to [facility] to [facility]. [address] care here to  pt to take to [facility]. Tele removed. All personal items placed in pt pink bag. Pt ready for transport.
PM CONSULT CALLED TO DR Mendenhall [address] Electronically signed by Brandon Calvert on 3/14/2023 at 2:47 PM
PT provided w/ bariatric bed and prn for anxiety per pt request. PT resting comfortably with eyes closed, VSS. call light within reach, bed low and locked.
Patient arrived to room from ED. Transferred to bed. Oriented to room and call light.  at bedside. Admission questions completed.
Patient noted to be very demanding/angry when her request for pain medication via IV not fulfilled by MD when this nurse requested in [address] [place] [address]. Patient is very argumentative about taking Tylenol, stating that she will throw it up, but eventually did take them p.o. Gagged slightly after taking. Patient states is to weak to adjust head of bed with controls on siderails, however noted to be moving hands and arms about when yelling out demands at this nurse. Patient states will not drink diet soda, and that she has her own supply to drink in patient refrigerator. Patient educated on Diabetic diet regarding regular soda and sugar content. Patient refused skin assessment and stated that she was too tired to do it during night shift. At 0400 patient demanded to be gotten up to chair and that she would refuse PT later in day. Patient informed that no PT evaluation has been completed, therefore we could not get her OOB until PT evaluation completed. Patient had been informed earlier in shift that Tylenol could also be given R/S if emesis with p.o. dose began to occur. At 0600 patient demanding to PCA that nurse come to room and give r/s. Patient informed that it was too early for Tylenol as this med is Q6hr. Attempts at calming/reassurance minimally successful despite several attempts per this nurse and other staff members.
Physical Therapy  Facility/Department: [facility] K965/T611-23  Physical Therapy Discharge      NAME: Brandon Farley    : 1950 (67 y.o.)  MRN: [id number]    Account: [de-identified]  Gender: female      Patient has been discharged from acute care hospital. DC patient from current PT program.      Electronically signed by Daisy Mari PT on 3/20/23 at 9:03 AM EDT
Physical Therapy Missed Treatment   Facility/Department: [facility] MED SURG G431/Y051-07    NAME: Judy Martinez    : 1950 (67 y.o.)  MRN: [id number]    Account: [de-identified]  Gender: female      PT referral received. Chart reviewed. PT received message that pt is ASAP for evaluation this date. Pt declined all movement this date. CM made aware. Will follow and attempt PT evaluation again at earliest availability.        Nanci Rodríguez, PT, 03/15/23 at 10:54 AM
Physician Progress Note      PATIENT:               Modesto Guy  CSN #:                  [id number]  :                       1950  ADMIT DATE:       3/13/2023 1:02 PM  [address] [place] DATE:  Partha Danielle  PROVIDER #:        Victoria Dance MD          QUERY TEXT:    Patient admitted with BAR. Noted documentation of hyponatremia by Dr. Maryanne Fuller   in PN dated 3/15. Sodium labs sqe110/152/154/153. If possible, please document   in progress notes and discharge summary if you are evaluating and /or treating   any of the following: The medical record reflects the following:  Risk Factors: BAR, DM, CKD  Clinical Indicators: /152/154/153  Treatment: lab monitoring, NS 2L bolus, [email or web address]    Kaye DAILEY, RN, CDS  [phone]  Options provided:  -- Hypernatremia confirmed and hyponatremia ruled out  -- Other - I will add my own diagnosis  -- Disagree - Not applicable / Not valid  -- Disagree - Clinically unable to determine / Unknown  -- Refer to Clinical Documentation Reviewer    PROVIDER RESPONSE TEXT:    Error in dictation interpretation. Hypernatremia, not hyponatremia.     Query created by: Makenzie Kennedy on 3/16/2023 9:57 AM      Electronically signed by:  Victoria Dance MD 3/16/2023 12:07 PM
Pt has been very demanding and displaying rude behavior this shift. Pt is now complaining of being uncomfortable in her room bed. This nurse ordered a bariatric bed per supply protocol and informed pt. Explained to pt that this specialty mattress will be delivered by an outside company with an unknown ETA. Call from the supply company who confirmed with [employer] that order was received and would deliver \"soon\". Pt demanding this nurse to \"call the squad to get me out of here. Where is my ambulette\". Offered the pt and her spouses AMA paperwork, not signed. After a couple hours, the pt was heard yelling by this nurse at her spouse and he left. Pt continuously pressed her nurse call light asking for pain medicine, PRN Tylenol given and offered again per order, she refused. Pt did finally allow this nurse to assess buttocks, wound care consulted per protocol for multiple stage I and IIs on bilat buttocks. Multiple attempts to comfort pt verbally and offered emotional support for her multiple rude outbursts. IV antibiotic given, IVFs continue to infuse without difficulty. Poor PO intake. Bed alarm on, call light in reach.
Shift summary:  Report received, morning assessment completed. Patient is alert and oriented x4, abdomen round distended non tender bowel sounds hypoactive/absent during my assessment. Patient is getting vistaril IM Q6 PRN for anxiety- seems to be helping at this time. Patient is refusing all PO meds at this time because they make her experience vomiting. Last night I was told she wanted to sign out AMA. Lungs clear, bilateral grasp equal, generalized edema noted, no adventitious heart sounds. Plan for discharge is to return to [facility]. GI perfect served in regards to her CT Abdomen result: Orders to consult General surgery. Wound  care in to see patient, New wound orders placed. Aurora Galeana made aware of sodium and potassium levels. General Surgery consulted. Patient is having generalized pain  notified. He told me to speak with pain management. Pain management notified of patients pain. Awaiting orders as of  1445. She has not attempted to sign out AMA during this shift. Family at bedside. Repositioned PRN. Lights dim, call bell in reach, resting with eyes closed, denies further needs at this time. 1630:No response from pain management. 1800: still no response from pain management. No new orders at this time. 1815:Dr.Nisar balderrama served for pain medications. Awaiting orders. Patient denies needs at this time. Call bell in reach.
[facility] Occupational Therapy      Date: 3/15/2023  Patient Name: Corwin Saint        MRN: [id number]  Account: [de-identified]   : 1950  (67 y.o.)  Room: [facility] [address]    Chart reviewed, attempted OT at 21  for OTE. Patient not seen 2° to:    Pt. declined, stating: I'm not doing any movement for them today. Explained role of OT multiple times and need for eval for DC planning. Pt continues to refuse. Case management notified. Will attempt again when able.     Electronically signed by Dasia Foreman OT on 3/15/2023 at 10:54 AM
Subjective: The patient complains of severe acute on chronic progressive fatigue and whole body pain partially relieved by rest, PT, OT and meds   and exacerbated by exertion and recent renal Ds, dehydration and  SBO-per recent CT  Findings are compatible with a mild localized small-bowel obstruction   relating to a large lower abdominal wall hernia. .  She still has not had any bowel movements or successfully related to her laxative. She is on IV fluids we are considering banana bag. I have messaged the hospitalist.  Patient and family are considering transfer back to [facility] to consider surgery for small bowel obstruction as she has had over 8 surgeries in the past and is prone to small bowel obstruction and is at high risk for surgery. I am concerned about patients medical complexities including:  Principal Problem:    Acute kidney injury superimposed on chronic kidney disease ([address]  Active Problems:    Complications of immobility    SBO (small bowel obstruction) (HCC)    Nausea    Myalgia    Acute cystitis with hematuria    Diabetes mellitus with diabetic peripheral angiopathy without gangrene, without long-term current use of insulin (HCC)    Diabetic neuropathy associated with diabetes mellitus due to underlying condition (HCC)    Anxiety    Acute on chronic renal failure (HCC)    Abnormality of gait and mobility due to Impaired Mobility secondary to Flare up of OA. [facility] admit 4/04/19. Resolved Problems:    * No resolved hospital problems. *      .    Reviewed recent nursing note and discussed current status and planned care with acute care providers, \" Slept poorly this shift, was very. Was very restless this shift. Using the call light frequently, staff gave much TLC \". She is resting comfortably in the bed better hydration on IV fluids. Still not tolerating orals-has only been able to eat a half of a popsicle since she is been admitted.   Vomited canned peaches yesterday when
Throughout the day the pt and her spouse have become increasingly agitated. Both have verbalized anger about \"noone doing anything to help her\" referring to the pt. Attempted to provide verbal reassurance that orders had been received to restart some of the pts home medications. Pt medicated for pain and nausea, however continues to verbalize dissatisfaction. Pts spouse came to the nurses station multiple times and verbalized \"I might as well take her out of here to the [facility]\". Pt called this nurse in the room and stated \"I need a squad\". Asked the pt to explain and she stated \"I need to be seen. I need a doctor\". Secure message sent to hospitalist to update him of pt and spouse verbal dissatisfaction. Asked pt several times this shift to allow this nurse to assess buttock, she refused. Pt agreeable to having bilat upper arms wrapped with xeroform and cling. Pt has multiple upper BLE skin tears.
-39.9)    Estimated Daily Nutrient Needs:  Energy Requirements Based On: Kcal/kg  Weight Used for Energy Requirements: Current  Energy (kcal/day): 1675-1898kcal (15-17kcal/kg)  Weight Used for Protein Requirements: Ideal  Protein (g/day): 48-60g (0.8-1g/kg)  Method Used for Fluid Requirements: 1 ml/kcal  Fluid (ml/day): ~1800ml    Nutrition Diagnosis:   Inadequate oral intake related to pain as evidenced by nausea, vomiting, intake 0-25%, poor intake prior to admission    Nutrition Interventions:   Food and/or Nutrient Delivery: Modify Current Diet  Nutrition Education/Counseling: Education declined  Coordination of Nutrition Care: Continue to monitor while inpatient       Goals:     Goals: PO intake 75% or greater, other (specify)  Specify Other Goals: Gluc <160    Nutrition Monitoring and Evaluation:   Behavioral-Environmental Outcomes: None Identified  Food/Nutrient Intake Outcomes: Food and Nutrient Intake  Physical Signs/Symptoms Outcomes: Biochemical Data, Weight, Meal Time Behavior, Skin    Discharge Planning:     Too soon to determine     Brooke Good, MS, RD, LD
Findings are compatible with a mild localized small-bowel obstruction   relating to a large lower abdominal wall hernia. 2.  Incidental findings are described above. Assessment/Plan:    Arf on CKD. Creat downtrending. DM: monitor glucose accordion, titrate meds as needed    HTN: monitor BP, adjust meds as needed    Hypothyroid    Abd discomfort with out peritoneal signs. Sbo on ct, gensur consulted. Hyponatremia persists. Did not receive ivf for parts of yesterday. Ivf changed from . 45 to d5w. Monitor renal function, na.       MD Samson Crockett MD ,MD
better hydration on IV fluids. Still not tolerating orals-has only been able to eat a half of a popsicle since she is been admitted. Vomited canned peaches yesterday when she tried to eat something semisolid. ROS x10: The patient also complains of severely impaired mobility and activities of daily living. Otherwise no new problems with vision, hearing, nose, mouth, throat, dermal, cardiovascular, GI, , pulmonary, musculoskeletal, psychiatric or neurological.        Vital signs:  BP (!) 115/55   Pulse 96   Temp 98 °F (36.7 °C) (Oral)   Resp 18   Ht 5' 6.5\" (1.689 m)   Wt 242 lb (109.8 kg)   LMP  (LMP Unknown)   SpO2 99%   BMI 38.47 kg/m²   I/O:   PO/Intake:    no PO intake,   dry  mucous membranes, IVFs-plans to transition to PPN    Bowel/Bladder:  SBO, constipated, incontinent, PureWick catheter Rocephin for UTI  General:  Patient is well developed, adequately nourished, and    well kempt. HEENT:    PERRLA, hearing intact to loud voice, external inspection of ear and nose benign. Inspection of lips, tongue and gums benign  Musculoskeletal: No significant change in strength or tone. All joints stable. Inspection and palpation of digits and nails show no clubbing, cyanosis or inflammatory conditions. Neuro/Psychiatric: Affect: flat-  Alert and oriented to self and situation with no cues. No significant change in deep tendon reflexes or sensation  Lungs:  Diminished, CTA-B  . Respiration effort is normal at rest.   Heart:   S1 = S2,   RRR. Abdomen:  Hypoactive but present right upper and lower quadrant bowel sounds, obese, distended, Soft, non-tender    Extremities:  mild lower extremity edema but no unusual tenderness. Skin:   BUE bruises dt blood draws, Dressing change at [facility].  Upper extremities done      Rehabilitation:  Physical Therapy:   Bed mobility:  Bed mobility  Rolling to Left: Maximum assistance;2 Person assistance (03/16/23 1595)  Rolling to Right: Maximum assistance;2
calories ([address] 04/04/2019    Essential hypertension 04/04/2019    Benzodiazepine dependence ([address] 04/04/2019    Anxiety 04/04/2019    Bronchitis 04/04/2019    OA (osteoarthritis) 04/04/2019    Acute on chronic renal failure ([address] 04/04/2019    Hx of Chronic ulcer of left foot ([address] 04/04/2019    HLD (hyperlipidemia) 04/04/2019    Fracture of right tibia and fibula 04/04/2019    Abnormality of gait and mobility due to Impaired Mobility secondary to Flare up of OA. [facility] admit 4/04/19. 04/04/2019    Unable to ambulate 04/03/2019    Abscess of foot including toes, left 01/26/2018    Diabetic neuropathy associated with diabetes mellitus due to underlying condition ([address] 11/03/2017    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 without evidence of necrosis (CODE) 10/13/2017    Diabetes mellitus with diabetic peripheral angiopathy without gangrene, without long-term current use of insulin ([address] 09/22/2017    Peripheral angiopathy ([address] 09/22/2017    Non-healing surgical wound 09/22/2017        Past Medical History:   Diagnosis Date    Anxiety 4/4/2019    Arthritis     Bronchitis     Diabetes mellitus ([address]     Diabetic neuropathy associated with diabetes mellitus due to underlying condition ([address] 11/3/2017    Essential hypertension 4/4/2019    Hypercholesterolemia     Hypertension     Morbid obesity due to excess calories ([address] 4/4/2019    Thyroid disease     Wound infection 2018    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  surgeries x3    TONSILLECTOMY N/A        Chart Reviewed: Yes  Patient assessed for rehabilitation services?: Yes    Restrictions:  Restrictions/Precautions: Fall Risk, Contact Precautions     SUBJECTIVE:   Subjective: Pt reports she lives at home with 
Intake/Tolerance  Physical Signs/Symptoms Outcomes: Biochemical Data, Weight, Meal Time Behavior, Skin    Discharge Planning:     Too soon to determine     Joanne Stephens RD, LD
get comforable at all today. \"    Pain  Pain: Pt reports 6/10 pain of the abdomin pre and post session. RN notified. OBJECTIVE:        Bed mobility  Rolling to Left: Maximum assistance;2 Person assistance  Rolling to Right: Maximum assistance;2 Person assistance  Supine to Sit: Dependent/Total;2 Person assistance  Sit to Supine: 2 Person assistance;Dependent/Total  Bed Mobility Comments: HOB elevated with use of HHA and hand rails. Pt able to initate movement for a breif period of time, however fatigues very quickly. Seated EOB pt able to sit with UE support for ~ 30 seconds before displaying a retro lean. Transfers  Sit to Stand: 2 Person Assistance;Dependent/Total  Stand to Sit: 2 Person Assistance;Dependent/Total  Comment: Attempted STS with WW and +2 assist. Pt unable to tolerate WBing and complete a  full stand. Pt returned to EOB and transfers discontinued d/t safety concerns. Activity Tolerance  Activity Tolerance: Patient limited by fatigue;Patient limited by endurance;Treatment limited secondary to medical complications          ASSESSMENT   Assessment: Pt able to transfer to EOB with total assist +2. Once seated EOB pt is able to sit with UE support for ~30 seconds before requiring assistance to correct retro lean. Pt fatigues very quickly and requires multiple extended rest breaks. Increased time and effort to complete all mobility. Goals  Short Term Goals  Short Term Goal 1: Pt will perform bed mobility modA  Short Term Goal 2: Pt will sit EOB >/=3 mins with fair balance  Short Term Goal 3: Pt will tolerate >/=10 mins of functional activity    PLAN    General Plan: 1 time a day 3-6 times a week  Safety Devices  Type of Devices: All fall risk precautions in place, Call light within reach, Left in bed, Bed alarm in place     AMPAC (6 CLICK) BASIC MOBILITY  AM-PAC Inpatient Mobility Raw Score : 7     Therapy Time   Individual   Time In 1049   Time Out 1112   Minutes 23      : BM/ Transfers:
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 without evidence of necrosis (CODE)    Diabetic neuropathy associated with diabetes mellitus due to underlying condition (HCC)    Abscess of foot including toes, left    Unable to ambulate    Ataxia    Acute kidney injury superimposed on chronic kidney disease ([address]    Hypothyroid    Morbid obesity due to excess calories (HCC)    Essential hypertension    Benzodiazepine dependence (HCC)    Anxiety    Bronchitis    OA (osteoarthritis)    Acute on chronic renal failure (HCC)    Hx of Chronic ulcer of left foot (HCC)    HLD (hyperlipidemia)    Fracture of right tibia and fibula    Abnormality of gait and mobility due to Impaired Mobility secondary to Flare up of OA. [facility] admit 4/04/19.     Uncontrolled type 2 diabetes mellitus with hyperglycemia (HCC)    Nocturnal hypoxemia due to obesity    Nocturnal hypoxia    Sleep apnea    Open wound of right buttock    Buttock wound    Venous insufficiency of both lower extremities    Wound of buttock, right, subsequent encounter    Decreased strength, endurance, and mobility    Complications of immobility    Buttock wound, left, subsequent encounter    Hand abrasion, left, initial encounter    Laceration of right hand without foreign body    Venous ulcer, limited to breakdown of skin ([address]    Venous ulcer of right lower extremity without varicose veins (HCC)    Venous ulcer of left leg (HCC)    Skin tear of left forearm without complication    Cervical myelopathy (HCC)    Encephalopathy    Chronic pain disorder    Pain in right knee
Monitor for UTI symptoms including lethargy and confusion    Severe back pain and generalized OA pain: reassess pain every shift and prior to and after each therapy session, give prn Tylenol versus Ultram and consider low-dose Duragesic patch or another medication that will be less likely to constipate her for her pain and consider scheduled Tylenol, modalities prn in therapy, consider Lidoderm, K-pad prn. Skin healing    breakdown   risk:  continue pressure relief program.  Daily skin exams and reports from nursing. Dolphin mattress seems to be helping-consider vitamin B12 shots consider co-Q10 once patient is able to swallow and take orals. Severe fatigue due to immobility and nutritional deficits: continue to monitor closely for dehydration   Add vitamin B12 vitamin D and CoQ10 titrate dosing and add protein supplementation with low carb content. Complex discharge planning:   Discussed with care team-last 24 hour events noted. I will continue to follow along and reassess functional and medical status as we strive to improve patient's functional and medical outcomes progressing to the most efficient and lowest level of care. Complex Active General Medical Issues that complicate care:     1. Principal Problem:    Acute kidney injury superimposed on chronic kidney disease ([facility]  Active Problems:    Complications of immobility    SBO (small bowel obstruction) (HCC)    Nausea    Myalgia    Diabetes mellitus with diabetic peripheral angiopathy without gangrene, without long-term current use of insulin (HCC)    Diabetic neuropathy associated with diabetes mellitus due to underlying condition (HCC)    Anxiety    Acute on chronic renal failure (HCC)    Abnormality of gait and mobility due to Impaired Mobility secondary to Flare up of OA. [facility] admit 4/04/19. Resolved Problems:    * No resolved hospital problems.  *          Events and functional changes in the past 24 hours reviewed decline in
Total  How much help is needed for bathing (which includes washing, rinsing, drying)?: Total  How much help is needed for toileting (which includes using toilet, bedpan, or urinal)?: Total  How much help is needed for putting on and taking off regular upper body clothing?: Total  How much help is needed for taking care of personal grooming?: A Lot  How much help for eating meals?: None  AM-[facility] Inpatient Daily Activity Raw Score: 10  AM-PAC Inpatient ADL T-Scale Score : 27.31  ADL Inpatient CMS 0-100% Score: 74.7    Therapy key for assistance levels -   Independent/Mod I = Pt. is able to perform task with no assistance but may require a device   Stand by assistance = Pt. does not perform task at an independent level but does not need physical assistance, requires verbal cues  Minimal, Moderate, Maximal Assistance = Pt. requires physical assistance (25%, 50%, 75% assist from helper) for task but is able to actively participate in task   Dependent = Pt. requires total assistance with task and is not able to actively participate with task completion     Plan:  Occupational Therapy Plan  Times Per Week: 1-4x/wk  Current Treatment Recommendations: Strengthening, ROM, Balance training, Functional mobility training, Endurance training, Neuromuscular re-education, Self-Care / ADL    Goals:   Patient will:    - Improve functional endurance to tolerate/complete 8-15 mins of ADL's  - Be Min A in UB ADLs   - Improve bilateral UE strength and endurance to Fair- in order to participate in self-care activities as projected. Patient Goal: Patient goals :  To return to SNF for short term rehab      Discussed and agreed upon: Yes Comments:       Therapy Time:   Individual   Time In 1420   Time Out 1432   Minutes 12          Eval: 12 minutes     Electronically signed by:    KALYANI Smith,   5/63/3799, 2:52 PM Electronically signed by KALYANI Smith on 0/50/66 at 2:54 PM EDT

## 2023-04-05 ENCOUNTER — HOSPITAL ENCOUNTER (EMERGENCY)
Age: 73
Discharge: HOME | End: 2023-04-05
Attending: EMERGENCY MEDICINE
Payer: COMMERCIAL

## 2023-04-05 ENCOUNTER — APPOINTMENT (OUTPATIENT)
Dept: CT IMAGING | Age: 73
End: 2023-04-05
Payer: COMMERCIAL

## 2023-04-05 VITALS
WEIGHT: 242 LBS | HEIGHT: 66 IN | BODY MASS INDEX: 38.89 KG/M2 | OXYGEN SATURATION: 97 % | TEMPERATURE: 98.6 F | SYSTOLIC BLOOD PRESSURE: 132 MMHG | RESPIRATION RATE: 19 BRPM | DIASTOLIC BLOOD PRESSURE: 78 MMHG | HEART RATE: 99 BPM

## 2023-04-05 DIAGNOSIS — E86.0 DEHYDRATION: Primary | ICD-10-CM

## 2023-04-05 LAB
ALBUMIN SERPL-MCNC: 2.4 G/DL (ref 3.5–4.6)
ALP SERPL-CCNC: 110 U/L (ref 40–130)
ALT SERPL-CCNC: 15 U/L (ref 0–33)
ANION GAP SERPL CALCULATED.3IONS-SCNC: 8 MEQ/L (ref 9–15)
ANISOCYTOSIS BLD QL SMEAR: ABNORMAL
AST SERPL-CCNC: 21 U/L (ref 0–35)
BASOPHILS # BLD: 0.1 K/UL (ref 0–0.2)
BASOPHILS NFR BLD: 1 %
BILIRUB SERPL-MCNC: 0.3 MG/DL (ref 0.2–0.7)
BUN SERPL-MCNC: 30 MG/DL (ref 8–23)
CALCIUM SERPL-MCNC: 7.9 MG/DL (ref 8.5–9.9)
CHLORIDE SERPL-SCNC: 114 MEQ/L (ref 95–107)
CO2 SERPL-SCNC: 28 MEQ/L (ref 20–31)
CREAT SERPL-MCNC: 2.03 MG/DL (ref 0.5–0.9)
EOSINOPHIL # BLD: 0 K/UL (ref 0–0.7)
EOSINOPHIL NFR BLD: 0.7 %
ERYTHROCYTE [DISTWIDTH] IN BLOOD BY AUTOMATED COUNT: 17.7 % (ref 11.5–14.5)
GLOBULIN SER CALC-MCNC: 2 G/DL (ref 2.3–3.5)
GLUCOSE SERPL-MCNC: 138 MG/DL (ref 70–99)
HCT VFR BLD AUTO: 26.9 % (ref 37–47)
HGB BLD-MCNC: 8.4 G/DL (ref 12–16)
HYPOCHROMIA BLD QL SMEAR: 0
LACTATE BLDV-SCNC: 1.1 MMOL/L (ref 0.5–2.2)
LIPASE SERPL-CCNC: 12 U/L (ref 12–95)
LYMPHOCYTES # BLD: 0.5 K/UL (ref 1–4.8)
LYMPHOCYTES NFR BLD: 5 %
MACROCYTES BLD QL SMEAR: 0
MCH RBC QN AUTO: 31 PG (ref 27–31.3)
MCHC RBC AUTO-ENTMCNC: 31.1 % (ref 33–37)
MCV RBC AUTO: 99.7 FL (ref 79.4–94.8)
METAMYELOCYTES NFR BLD MANUAL: 2 %
MICROCYTES BLD QL SMEAR: 0
MONOCYTES # BLD: 0.1 K/UL (ref 0.2–0.8)
MONOCYTES NFR BLD: 0.9 %
MYELOCYTES NFR BLD MANUAL: 1 %
NEUTROPHILS # BLD: 7.3 K/UL (ref 1.4–6.5)
NEUTS SEG NFR BLD: 90 %
PLATELET # BLD AUTO: 140 K/UL (ref 130–400)
PLATELET BLD QL SMEAR: ADEQUATE
POIKILOCYTOSIS BLD QL SMEAR: ABNORMAL
POLYCHROMASIA BLD QL SMEAR: ABNORMAL
POTASSIUM SERPL-SCNC: 4.3 MEQ/L (ref 3.4–4.9)
PROT SERPL-MCNC: 4.4 G/DL (ref 6.3–8)
RBC # BLD AUTO: 2.7 M/UL (ref 4.2–5.4)
SCHISTOCYTES BLD QL SMEAR: ABNORMAL
SODIUM SERPL-SCNC: 150 MEQ/L (ref 135–144)
VARIANT LYMPHS NFR BLD: 1 %
WBC # BLD AUTO: 7.9 K/UL (ref 4.8–10.8)

## 2023-04-05 PROCEDURE — 85025 COMPLETE CBC W/AUTO DIFF WBC: CPT

## 2023-04-05 PROCEDURE — 2580000003 HC RX 258: Performed by: EMERGENCY MEDICINE

## 2023-04-05 PROCEDURE — 74176 CT ABD & PELVIS W/O CONTRAST: CPT

## 2023-04-05 PROCEDURE — 36415 COLL VENOUS BLD VENIPUNCTURE: CPT

## 2023-04-05 PROCEDURE — 6370000000 HC RX 637 (ALT 250 FOR IP): Performed by: EMERGENCY MEDICINE

## 2023-04-05 PROCEDURE — 80053 COMPREHEN METABOLIC PANEL: CPT

## 2023-04-05 PROCEDURE — 83605 ASSAY OF LACTIC ACID: CPT

## 2023-04-05 PROCEDURE — 83690 ASSAY OF LIPASE: CPT

## 2023-04-05 RX ORDER — 0.9 % SODIUM CHLORIDE 0.9 %
1000 INTRAVENOUS SOLUTION INTRAVENOUS ONCE
Status: COMPLETED | OUTPATIENT
Start: 2023-04-05 | End: 2023-04-05

## 2023-04-05 RX ADMIN — Medication: at 12:45

## 2023-04-05 RX ADMIN — SODIUM CHLORIDE 1000 ML: 9 INJECTION, SOLUTION INTRAVENOUS at 13:28

## 2023-04-05 ASSESSMENT — LIFESTYLE VARIABLES
HOW MANY STANDARD DRINKS CONTAINING ALCOHOL DO YOU HAVE ON A TYPICAL DAY: PATIENT DOES NOT DRINK
HOW OFTEN DO YOU HAVE A DRINK CONTAINING ALCOHOL: NEVER

## 2023-04-05 ASSESSMENT — PAIN - FUNCTIONAL ASSESSMENT: PAIN_FUNCTIONAL_ASSESSMENT: NONE - DENIES PAIN

## 2023-04-05 NOTE — ED NOTES
Blood obtained from lt upper arm picc licc but stopped after 2.5 cc     Lucie Morillo.  Karen Stark, GIOVANA  04/05/23 8150

## 2023-04-05 NOTE — ED NOTES
Report to [facility] update given     Claudette Ring.  Kedar Carbajal, RN  04/05/23 8989

## 2023-04-05 NOTE — ED PROVIDER NOTES
Management Options  Dehydration  Diagnosis management comments: Labs appear to be at baseline. Scan is normal.  Patient does tolerate fluids here. May need outpatient GI follow-up and she is provided with this. Patient will be discharged home in good condition. Patient has been hemodynamically stable throughout ED course and is appropriate for outpatient follow up. Patient should follow up with GI in 2-3 days or return to ED immediately for any new or worsening symptoms. Patient is well appearing on discharge and agreeable with plan of care. Procedures    CRITICAL CARE TIME   Total Critical Care time was 0 minutes, excluding separately reportable procedures. There was a high probability of clinically significant/life threatening deterioration in the patient's condition which required my urgent intervention. FINAL IMPRESSION      1.  Dehydration          DISPOSITION/PLAN   DISPOSITION Decision To Discharge 04/05/2023 02:48:23 PM      (Please note that portions of this note were completed with a voice recognition program.  Efforts were made to edit the dictations but occasionally words are mis-transcribed.)    Gemini Bliss MD (electronically signed)  Attending Emergency Physician        Gemini Bliss MD  04/05/23 3414

## 2023-04-05 NOTE — ED TRIAGE NOTES
Pt arrived via ems from [facility] for high NA+ level 154 and is currently being treated for pneumonia. Pt a/o x 3 skin pink w/dresp non labored,lungs clear. Pt has raspy cough. pt has weak cough.

## 2023-04-14 ENCOUNTER — HOSPITAL ENCOUNTER (OUTPATIENT)
Age: 73
Setting detail: OUTPATIENT SURGERY
Discharge: NURSING, REHAB OR LONG-TERM CARE | End: 2023-04-14
Attending: SPECIALIST | Admitting: SPECIALIST
Payer: COMMERCIAL

## 2023-04-14 VITALS
HEIGHT: 66 IN | BODY MASS INDEX: 36.48 KG/M2 | RESPIRATION RATE: 16 BRPM | DIASTOLIC BLOOD PRESSURE: 60 MMHG | TEMPERATURE: 97.1 F | HEART RATE: 104 BPM | SYSTOLIC BLOOD PRESSURE: 120 MMHG | WEIGHT: 227 LBS | OXYGEN SATURATION: 94 %

## 2023-04-14 DIAGNOSIS — R13.10 DYSPHAGIA, UNSPECIFIED TYPE: ICD-10-CM

## 2023-04-14 PROCEDURE — 3700000001 HC ADD 15 MINUTES (ANESTHESIA): Performed by: SPECIALIST

## 2023-04-14 PROCEDURE — 88342 IMHCHEM/IMCYTCHM 1ST ANTB: CPT

## 2023-04-14 PROCEDURE — 3700000000 HC ANESTHESIA ATTENDED CARE: Performed by: SPECIALIST

## 2023-04-14 PROCEDURE — 2709999900 HC NON-CHARGEABLE SUPPLY: Performed by: SPECIALIST

## 2023-04-14 PROCEDURE — 6370000000 HC RX 637 (ALT 250 FOR IP): Performed by: SPECIALIST

## 2023-04-14 PROCEDURE — 7100000011 HC PHASE II RECOVERY - ADDTL 15 MIN: Performed by: SPECIALIST

## 2023-04-14 PROCEDURE — 7100000010 HC PHASE II RECOVERY - FIRST 15 MIN: Performed by: SPECIALIST

## 2023-04-14 PROCEDURE — 3609017100 HC EGD: Performed by: SPECIALIST

## 2023-04-14 PROCEDURE — 2580000003 HC RX 258: Performed by: SPECIALIST

## 2023-04-14 PROCEDURE — 88305 TISSUE EXAM BY PATHOLOGIST: CPT

## 2023-04-14 PROCEDURE — 2580000003 HC RX 258

## 2023-04-14 RX ORDER — SODIUM CHLORIDE 0.9 % (FLUSH) 0.9 %
5-40 SYRINGE (ML) INJECTION EVERY 12 HOURS SCHEDULED
Status: CANCELLED | OUTPATIENT
Start: 2023-04-14

## 2023-04-14 RX ORDER — SODIUM CHLORIDE 9 MG/ML
INJECTION, SOLUTION INTRAVENOUS CONTINUOUS
Status: DISCONTINUED | OUTPATIENT
Start: 2023-04-14 | End: 2023-04-14 | Stop reason: HOSPADM

## 2023-04-14 RX ORDER — SIMETHICONE 20 MG/.3ML
EMULSION ORAL PRN
Status: DISCONTINUED | OUTPATIENT
Start: 2023-04-14 | End: 2023-04-14 | Stop reason: ALTCHOICE

## 2023-04-14 RX ORDER — ONDANSETRON 2 MG/ML
4 INJECTION INTRAMUSCULAR; INTRAVENOUS
Status: CANCELLED | OUTPATIENT
Start: 2023-04-14 | End: 2023-04-15

## 2023-04-14 RX ORDER — SODIUM CHLORIDE 0.9 % (FLUSH) 0.9 %
5-40 SYRINGE (ML) INJECTION PRN
Status: CANCELLED | OUTPATIENT
Start: 2023-04-14

## 2023-04-14 RX ORDER — MAGNESIUM HYDROXIDE 1200 MG/15ML
LIQUID ORAL PRN
Status: DISCONTINUED | OUTPATIENT
Start: 2023-04-14 | End: 2023-04-14 | Stop reason: ALTCHOICE

## 2023-04-14 RX ORDER — SODIUM CHLORIDE 9 MG/ML
INJECTION, SOLUTION INTRAVENOUS PRN
Status: CANCELLED | OUTPATIENT
Start: 2023-04-14

## 2023-04-14 RX ORDER — SODIUM CHLORIDE 9 MG/ML
INJECTION, SOLUTION INTRAVENOUS
Status: COMPLETED
Start: 2023-04-14 | End: 2023-04-14

## 2023-04-14 RX ADMIN — SODIUM CHLORIDE 500 ML: 9 INJECTION, SOLUTION INTRAVENOUS at 10:20

## 2023-04-14 NOTE — H&P
Patient Name: Jason Novak  : 1950  MRN: [id number]  DATE: 23      ENDOSCOPY  History and Physical    Procedure:    [] Diagnostic Colonoscopy       [] Screening Colonoscopy   EGD      [] ERCP      [] EUS       [][] Other    [x] Previous office notes/History and Physical reviewed from the patients chart. Please see EMR for further details of HPI. I have examined the patient's status immediately prior to the procedure and:      Indications/HPI:    []Abdominal Pain  []Cancer- GI/Lung  []Fhx of colon CA/polyps  []History of Polyps  []Naqvis   []Melena  []Abnormal Imaging  []Dysphagia    []Persistent Pneumonia  []Anemia  []Food Impaction  []History of Polyps  []GI Bleed  []Pulmonary nodule/Mass  []Change in bowel habits []Heartburn/Reflux  []Rectal Bleed (BRBPR)  []Chest Pain - Non Cardiac []Heme (+) Stoo  l[]Ulcers  []Constipation  []Hemoptysis   []Varices  []Diarrhea  []Hypoxemia  [x]Nausea/Vomiting  []Screening   []Crohns/Colitis  []Other:     Anesthesia:   [x] MAC [] Moderate Sedation   [] General   [] None     ROS: 12 pt Review of Symptoms was negative unless mentioned above    Medications:   Prior to Admission medications    Medication Sig Start Date End Date Taking? Authorizing Provider   mupirocin (BACTROBAN) 2 % ointment Apply topically with daily dressing change 22   Russ Metzger MD   predniSONE (DELTASONE) 20 MG tablet Take 10 mg by mouth daily    Historical Provider, MD   mupirocin (BACTROBAN) 2 % ointment Apply topically 2 times daily. 22   Russ Metzger MD   Melatonin 10 MG TABS Take by mouth daily At bedtime    Historical Provider, MD   acetaminophen (TYLENOL) 500 MG tablet Take by mouth every 6 hours as needed for Pain    Historical Provider, MD Fergusonmutjovita Tribromoph-Petrolatum (XEROFORM PETROLATUM GAUZE 5\"X9\") MISC external pads Apply 1 each topically daily Apply to affected area as directed daily, after mupirocin ointment.  22   MARCO West NP   ammonium

## 2023-04-20 PROBLEM — J90 PLEURAL EFFUSION: Status: ACTIVE | Noted: 2023-01-01

## 2023-04-21 PROBLEM — Z71.89 GOALS OF CARE, COUNSELING/DISCUSSION: Status: ACTIVE | Noted: 2023-04-21

## 2023-04-21 PROBLEM — Z71.89 ADVANCED CARE PLANNING/COUNSELING DISCUSSION: Status: ACTIVE | Noted: 2023-04-21

## 2023-04-21 PROBLEM — J18.9 BASAL PNEUMONIA OF BOTH LUNGS: Status: ACTIVE | Noted: 2023-04-21

## 2023-04-21 PROBLEM — Z51.5 PALLIATIVE CARE ENCOUNTER: Status: ACTIVE | Noted: 2023-04-21

## 2023-04-21 PROBLEM — Z71.89 ENCOUNTER FOR HOSPICE CARE DISCUSSION: Status: ACTIVE | Noted: 2023-01-01

## 2023-04-24 PROBLEM — E87.70 HYPERVOLEMIA: Status: ACTIVE | Noted: 2023-01-01

## 2023-04-24 NOTE — OR NURSING
NO SEDATION    1440 Procedure explained, consent signed. 1445 Patient unable to sit on cart side leaning over tray table. Pt placed on right side with left lung field up. Left lung scanned by IR tech. Pt then turned onto left side with right lung field up, additional US images obtained. 1055 [facility] Dr. Tony Cervantes in to review ultrasound images and scan pt. Dr. Tony Cervantes states that pt should have enough fluid for diagnostics, but that not much fluid is noted. Perfect serve to Dr. Aleah Johnston to inquire about diagnostics and per Dr. Aleah Johnston, hold off on thoracentesis as pleural effusions likely to improve with diuretics. Transport request placed for patient to return to inpt room. Electronically signed by Joshua Bautista RN on 4/24/2023 at 3:04 PM     Call to [facility] RN to notify.

## 2023-04-24 NOTE — CARE COORDINATION
Plan remains [id number] [place] [address]. Patient will need a pre cert when closer to discharge.

## 2023-04-25 NOTE — OR NURSING
NO SEDATION     1433 - Patient assisted to IR table in supine position. Consent verified for Temporary Hemodialysis Catheter - signed by spouse. Patient placed on vitals monitor, VSS (BP obtained on LLE), on 3L NC. Patient appears anxious at this time, emotional and tactile support given. 1441 - Right neck vessel assessed for patency using Ultrasound guidance. Right neck area cleaned generously with Chloroprep and full body drape applied. 1459 - Timeout performed for fluoroscopy guided non-tunneled hemodialysis catheter insertion. 1501 - Dr. Bhavesh Durbin administered Lidocaine 2% to Right neck area to numb the skin. Using U/S guidance, Dr. Bhavesh Durbin used a 21G access needle from 5 Fr x 10 cm MP set to gain access into vessel. Hendricks Event[employer]mary[employer]urt 11.5F x 15cm [facility] Duo-Flow IJ Double Lumen Catheter (LOT# [id number]  / Expires: 03/23/2027) placed. Placement confirmed by fluoro imaging as well as both ports aspirating blood and flushing easily per Dr Bhavesh Durbin.      [address] [place] [address] port instilled with 1.4 ml of heparin by Dr. Bhavesh Durbin. Blue port instilled with 1.2 ml of heparin by Dr. Bhavesh Durbin.     2575 - Catheter sutured to skin with Prolene 2.0 suture. Catheter insertion site dressed with Biopatch, gauze and large tegaderm by IR Tech. Patient tolerated procedure well, verbal and tactile reassurance given throughout procedure. VSS. Patient assisted back onto bed. Transport requested to return patient to her room. Report given to [facility] [place] [address] dialysis unit called - they are unable complete treatment today. Catheter ready for use, order placed.  Electronically signed by Addis Strickland RN on 4/25/2023 at 3:22 PM

## 2023-04-25 NOTE — BRIEF OP NOTE
Preliminary  Procedure Note, Full Note To Follow in PACS  Vascular and Interventional Radiology      Alycia Jefferson  1950  [id number]    Date of Procedure: 04/25/23    Physician: Jayson Washington MD, SALVADORR    Pre-Op Diagnosis: Anasarca    Post-Op Diagnosis: Same       Procedure: Right IJ temporary dialysis catheter placement. Catheter is ready to use for dialysis. This is a temporary catheter and must be removed or converted to a permament catheter prior to patient discharge. Estimated Blood Loss (mL): Minimal    Complications: None    Specimens: none    Implants: Dual lumen temporary HD CVC    Drains: none    Findings: Right IJ temporary dialysis catheter placement. Catheter is ready to use for dialysis. This is a temporary catheter and must be removed or converted to a permament catheter prior to patient discharge.      Electronically signed by Henri Escobedo MD on 4/25/2023 at 3:17 PM

## 2023-04-25 NOTE — FLOWSHEET NOTE
Patient a&ox1 refused oral medications this am transported off of unit via bed to inpatient dialysis no s/s distress noted at time of transfer    0940 patient returned to unit no dialysis access Dr Sumeet Corrigan notified via dialysis tech patient a&ox1 no s/s distress noted lasix gtt maintained will cont to monitor

## 2023-04-25 NOTE — CARE COORDINATION
Updates provided to [facility] in admissions at [facility] who said she will continue to follow patient. Patient will need a pre cert when closer to discharge.

## 2023-04-25 NOTE — CONSULTS
diuretics. Plan: Ultrasound and fluoroscopy guided right IJ temporary dialysis catheter placement to remove fluid.      Chuck Mackenzie MD, Lucía Castellano

## 2023-04-25 NOTE — FLOWSHEET NOTE
Shift assessment complete pt alert. Breathing even/unlabored. Denies having chest pain, nausea, vomiting at this time. Pt refused to take PO night meds. Pt stated \"I can't\". This shift nurse provided mouth care with oral swab x1 and provided gingerale, pt only took one sip. Would not take any further sips. Refused PO medications again. Will continue to encourage fluids. Repositioned in bed. Call light and phone within reach.

## 2023-04-25 NOTE — FLOWSHEET NOTE
Patient a&ox1 transported off of unit via bed at this time to specials for placement of temp dialysis cath  at bedside consent obtained no s/s distress noted at time of transport

## 2023-04-25 NOTE — FLOWSHEET NOTE
Patient returned to unit from specials temporary dialysis cath place right IJ site clean/dry/intact dsg clean/dry/intact patient resting quietly in bed no s/s distress noted

## 2023-04-25 NOTE — TELEPHONE ENCOUNTER
Pt's  left message requesting patient's appointment be moved up. Message not received until this morning, she is scheduled this afternoon.

## 2023-04-26 PROBLEM — R57.9 SHOCK (HCC): Status: ACTIVE | Noted: 2023-01-01

## 2023-04-26 NOTE — FLOWSHEET NOTE
Tx complete 3000 ml uf removed tolerated well       04/26/23 0955   Vital Signs   BP (!) 101/48   Temp 98.1 °F (36.7 °C)   Heart Rate 80   Resp 16   Weight 245 lb 13 oz (111.5 kg)   Percent Weight Change -2.62   Post-Hemodialysis Assessment   Post-Treatment Procedures Blood returned;Catheter capped, clamped and heparinized x 2 ports   Machine Disinfection Process Acid/Vinegar Clean;Bleach; Exterior Machine Disinfection   Rinseback Volume (ml) 200 ml   Blood Volume Processed (Liters) 0 l/min   Dialyzer Clearance Moderately streaked   Duration of Treatment (minutes) 180 minutes   Heparin Amount Administered During Treatment (mL) 0 mL   Hemodialysis Intake (ml) 400 ml   Hemodialysis Output (ml) 3400 ml   NET Removed (ml) 3000   Tolerated Treatment Good   Bilateral Breath Sounds Diminished   Edema Generalized;Right upper extremity; Left upper extremity;Right lower extremity; Left lower extremity   Edema Generalized +3;Weeping   RUE Edema +3;Weeping   LUE Edema +3;Weeping   RLE Edema +3;Pitting   LLE Edema +3;Pitting

## 2023-04-26 NOTE — CARE COORDINATION
Plan remains [id number] [facility] at discharge. Marilu Caraballo in admissions updated that patient is being transferred to ICU. Patient will need a pre cert when closer to discharge.

## 2023-04-26 NOTE — PROCEDURES
Femoral artery line placement. (A-line)      INDICATION:   Shock need for BP monitoring and frequent ABG, unable to obtain accurate blood pressure or ABG      CONSENT: The risks and benefits of this procedure were explained to the patient and family. All questions were answered and alternative options explained. The risks and benefits of the alternative procedures were discussed as well. The patient has been assessed and he/she is stable to undergo conscious sedation as well as the procedure itself. the patient's  have signed consent. PROCEDURE SUMMARY:   The patient was prepped and draped in the usual sterile manner using chlorhexidine scrub. 1% lidocaine was used to numb the region. The left femoral artery was accessed using a needle. Pulsatile, arterial blood was visualized and the artery was then threaded using the Seldinger technique and a catheter was then sutured into place  . Good wave-form was obtained. The patient tolerated the procedure well without any immediate complications. The area was cleaned and Tegaderm was applied.     ESTIMATED BLOOD LOSS:   Less than 5 cc  COMPLICATIONS:  No immediate complication       Jazz Morgan MD MD [facility]  6:25 PM

## 2023-04-26 NOTE — FLOWSHEET NOTE
1100- Patient returned from dialysis unit. AM assessment completed. She is drowsy but arousable. She denies any chest pain at this time. Remains atrial fibrillation on the monitor. She has +2 pitting edema/weeping noted to bilateral upper extremities. +3 pitting edema/weeping noted to bilateral lower extremities. Pedal pulses weak but palpable. No shortness of breath noted at this time. Lungs are diminished bilaterally. SATS 99% on 2L NC. She is A/Ox1-to self. She is pleasantly confused and her speech is delayed and garbled at times. She will assist with turning in the bed. Hodges catheter to gravity drainage with clear yellow urine noted. Skin remains cool to touch. Generalized bruising noted to all extremities. Excoriation noted to buttocks with a small open area to left buttock and to coccyx. Zinc cream applied liberally. Wound to left lower extremity/shin area cleansed with NS and a new mepilex dressing placed to protect. Dressing to right arm removed, healing skin tears noted. Cleansed with soap and water, vaseline gauze placed and covered with an ABD pad and wrapped with kerlex to protect. Right upper arm single lumen PICC line present with a lasix gtt infusing at 10mg=10ml/hr. Right upper chest CVC IJ present with dressing that remains clean, dry and intact. Patient bathed and linens changed at this time. Heels remain elevated off the bed with pillows. New heel mepilex dressings applied. Vital signs stable but Temp registering low. Re-checked rectally and it was 80 F. Perfect serve message sent to Dr Sotero Christian asking for a warming blanket/bear hugger. Patient refusing her PO medications at this time. Family at bedside. Call light remains in reach. 1200- Bear hugger warming blanket placed on the patient at this time. 801 Emmanuel Lerma, NP at bedside to see patient and speak with family. Plan is to consult surgery and place PEG tomorrow. Family is agreeable. 1300- Dr Myra Clarke present to see patient.  Plan is to

## 2023-04-27 NOTE — SIGNIFICANT EVENT
Rapid response initially called for altered mental status and change in mentation patient had being responding following commands and she suddenly became unresponsive. Stat ABG was obtained showing appropriate oxygen saturation without any significant hypercapnia however given the fact that she had a sudden and rapid decompensation was no longer following commands the decision was made to intubate her in order to protect her airway while going for urgent CT of her head. During intubation unfortunately patient rapidly decompensated and lost a pulse. CPR was initiated. Patient was intubated by Conrad Robertson see associated note. ACLS protocol was followed with epinephrine given every 3 to 5 minutes. Patient was given 2 A of bicarb during resuscitation attempts. ROSC was initially obtained briefly during this time Levophed dose was escalated vasopressin and epinephrine were also ordered and patient was started on a fluid bolus. Unfortunately the patient again lost her pulse and resuscitation was resumed. Patient had no further pulse during resuscitation attempts and each pulse check showed asystole. ABG performed during second resuscitation showed appropriate pH with no significant hypercapnia or hypoxia. Electrolytes were appropriate on the ABG as well. Patient was administered 3 further doses of sodium bicarbonate during the second resuscitation attempt and continued to receive epinephrine every 3 to 5 minutes per ACLS protocol. During the resuscitation emergent femoral CVC was placed due to limited access, see note below    The  was notified during the initial decompensation and was at the bedside during resuscitation attempts they requested to cease resuscitation efforts and let the patient pass naturally. Resuscitation efforts were discontinued at 0337 time of death 0 337.   At this time patient in no spontaneous breath sounds no spontaneous heart sounds and did not have any

## 2023-04-27 NOTE — PROCEDURES
Patient Name: Brannon Taylor   Medical Record Number: [id number]  Date: 4/27/2023   Time: 2:51 AM   Room/Bed: [place] [address]  Intubation Procedure Note  Indication: impending respiratory failure, impending airway compromise, comatose state, and airway protection    Consent: Unable to be obtained due to the emergent nature of this procedure. Medications Used: etomidate intravenously    Procedure: The patient was placed in the appropriate position. Cricoid pressure was utilized. Intubation was performed by indirect laryngoscopy using a bronchoscope and a 7.5 cuffed endotracheal tube. The cuff was then inflated and the tube was secured appropriately at a distance of 23 cm to the dental ridge. Initial confirmation of placement included bilateral breath sounds, an end tidal CO2 detector, absence of sounds over the stomach, tube fogging, adequate chest rise, and adequate pulse oximetry reading. A chest x-ray to verify correct placement of the tube showed appropriate tube position. The patient tolerated the procedure well.      Complications: None  Supervised by Dr. Shaylee Washington MD    Electronically Signed by: Electronically signed by MARCO Nicholson CNP on 4/27/2023 at 3:06 AM

## 2023-04-27 NOTE — PROGRESS NOTES
Assessment completed, aguilera catheter maintained, manual b/p obtained, pt awake and responsive to questions, bed alarm maintained, will continue to monitor    4/23/23 2210  HS meds given, pt refused further po medication, pt was agreeable to medication for eye drainage    4/23/23 0015  Sleeping in bed, no distress noted
DVT / VTE PROPHYLAXIS EVALUATION    Recent Labs     04/24/23  0600 04/25/23  0556   BUN 21 22   CREATININE 3.01* 3.26*     ADMITTING DX OR CHIEF COMPLAINT? Pleural effusion  WARFARIN? DOAC'S? no  ANY APPARENT BLEEDING? no  SCHEDULED SURGERY? no     If yes to following, excluded from auto adjustment in Table 1 of policy - please contact provider with recommendations as appropriate. Include condition/exception in scratch notes. Yes No   Trauma Service or Ortho Surgery []  [x]    COVID []  [x]    Pregnancy []  [x]        Current order:  Enoxaparin 30 mg SUBQ once daily      Height: 5' 6.5\" (168.9 cm), Weight: 245 lb 13 oz (111.5 kg)  Estimated Creatinine Clearance: 20 mL/min (A) (based on SCr of 3.26 mg/dL (H)). Plan:  Pharmacologic VTE prophylaxis modified based on patient renal function per [facility]/P&T approved protocol     Patient Weight (kg)      50.9 and below .9 101-150.9 151-174.9 175 or greater   Estimated   CrCl  (ml/min) 30 or greater []   30 mg   SUBQ daily   []   40 mg   SUBQ daily (or 30 mg BID for orthopedic cases) []  30 mg SUBQ   BID  []  40 mg   SUBQ   BID []  60mg SUBQ BID    15-29.9 []  UFH 5000   units SUBQ BID []  30 mg   SUBQ daily [] 30 mg SUBQ   daily []  40 mg SUBQ   daily [] 60 mg SUBQ   daily    Less than 15 or dialysis []  UFH 5000   units SUBQ BID [x] UFH 5000 units SUBQ TID []  UFH 7500   units   SUBQ TID       Changed to heparin SC as patient on HD now.  Repeat CBC ordered will follow for monitoring hgb/plt    ARIANNA Royal [facility] PharmD
Gastroenterology Progress Note    Emperatriz Olsen is a 67 y.o. female patient. Hospitalization Day:3    Chief C/O: Nausea and not able to eat    SUBJECTIVE: Seen and examined on telemetry unit. Patient denies any further burning or epigastric pain with PPI therapy. However, patient reports continued poor appetite, nausea at times. States she is not hungry and nothing tastes good. Per nursing,  encourages her/helps her eat. Nursing reports trying to help her eat as well, reports she will place food in her mouth then spit it back out because she does not like the flavor/texture. Patient and nursing reports this is ongoing since she had COVID-19 infection. ROS:  Gastrointestinal ROS: no abdominal pain, change in bowel habits, or black or bloody stools    Physical    VITALS:  /82   Pulse 85   Temp (!) 95.9 °F (35.5 °C) (Axillary)   Resp 16   Ht 5' 6.5\" (1.689 m)   Wt 256 lb 14.4 oz (116.5 kg)   LMP  (LMP Unknown)   SpO2 93%   BMI 40.84 kg/m²   TEMPERATURE:  Current - Temp: (!) 95.9 °F (35.5 °C);  Max - Temp  Av.9 °F (36.1 °C)  Min: 95.9 °F (35.5 °C)  Max: 97.8 °F (36.6 °C)    General - Alert, oriented, chronically ill appearing  Eyes - no Icterus, no pallor  Cardiovascular - RRR  Lungs - clear to auscultation bilaterally  Abdomen - +central obesity,  non tender, no organomegaly, Bowel sounds present  Extremities - 2+BLE edema  Skin - warm/dry, without jaundice, ecchymosis to bilateral extremities  Neuro: Without focal deficit, moves all extremities, no asterixis     Data    Data Review:    Recent Labs     23  2250 23  0600 23  0600   WBC 8.1 12.0* 11.6*   HGB 8.6* 8.8* 9.1*   HCT 27.9* 28.9* 29.4*   .1* 99.6* 99.1*   * 128* 112*     Recent Labs     23  0600 23  0600 23  0600    142 140   K 4.4 3.8 4.1   * 108* 106   CO2 19* 20 20   BUN 18 21 21   CREATININE 2.52* 2.77* 3.01*     Recent Labs     23  0600 23  0600
Gastroenterology Progress Note    Simona Escudero is a 67 y.o. female patient. Hospitalization Day:5    Chief C/O: Nausea and not able to eat    SUBJECTIVE: Seen and examined on telemetry unit.  at bedside. Patient denies any further burning or epigastric pain with PPI therapy. However, patient reports continued poor appetite, nausea at times. States she is not hungry and nothing tastes good. Patient's  encourages her/helps her eat. However most of the time she refuses, will only take a bite or 2. Nursing reports trying to help her eat as well, reports she will place food in her mouth then spit it back out because she does not like the flavor/texture. Patient and nursing reports this is ongoing since she had COVID-19 infection. Patient's  endorses multiple abdominal surgeries, multiple abdominal hernias with mesh placement. ROS:  Gastrointestinal ROS: no abdominal pain, change in bowel habits, or black or bloody stools    Physical    VITALS:  BP (!) 80/45   Pulse 50   Temp 98 °F (36.7 °C)   Resp 18   Ht 5' 6.5\" (1.689 m)   Wt 252 lb 8 oz (114.5 kg)   LMP  (LMP Unknown)   SpO2 100%   BMI 40.14 kg/m²   TEMPERATURE:  Current - Temp: 98 °F (36.7 °C); Max - Temp  Av.2 °F (36.8 °C)  Min: 98 °F (36.7 °C)  Max: 98.5 °F (36.9 °C)    General - Alert, oriented to person, disoriented to place and time, chronically ill appearing  Eyes - no Icterus, no pallor  Cardiovascular - RRR  Lungs - clear to auscultation bilaterally  Abdomen - +central obesity,  non tender, no organomegaly, Bowel sounds present  Extremities - 2+BLE edema  Skin - warm/dry, without jaundice, ecchymosis to bilateral extremities  Neuro: Without focal deficit, moves all extremities, no asterixis     Data    Data Review:    No results for input(s): WBC, HGB, HCT, MCV, PLT in the last 72 hours.     Recent Labs     23  0600 23  0556    136   K 4.1 3.9    104   CO2 20 20   BUN 21 22   CREATININE 3.01* 3.26*
Handoff report received. Assessments completed, see flowsheets. Pt on Bipap tolerating well. Sitter at the bedside for safety while bipap is in use. Pt oriented to self only and nods appropriately. Pts skin appears fragile and weeping. Medications titrated/ infusing per orders, see MAR. Approximately 0222 pt having a change in mental status and a rapid response was called that later turned to a code blue, see code documentation. Time of death pronounced at 5982. Family updated on pts status. Family said to throw pts personal blanket away. Postmortem care completed. Pt taken to [facility] with nurse supervisor approximately 0600.
Hospitalist Progress Note      Date of Admission: 4/20/2023  Chief Complaint:    Chief Complaint   Patient presents with    Abdominal Pain     Subjective:  No new complaints.   No nausea, vomiting, chest pain, or headache      Medications:    Infusion Medications    furosemide (LASIX) 1mg/mL infusion 10 mg/hr (04/24/23 1202)    sodium chloride       Scheduled Medications    megestrol  200 mg Oral Daily    metOLazone  10 mg Oral Daily    magic (miracle) mouthwash  5 mL Swish & Spit BID    enoxaparin  30 mg SubCUTAneous Daily    metoprolol tartrate  25 mg Oral Daily    magnesium oxide  400 mg Oral Daily    mirtazapine  30 mg Oral Nightly    predniSONE  5 mg Oral Daily    insulin lispro  0-8 Units SubCUTAneous TID WC    insulin lispro  0-4 Units SubCUTAneous Nightly    sodium chloride flush  5-40 mL IntraVENous 2 times per day    erythromycin   Right Eye TID    atorvastatin  10 mg Oral Daily    capsaicin   Topical TID    lactobacillus acidophilus  1 tablet Oral Daily    levothyroxine  125 mcg Oral Daily    melatonin  10 mg Oral QHS    camphor-menthol-methyl salicylate   Topical BID    nystatin  500,000 Units Oral 4x Daily    mupirocin   Topical Daily    oxybutynin  15 mg Oral Daily    potassium chloride  20 mEq Oral BID WC    megestrol  200 mg Oral Daily    pantoprazole (PROTONIX) 40 mg injection  40 mg IntraVENous Daily     PRN Meds: sodium chloride flush, sodium chloride, ondansetron **OR** ondansetron, polyethylene glycol, acetaminophen **OR** acetaminophen, metoclopramide    Intake/Output Summary (Last 24 hours) at 4/24/2023 1611  Last data filed at 4/24/2023 0224  Gross per 24 hour   Intake --   Output 400 ml   Net -400 ml     Exam:  BP (!) 98/54   Pulse 58   Temp 97.5 °F (36.4 °C) (Axillary)   Resp 20   Ht 5' 6.5\" (1.689 m)   Wt 256 lb 14.4 oz (116.5 kg)   LMP  (LMP Unknown)   SpO2 93%   BMI 40.84 kg/m²   Head: Normocephalic, atraumatic  Sclera clear  Neck JVD flat  Lungs: normal effort of breathing except
Hospitalist Progress Note      Date of Admission: 4/20/2023  Chief Complaint:    Chief Complaint   Patient presents with    Abdominal Pain     Subjective:  No new complaints.   No nausea, vomiting, chest pain, or headache      Medications:    Infusion Medications    furosemide (LASIX) 1mg/mL infusion 10 mg/hr (04/25/23 2301)    sodium chloride       Scheduled Medications    megestrol  200 mg Oral Daily    metOLazone  10 mg Oral Daily    magic (miracle) mouthwash  5 mL Swish & Spit BID    enoxaparin  30 mg SubCUTAneous Daily    metoprolol tartrate  25 mg Oral Daily    magnesium oxide  400 mg Oral Daily    mirtazapine  30 mg Oral Nightly    predniSONE  5 mg Oral Daily    insulin lispro  0-8 Units SubCUTAneous TID WC    insulin lispro  0-4 Units SubCUTAneous Nightly    sodium chloride flush  5-40 mL IntraVENous 2 times per day    erythromycin   Right Eye TID    atorvastatin  10 mg Oral Daily    capsaicin   Topical TID    lactobacillus acidophilus  1 tablet Oral Daily    levothyroxine  125 mcg Oral Daily    melatonin  10 mg Oral QHS    camphor-menthol-methyl salicylate   Topical BID    nystatin  500,000 Units Oral 4x Daily    mupirocin   Topical Daily    oxybutynin  15 mg Oral Daily    potassium chloride  20 mEq Oral BID WC    megestrol  200 mg Oral Daily    pantoprazole (PROTONIX) 40 mg injection  40 mg IntraVENous Daily     PRN Meds: sodium chloride flush, sodium chloride, ondansetron **OR** ondansetron, polyethylene glycol, acetaminophen **OR** acetaminophen, metoclopramide    Intake/Output Summary (Last 24 hours) at 4/25/2023 2312  Last data filed at 4/25/2023 1853  Gross per 24 hour   Intake 220 ml   Output 800 ml   Net -580 ml       Exam:  BP (!) 110/56   Pulse 79   Temp 98.1 °F (36.7 °C) (Oral)   Resp 18   Ht 5' 6.5\" (1.689 m)   Wt 254 lb 11.2 oz (115.5 kg)   LMP  (LMP Unknown)   SpO2 100%   BMI 40.49 kg/m²   Head: Normocephalic, atraumatic  Sclera clear  Neck JVD flat  Lungs: normal effort of breathing
INPATIENT PROGRESS NOTES    PATIENT NAME: Africa Betts  MRN: [id number]  SERVICE DATE:  April 25, 2023   SERVICE TIME:  7:30 AM      PRIMARY SERVICE: Pulmonary Disease    CHIEF COMPLAIN: Shortness of breath      INTERVAL HPI: Patient seen and examined at bedside, Interval Notes, orders reviewed. Nursing notes noted  Discussed with RN. She is confused at times. She is on Lasix drip. She has mild short of breath. She is on room air O2 sat 91%. No fever or chills. No chest pain. She does have a swelling in all extremity. Serum creatinine is worsening. OBJECTIVE    Body mass index is 40.49 kg/m². PHYSICAL EXAM:  Vitals:  BP (!) 117/42   Pulse 98   Temp 98.2 °F (36.8 °C) (Oral)   Resp 20   Ht 5' 6.5\" (1.689 m)   Wt 254 lb 11.2 oz (115.5 kg)   LMP  (LMP Unknown)   SpO2 91%   BMI 40.49 kg/m²   General: Alert, awake . comfortable in bed, No distress. Head: Atraumatic , Normocephalic   Eyes: PERRL. No sclera icterus. No conjunctival injection. No discharge   ENT: No nasal  discharge. Pharynx clear. Neck:  Trachea midline. No thyromegaly, no JVD, No cervical adenopathy. Chest : Bilaterally symmetrical ,Normal effort,  No accessory muscle use  Lung : . Fair BS bilateral, decreased BS at bases. Bibasilar Rales. No wheezing. No rhonchi. Heart[de-identified] Normal  rate. Regular rhythm. No mumur ,  Rub or gallop  ABD: Non-tender. Non-distended. No masses. No organmegaly. Normal bowel sounds. No hernia.   Ext : 2+ pitting both leg and upper extremity, No Cyanosis No clubbing  Neuro: no focal weakness          DATA:   Recent Labs     04/23/23  0600   WBC 11.6*   HGB 9.1*   HCT 29.4*   MCV 99.1*   *     Recent Labs     04/23/23  0600 04/24/23  0600 04/25/23  0556    140 136   K 3.8 4.1 3.9   * 106 104   CO2 20 20 20   BUN 21 21 22   CREATININE 2.77* 3.01* 3.26*   GLUCOSE 135* 146* 139*   CALCIUM 7.7* 7.6* 7.6*   PROT 4.4* 4.5*  --    LABALBU 2.1* 2.2*  --    BILITOT <0.2 <0.2  --    ALKPHOS 148* 162*
Multiple attempts to provide mouthcare this shift, pt refused. Later on this evening while pts spouse and her longtime friend were visiting at the bedside, pt agreed to [facility]. Hodges catheter continues to drain without difficulty-see flowsheet for output. Pt reported feeling \"sick to my stomach\" earlier this shift. PRN IV Zofran given. Pt resting peacefully in bed, safety maintained.
Nephrology Progress Note    Assessment:  CKD-4 no proteinuria  Fe++ anemia  COPD  Obesity  Cervical surgery [facility] 2 years ago poor ambulation skills  Hypothyroid        Plan: venofir IV   follow bmp began lasix IV comtinous d/t edema watch BP  Supplement/Megace also Zaroxlyn oral    Patient Active Problem List:     Diabetes mellitus with diabetic peripheral angiopathy without gangrene, without long-term current use of insulin (HCC)     Peripheral angiopathy (HCC)     Non-healing surgical wound     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 without evidence of necrosis (CODE)     Diabetic neuropathy associated with diabetes mellitus due to underlying condition (HCC)     Abscess of foot including toes, left     Unable to ambulate     Ataxia     Acute kidney injury superimposed on chronic kidney disease ([address]     Hypothyroid     Morbid obesity due to excess calories (HCC)     Essential hypertension     Benzodiazepine dependence (HCC)     Anxiety     Bronchitis     OA (osteoarthritis)     Acute on chronic renal failure (HCC)     Hx of Chronic ulcer of left foot (HCC)     HLD (hyperlipidemia)     Fracture of right tibia and fibula     Abnormality of gait and mobility due to Impaired Mobility secondary to Flare up of OA. [facility] admit 4/04/19.      Uncontrolled type 2 diabetes mellitus with hyperglycemia (HCC)     Nocturnal hypoxemia due to obesity     Nocturnal hypoxia     Sleep apnea     Open wound of right buttock     Buttock wound     Venous insufficiency of both lower extremities     Wound of buttock, right, subsequent encounter     Decreased strength, endurance, and mobility     Impaired mobility and activities of daily living     Buttock wound, left, subsequent encounter     Hand abrasion, left, initial encounter     Laceration of right hand without foreign body     Venous ulcer, limited to breakdown of skin ([address]     Venous ulcer of right lower extremity without varicose veins
Nephrology Progress Note    Assessment:  Germainesafredis  CKD-4  Cervical surgery remote ambulation issues  Obesity  DM type-2  Hypertension history        Plan: will ultrafiltrate patient today  poor diuresis    Patient Active Problem List:     Diabetes mellitus with diabetic peripheral angiopathy without gangrene, without long-term current use of insulin (HCC)     Peripheral angiopathy (HCC)     Non-healing surgical wound     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 without evidence of necrosis (CODE)     Diabetic neuropathy associated with diabetes mellitus due to underlying condition (HCC)     Abscess of foot including toes, left     Unable to ambulate     Ataxia     Acute kidney injury superimposed on chronic kidney disease (HCC)     Hypothyroid     Morbid obesity due to excess calories (HCC)     Essential hypertension     Benzodiazepine dependence (HCC)     Anxiety     Bronchitis     OA (osteoarthritis)     Acute on chronic renal failure (HCC)     Hx of Chronic ulcer of left foot (HCC)     HLD (hyperlipidemia)     Fracture of right tibia and fibula     Abnormality of gait and mobility due to Impaired Mobility secondary to Flare up of OA. [facility] admit 4/04/19.      Uncontrolled type 2 diabetes mellitus with hyperglycemia (HCC)     Nocturnal hypoxemia due to obesity     Nocturnal hypoxia     Sleep apnea     Open wound of right buttock     Buttock wound     Venous insufficiency of both lower extremities     Wound of buttock, right, subsequent encounter     Decreased strength, endurance, and mobility     Impaired mobility and activities of daily living     Buttock wound, left, subsequent encounter     Hand abrasion, left, initial encounter     Laceration of right hand without foreign body     Venous ulcer, limited to breakdown of skin ([address]     Venous ulcer of right lower extremity without varicose veins (HCC)     Venous ulcer of left leg (HCC)     Skin tear of left forearm without
Nursing report received from first shift nurse. Night shift assessment completed pt afebrile Vital signs stable. Pt in bed resting. Pt Currently denies pain. Wearing O2 via nasal canula. Bed in lowest position. Side rails x2, call light in reach. Bed alarm on. Nurse will monitor Pt throughout the night     Pt refused medication this evening except topicals, nystatin and magic mouthwash. Offered to pt several times. Also offered to crush medication and place in applesauce or pudding to help with getting medication down. Pt still refused stated her stomach hurt. Perfect serve sent to Dr. Babatunde Noriega about refusal of medication. Pt offered Medication for nausea. Pt offered medication for nausea refused medication for nausea.     Pt refused medication this AM
Patient . Family working on arrangements.
Patient became unresponsive Rapid response called. ABG completed. Plan to intubate. BP and Heart rate decreased. Patient lost pulse and Code blue called. CPR started. Patient intubated. See Code Noted. Family called and updated.
Physical Therapy Med Surg Daily Treatment Note  [facility]  [facility]       NAME: Douglas Seals  : 1950 (67 y.o.)  MRN: [id number]  CODE STATUS: Full Code    Date of Service: 2023    Patient Diagnosis(es): Pleural effusion [J90]  Basal pneumonia of both lungs [J18.9]   Chief Complaint   Patient presents with    Abdominal Pain     Patient Active Problem List    Diagnosis Date Noted    Acute cystitis with hematuria 03/15/2023    Nausea 2023    Myalgia 2023    Venous ulcer of left leg ([address] 2023    Skin tear of left forearm without complication     Venous ulcer, limited to breakdown of skin ([address] 2022    Venous ulcer of right lower extremity without varicose veins ([address] 2022    Laceration of right hand without foreign body 10/12/2022    Hand abrasion, left, initial encounter 2022    Buttock wound, left, subsequent encounter 2022    Impaired mobility and activities of daily living 2022    SBO (small bowel obstruction) ([address] 2021    Chronic pain disorder 2020    Encephalopathy 2019    Respiratory distress 2019    Cervical myelopathy ([address] 2019    Pain in right knee 2019    Hypervolemia 2023    Palliative care encounter 2023    Goals of care, counseling/discussion 2023    Advanced care planning/counseling discussion 2023    Encounter for hospice care discussion 2023    Basal pneumonia of both lungs 2023    Pleural effusion 2023    Wound of buttock, right, subsequent encounter 2022    Decreased strength, endurance, and mobility 2022    Venous insufficiency of both lower extremities 2022    Buttock wound 2022    Open wound of right buttock 2020    Nocturnal hypoxemia due to obesity 04/10/2019    Nocturnal hypoxia     Sleep apnea     Uncontrolled type 2 diabetes mellitus with hyperglycemia ([address]     Ataxia 2019
Physical Therapy Missed Treatment   Facility/Department: [facility] MED SURG W514/I627-13    NAME: Manuel Hector    : 1950 (67 y.o.)  MRN: [id number]    Account: [de-identified]  Gender: female    Chart reviewed, attempted PT at 65. Patient unavailable 2° to:    [x] Hold per nsg request hypotensive, temperature of 89 degrees. [] Pt declined     [] Nsg notified   [] Other notified    [] Pt. . off floor for test/procedure. [] Pt. Unavailable       Will attempt PT treatment again at earliest convenience.       Electronically signed by Sharan Ybarra PTA on 23 at 1:46 PM EDT
Physical Therapy Missed Treatment   Facility/Department: [facility] MED SURG D340/L817-12    NAME: Alycia Jefferson  Patient Status:   : 1950 (73 y.o.)  MRN: [id number]  Account: [de-identified]  Gender: female        [] Patient Declines PT Treatment            [x] Patient Unavailable:     Pt not available for PT at this time and is currently being transported for procedure. Will attempt PT Treatment again at earliest convenience.         Electronically signed by Clara Hodgkins, PTA on 23 at 9:22 AM EDT
Physical Therapy Missed Treatment   Facility/Department: [facility] MED SURG Y290/W788-29    NAME: Meliza Villareal  Patient Status:   : 1950 (73 y.o.)  MRN: [id number]  Account: [de-identified]  Gender: female        [] Patient Declines PT Treatment            [x] Patient Unavailable:     Pt currently at dialysis and is not available for PT tx. Will attempt PT Treatment again at earliest convenience.         Electronically signed by Tate Barrera PTA on 23 at 8:58 AM EDT
Physical Therapy Missed Treatment   Facility/Department: [facility] MED SURG O315/K747-84    NAME: Heri Raza  Patient Status:   : 1950 (73 y.o.)  MRN: [id number]  Account: [de-identified]  Gender: female        [] Patient Declines PT Treatment            [x] Patient Unavailable:     Pt getting ready to go to special procedures and is not available for PT tx at this time. Will attempt PT Treatment again at earliest convenience.         Electronically signed by Katty Strong PTA on 23 at 3:05 PM EDT
Pt assessed earlier this AM.  Pt continues to have a poor appetite. Multiple attempts to encourage pt to eat and drink by this nurse along with the pts spouse and she continues to refuse. Pt does take sips of water and gingerale with PO meds. IVFs continue infusing without difficulty. IV Lasix gtt started per order. Wound care provided. Pt repositioned throughout this shift. Pts mouth/lips dry. Offered mouthcare with soft tip sponge, pt refuses and states \"I don't like that\". Will continue to encourage PO intake. Call light in reach. Pt safety maintained.
Pulmonary Progress Note    2023 1:54 PM    Subjective:   Admit Date: 2023  PCP: Yoanna Newman MD    Chief Complaint   Patient presents with    Abdominal Pain     Interval History: Seen in dialysis. Appears to be confused. Currently on 2 L of oxygen. Has been on Lasix gtt. Getting ultrafiltration today. Had a session yesterday as well. She does not know why she is here or what happened to her. Confused and unable to assess complete review of systems.     Medications:   Scheduled Meds:   megestrol  200 mg Oral Daily    metOLazone  10 mg Oral Daily    magic (miracle) mouthwash  5 mL Swish & Spit BID    enoxaparin  30 mg SubCUTAneous Daily    metoprolol tartrate  25 mg Oral Daily    magnesium oxide  400 mg Oral Daily    mirtazapine  30 mg Oral Nightly    predniSONE  5 mg Oral Daily    insulin lispro  0-8 Units SubCUTAneous TID WC    insulin lispro  0-4 Units SubCUTAneous Nightly    sodium chloride flush  5-40 mL IntraVENous 2 times per day    erythromycin   Right Eye TID    atorvastatin  10 mg Oral Daily    capsaicin   Topical TID    lactobacillus acidophilus  1 tablet Oral Daily    levothyroxine  125 mcg Oral Daily    melatonin  10 mg Oral QHS    camphor-menthol-methyl salicylate   Topical BID    nystatin  500,000 Units Oral 4x Daily    mupirocin   Topical Daily    oxybutynin  15 mg Oral Daily    potassium chloride  20 mEq Oral BID WC    megestrol  200 mg Oral Daily    pantoprazole (PROTONIX) 40 mg injection  40 mg IntraVENous Daily     Continuous Infusions:   furosemide (LASIX) 1mg/mL infusion Stopped (23 1310)    sodium chloride           Objective:   Vitals:   Temp (24hrs), Av.1 °F (36.7 °C), Min:98 °F (36.7 °C), Max:98.2 °F (36.8 °C)    BP (!) 97/59   Pulse 75   Temp 98.1 °F (36.7 °C)   Resp 14   Ht 5' 6.5\" (1.689 m)   Wt 245 lb 13 oz (111.5 kg)   LMP  (LMP Unknown)   SpO2 100%   BMI 39.08 kg/m²   I/O:24HR INTAKE/OUTPUT:    Intake/Output Summary (Last 24 hours) at 2023
This RN called and spoke to Amanda Adorno, patient's spouse. Update given on patient condition.
[facility] OCCUPATIONAL THERAPY MED SURG TREATMENT NOTE     Date: 2023  Patient Name: Milli Mayfield        MRN: [id number]  Account: [de-identified]   : 1950  (67 y.o.)  Room: [place] [id number]    Chart Review:    Restrictions  Restrictions/Precautions  Restrictions/Precautions: Fall Risk, Contact Precautions (MRSA-Wound)  Position Activity Restriction  Other position/activity restrictions: RISK OF ELOPEMENT     Safety:  Safety Devices  Type of Devices: All fall risk precautions in place; Bed alarm in place;Call light within reach; Left in bed    Patient's birthday verified: Pt verbally unable, verified via wrist band    Subjective:    Pain at start of treatment: No    Pain at end of treatment: No      Objective: Pt in bed resting upon arrival with visitors at bedside. Pt minimally verbal when spoken to. Per visitors, pt participate in Physical Therapy earlier today and was just recently repositioned in bed for comfort. Pt declining therapy requesting to rest at this time. Pt's visitors concerned about pt's dry mouth and resistance to eating, drinking, and oral care. Assisted pt with oral care as follows. ADL Status:  Grooming: Moderate assistance  Grooming Skilled Clinical Factors: Oral Care using toothette. Pt able to use her right hand in order to bring toothette to mouth. Pt required set up with toothette in hand and tactile cues to bring hand to mouth. Pt able to follow directions, however, minimally able to swab mouth. Assistance provided for thoroughness.        Therapy key for assistance levels -   Independent/Mod I = Pt. is able to perform task with no assistance but may require a device   Stand by assistance = Pt. does not perform task at an independent level but does not need physical assistance, requires verbal cues  Minimal, Moderate, Maximal Assistance = Pt. requires physical assistance (25%, 50%, 75% assist from helper) for task but is able to actively participate in task   Dependent = Pt.
ulcer of left leg (HCC)     Skin tear of left forearm without complication     Cervical myelopathy (HCC)     Encephalopathy     Chronic pain disorder     Pain in right knee     Respiratory distress     SBO (small bowel obstruction) (HCC)     Nausea     Myalgia     Acute cystitis with hematuria     Pleural effusion     Palliative care encounter     Goals of care, counseling/discussion     Advanced care planning/counseling discussion     Encounter for hospice care discussion     Basal pneumonia of both lungs     Hypervolemia      Subjective:  Admit Date: 4/20/2023    Interval History:  pt seen on UF. Poor flow with hd catheter. On lasix drip metolazone and kdur. No cp. Confused. Medications:  Scheduled Meds:   megestrol  200 mg Oral Daily    metOLazone  10 mg Oral Daily    magic (miracle) mouthwash  5 mL Swish & Spit BID    enoxaparin  30 mg SubCUTAneous Daily    metoprolol tartrate  25 mg Oral Daily    magnesium oxide  400 mg Oral Daily    mirtazapine  30 mg Oral Nightly    predniSONE  5 mg Oral Daily    insulin lispro  0-8 Units SubCUTAneous TID WC    insulin lispro  0-4 Units SubCUTAneous Nightly    sodium chloride flush  5-40 mL IntraVENous 2 times per day    erythromycin   Right Eye TID    atorvastatin  10 mg Oral Daily    capsaicin   Topical TID    lactobacillus acidophilus  1 tablet Oral Daily    levothyroxine  125 mcg Oral Daily    melatonin  10 mg Oral QHS    camphor-menthol-methyl salicylate   Topical BID    nystatin  500,000 Units Oral 4x Daily    mupirocin   Topical Daily    oxybutynin  15 mg Oral Daily    potassium chloride  20 mEq Oral BID WC    megestrol  200 mg Oral Daily    pantoprazole (PROTONIX) 40 mg injection  40 mg IntraVENous Daily     Continuous Infusions:   sodium chloride      furosemide (LASIX) 1mg/mL infusion 10 mg/hr (04/25/23 2301)    sodium chloride         CBC:   No results for input(s): WBC, HGB, PLT in the last 72 hours.     CMP:    Recent Labs     04/24/23  0600
Hypotension    CONSENT: The spouse was counseled regarding the procedure, it's indications, risks, potential complications and alternatives and any questions were answered. Consent was obtained. nt. PROCEDURE SUMMARY:   Radial arteries were assessed by palpation and ultrasound localization. The patient was prepped and draped in the usual sterile manner using chlorhexidine scrub. 1% lidocaine was used to numb the region. The left brachial artery was palpated and successfully cannulated on the first pass. Pulsatile, arterial blood was visualized however unable to thread the guidewire into the artery. Multiple attempts was done and unable to thread the guidewire. Waveform was obtained as noted above. Line removed and pressure applied.     ESTIMATED BLOOD LOSS:   Minimal    COMPLICATIONS:  No immediate complication     Cordella Cowden, MD
CT CHEST WO CONTRAST   Final Result   Large bilateral pleural effusions with consolidative infiltrate identified   lung bases bilaterally favoring pneumonia in which aspiration is of concern. RECOMMENDATIONS:   Unavailable         CT ABDOMEN PELVIS WO CONTRAST Additional Contrast? None   Final Result   Interval development of a small amount of fluid seen surrounding the liver   and within the pericolic gutters. The remainder of the abdomen and pelvis is   stable. No change seen in the hernia in the right lower quadrant containing   loops of bowel with no evidence of obstruction. RECOMMENDATIONS:   Unavailable         IR GUIDED THORACENTESIS PLEURAL    (Results Pending)     Assessment/Plan:    Fluid overload with anasarca, pleural effusions. Agree with IV Lasix as started by nephrology. Anticipate improvement in pleural effusions with continued diuresis. UF as per nephrology, following    Hypotension after ultrafiltration. Difficulty in obtaining accurate blood pressures therefore unsure if patient is truly hypotensive. Transferred to ICU for further monitoring    Hypothermia: Empiric antibiotics, landy fitzgerald. Dysgeusia: Been present for many weeks, worse after recent episode of COVID a little over a month ago. Consequently poor oral intake. Poor oral intake may be multifactorial given dyspepsia also secondary to recent ulcer found on EGD.   Plan for PEG tube tomorrow    Class III obesity  COPD: Not in exacerbation  CKD stage IV  Hypothyroidism    Abi Rios MD
SUGGESTION:  Bilateral pleural effusion, likely due to anasarca and volume overload, rule out underlying heart failure   Obesity, possible JENNIFER  COPD without exacerbation  Chronic kidney disease stage  Hypothyroidism    Continue diuretics therapy. Nephrology is following. She is on Lasix drip. Patient has anasarca and likely by her pleural effusion due to fluid overload. Which appear improving. per Dr. Sue Guardado he advised to hold off  thoracentesis since pleural effusion ,likely improve with diuretic therapy. I also discussed with Dr. Sue Guardado. At this time continue present treatment plan. NOTE: This report was transcribed using voice recognition software. Every effort was made to ensure accuracy; however, inadvertent computerized transcription errors may be present.       Electronically signed by Becky Webb MD, FCCP on 4/24/2023 at 12:34 PM

## 2023-04-30 LAB
EKG ATRIAL RATE: 128 BPM
EKG P AXIS: 17 DEGREES
EKG P-R INTERVAL: 164 MS
EKG Q-T INTERVAL: 280 MS
EKG QRS DURATION: 78 MS
EKG QTC CALCULATION (BAZETT): 408 MS
EKG R AXIS: -1 DEGREES
EKG T AXIS: 187 DEGREES
EKG VENTRICULAR RATE: 128 BPM

## 2025-05-16 NOTE — PLAN OF CARE
Scheduled date of colonoscopy (as of today): 5/27/25  Physician performing colonoscopy: Dr. Singh  Location of colonoscopy: [facility]  Bowel prep reviewed with patient: roberto/dul  Instructions reviewed with patient by: Cindy ALSTON, pt requesting they be mailed to verified address at: [address], [place] [address]  Clearances: N/A    Chelsie from pcp Dr. Douglas Shoenbergers office helped schedule this appt and will inform pt to avoid the following foods starting 5/22/5. Nuts, seeds, corn, popcorn, multi grain bread and any fruits and vegetables w/ skins and seeds. Since appt soon and wants prep instructions mailed.   Therapy evaluation completed. Please see daily notes and/or progress notes for details related to planned treatment interventions, goals and functional abilities.

## (undated) DEVICE — ENDO CARRY-ON PROCEDURE KIT: Brand: ENDO CARRY-ON PROCEDURE KIT

## (undated) DEVICE — FORCEPS BX L240CM JAW DIA2.8MM L CAP W/ NDL MIC MESH TOOTH

## (undated) DEVICE — TUBE SET 96 MM 64 MM H2O PERISTALTIC STD AUX CHANNEL

## (undated) DEVICE — GLOVE ORANGE PI 8 1/2   MSG9085

## (undated) DEVICE — GLOVE ORTHO 8   MSG9480

## (undated) DEVICE — SINGLE PORT MANIFOLD: Brand: NEPTUNE 2

## (undated) DEVICE — CONMED SCOPE SAVER BITE BLOCK, 20X27 MM: Brand: SCOPE SAVER

## (undated) DEVICE — TUBING, SUCTION, 1/4" X 10', STRAIGHT: Brand: MEDLINE

## (undated) DEVICE — Device: Brand: ENDO SMARTCAP

## (undated) DEVICE — BRUSH ENDO CLN L90.5IN SHTH DIA1.7MM BRIST DIA5-7MM 2-6MM